# Patient Record
Sex: FEMALE | Race: WHITE | NOT HISPANIC OR LATINO | Employment: FULL TIME | URBAN - METROPOLITAN AREA
[De-identification: names, ages, dates, MRNs, and addresses within clinical notes are randomized per-mention and may not be internally consistent; named-entity substitution may affect disease eponyms.]

---

## 2017-01-10 ENCOUNTER — ALLSCRIPTS OFFICE VISIT (OUTPATIENT)
Dept: OTHER | Facility: OTHER | Age: 57
End: 2017-01-10

## 2017-05-02 ENCOUNTER — LAB CONVERSION - ENCOUNTER (OUTPATIENT)
Dept: OTHER | Facility: OTHER | Age: 57
End: 2017-05-02

## 2017-05-02 LAB
CHOLEST SERPL-MCNC: 163 MG/DL (ref 125–200)
CHOLEST/HDLC SERPL: 3.4 (CALC)
HBA1C MFR BLD HPLC: 5.7 % OF TOTAL HGB
HDLC SERPL-MCNC: 48 MG/DL
LDL CHOLESTEROL (HISTORICAL): 98 MG/DL (CALC)
NON-HDL-CHOL (CHOL-HDL) (HISTORICAL): 115 MG/DL (CALC)
TRIGL SERPL-MCNC: 87 MG/DL

## 2017-05-04 ENCOUNTER — ALLSCRIPTS OFFICE VISIT (OUTPATIENT)
Dept: OTHER | Facility: OTHER | Age: 57
End: 2017-05-04

## 2017-10-09 ENCOUNTER — ALLSCRIPTS OFFICE VISIT (OUTPATIENT)
Dept: OTHER | Facility: OTHER | Age: 57
End: 2017-10-09

## 2017-10-19 ENCOUNTER — ALLSCRIPTS OFFICE VISIT (OUTPATIENT)
Dept: OTHER | Facility: OTHER | Age: 57
End: 2017-10-19

## 2017-10-19 DIAGNOSIS — M25.579 PAIN IN ANKLE: ICD-10-CM

## 2017-10-20 NOTE — PROGRESS NOTES
Assessment  1  Pain, joint, ankle and foot, unspecified laterality (094 36) (Y39 [id number])    Plan  Pain, joint, ankle and foot, unspecified laterality    · * XR ANKLE 3+ VIEW RIGHT; Status:Active; Requested TCE:[id number];     Discussion/Summary    1  R ankle pain and swelling- possible OA with achilles tendinitis  Check xray R ankle and then refer to [facility]  Chief Complaint  1 wk follow up to right ankle pain  Pt  noted the swollen has gone down some, she continues to have pain in right ankle  kck      History of Present Illness  Patient here today for continued right ankle pain and Achilles tenderness  She states that the Mobic that was given to her about a week ago did not make any difference but she continues to take it on a daily basis  She does not remember any injury and states that she has been mostly wearing her flip-flops because she feels the arch is very good and has good support  She often hears a clicking coming from her ankle when she moves side to side  She states she did have issues with her right ankle in the past and had to wear a boot but upon looking in her past records it was actually her left ankle which she had x-rays and MRIs for  At that time she was seeing Dr Lavern rabago with Ortho  Patient states she did see Dr Tyree Lambert podiatrist for some problem in the past as well  Review of Systems    Constitutional: No fever, no chills, feels well, no tiredness, no recent weight gain or weight loss  Eyes: No complaints of eye pain, no red eyes, no eyesight problems, no discharge, no dry eyes, no itching of eyes  ENT: no complaints of earache, no loss of hearing, no nose bleeds, no nasal discharge, no sore throat, no hoarseness  Cardiovascular: No complaints of slow heart rate, no fast heart rate, no chest pain, no palpitations, no leg claudication, no lower extremity edema  Respiratory: No complaints of shortness of breath, no wheezing, no cough, no SOB on exertion, no orthopnea, no PND  Gastrointestinal: No complaints of abdominal pain, no constipation, no nausea or vomiting, no diarrhea, no bloody stools  Genitourinary: No complaints of dysuria, no incontinence, no pelvic pain, no dysmenorrhea, no vaginal discharge or bleeding  Musculoskeletal: as noted in HPI  Integumentary: No complaints of skin rash or lesions, no itching, no skin wounds, no breast pain or lump  Neurological: No complaints of headache, no confusion, no convulsions, no numbness, no dizziness or fainting, no tingling, no limb weakness, no difficulty walking  Psychiatric: Not suicidal, no sleep disturbance, no anxiety or depression, no change in personality, no emotional problems  Endocrine: No complaints of proptosis, no hot flashes, no muscle weakness, no deepening of the voice, no feelings of weakness  Hematologic/Lymphatic: No complaints of swollen glands, no swollen glands in the neck, does not bleed easily, does not bruise easily  ROS reviewed  Active Problems  1  Acute sinusitis (461 9) (J01 90)   2  Acute upper respiratory infection (465 9) (J06 9)   3  Allergic dermatitis (692 9) (L23 9)   4  Allergic rhinitis (477 9) (J30 9)   5  Anisocoria (379 41) (H57 02)   6  Asymptomatic menopausal state (V49 81) (Z78 0)   7  Attention deficit hyperactivity disorder (314 01) (F90 9)   8  Bronchitis, acute (466 0) (J20 9)   9  Chest pain (786 50) (R07 9)   10  Chronic sinusitis (473 9) (J32 9)   11  Cough (786 2) (R05)   12  Degenerative joint disease (715 90) (M19 90)   13  Depression with anxiety (300 4) (F41 8)   14  Dizziness (780 4) (R42)   15  Edema (782 3) (R60 9)   16  Edema (782 3) (R60 9)   17  Encounter for screening colonoscopy (V76 51) (Z12 11)   18  Encounter for screening mammogram for breast cancer (V76 12) (Z12 31)   19  Foot Pain (Soft Tissue) (729 5)   20  HTN (hypertension) (401 9) (I10)   21  Hyperglycemia (790 29) (R73 9)   22  Hyperlipidemia (272 4) (E78 5)   23   Insomnia (780 52) (G47 00)   24  Labile Hypertension (On Exam)   25  Memory disturbance (780 93) (R41 3)   26  Microscopic hematuria (599 72) (R31 29)   27  Multiple Environmental Allergies (V15 05)   28  Nausea (787 02) (R11 0)   29  Osteoporosis (733 00) (M81 0)   30  Other screening mammogram (V76 12) (Z12 31)   31  Pain, joint, ankle and foot, unspecified laterality (719 47) (M25 579)   32  Pericardial cyst (746 89) (Q24 8)   33  Pre-op testing (V72 84) (Z01 818)   34  Reactive airway disease (493 90) (J45 909)   35  Sciatica (724 3) (M54 30)   36  Sinus disease (473 9) (J34 9)   37  Urinary tract infection (599 0) (N39 0)   38  Visit for routine gyn exam (V72 31) (Z01 419)   39  Vitamin D deficiency (268 9) (E55 9)    Past Medical History  1  History of Anxiety (300 00) (F41 9)   2  History of Depression (311) (F32 9)   3  History of High cholesterol (272 0) (E78 00)   4  History of sinusitis (V12 69) (Z87 09)    The active problems and past medical history were reviewed and updated today  Surgical History  1  History of Breast Surgery Puncture Aspiration Of Cyst   2  History of Exploratory Laparoscopy   3  History of Sinus Surgery    The surgical history was reviewed and updated today  Family History  Father    1  Family history of CABG  Sister    2  Family history of Type 2 Diabetes Mellitus  Brother    3  Family history of Bipolar Disorder   4  Family history of Depression With Anxiety   5  Family history of malignant neoplasm of esophagus (V16 0) (Z80 0)  Maternal Grandfather    6  Family history of Diabetes Mellitus (V18 0)  Family History    7  Family history of Depression With Anxiety   8  Family history of Pure Hypercholesterolemia    The family history was reviewed and updated today         Social History   · Denied: History of Alcohol Use (History)   · Completed college, associates degree   · Employed   · Lives with spouse   ·    · Never a smoker   · No alcohol use   · No caffeine use   · No drug use · No secondhand smoke exposure (V49 89) (Z78 9)  The social history was reviewed and updated today  Current Meds   1  Anti-Oxidant TABS; Therapy: (Recorded:09Oct2017) to Recorded   2  Atenolol 25 MG Oral Tablet; take 1 tablet every day; Therapy: 23VOR7896 to ([facility])  Requested for: 06QVI4042; Last   GT:85EGC3224 Ordered   3  Atorvastatin Calcium 80 MG Oral Tablet; TAKE ONE tablet(s) DAILY at bedtime; Therapy: 71Mof2670 to (Evaluate:29Gbe7288)  Requested for: 10Aug2017; Last   Rx:10Aug2017 Ordered   4  Calcium 1500 MG TABS; Therapy: (Recorded:09Oct2017) to Recorded   5  DULoxetine HCl - 60 MG Oral Capsule Delayed Release Particles; TAKE 1 CAPSULE   Daily; Therapy: 54TJO1760 to (Last Rx:70Abm8915)  Requested for: 45Dif3951 Ordered   6  Fluticasone Propionate 50 MCG/ACT Nasal Suspension; USE 2 SPRAYS IN EACH   NOSTRIL ONCE DAILY; Therapy: 06RKN1122 to (Last Rx:09Bsm6758)  Requested for: 62Vil5153 Ordered   7  Meloxicam 7 5 MG Oral Tablet; Take 1 tablet daily; Therapy: 95GRS5184 to (Last Rx:09Oct2017)  Requested for: 35PMJ1516 Ordered   8  TraZODone HCl - 50 MG Oral Tablet; TAKE 1 TABLET AT BEDTIME; Therapy: 47KDF3989 to (Last Rx:09Oct2017)  Requested for: 81ZYY9138 Ordered   9  Ventolin  (90 Base) MCG/ACT Inhalation Aerosol Solution; INHALE 2 PUFFS   EVERY 6 HOURS AS NEEDED; Therapy: 80GLP3809 to [phone])  Requested for: 98QOF1429; Last   Rx:07Iqm5684 Ordered   10  Vitamin C 500 MG Oral Tablet; Therapy: (Recorded:22Axn8072) to Recorded   11  Vitamin D 2000 UNIT Oral Capsule; TAKE 1 CAPSULE Daily; Therapy: 08CWV0906 to (Last Rx:21Knz7069) Ordered    The medication list was reviewed and updated today  Allergies  1   No Known Drug Allergies    Vitals  Vital Signs    Recorded: 46QWK3825 03:10PM   Heart Rate 80   Systolic 021, LUE, Sitting   Diastolic 60, LUE, Sitting   Height 5 ft 2 in   Weight 150 lb 2 oz   BMI Calculated 27 46   BSA Calculated 1 69 Physical Exam    Constitutional   General appearance: No acute distress, well appearing and well nourished  Eyes   Conjunctiva and lids: No swelling, erythema or discharge  Ears, Nose, Mouth, and Throat   External inspection of ears and nose: Normal     Pulmonary   Respiratory effort: No increased work of breathing or signs of respiratory distress  Musculoskeletal   Inspection/palpation of joints, bones, and muscles: Abnormal  -- Right outer malleolus with mild edema and no warmth mild decreased range of motion  No tenderness to palpation  Tender to Achilles tendon insertion to the heel  Health Management  Other screening mammogram   Digital Bilateral Screening Mammogram With CAD; every 1 year; Next Due: 44WQW4795;  Overdue    Future Appointments    Date/Time Provider Specialty Site   11/15/2017 08:00 AM Nathanael Benz, [address]     Signatures   Electronically signed by : Adelina Monreal, [facility]; Oct 19 2017  3:46PM EST                       (Author)    Electronically signed by : Valentino Bence, DO; Oct 19 2017  4:22PM EST

## 2017-10-28 NOTE — PROGRESS NOTES
Assessment    1  Degenerative joint disease (215 90) (M19 90)    Plan  Allergic rhinitis    · Stop: Fluzone Quadrivalent 0 5 ML Intramuscular Suspension PrefilledSyringe  Degenerative joint disease    · Meloxicam 7 5 MG Oral Tablet; Take 1 tablet daily  Insomnia    · TraZODone HCl - 50 MG Oral Tablet; TAKE 1 TABLET AT BEDTIME  Reactive airway disease    · Ventolin  (90 Base) MCG/ACT Inhalation Aerosol Solution; INHALE 2PUFFS EVERY 6 HOURS AS NEEDED  Visit for routine gyn exam    · Stef Pandya Nurse Practitioner Co-Management  *  Status: Hold For -Scheduling  Requested for: 10ESV6015  Care Summary provided  : Yes    Discussion/Summary    #1  Degenerative joint disease of the right ankle-patient was placed on meloxicam 7 5 mg 1 daily with food #14 was offered an x-ray of the ankle but she wanted to wait to see how the meloxicam work  to get rid of her ankle pain  up 1 week for recheck  Her ankle pain which is intermittent may also be the result of Lipitor 80 mg daily  Possible side effects of new medications were reviewed with the patient/guardian today  The treatment plan was reviewed with the patient/guardian  The patient/guardian understands and agrees with the treatment plan     Self Referrals: No      Chief Complaint  Right ankle bothersome x 2 weeks  No specific recent injury  Pain varies with position  Some swelling  immunization declined - [facility]      History of Present Illness  HPI: This is a 57-year-old female who comes in with right ankle pain  She has not had any specific injury recently but several years ago she had an ankle injury  Occasionally the ankle swells  Right now the pain is located under the right medial malleolus  She has used Aleve which has helped  Review of Systems   Constitutional: No fever, no chills, feels well, no tiredness, no recent weight gain or loss    Cardiovascular: no complaints of slow or fast heart rate, no chest pain, no palpitations, no leg claudication or lower extremity edema  Respiratory: no complaints of shortness of breath, no wheezing, no dyspnea on exertion, no orthopnea or PND  Genitourinary: no complaints of dysuria, no incontinence, no pelvic pain, no dysmenorrhea, no vaginal discharge or abnormal vaginal bleeding  Musculoskeletal: joint stiffness-- and-- Right ankle pain, but-- as noted in HPI,-- no joint swelling,-- no limb pain,-- no myalgias-- and-- no limb swelling--   The patient presents with complaints of sudden onset of moderate right ankle arthralgias  ROS reviewed  Active Problems  1  Acute sinusitis (461 9) (J01 90)   2  Acute upper respiratory infection (465 9) (J06 9)   3  Allergic dermatitis (692 9) (L23 9)   4  Allergic rhinitis (477 9) (J30 9)   5  Anisocoria (379 41) (H57 02)   6  Ankle pain, unspecified laterality   7  Asymptomatic menopausal state (V49 81) (Z78 0)   8  Attention deficit hyperactivity disorder (314 01) (F90 9)   9  Bronchitis, acute (466 0) (J20 9)   10  Chest pain (786 50) (R07 9)   11  Chronic sinusitis (473 9) (J32 9)   12  Cough (786 2) (R05)   13  Depression with anxiety (300 4) (F41 8)   14  Dizziness (780 4) (R42)   15  Edema (782 3) (R60 9)   16  Edema (782 3) (R60 9)   17  Encounter for screening colonoscopy (V76 51) (Z12 11)   18  Encounter for screening mammogram for breast cancer (V76 12) (Z12 31)   19  Foot Pain (Soft Tissue) (729 5)   20  HTN (hypertension) (401 9) (I10)   21  Hyperglycemia (790 29) (R73 9)   22  Hyperlipidemia (272 4) (E78 5)   23  Insomnia (780 52) (G47 00)   24  Labile Hypertension (On Exam)   25  Memory disturbance (780 93) (R41 3)   26  Microscopic hematuria (599 72) (R31 29)   27  Multiple Environmental Allergies (V15 05)   28  Nausea (787 02) (R11 0)   29  Osteoporosis (733 00) (M81 0)   30  Other screening mammogram (V76 12) (Z12 31)   31  Pericardial cyst (746 89) (Q24 8)   32  Pre-op testing (V72 84) (Z01 818)   33  Reactive airway disease (102 90) (J45 909)   34  Sciatica (724 3) (M54 30)   35  Sinus disease (473 9) (J34 9)   36  Urinary tract infection (599 0) (N39 0)   37  Vitamin D deficiency (268 9) (E55 9)    Past Medical History  1  History of Anxiety (300 00) (F41 9)   2  History of Depression (311) (F32 9)   3  History of High cholesterol (272 0) (E78 00)   4  History of sinusitis (V12 69) (Z87 09)  Active Problems And Past Medical History Reviewed: The active problems and past medical history were reviewed and updated today  Family History  Father    1  Family history of CABG  Sister    2  Family history of Type 2 Diabetes Mellitus  Brother    3  Family history of Bipolar Disorder   4  Family history of Depression With Anxiety   5  Family history of malignant neoplasm of esophagus (V16 0) (Z80 0)  Maternal Grandfather    6  Family history of Diabetes Mellitus (V18 0)  Family History    7  Family history of Depression With Anxiety   8  Family history of Pure Hypercholesterolemia  Family History Reviewed: The family history was reviewed and updated today  Social History   · Denied: History of Alcohol Use (History)   · Completed college, associates degree   · Employed   · Lives with spouse   ·    · Never a smoker   · No alcohol use   · No caffeine use   · No drug use   · No secondhand smoke exposure (V49 89) (Z78 9)  The social history was reviewed and updated today  The social history was reviewed and is unchanged  Surgical History    1  History of Breast Surgery Puncture Aspiration Of Cyst   2  History of Exploratory Laparoscopy   3  History of Sinus Surgery  Surgical History Reviewed: The surgical history was reviewed and updated today  Current Meds   1  Anti-Oxidant TABS; Therapy: (Recorded:80Zmh9927) to Recorded   2  Atenolol 25 MG Oral Tablet; take 1 tablet every day; Therapy: 00IQE7453 to (Filipe Norman)  Requested for: 40VSN8077; Last RZ:81RRB5021 Ordered   3   Atorvastatin Calcium 80 MG Oral Tablet; TAKE ONE tablet(s) DAILY at bedtime; Therapy: 82Mdw4016 to (Evaluate:68Fdu9008)  Requested for: 41Yhf0230; Last Rx:04Uto7499 Ordered   4  Calcium 1500 MG TABS; Therapy: (Recorded:09Oct2017) to Recorded   5  DULoxetine HCl - 60 MG Oral Capsule Delayed Release Particles; TAKE 1 CAPSULE Daily; Therapy: 79EKZ6941 to (Last Rx:51Lte8229)  Requested for: 68Wnt3601 Ordered   6  Fluticasone Propionate 50 MCG/ACT Nasal Suspension; USE 2 SPRAYS IN EACH NOSTRIL ONCE DAILY; Therapy: 74VGS9553 to (Last Rx:88Nbp9538)  Requested for: 65Yaw5205 Ordered   7  TraZODone HCl - 50 MG Oral Tablet; TAKE 1 TABLET AT BEDTIME; Therapy: 98TZF2233 to (Last Rx:61Gcp8015)  Requested for: 67Dgy0162 Ordered   8  Ventolin  (90 Base) MCG/ACT Inhalation Aerosol Solution; INHALE 2 PUFFS EVERY 6 HOURS AS NEEDED; Therapy: 18PUO5041 to ([phone])  Requested for: 99YNY5833; Last Rx:31Mar2014 Ordered   9  Vitamin C 500 MG Oral Tablet; Therapy: (Recorded:09Oct2017) to Recorded   10  Vitamin D 2000 UNIT Oral Capsule; TAKE 1 CAPSULE Daily; Therapy: 72MJG1961 to (Last Rx:18Mar2015) Ordered    The medication list was reviewed and updated today  Allergies  1  No Known Drug Allergies    Vitals   Recorded: 20ZCO7073 06:00PM   Heart Rate 80, L Popliteal   Pulse Quality Regular, L Popliteal   Systolic 615, LLE, Sitting   Diastolic 90, LLE, Sitting   Height 5 ft 2 in   Weight 149 lb    BMI Calculated 27 25   BSA Calculated 1 69       Physical Exam   Constitutional  General appearance: No acute distress, well appearing and well nourished  Pulmonary  Auscultation of lungs: Clear to auscultation  Cardiovascular  Auscultation of heart: Normal rate and rhythm, normal S1 and S2, without murmurs  Examination of extremities for edema and/or varicosities: Normal    Musculoskeletal  Inspection/palpation of joints, bones, and muscles: Abnormal  -- Tender to palpation inferior to right medial malleolus with good range of motion of foot          Results/Data  PHQ-9 Adult Depression Screening 90QXL3385 06:27PM User, Margarita     Test Name Result Flag Reference   PHQ-9 Adult Depression Score 4       Over the last two weeks, how often have you been bothered by any of the following problems? Little interest or pleasure in doing things: Not at all - 0 Feeling down, depressed, or hopeless: Not at all - 0 Trouble falling or staying asleep, or sleeping too much: More than half the days - 2 Feeling tired or having little energy: Several days - 1 Poor appetite or over eating: Not at all - 0 Feeling bad about yourself - or that you are a failure or have let yourself or your family down: Not at all - 0 Trouble concentrating on things, such as reading the newspaper or watching television: Several days - 1 Moving or speaking so slowly that other people could have noticed  Or the opposite -  being so fidgety or restless that you have been moving around a lot more than usual: Not at all - 0 Thoughts that you would be better off dead, or of hurting yourself in some way: Not at all - 0   PHQ-9 Adult Depression Screening Negative     PHQ-9 Difficulty Level Not difficult at all     PHQ-9 Severity Minimal Depression         Future Appointments    Date/Time Provider Specialty Site   11/15/2017 08:00 AM Vani Garcias [facility]       Signatures   Electronically signed by : Lenoard Gaucher, [facility]; Oct  9 2017  6:31PM EST                       (Author)    Electronically signed by :  DARCY Mcdonald ; Oct 10 2017  9:12AM EST

## 2017-11-04 DIAGNOSIS — E55.9 VITAMIN D DEFICIENCY: ICD-10-CM

## 2017-11-04 DIAGNOSIS — R73.9 HYPERGLYCEMIA: ICD-10-CM

## 2017-11-04 DIAGNOSIS — E78.5 HYPERLIPIDEMIA: ICD-10-CM

## 2017-11-04 DIAGNOSIS — M81.0 AGE-RELATED OSTEOPOROSIS WITHOUT CURRENT PATHOLOGICAL FRACTURE: ICD-10-CM

## 2017-11-04 DIAGNOSIS — I10 ESSENTIAL (PRIMARY) HYPERTENSION: ICD-10-CM

## 2018-01-09 NOTE — MISCELLANEOUS
Provider Comments  Provider Comments:   Patient no-showed for her appt with me today at 3:00PM       Signatures   Electronically signed by : Karolina Henderson LCSW; Apr 25 2016  3:27PM EST                       (Author)

## 2018-01-11 NOTE — RESULT NOTES
Verified Results  ECHO COMPLETE WITH CONTRAST IF INDICATED 48QYR0391 08:58AM [facility]     Test Name Result Flag Reference   ECHO COMPLETE WITH CONTRAST IF INDICATED (Report)     [address]   [facility], [address] [facility]   [phone]     Transthoracic Echocardiogram   2D, M-mode, Doppler, and Color Doppler     Study date: 2016     Patient: Soy Young   MR number: [id number]   Account number: [de-identified]   : 1960   Age: 60 years   Gender: Female   Status: Outpatient   Location: Echo lab   Height: 62 in   Weight: 147 lb   BP: 122/ 64 mmHg     Indications: Congenital pericardial defect     Diagnoses: 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 9 - Congenital malformation of heart, unspecified     Sonographer: JAQUELINE Huang   Referring Physician: Raymon Ruff Aundria Osgood PAC   Group: [facility]   Interpreting Physician: Genoveva Palomo MD     SUMMARY     SUMMARY:   The small pericardial cyst noted on chest x-ray and chest CT was not visualized   well on echocardiogram  There was no extrinsic compression of any of the   cardiac chambers  LEFT VENTRICLE:   Systolic function was normal  Ejection fraction was estimated to be 65 %  There were no regional wall motion abnormalities  There was no evidence of concentric hypertrophy  MITRAL VALVE:   There was trace regurgitation  AORTIC VALVE:   There was trace regurgitation  TRICUSPID VALVE:   There was trace regurgitation  HISTORY: PRIOR HISTORY: Chest pain, dizziness, hypertension, edema,   hyperlipidemia     PROCEDURE: The procedure was performed in the echo lab  This was a routine   study  The transthoracic approach was used  The study included complete 2D   imaging, M-mode, complete spectral Doppler, and color Doppler  The heart rate   was 67 bpm, at the start of the study  Images were obtained from the   parasternal, apical, subcostal, and suprasternal notch acoustic windows   Image   quality was adequate  LEFT VENTRICLE: Size was normal  Systolic function was normal  Ejection   fraction was estimated to be 65 %  There were no regional wall motion   abnormalities  Wall thickness was normal  There was no evidence of concentric   hypertrophy  DOPPLER: Left ventricular diastolic function parameters were   normal      RIGHT VENTRICLE: The size was normal  Systolic function was normal  Wall   thickness was normal      LEFT ATRIUM: Size was normal      RIGHT ATRIUM: Size was normal      MITRAL VALVE: Valve structure was normal  There was normal leaflet separation  DOPPLER: The transmitral velocity was within the normal range  There was no   evidence for stenosis  There was trace regurgitation  AORTIC VALVE: The valve was trileaflet  Leaflets exhibited normal thickness and   normal cuspal separation  DOPPLER: Transaortic velocity was within the normal   range  There was no evidence for stenosis  There was trace regurgitation  TRICUSPID VALVE: The valve structure was normal  There was normal leaflet   separation  DOPPLER: The transtricuspid velocity was within the normal range  There was no evidence for stenosis  There was trace regurgitation  Pulmonary   artery systolic pressure was within the normal range  PULMONIC VALVE: Leaflets exhibited normal thickness, no calcification, and   normal cuspal separation  DOPPLER: The transpulmonic velocity was within the   normal range  There was no regurgitation  PERICARDIUM: There was no pericardial effusion  The pericardium was normal in   appearance  AORTA: The root exhibited normal size       SYSTEM MEASUREMENT TABLES     2D   %FS: 35 76 %   Ao Diam: 3 02 cm   EDV(Teich): 103 64 ml   EF(Cube): 73 49 %   EF(Teich): 65 25 %   ESV(Cube): 27 97 ml   ESV(Teich): 36 02 ml   IVSd: 0 85 cm   LA Area: 13 81 cm2   LA Diam: 2 67 cm   LVEDV MOD A4C: 67 97 ml   LVEF MOD A4C: 69 45 %   LVESV MOD A4C: 20 76 ml   LVIDd: 4 72 cm   LVIDs: 3 04 cm   LVLd A4C: 6 79 cm   LVLs A4C: 4 95 cm   LVPWd: 0 93 cm   RA Area: 12 12 cm2   SV MOD A4C: 47 21 ml   SV(Cube): 77 52 ml   SV(Teich): 67 62 ml   rv diam: 2 37 cm     CW   TR Vmax: 2 11 m/s   TR maxP 88 mmHg     MM   TAPSE: 1 98 cm     PW   E': 0 1 m/s   E/E': 11 26   MV A Yasmany: 0 93 m/s   MV Dec [place]: 5 1 m/s2   MV DecT: 222 88 ms   MV E Yasmany: 1 14 m/s   MV E/A Ratio: 1 22     [employer] Accredited Echocardiography Laboratory     Prepared and electronically signed by     Lynnette Farfan MD   Signed [id number] 17:13:37

## 2018-01-12 VITALS
HEIGHT: 62 IN | WEIGHT: 150.13 LBS | HEART RATE: 80 BPM | DIASTOLIC BLOOD PRESSURE: 60 MMHG | SYSTOLIC BLOOD PRESSURE: 118 MMHG | BODY MASS INDEX: 27.63 KG/M2

## 2018-01-12 NOTE — RESULT NOTES
Verified Results  (1) URINE CULTURE 21Oct2016 11:26AM Austin Joey Order Number: [id number]     Test Name Result Flag Reference   CLINICAL REPORT (Report)     Test:        Urine culture  Specimen Type:   Urine  Specimen Date:   10/21/2016 11:26 AM  Result Date:    10/24/2016 8:22 PM  Result Status:   Final result  Resulting Lab:   [place] [address]            Tel: [phone]      CULTURE                                       ------------------                                   60,000-69,000 cfu/ml Escherichia coli    10,000-19,000 cfu/ml Mixed Contaminants X2      SUSCEPTIBILITY                                   ------------------                                                       Escherichia coli  METHOD                 LIBRA  -------------------------------------  -------------------------  AMPICILLIN ($$)             <=8 00 ug/ml Susceptible  AZTREONAM ($$$)             <=8 ug/ml   Susceptible  CEFAZOLIN ($)              <=8 00 ug/ml Susceptible  CIPROFLOXACIN ($)            <=1 00 ug/ml Susceptible  GENTAMICIN ($$)             <=4 ug/ml   Susceptible  LEVOFLOXACIN ($)            <=2 00 ug/ml Susceptible  NITROFURANTOIN             <=32 ug/ml  Susceptible  PIPERACILLIN + TAZOBACTAM ($$$)     <=16 ug/ml  Susceptible  TETRACYCLINE              <=4 ug/ml   Susceptible  TOBRAMYCIN ($)             <=4 ug/ml   Susceptible  TRIMETHOPRIM + SULFAMETHOXAZOLE ($$$)  <=2/38 ug/ml Susceptible

## 2018-01-13 VITALS
TEMPERATURE: 97.4 F | WEIGHT: 151.13 LBS | HEART RATE: 76 BPM | SYSTOLIC BLOOD PRESSURE: 120 MMHG | HEIGHT: 62 IN | BODY MASS INDEX: 27.81 KG/M2 | DIASTOLIC BLOOD PRESSURE: 86 MMHG

## 2018-01-13 VITALS
SYSTOLIC BLOOD PRESSURE: 122 MMHG | WEIGHT: 148 LBS | HEIGHT: 62 IN | HEART RATE: 64 BPM | BODY MASS INDEX: 27.23 KG/M2 | DIASTOLIC BLOOD PRESSURE: 64 MMHG

## 2018-01-14 VITALS
DIASTOLIC BLOOD PRESSURE: 90 MMHG | SYSTOLIC BLOOD PRESSURE: 144 MMHG | HEIGHT: 62 IN | HEART RATE: 80 BPM | WEIGHT: 149 LBS | BODY MASS INDEX: 27.42 KG/M2

## 2018-01-14 NOTE — MISCELLANEOUS
Provider Comments  Provider Comments:   [facility] regarding pts missed appointment advised pt to call back to reschedule appointment        Signatures   Electronically signed by : Jules Harrison [facility]; Feb 19 2016  8:44AM EST                       (Author)    Electronically signed by : DARCY Hyatt ; Feb 19 2016  9:42AM EST                       (Author)

## 2018-01-16 NOTE — RESULT NOTES
Message  Patient aware of her CXR results   She will schedule her own Echo then follow up with Cardiologist       Plan  Pericardial cyst    · *1 - [facility] (CARDIOLOGY ) Physician Referral  Consult after her  Echocardiogram   Status: Active - Retrospective By Protocol Authorization  Requested  for: [id number]  Care Summary provided  : Yes    Signatures   Electronically signed by : Garett Hodge, ; Nov 17 2016  9:49AM EST                       (Author)

## 2018-01-17 NOTE — RESULT NOTES
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(a cyst on the "Saran wrap" that surrounds the heart)  She has had this since birth but it was never detected until now and was found incidentally  This will not prevent her from getting her surgery she is technically cleared forms were sent and faxed after completed  After her surgery she does need an echo and then to see a cardiologist for review  Verified Results  * XR CHEST PA & LATERAL 25Oct2016 05:01PM Bulmaro Briggs Order Number: [id number]     Test Name Result Flag Reference   XR CHEST PA & LATERAL (Report)     CHEST      INDICATION: Preoperative evaluation  COMPARISON: CT chest 6/5/2009     VIEWS: Frontal and lateral projections; 2 images     FINDINGS:        Cardiomediastinal silhouette demonstrates a normal heart size with obscuration of the right heart border which may represent an enlarging pericardial cyst      The lungs are clear  No pneumothorax or pleural effusion  Visualized osseous structures appear within normal limits for the patient's age  IMPRESSION:     Probable enlarging right-sided pericardial cyst        Workstation performed: [id number]     Signed by: Yuri Reyes MD   10/26/16       Plan  Pericardial cyst    · ECHO LIMITED WITH CONTRAST IF INDICATED; Status:Need Information - Financial  Authorization;  Requested U:[id number];     Signatures   Electronically signed by : Ashleigh Cordova, [facility]; Oct 27 2016 11:46AM EST                       (Author)

## 2018-02-17 ENCOUNTER — OFFICE VISIT (OUTPATIENT)
Dept: FAMILY MEDICINE CLINIC | Facility: CLINIC | Age: 58
End: 2018-02-17
Payer: COMMERCIAL

## 2018-02-17 VITALS
TEMPERATURE: 98.1 F | HEIGHT: 63 IN | DIASTOLIC BLOOD PRESSURE: 80 MMHG | RESPIRATION RATE: 20 BRPM | HEART RATE: 80 BPM | SYSTOLIC BLOOD PRESSURE: 132 MMHG | BODY MASS INDEX: 26.93 KG/M2 | WEIGHT: 152 LBS

## 2018-02-17 DIAGNOSIS — J20.9 ACUTE BRONCHITIS, UNSPECIFIED ORGANISM: Primary | ICD-10-CM

## 2018-02-17 PROBLEM — M19.90 DEGENERATIVE JOINT DISEASE: Status: ACTIVE | Noted: 2017-10-09

## 2018-02-17 PROCEDURE — 99214 OFFICE O/P EST MOD 30 MIN: CPT | Performed by: PHYSICIAN ASSISTANT

## 2018-02-17 PROCEDURE — 3008F BODY MASS INDEX DOCD: CPT | Performed by: PHYSICIAN ASSISTANT

## 2018-02-17 RX ORDER — PROMETHAZINE HYDROCHLORIDE AND CODEINE PHOSPHATE 6.25; 1 MG/5ML; MG/5ML
5 SYRUP ORAL EVERY 4 HOURS PRN
Qty: 118 ML | Refills: 0 | Status: SHIPPED | OUTPATIENT
Start: 2018-02-17 | End: 2018-02-27

## 2018-02-17 RX ORDER — AZITHROMYCIN 250 MG/1
TABLET, FILM COATED ORAL
Qty: 6 TABLET | Refills: 0 | Status: SHIPPED | OUTPATIENT
Start: 2018-02-17 | End: 2018-02-21

## 2018-02-17 NOTE — PATIENT INSTRUCTIONS
Problem List Items Addressed This Visit     Bronchitis, acute - Primary     Very highly likely started as influenza however patient has missed the window where Tamiflu initiation would be of benefit  Due to lung exam findings will cover for bronchitis with bacterial agent using Zithromax as directed  Increase fluids continue Delsym as needed during the day and Phenergan with codeine as needed at night for cough           Relevant Medications    azithromycin (ZITHROMAX) 250 mg tablet    promethazine-codeine (PHENERGAN WITH CODEINE) 6 25-10 mg/5 mL syrup

## 2018-02-17 NOTE — ASSESSMENT & PLAN NOTE
Very highly likely started as influenza however patient has missed the window where Tamiflu initiation would be of benefit  Due to lung exam findings will cover for bronchitis with bacterial agent using Zithromax as directed  Increase fluids continue Delsym as needed during the day and Phenergan with codeine as needed at night for cough

## 2018-02-17 NOTE — PROGRESS NOTES
Assessment/Plan:    Bronchitis, acute  Very highly likely started as influenza however patient has missed the window where Tamiflu initiation would be of benefit  Due to lung exam findings will cover for bronchitis with bacterial agent using Zithromax as directed  Increase fluids continue Delsym as needed during the day and Phenergan with codeine as needed at night for cough  Diagnoses and all orders for this visit:    Acute bronchitis, unspecified organism  -     azithromycin (ZITHROMAX) 250 mg tablet; Take two tablets on day one and then one tablet daily for the next four days  -     promethazine-codeine (PHENERGAN WITH CODEINE) 6 25-10 mg/5 mL syrup; Take 5 mL by mouth every 4 (four) hours as needed for cough for up to 10 days          Subjective:      Patient ID: Raven Taveras is a 59 y o  female  Cc: Cough, headache, fever last night of 100 00, fatigue x 4 days  Alverto Servant    Patient's  came home sick on Monday coughing and she developed symptoms on Tuesday  She states that as the week has gone but she has felt worse although her fever has broke with the 100 last night  She states that the most bothersome issue is her persistent cough Delsym with little relief  No nausea vomiting or diarrhea  URI    This is a new problem  The current episode started in the past 7 days  The problem has been gradually worsening  The maximum temperature recorded prior to her arrival was 100 4 - 100 9 F  The fever has been present for 1 to 2 days  Associated symptoms include congestion, coughing, ear pain, headaches, rhinorrhea, sinus pain and a sore throat  Pertinent negatives include no diarrhea, nausea or vomiting  She has tried antihistamine and decongestant for the symptoms  The treatment provided mild relief         The following portions of the patient's history were reviewed and updated as appropriate: allergies, current medications, past family history, past medical history, past social history, past surgical history and problem list     Review of Systems   Constitutional: Positive for chills, fatigue and fever  HENT: Positive for congestion, ear pain, rhinorrhea, sinus pain and sore throat  Eyes: Negative  Respiratory: Positive for cough  Cardiovascular: Negative  Gastrointestinal: Negative  Negative for diarrhea, nausea and vomiting  Endocrine: Negative  Genitourinary: Negative  Musculoskeletal: Positive for arthralgias and myalgias  Skin: Negative  Allergic/Immunologic: Negative  Neurological: Positive for headaches  Hematological: Negative  Psychiatric/Behavioral: Negative  Objective:      Vitals:    02/17/18 0900   BP: 132/80   BP Location: Left arm   Patient Position: Sitting   Cuff Size: Standard   Pulse: 80   Resp: 20   Temp: 98 1 °F (36 7 °C)   TempSrc: Tympanic   Weight: 68 9 kg (152 lb)   Height: 5' 3" (1 6 m)          Physical Exam   Constitutional: She is oriented to person, place, and time  She appears well-developed and well-nourished  No distress  HENT:   Head: Normocephalic and atraumatic  Eyes: Conjunctivae are normal  Right eye exhibits no discharge  Left eye exhibits no discharge  Neck: Neck supple  Cardiovascular: Normal rate, regular rhythm and normal heart sounds  Exam reveals no gallop and no friction rub  No murmur heard  Pulmonary/Chest: Effort normal  No respiratory distress  She has no wheezes  She has rhonchi in the right middle field and the left middle field  She has no rales  Neurological: She is alert and oriented to person, place, and time  Skin: Skin is warm and dry  She is not diaphoretic  Psychiatric: She has a normal mood and affect  Judgment normal    Nursing note and vitals reviewed

## 2018-05-14 DIAGNOSIS — I10 ESSENTIAL HYPERTENSION: Primary | ICD-10-CM

## 2018-05-14 RX ORDER — ATENOLOL 25 MG/1
TABLET ORAL
Qty: 90 TABLET | Refills: 1 | Status: SHIPPED | OUTPATIENT
Start: 2018-05-14 | End: 2018-10-29 | Stop reason: SDUPTHER

## 2018-07-11 ENCOUNTER — TRANSCRIBE ORDERS (OUTPATIENT)
Dept: ADMINISTRATIVE | Facility: HOSPITAL | Age: 58
End: 2018-07-11

## 2018-08-21 NOTE — ASSESSMENT & PLAN NOTE
Fasting lipid panel ordered  Overdue  Also ordered a set of blood work for 6 months from now to get patient back on track  Call/card with results of labs due soon and return to office after blood work completed in 6 months as well

## 2018-08-21 NOTE — ASSESSMENT & PLAN NOTE
Fasting CMP and hemoglobin A1c ordered  Overdue  Also ordered a set of blood work for 6 months from now to get patient back on track  Call/card with results of labs due soon and return to office after blood work completed in 6 months as well

## 2018-08-21 NOTE — ASSESSMENT & PLAN NOTE
Vitamin-D level ordered  Overdue  Also ordered a set of blood work for 6 months from now to get patient back on track  Call/card with results of labs due soon and return to office after blood work completed in 6 months as well

## 2018-08-22 ENCOUNTER — OFFICE VISIT (OUTPATIENT)
Dept: FAMILY MEDICINE CLINIC | Facility: CLINIC | Age: 58
End: 2018-08-22
Payer: COMMERCIAL

## 2018-08-22 VITALS
DIASTOLIC BLOOD PRESSURE: 60 MMHG | WEIGHT: 151.6 LBS | HEART RATE: 68 BPM | BODY MASS INDEX: 26.85 KG/M2 | SYSTOLIC BLOOD PRESSURE: 104 MMHG

## 2018-08-22 DIAGNOSIS — E78.2 MIXED HYPERLIPIDEMIA: ICD-10-CM

## 2018-08-22 DIAGNOSIS — E55.9 VITAMIN D DEFICIENCY: ICD-10-CM

## 2018-08-22 DIAGNOSIS — I10 ESSENTIAL HYPERTENSION: Primary | ICD-10-CM

## 2018-08-22 DIAGNOSIS — R07.81 RIB PAIN ON LEFT SIDE: ICD-10-CM

## 2018-08-22 DIAGNOSIS — F51.01 PRIMARY INSOMNIA: ICD-10-CM

## 2018-08-22 DIAGNOSIS — Z12.31 SCREENING MAMMOGRAM, ENCOUNTER FOR: ICD-10-CM

## 2018-08-22 DIAGNOSIS — R73.9 HYPERGLYCEMIA: ICD-10-CM

## 2018-08-22 DIAGNOSIS — F41.8 DEPRESSION WITH ANXIETY: ICD-10-CM

## 2018-08-22 PROCEDURE — 3078F DIAST BP <80 MM HG: CPT | Performed by: PHYSICIAN ASSISTANT

## 2018-08-22 PROCEDURE — 99214 OFFICE O/P EST MOD 30 MIN: CPT | Performed by: PHYSICIAN ASSISTANT

## 2018-08-22 PROCEDURE — 3074F SYST BP LT 130 MM HG: CPT | Performed by: PHYSICIAN ASSISTANT

## 2018-08-22 RX ORDER — DULOXETIN HYDROCHLORIDE 60 MG/1
60 CAPSULE, DELAYED RELEASE ORAL DAILY
Qty: 90 CAPSULE | Refills: 3 | Status: SHIPPED | OUTPATIENT
Start: 2018-08-22 | End: 2018-08-28 | Stop reason: SDUPTHER

## 2018-08-22 RX ORDER — FLUTICASONE PROPIONATE 50 MCG
2 SPRAY, SUSPENSION (ML) NASAL DAILY
COMMUNITY
Start: 2015-09-30 | End: 2018-09-10 | Stop reason: SDUPTHER

## 2018-08-22 RX ORDER — ATORVASTATIN CALCIUM 80 MG/1
80 TABLET, FILM COATED ORAL DAILY
Qty: 90 TABLET | Refills: 3 | Status: SHIPPED | OUTPATIENT
Start: 2018-08-22 | End: 2018-08-28 | Stop reason: SDUPTHER

## 2018-08-22 NOTE — ASSESSMENT & PLAN NOTE
Pt with possible rib fracture from very hard/tight hug  Check xray in future if wanted  Declines today  Continue aleve up to 2 twice daily

## 2018-08-22 NOTE — PROGRESS NOTES
Assessment/Plan:    Hyperglycemia  Fasting CMP and hemoglobin A1c ordered  Overdue  Also ordered a set of blood work for 6 months from now to get patient back on track  Call/card with results of labs due soon and return to office after blood work completed in 6 months as well  Vitamin D deficiency  Vitamin-D level ordered  Overdue  Also ordered a set of blood work for 6 months from now to get patient back on track  Call/card with results of labs due soon and return to office after blood work completed in 6 months as well  Mixed hyperlipidemia  Fasting lipid panel ordered  Overdue  Also ordered a set of blood work for 6 months from now to get patient back on track  Call/card with results of labs due soon and return to office after blood work completed in 6 months as well  Essential hypertension  Very well control continue current medication  Rib pain on left side  Pt with possible rib fracture from very hard/tight hug  Check xray in future if wanted  Declines today  Continue aleve up to 2 twice daily  Depression with anxiety  Sightly uncontrolled due to weather, work stress and family stress  Pt would like to see how the next 1-2 months go before changing/tweeking her meds  Recheck in 2 months  Diagnoses and all orders for this visit:    Essential hypertension    Hyperglycemia  -     Comprehensive metabolic panel; Future  -     Hemoglobin A1C; Future  -     Hemoglobin A1C; Future  -     Comprehensive metabolic panel; Future    Vitamin D deficiency  -     Vitamin D 25 hydroxy; Future  -     Vitamin D 25 hydroxy; Future    Mixed hyperlipidemia  -     Lipid Panel with Direct LDL reflex; Future  -     Lipid Panel with Direct LDL reflex; Future  -     atorvastatin (LIPITOR) 80 mg tablet; Take 1 tablet (80 mg total) by mouth daily    Primary insomnia    Depression with anxiety  -     DULoxetine (CYMBALTA) 60 mg delayed release capsule;  Take 1 capsule (60 mg total) by mouth daily    Screening mammogram, encounter for  -     Mammo screening bilateral w 3d & cad; Future    Rib pain on left side    Other orders  -     fluticasone (FLONASE) 50 mcg/act nasal spray; 2 sprays into each nostril daily          Subjective:   CC: Follow up for chronic conditions, requesting blood work script and medication refills  c/o pain on left side in the rib area, pt  Got a hug several days ago and has been having pain since  kck    PHQ-9 Depression Screening    PHQ-9:    Frequency of the following problems over the past two weeks:       Little interest or pleasure in doing things:  1 - several days  Feeling down, depressed, or hopeless:  2 - more than half the days  Trouble falling or staying asleep, or sleeping too much:  0 - not at all  Feeling tired or having little energy:  3 - nearly every day  Poor appetite or overeatin - several days  Feeling bad about yourself - or that you are a failure or have let yourself or your family down:  2 - more than half the days  Trouble concentrating on things, such as reading the newspaper or watching television:  1 - several days  Moving or speaking so slowly that other people could have noticed  Or the opposite - being so fidgety or restless that you have been moving around a lot more than usual:  0 - not at all  Thoughts that you would be better off dead, or of hurting yourself in some way:  0 - not at all  PHQ-2 Score:  3  PHQ-9 Score:  10          Patient ID: John Tello is a 59 y o  female  John Tello is here for chronic conditions f/u including the diagnosis of Essential hypertension  (primary encounter diagnosis)  Hyperglycemia  Vitamin d deficiency  Mixed hyperlipidemia  Primary insomnia  Depression with anxiety  Screening mammogram, encounter for   Pt  states they are taking all medications as directed without complaints or side effects       Patient has definitely been feeling overwhelmed with her job and 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 really made her drop stressful this year, she is taking care both her parents and the weather has been so dismal   She definitely feels that she has decreased motivation decrease in energy without problems with suicidal or homicidal ideations  She is unsure if she should tweak or change any of her medications at this time or weight which she prefers to wait to see if the weather improvement coming up will help her  She states even her  noticed that she is feeling more under the weather than usual         The following portions of the patient's history were reviewed and updated as appropriate: allergies, current medications, past family history, past medical history, past social history, past surgical history and problem list     Review of Systems   Constitutional: Negative  HENT: Negative  Eyes: Negative  Respiratory: Negative  Cardiovascular: Negative  Gastrointestinal: Negative  Endocrine: Negative  Genitourinary: Negative  Musculoskeletal: Negative  Skin: Negative  Allergic/Immunologic: Negative  Neurological: Negative  Hematological: Negative  Psychiatric/Behavioral: Positive for dysphoric mood  Objective:      Vitals:    08/22/18 0821   BP: 104/60   BP Location: Left arm   Patient Position: Sitting   Pulse: 68   Weight: 68 8 kg (151 lb 9 6 oz)            Physical Exam   Constitutional: She is oriented to person, place, and time  She appears well-developed and well-nourished  No distress  HENT:   Head: Normocephalic and atraumatic  Eyes: Conjunctivae are normal  Right eye exhibits no discharge  Left eye exhibits no discharge  Neck: Neck supple  Carotid bruit is not present  Cardiovascular: Normal rate, regular rhythm and normal heart sounds  Exam reveals no gallop and no friction rub  No murmur heard  Pulmonary/Chest: Effort normal and breath sounds normal  No respiratory distress  She has no wheezes  She has no rales     Neurological: She is alert and oriented to person, place, and time  Skin: Skin is warm and dry  She is not diaphoretic  Psychiatric: She has a normal mood and affect  Judgment normal    Nursing note and vitals reviewed

## 2018-08-22 NOTE — ASSESSMENT & PLAN NOTE
Sightly uncontrolled due to weather, work stress and family stress  Pt would like to see how the next 1-2 months go before changing/tweeking her meds  Recheck in 2 months

## 2018-08-22 NOTE — PATIENT INSTRUCTIONS
Problem List Items Addressed This Visit        Cardiovascular and Mediastinum    Essential hypertension - Primary     Very well control continue current medication  Other    Depression with anxiety    Relevant Medications    DULoxetine (CYMBALTA) 60 mg delayed release capsule    Hyperglycemia     Fasting CMP and hemoglobin A1c ordered  Overdue  Also ordered a set of blood work for 6 months from now to get patient back on track  Call/card with results of labs due soon and return to office after blood work completed in 6 months as well  Relevant Orders    Comprehensive metabolic panel    Hemoglobin A1C    Hemoglobin A1C    Comprehensive metabolic panel    Mixed hyperlipidemia     Fasting lipid panel ordered  Overdue  Also ordered a set of blood work for 6 months from now to get patient back on track  Call/card with results of labs due soon and return to office after blood work completed in 6 months as well  Relevant Medications    atorvastatin (LIPITOR) 80 mg tablet    Other Relevant Orders    Lipid Panel with Direct LDL reflex    Lipid Panel with Direct LDL reflex    Insomnia    Vitamin D deficiency     Vitamin-D level ordered  Overdue  Also ordered a set of blood work for 6 months from now to get patient back on track  Call/card with results of labs due soon and return to office after blood work completed in 6 months as well           Relevant Orders    Vitamin D 25 hydroxy    Vitamin D 25 hydroxy      Other Visit Diagnoses     Screening mammogram, encounter for        Relevant Orders    Mammo screening bilateral w 3d & cad

## 2018-08-28 DIAGNOSIS — F41.8 DEPRESSION WITH ANXIETY: ICD-10-CM

## 2018-08-28 DIAGNOSIS — E78.2 MIXED HYPERLIPIDEMIA: ICD-10-CM

## 2018-08-28 RX ORDER — DULOXETIN HYDROCHLORIDE 60 MG/1
60 CAPSULE, DELAYED RELEASE ORAL DAILY
Qty: 90 CAPSULE | Refills: 3 | Status: SHIPPED | OUTPATIENT
Start: 2018-08-28 | End: 2019-11-04 | Stop reason: SDUPTHER

## 2018-08-28 RX ORDER — ATORVASTATIN CALCIUM 80 MG/1
80 TABLET, FILM COATED ORAL DAILY
Qty: 90 TABLET | Refills: 3 | Status: SHIPPED | OUTPATIENT
Start: 2018-08-28 | End: 2019-08-06 | Stop reason: SDUPTHER

## 2018-09-10 ENCOUNTER — OFFICE VISIT (OUTPATIENT)
Dept: FAMILY MEDICINE CLINIC | Facility: CLINIC | Age: 58
End: 2018-09-10
Payer: COMMERCIAL

## 2018-09-10 VITALS
TEMPERATURE: 97.8 F | BODY MASS INDEX: 27 KG/M2 | HEART RATE: 68 BPM | WEIGHT: 152.4 LBS | SYSTOLIC BLOOD PRESSURE: 118 MMHG | DIASTOLIC BLOOD PRESSURE: 70 MMHG

## 2018-09-10 DIAGNOSIS — J30.1 SEASONAL ALLERGIC RHINITIS DUE TO POLLEN: Primary | ICD-10-CM

## 2018-09-10 PROCEDURE — 99213 OFFICE O/P EST LOW 20 MIN: CPT | Performed by: PHYSICIAN ASSISTANT

## 2018-09-10 RX ORDER — FLUTICASONE PROPIONATE 50 MCG
2 SPRAY, SUSPENSION (ML) NASAL DAILY
Qty: 16 G | Refills: 5 | Status: SHIPPED | OUTPATIENT
Start: 2018-09-10

## 2018-09-10 NOTE — PATIENT INSTRUCTIONS
Problem List Items Addressed This Visit        Respiratory    Seasonal allergic rhinitis due to pollen - Primary     Restart flonase as directed, Mucinex DM as directed  Pt has HTN so should not use decongestant however BP very well controlled so may use for 2-3 days if needed

## 2018-09-10 NOTE — ASSESSMENT & PLAN NOTE
Restart flonase as directed, Mucinex DM as directed  Pt has HTN so should not use decongestant however BP very well controlled so may use for 2-3 days if needed

## 2018-09-10 NOTE — PROGRESS NOTES
Assessment/Plan:    Seasonal allergic rhinitis due to pollen  Restart flonase as directed, Mucinex DM as directed  Pt has HTN so should not use decongestant however BP very well controlled so may use for 2-3 days if needed  Diagnoses and all orders for this visit:    Seasonal allergic rhinitis due to pollen    Other orders  -     fluticasone (FLONASE) 50 mcg/act nasal spray; 2 sprays into each nostril daily          Subjective:   CC: c/o cough and tickle in throat  Pt  States worse when she lays down  kck     Patient ID: John Tello is a 59 y o  female  Patient here today because she developed scratchy throat with a constant dry mouth nonproductive cough since her last appointment  She is being kept up by this  She states she otherwise does not feel sick but she is not sure if this is her allergies are she actually has an infection  No other coworkers or  is sick  She has not tried anything over-the-counter  She does have seasonal allergies for which she gets monthly immunotherapy injections  She is out of her Flonase and does not take daily nondrowsy antihistamines because the wire her up  No fever nausea vomiting or diarrhea  The following portions of the patient's history were reviewed and updated as appropriate: allergies, current medications, past family history, past medical history, past social history, past surgical history and problem list     Review of Systems   Constitutional: Negative  HENT: Positive for sore throat and voice change  Negative for congestion, ear pain, postnasal drip, rhinorrhea and sinus pressure  Eyes: Negative  Respiratory: Positive for cough  Negative for chest tightness and wheezing  Cardiovascular: Negative  Gastrointestinal: Negative  Endocrine: Negative  Genitourinary: Negative  Musculoskeletal: Negative  Skin: Negative  Allergic/Immunologic: Negative  Neurological: Negative  Hematological: Negative  Psychiatric/Behavioral: Negative  Objective:      Vitals:    09/10/18 1404   BP: 118/70   BP Location: Left arm   Patient Position: Sitting   Pulse: 68   Temp: 97 8 °F (36 6 °C)   TempSrc: Tympanic   Weight: 69 1 kg (152 lb 6 4 oz)            Physical Exam   Constitutional: She is oriented to person, place, and time  She appears well-developed and well-nourished  No distress  HENT:   Head: Normocephalic and atraumatic  Right Ear: Hearing, tympanic membrane, external ear and ear canal normal    Left Ear: Hearing, tympanic membrane, external ear and ear canal normal    Nose: Nose normal    Mouth/Throat: Uvula is midline  Posterior oropharyngeal edema and posterior oropharyngeal erythema present  Eyes: Conjunctivae are normal  Right eye exhibits no discharge  Left eye exhibits no discharge  Neck: Neck supple  Carotid bruit is not present  Cardiovascular: Normal rate, regular rhythm and normal heart sounds  Exam reveals no gallop and no friction rub  No murmur heard  Pulmonary/Chest: Effort normal and breath sounds normal  No respiratory distress  She has no wheezes  She has no rales  Lymphadenopathy:     She has cervical adenopathy  Neurological: She is alert and oriented to person, place, and time  Skin: Skin is warm and dry  She is not diaphoretic  Psychiatric: She has a normal mood and affect  Judgment normal    Nursing note and vitals reviewed

## 2018-10-06 LAB
25(OH)D3 SERPL-MCNC: 42 NG/ML (ref 30–100)
ALBUMIN SERPL-MCNC: 4.5 G/DL (ref 3.6–5.1)
ALBUMIN/GLOB SERPL: 1.8 (CALC) (ref 1–2.5)
ALP SERPL-CCNC: 110 U/L (ref 33–130)
ALT SERPL-CCNC: 14 U/L (ref 6–29)
AST SERPL-CCNC: 14 U/L (ref 10–35)
BILIRUB SERPL-MCNC: 1.2 MG/DL (ref 0.2–1.2)
BUN SERPL-MCNC: 21 MG/DL (ref 7–25)
BUN/CREAT SERPL: ABNORMAL (CALC) (ref 6–22)
CALCIUM SERPL-MCNC: 9.4 MG/DL (ref 8.6–10.4)
CHLORIDE SERPL-SCNC: 103 MMOL/L (ref 98–110)
CHOLEST SERPL-MCNC: 171 MG/DL
CHOLEST/HDLC SERPL: 4.2 (CALC)
CO2 SERPL-SCNC: 31 MMOL/L (ref 20–32)
CREAT SERPL-MCNC: 0.8 MG/DL (ref 0.5–0.99)
GLOBULIN SER CALC-MCNC: 2.5 G/DL (CALC) (ref 1.9–3.7)
GLUCOSE SERPL-MCNC: 106 MG/DL (ref 65–99)
HDLC SERPL-MCNC: 41 MG/DL
LDLC SERPL CALC-MCNC: 102 MG/DL (CALC)
NONHDLC SERPL-MCNC: 130 MG/DL (CALC)
POTASSIUM SERPL-SCNC: 4.7 MMOL/L (ref 3.5–5.3)
PROT SERPL-MCNC: 7 G/DL (ref 6.1–8.1)
SL AMB EGFR AFRICAN AMERICAN: 93 ML/MIN/1.73M2
SL AMB EGFR NON AFRICAN AMERICAN: 80 ML/MIN/1.73M2
SODIUM SERPL-SCNC: 140 MMOL/L (ref 135–146)
TRIGL SERPL-MCNC: 159 MG/DL

## 2018-10-28 PROBLEM — J20.9 BRONCHITIS, ACUTE: Status: RESOLVED | Noted: 2017-01-10 | Resolved: 2018-10-28

## 2018-10-29 ENCOUNTER — OFFICE VISIT (OUTPATIENT)
Dept: FAMILY MEDICINE CLINIC | Facility: CLINIC | Age: 58
End: 2018-10-29
Payer: COMMERCIAL

## 2018-10-29 VITALS
SYSTOLIC BLOOD PRESSURE: 128 MMHG | DIASTOLIC BLOOD PRESSURE: 70 MMHG | WEIGHT: 151.8 LBS | BODY MASS INDEX: 26.89 KG/M2 | HEART RATE: 64 BPM

## 2018-10-29 DIAGNOSIS — F51.01 PRIMARY INSOMNIA: ICD-10-CM

## 2018-10-29 DIAGNOSIS — E55.9 VITAMIN D DEFICIENCY: ICD-10-CM

## 2018-10-29 DIAGNOSIS — R73.9 HYPERGLYCEMIA: ICD-10-CM

## 2018-10-29 DIAGNOSIS — F41.8 DEPRESSION WITH ANXIETY: Primary | ICD-10-CM

## 2018-10-29 DIAGNOSIS — I10 ESSENTIAL HYPERTENSION: ICD-10-CM

## 2018-10-29 DIAGNOSIS — E78.2 MIXED HYPERLIPIDEMIA: ICD-10-CM

## 2018-10-29 PROBLEM — R07.81 RIB PAIN ON LEFT SIDE: Status: RESOLVED | Noted: 2018-08-22 | Resolved: 2018-10-29

## 2018-10-29 LAB — SL AMB POCT HEMOGLOBIN AIC: 5.8

## 2018-10-29 PROCEDURE — 3078F DIAST BP <80 MM HG: CPT | Performed by: PHYSICIAN ASSISTANT

## 2018-10-29 PROCEDURE — 3074F SYST BP LT 130 MM HG: CPT | Performed by: PHYSICIAN ASSISTANT

## 2018-10-29 PROCEDURE — 99214 OFFICE O/P EST MOD 30 MIN: CPT | Performed by: PHYSICIAN ASSISTANT

## 2018-10-29 PROCEDURE — 83036 HEMOGLOBIN GLYCOSYLATED A1C: CPT | Performed by: PHYSICIAN ASSISTANT

## 2018-10-29 RX ORDER — ATENOLOL 25 MG/1
25 TABLET ORAL DAILY
Qty: 90 TABLET | Refills: 3 | Status: SHIPPED | OUTPATIENT
Start: 2018-10-29 | End: 2019-11-11 | Stop reason: SDUPTHER

## 2018-10-29 RX ORDER — SERTRALINE HYDROCHLORIDE 25 MG/1
50 TABLET, FILM COATED ORAL DAILY
Qty: 30 TABLET | Refills: 2 | Status: SHIPPED | OUTPATIENT
Start: 2018-10-29 | End: 2018-12-10 | Stop reason: SDUPTHER

## 2018-10-29 NOTE — PATIENT INSTRUCTIONS
Problem List Items Addressed This Visit        Cardiovascular and Mediastinum    Essential hypertension     Stable  Continue current medications  Relevant Medications    atenolol (TENORMIN) 25 mg tablet       Other    Depression with anxiety - Primary     Uncontrolled since last winter  Will augment treatment with low dose zoloft 25 mg with pt taking 1/2 tab until next recheck  Relevant Medications    sertraline (ZOLOFT) 25 mg tablet    Hyperglycemia     Glucose fasting was 106 but lab did not run an A1C so fingerstick in office was done and resulted at 5 8  Decrease carb intake and recheck in 6 months  Relevant Orders    Hemoglobin A1C    Comprehensive metabolic panel    Mixed hyperlipidemia     LDL stable at 102 on lipitor 80 mg but trigs have increased to 159 from 87  Decrease carb intake and recheck in 6 months  Relevant Orders    Lipid Panel with Direct LDL reflex    Insomnia    Vitamin D deficiency     Vit D level stable at 42  Continue current supplementation and recheck in one year  Basic Carbohydrate Counting   AMBULATORY CARE:   Carbohydrate counting  is a way to plan your meals by counting the amount of carbohydrate in foods  Carbohydrates are the sugars, starches, and fiber found in fruit, grains, vegetables, and milk products  Carbohydrates increase your blood sugar levels  Carbohydrate counting can help you eat the right amount of carbohydrate to keep your blood sugar levels under control  What you need to know about planning meals using carbohydrate counting:  · A dietitian or healthcare provider will help you develop a healthy meal plan that works best for you  You will be taught how much carbohydrate to eat or drink for each meal and snack  Your meal plan will be based on your age, weight, usual food intake, and physical activity level  If you have diabetes, it will also include your blood sugar levels and diabetes medicine   Once you know how much carbohydrate you should eat, you can decide what type of food you want to eat  · You will need to know what foods contain carbohydrate and how much they contain  Keep track of the amount of carbohydrate in meals and snacks in order to follow your meal plan  Do not avoid carbohydrates or skip meals  Your blood sugar may fall too low if you do not eat enough carbohydrate or you skip meals  Foods that contain carbohydrate:   · Breads:  Each serving of food listed below contains about 15 g of carbohydrate   ¨ 1 slice of bread (1 ounce) or 1 flour or corn tortilla (6 inch)    ¨ ½ of a hamburger bun or ¼ of a large bagel (about 1 ounce)    ¨ 1 pancake (about 4 inches across and ¼ inch thick)    · Cereals and grains:  Serving sizes of ready-to-eat cereals vary  Look at the serving size and the total carbohydrate amount listed on the food label  Each serving of food listed below contains about 15 g of carbohydrate   ¨ ¾ cup of dry, unsweetened, ready-to-eat cereal or ¼ cup of low-fat granola     ¨ ½ cup of oatmeal or other cooked cereal     ¨ ? cup of cooked rice or pasta    · Starchy vegetables and beans:  Each serving of food listed below contains about 15 g of carbohydrate   ¨ ½ cup of corn, green peas, sweet potatoes, or mashed potatoes    ¨ ¼ of a large baked potato    ¨ ½ cup of beans, lentils, and peas (garbanzo, dill, kidney, white, split, black-eyed)    · Crackers and snacks:  Each serving of food listed below contains about 15 g of carbohydrate   ¨ 3 gaby cracker squares or 8 animal crackers     ¨ 6 saltine-type crackers    ¨ 3 cups of popcorn or ¾ ounce of pretzels, potato chips, or tortilla chips    · Fruit:  Each serving of food listed below contains about 15 g of carbohydrate       ¨ 1 small (4 ounce) piece of fresh fruit or ¾ to 1 cup of fresh fruit    ¨ ½ cup of canned or frozen fruit, packed in natural juice    ¨ ½ cup (4 ounces) of unsweetened fruit juice    ¨ 2 tablespoons of dried fruit    · Desserts or sugary foods:  Each serving of food listed below contains about 15 g of carbohydrate   ¨ 2-inch square unfrosted cake or brownie     ¨ 2 small cookies    ¨ ½ cup of ice cream, frozen yogurt, or nondairy frozen yogurt    ¨ ¼ cup of sherbet or sorbet    ¨ 1 tablespoon of regular syrup, jam, or jelly    ¨ 2 tablespoons of light syrup    · Milk and yogurt:  Foods from the milk group contain about 12 g of carbohydrate per serving  ¨ 1 cup of fat-free or low-fat milk    ¨ 1 cup of soy milk    ¨ ? cup of fat-free, yogurt sweetened with artificial sweetener    · Non-starchy vegetables:  Each serving contains about 5 g of carbohydrate   Three servings of non-starch vegetables count as 1 carbohydrate serving  ¨ ½ cup of cooked vegetables or 1 cup of raw vegetables  This includes beets, broccoli, cabbage, cauliflower, cucumber, mushrooms, tomatoes, and zucchini    ¨ ½ cup of vegetable juice  How to use carbohydrate counting to plan meals:   · Count carbohydrate amounts using serving sizes:      ¨ Pasta dinner example: You plan to have pasta, tossed salad, and an 8-ounce glass of milk  Your healthcare provider tells you that you may have 4 carbohydrate servings for dinner  One carbohydrate serving of pasta is ? cup  One cup of pasta will equal 3 carbohydrate servings  An 8-ounce glass of milk will count as 1 carbohydrate serving  These amounts of food would equal 4 carbohydrate servings  One cup of tossed salad does not count toward your carbohydrate servings  · Count carbohydrate amounts using food labels:  Find the total amount of carbohydrate in a packaged food by reading the food label  Food labels tell you the serving size of the food and the total carbohydrate amount in each serving  Find the serving size on the food label and then decide how many servings you will eat  Multiply the number of servings you plan to eat by the carbohydrate amount per serving       ¨ Granola bar snack example: Your meal plan allows you to have 2 carbohydrate servings (30 grams) of carbohydrate for a snack  You plan to eat 1 package of granola bars, which contains 2 bars  According to the food label, the serving size of food in this package is 1 bar  Each serving (1 bar) contains 25 grams of carbohydrate  The total amount of carbohydrate in this package of granola bars would be 50 g  Based on your meal plan, you should eat only 1 bar  Follow up with your healthcare provider as directed:  Write down your questions so you remember to ask them during your visits  © 2017 [employer]  Information is for End User's use only and may not be sold, redistributed or otherwise used for commercial purposes  All illustrations and images included in CareNotes® are the copyrighted property of [employer]  or [employer]  The above information is an  only  It is not intended as medical advice for individual conditions or treatments  Talk to your doctor, nurse or pharmacist before following any medical regimen to see if it is safe and effective for you

## 2018-10-29 NOTE — ASSESSMENT & PLAN NOTE
Uncontrolled since last winter  Will augment treatment with low dose zoloft 25 mg with pt taking 1/2 tab until next recheck

## 2018-10-29 NOTE — ASSESSMENT & PLAN NOTE
LDL stable at 102 on lipitor 80 mg but trigs have increased to 159 from 87  Decrease carb intake and recheck in 6 months

## 2018-10-29 NOTE — PROGRESS NOTES
Assessment/Plan:    Essential hypertension  Stable  Continue current medications  Hyperglycemia  Glucose fasting was 106 but lab did not run an A1C so fingerstick in office was done and resulted at 5 8  Decrease carb intake and recheck in 6 months  Vitamin D deficiency  Vit D level stable at 42  Continue current supplementation and recheck in one year  Mixed hyperlipidemia  LDL stable at 102 on lipitor 80 mg but trigs have increased to 159 from 87  Decrease carb intake and recheck in 6 months  Depression with anxiety  Uncontrolled since last winter  Will augment treatment with low dose zoloft 25 mg with pt taking 1/2 tab until next recheck  Diagnoses and all orders for this visit:    Depression with anxiety  -     sertraline (ZOLOFT) 25 mg tablet; Take 2 tablets (50 mg total) by mouth daily    Essential hypertension  -     atenolol (TENORMIN) 25 mg tablet; Take 1 tablet (25 mg total) by mouth daily    Hyperglycemia  -     Hemoglobin A1C; Future  -     Comprehensive metabolic panel; Future    Vitamin D deficiency    Mixed hyperlipidemia  -     Lipid Panel with Direct LDL reflex; Future    Primary insomnia          Subjective:   2 month follow up for anxiety and depression  nayan   Patient ID: Chaka Franks is a 59 y o  female  Patient here today for her 2 month check on follow-up to depression and anxiety  Patient still feels like she is more fluid than usual   She states that she never got out of her winter funk as the spring and summer were so Pepper  She also has decided not to dye her hair and let her hair go gray which she is also not 100% sure she legs and may be a part of the reason why she keeps feeling blue in a sense  No suicidal or homicidal ideations  Patient states she did go for blood work done which was overdue for over a year  Lab did not run an A1c fingerstick  No problems with sleeping she takes a quarter of her trazodone and this works well    She does need refills of her blood pressure medication at this time  Rita Rodríguez is here for chronic conditions f/u including the diagnosis of Depression with anxiety  (primary encounter diagnosis)  Essential hypertension  Hyperglycemia  Vitamin d deficiency  Mixed hyperlipidemia  Primary insomnia   Pt  states they are taking all medications as directed without complaints or side effects   Pt  had labs done prior to today's visit which included Recent Results (from the past 672 hour(s))  -Lipid Panel with Direct LDL reflex  Collection Time: 10/05/18  8:51 AM       Result                      Value             Ref Range           Total Cholesterol           171               <200 mg/dL          HDL                         41 (L)            >50 mg/dL           Triglycerides               159 (H)           <150 mg/dL          LDL Direct                  102 (H)           mg/dL (calc)        Chol HDLC Ratio             4 2               <5 0 (calc)         Non-HDL Cholesterol         130 (H)           <130 mg/dL (*  -Comprehensive metabolic panel  Collection Time: 10/05/18  8:51 AM       Result                      Value             Ref Range           Glucose                     106 (H)           65 - 99 mg/dL       BUN                         21                7 - 25 mg/dL        Creatinine                  0 80              0 50 - 0 99 *       eGFR Non  Ameri*     80                > OR = 60 mL*       SL AMB EGFR  AM*     93                > OR = 60 mL*       SL AMB BUN/CREATININE *                       6 - 22 (calc)   NOT APPLICABLE       Sodium                      140               135 - 146 mm*       Potassium                   4 7               3 5 - 5 3 mm*       Chloride                    103               98 - 110 mmo*       CO2                         31                20 - 32 mmol*       SL AMB CALCIUM              9 4               8 6 - 10 4 m*       SL AMB PROTEIN, TOTAL       7 0               6 1 - 8 1 g/*       Albumin                     4 5               3 6 - 5 1 g/*       Globulin                    2 5               1 9 - 3 7 g/*       SL AMB ALBUMIN/GLOBULI*     1 8               1 0 - 2 5 (c*       TOTAL BILIRUBIN             1 2               0 2 - 1 2 mg*       Alkaline Phosphatase        110               33 - 130 U/L        SL AMB AST                  14                10 - 35 U/L         SL AMB ALT                  14                6 - 29 U/L     -Vitamin D 25 hydroxy  Collection Time: 10/05/18  8:51 AM       Result                      Value             Ref Range           Vitamin D, 25-Hydroxy,*     42                30 - 100 ng/*          The following portions of the patient's history were reviewed and updated as appropriate: allergies, current medications, past family history, past medical history, past social history, past surgical history and problem list     Review of Systems   Constitutional: Negative  HENT: Negative  Eyes: Negative  Respiratory: Negative  Cardiovascular: Negative  Gastrointestinal: Negative  Endocrine: Negative  Genitourinary: Negative  Musculoskeletal: Negative  Skin: Negative  Allergic/Immunologic: Negative  Neurological: Negative  Hematological: Negative  Psychiatric/Behavioral: Positive for dysphoric mood  Objective:      Vitals:    10/29/18 0810   BP: 128/70   BP Location: Left arm   Patient Position: Sitting   Pulse: 64   Weight: 68 9 kg (151 lb 12 8 oz)          Physical Exam   Constitutional: She is oriented to person, place, and time  She appears well-developed and well-nourished  No distress  HENT:   Head: Normocephalic and atraumatic  Eyes: Conjunctivae are normal  Right eye exhibits no discharge  Left eye exhibits no discharge  Neck: Neck supple  Carotid bruit is not present  Cardiovascular: Normal rate, regular rhythm and normal heart sounds  Exam reveals no gallop and no friction rub      No murmur heard   Pulmonary/Chest: Effort normal and breath sounds normal  No respiratory distress  She has no wheezes  She has no rales  Neurological: She is alert and oriented to person, place, and time  Skin: Skin is warm and dry  She is not diaphoretic  Psychiatric: She has a normal mood and affect  Judgment normal    Nursing note and vitals reviewed

## 2018-10-29 NOTE — ASSESSMENT & PLAN NOTE
Glucose fasting was 106 but lab did not run an A1C so fingerstick in office was done and resulted at 5 8  Decrease carb intake and recheck in 6 months

## 2018-11-23 ENCOUNTER — OFFICE VISIT (OUTPATIENT)
Dept: FAMILY MEDICINE CLINIC | Facility: CLINIC | Age: 58
End: 2018-11-23
Payer: COMMERCIAL

## 2018-11-23 VITALS
SYSTOLIC BLOOD PRESSURE: 120 MMHG | DIASTOLIC BLOOD PRESSURE: 68 MMHG | TEMPERATURE: 98.1 F | WEIGHT: 149.8 LBS | HEIGHT: 63 IN | BODY MASS INDEX: 26.54 KG/M2 | HEART RATE: 68 BPM

## 2018-11-23 DIAGNOSIS — J18.9 COMMUNITY ACQUIRED PNEUMONIA OF LEFT LOWER LOBE OF LUNG: Primary | ICD-10-CM

## 2018-11-23 PROCEDURE — 99213 OFFICE O/P EST LOW 20 MIN: CPT | Performed by: FAMILY MEDICINE

## 2018-11-23 PROCEDURE — 1036F TOBACCO NON-USER: CPT | Performed by: FAMILY MEDICINE

## 2018-11-23 PROCEDURE — 3008F BODY MASS INDEX DOCD: CPT | Performed by: FAMILY MEDICINE

## 2018-11-23 RX ORDER — AZITHROMYCIN 250 MG/1
TABLET, FILM COATED ORAL
Qty: 6 TABLET | Refills: 0 | Status: SHIPPED | OUTPATIENT
Start: 2018-11-23 | End: 2018-11-28

## 2018-11-23 NOTE — PROGRESS NOTES
Assessment and Plan:    Problem List Items Addressed This Visit     None      Visit Diagnoses     Community acquired pneumonia of left lower lobe of lung ([facility] [address] )    -  Primary    Findings consistent with pneumonia of left  Recommend Zithromax, Mucinex over-the-counter  Follow in 2 weeks if needed  Diagnoses and all orders for this visit:    Community acquired pneumonia of left lower lobe of lung ([facility] [address] )  Comments:  Findings consistent with pneumonia of left  Recommend Zithromax, Mucinex over-the-counter  Follow in 2 weeks if needed  Subjective: pt is here for sinus pain and pressure for approx  1 week~cd     Patient ID: Marisa Lei is a 59 y o  female  CC:    No chief complaint on file  HPI:    Patient is having significant amount of nasal discharge  It is green at this point  Has been for approximately 1 week now  Denies any tooth or face pain  She did have some chest symptoms along with this, i e   productive cough  After it started in the chest, she now has symptoms in her head  She does have a significant amount of postnasal drip  Last night she had a significant amount of chills, but overall she states she does not feel chills  The following portions of the patient's history were reviewed and updated as appropriate: allergies, current medications and problem list       Review of Systems   Constitutional: Positive for chills  Negative for diaphoresis and fatigue  HENT: Positive for congestion, postnasal drip, rhinorrhea and sore throat  Negative for dental problem, sinus pain and sinus pressure  Respiratory: Positive for cough            Data to review:       Objective:    Vitals:    11/23/18 1442   BP: 120/68   BP Location: Left arm   Patient Position: Sitting   Cuff Size: Standard   Pulse: 68   Temp: 98 1 °F (36 7 °C)   TempSrc: Oral   Weight: 67 9 kg (149 lb 12 8 oz)   Height: 5' 3" (1 6 m)        Physical Exam   Constitutional: She appears well-developed and well-nourished  HENT:   Head: Normocephalic and atraumatic  Right Ear: Hearing, tympanic membrane, external ear and ear canal normal    Left Ear: Hearing, tympanic membrane, external ear and ear canal normal    Nose: Nose normal    Mouth/Throat: Uvula is midline, oropharynx is clear and moist and mucous membranes are normal    Cardiovascular: Normal rate, regular rhythm and normal heart sounds  Pulses:       Carotid pulses are 2+ on the right side, and 2+ on the left side  Pulmonary/Chest: Effort normal and breath sounds normal  She has no wheezes  She has no rales  She exhibits no tenderness  Lymphadenopathy:     She has no cervical adenopathy  Nursing note and vitals reviewed

## 2018-11-23 NOTE — PATIENT INSTRUCTIONS
Problem List Items Addressed This Visit     None      Visit Diagnoses     Community acquired pneumonia of left lower lobe of lung ([facility] [address] )    -  Primary    Findings consistent with pneumonia of left  Recommend Zithromax, Mucinex over-the-counter  Follow in 2 weeks if needed  Patient can stop her atorvastatin for the next week if needed

## 2018-12-10 ENCOUNTER — OFFICE VISIT (OUTPATIENT)
Dept: FAMILY MEDICINE CLINIC | Facility: CLINIC | Age: 58
End: 2018-12-10
Payer: COMMERCIAL

## 2018-12-10 VITALS
HEART RATE: 72 BPM | WEIGHT: 145.8 LBS | SYSTOLIC BLOOD PRESSURE: 118 MMHG | BODY MASS INDEX: 25.83 KG/M2 | HEIGHT: 63 IN | DIASTOLIC BLOOD PRESSURE: 60 MMHG

## 2018-12-10 DIAGNOSIS — H10.13 ALLERGIC CONJUNCTIVITIS OF BOTH EYES: ICD-10-CM

## 2018-12-10 DIAGNOSIS — F41.8 DEPRESSION WITH ANXIETY: Primary | ICD-10-CM

## 2018-12-10 PROCEDURE — 3008F BODY MASS INDEX DOCD: CPT | Performed by: PHYSICIAN ASSISTANT

## 2018-12-10 PROCEDURE — 99214 OFFICE O/P EST MOD 30 MIN: CPT | Performed by: PHYSICIAN ASSISTANT

## 2018-12-10 PROCEDURE — 1036F TOBACCO NON-USER: CPT | Performed by: PHYSICIAN ASSISTANT

## 2018-12-10 RX ORDER — SERTRALINE HYDROCHLORIDE 25 MG/1
25 TABLET, FILM COATED ORAL DAILY
Qty: 30 TABLET | Refills: 0
Start: 2018-12-10 | End: 2019-03-11 | Stop reason: SDUPTHER

## 2018-12-10 NOTE — ASSESSMENT & PLAN NOTE
Improved overall  Will have pt continue addition of zoloft at 1/2 of 25 mg once daily  Recheck in March as scheduled after labs

## 2018-12-10 NOTE — ASSESSMENT & PLAN NOTE
Over-the-counter antihistamine may be too drying for patient's eyes and they are constantly watering  Trial of prescription strength Alomide which is only a mast cell stabilizer    Consider consult with ophthalmologist

## 2018-12-10 NOTE — PATIENT INSTRUCTIONS
Problem List Items Addressed This Visit        Other    Depression with anxiety - Primary     Improved overall  Will have pt continue addition of zoloft at 1/2 of 25 mg once daily  Recheck in March as scheduled after labs  Relevant Medications    sertraline (ZOLOFT) 25 mg tablet    Allergic conjunctivitis of both eyes     Over-the-counter antihistamine may be too drying for patient's eyes and they are constantly watering  Trial of prescription strength Alomide which is only a mast cell stabilizer    Consider consult with ophthalmologist          Relevant Medications    lodoxamide (ALOMIDE) 0 1 % ophthalmic solution

## 2018-12-10 NOTE — PROGRESS NOTES
Assessment/Plan:    Depression with anxiety  Improved overall  Will have pt continue addition of zoloft at 1/2 of 25 mg once daily  Recheck in March as scheduled after labs  Allergic conjunctivitis of both eyes  Over-the-counter antihistamine may be too drying for patient's eyes and they are constantly watering  Trial of prescription strength Alomide which is only a mast cell stabilizer  Consider consult with ophthalmologist        Diagnoses and all orders for this visit:    Depression with anxiety  -     sertraline (ZOLOFT) 25 mg tablet; Take 1 tablet (25 mg total) by mouth daily    Allergic conjunctivitis of both eyes  -     lodoxamide (ALOMIDE) 0 1 % ophthalmic solution; Administer 2 drops to both eyes 4 (four) times a day          Subjective: Follow up for a medication check  Patient ID: Kelli Garner is a 59 y o  female  Patient here today to follow-up on depression and anxiety  The last visit we added Zoloft 25 mg half daily  Patient states that it is working very well and she feels like this is enough to keep her on even keel with mood and anxiety and depression throughout the rest of the year into the new year  She has no complaints of side effects  She does state that her eyes itch all the time so she uses an over-the-counter antihistamine drop as a prescription the ophthalmologist offered was too expensive  But then now she is having a lot of dripping of her eyes and watering and the allergist told her that she probably has dry eye for which the antihistamine is making worse  The following portions of the patient's history were reviewed and updated as appropriate: allergies, current medications, past family history, past medical history, past social history, past surgical history and problem list     Review of Systems   Constitutional: Negative  HENT: Negative  Eyes: Positive for discharge and itching  Respiratory: Negative  Cardiovascular: Negative  Gastrointestinal: Negative  Endocrine: Negative  Genitourinary: Negative  Musculoskeletal: Negative  Skin: Negative  Allergic/Immunologic: Negative  Neurological: Negative  Hematological: Negative  Psychiatric/Behavioral: Negative  Objective:    Vitals:    12/10/18 0811   BP: 118/60   BP Location: Left arm   Patient Position: Sitting   Pulse: 72   Weight: 66 1 kg (145 lb 12 8 oz)   Height: 5' 3" (1 6 m)          Physical Exam   Constitutional: She is oriented to person, place, and time  She appears well-developed and well-nourished  No distress  HENT:   Head: Normocephalic and atraumatic  Eyes: Pupils are equal, round, and reactive to light  Conjunctivae and EOM are normal  Right eye exhibits no discharge  Left eye exhibits no discharge  Neck: Neck supple  Carotid bruit is not present  Cardiovascular: Normal rate  Pulmonary/Chest: Effort normal  No respiratory distress  Neurological: She is alert and oriented to person, place, and time  Skin: Skin is warm and dry  She is not diaphoretic  Psychiatric: She has a normal mood and affect  Judgment normal    Nursing note and vitals reviewed

## 2019-02-07 DIAGNOSIS — F41.8 DEPRESSION WITH ANXIETY: ICD-10-CM

## 2019-02-07 RX ORDER — SERTRALINE HYDROCHLORIDE 25 MG/1
TABLET, FILM COATED ORAL
Qty: 30 TABLET | Refills: 2 | Status: SHIPPED | OUTPATIENT
Start: 2019-02-07 | End: 2019-02-12 | Stop reason: ALTCHOICE

## 2019-02-12 ENCOUNTER — OFFICE VISIT (OUTPATIENT)
Dept: FAMILY MEDICINE CLINIC | Facility: CLINIC | Age: 59
End: 2019-02-12
Payer: COMMERCIAL

## 2019-02-12 VITALS
BODY MASS INDEX: 25.34 KG/M2 | SYSTOLIC BLOOD PRESSURE: 94 MMHG | HEIGHT: 63 IN | DIASTOLIC BLOOD PRESSURE: 70 MMHG | WEIGHT: 143 LBS | TEMPERATURE: 97.9 F

## 2019-02-12 DIAGNOSIS — J30.1 SEASONAL ALLERGIC RHINITIS DUE TO POLLEN: ICD-10-CM

## 2019-02-12 DIAGNOSIS — I10 ESSENTIAL HYPERTENSION: ICD-10-CM

## 2019-02-12 DIAGNOSIS — J01.00 ACUTE NON-RECURRENT MAXILLARY SINUSITIS: Primary | ICD-10-CM

## 2019-02-12 PROCEDURE — 99214 OFFICE O/P EST MOD 30 MIN: CPT | Performed by: PHYSICIAN ASSISTANT

## 2019-02-12 RX ORDER — AZITHROMYCIN 250 MG/1
TABLET, FILM COATED ORAL
Qty: 6 TABLET | Refills: 0 | Status: SHIPPED | OUTPATIENT
Start: 2019-02-12 | End: 2019-02-17

## 2019-02-12 NOTE — PATIENT INSTRUCTIONS
1   Acute sinusitis-recommended Zithromax Z-Selvin as directed  The patient was also advised to use Flonase, 2 sprays in each nostril daily  They may use over-the-counter NSAIDs such as ibuprofen as necessary for pressure  Follow-up in the next 4-5 days if not improved  2  Allergic rhinitis-continue Flonase as above  3  Benign essential hypertension-presently stable, no medication changes

## 2019-02-12 NOTE — PROGRESS NOTES
Assessment/Plan:  Patient Instructions   1  Acute sinusitis-recommended Zithromax Z-Selvin as directed  The patient was also advised to use Flonase, 2 sprays in each nostril daily  They may use over-the-counter NSAIDs such as ibuprofen as necessary for pressure  Follow-up in the next 4-5 days if not improved  2  Allergic rhinitis-continue Flonase as above  3  Benign essential hypertension-presently stable, no medication changes  Diagnoses and all orders for this visit:    Acute non-recurrent maxillary sinusitis  -     azithromycin (ZITHROMAX) 250 mg tablet; Take 2 Tabs by mouth today, then Take 1 tablet daily for 4 more days  Seasonal allergic rhinitis due to pollen    Essential hypertension        Chief complaint:  Cough and congestion for 2 weeks  Subjective:      Patient ID: Dalia Morgan is a 59 y o  female  HPI:  This is a 59-year-old female who presents to the office with 2 weeks of ongoing chest congestion, fatigue, and sinus congestion  She has had quite a bit of sinus pressure, headache, and ear discomfort as well  She has been trying over-the-counter Delsym and Aleve with a little benefit  She has not had any fevers that she is aware of  She does have a history of hypertension which has been stable  She does also have a history of allergic rhinitis and continues Flonase nasal spray  The following portions of the patient's history were reviewed and updated as appropriate: allergies, current medications, past family history, past medical history, past social history, past surgical history and problem list     Review of Systems   Constitutional: Positive for activity change and fatigue  Negative for fever  HENT: Positive for congestion, postnasal drip, rhinorrhea, sinus pressure and sore throat  Negative for ear pain  Respiratory: Positive for cough  Negative for chest tightness, shortness of breath and wheezing  Cardiovascular: Negative for palpitations  Gastrointestinal: Negative for diarrhea and nausea  Musculoskeletal: Positive for myalgias  Negative for arthralgias  Skin: Negative for rash  Neurological: Negative for dizziness and numbness  All other systems reviewed and are negative  Objective:      BP 94/70   Temp 97 9 °F (36 6 °C)   Ht 5' 3" (1 6 m)   Wt 64 9 kg (143 lb)   BMI 25 33 kg/m²          Physical Exam   Constitutional: She is oriented to person, place, and time  She appears well-developed and well-nourished  No distress  HENT:   Head: Normocephalic and atraumatic  Mouth/Throat: No oropharyngeal exudate  Turbinates are erythematous and edematous bilaterally  Post nasal drip of the posterior pharynx noted  Eyes: Pupils are equal, round, and reactive to light  Right eye exhibits no discharge  Left eye exhibits no discharge  Neck: Neck supple  Cardiovascular: Normal rate, regular rhythm and normal heart sounds  Exam reveals no friction rub  No murmur heard  Pulmonary/Chest: Effort normal and breath sounds normal  No respiratory distress  She has no wheezes  She has no rales  Musculoskeletal: Normal range of motion  She exhibits no edema  Lymphadenopathy:     She has cervical adenopathy  Neurological: She is alert and oriented to person, place, and time  Skin: Skin is warm and dry  No erythema  Psychiatric: She has a normal mood and affect  Her behavior is normal    Nursing note and vitals reviewed

## 2019-03-01 LAB
25(OH)D3 SERPL-MCNC: 32 NG/ML (ref 30–100)
ALBUMIN SERPL-MCNC: 4.2 G/DL (ref 3.6–5.1)
ALBUMIN/GLOB SERPL: 1.7 (CALC) (ref 1–2.5)
ALP SERPL-CCNC: 125 U/L (ref 33–130)
ALT SERPL-CCNC: 18 U/L (ref 6–29)
AST SERPL-CCNC: 15 U/L (ref 10–35)
BILIRUB SERPL-MCNC: 1 MG/DL (ref 0.2–1.2)
BUN SERPL-MCNC: 22 MG/DL (ref 7–25)
BUN/CREAT SERPL: ABNORMAL (CALC) (ref 6–22)
CALCIUM SERPL-MCNC: 9.3 MG/DL (ref 8.6–10.4)
CHLORIDE SERPL-SCNC: 103 MMOL/L (ref 98–110)
CO2 SERPL-SCNC: 30 MMOL/L (ref 20–32)
CREAT SERPL-MCNC: 0.67 MG/DL (ref 0.5–0.99)
GLOBULIN SER CALC-MCNC: 2.5 G/DL (CALC) (ref 1.9–3.7)
GLUCOSE SERPL-MCNC: 102 MG/DL (ref 65–99)
POTASSIUM SERPL-SCNC: 4.1 MMOL/L (ref 3.5–5.3)
PROT SERPL-MCNC: 6.7 G/DL (ref 6.1–8.1)
SL AMB EGFR AFRICAN AMERICAN: 111 ML/MIN/1.73M2
SL AMB EGFR NON AFRICAN AMERICAN: 96 ML/MIN/1.73M2
SODIUM SERPL-SCNC: 141 MMOL/L (ref 135–146)

## 2019-03-06 DIAGNOSIS — F51.04 PSYCHOPHYSIOLOGICAL INSOMNIA: Primary | ICD-10-CM

## 2019-03-07 ENCOUNTER — TELEPHONE (OUTPATIENT)
Dept: FAMILY MEDICINE CLINIC | Facility: CLINIC | Age: 59
End: 2019-03-07

## 2019-03-07 RX ORDER — TRAZODONE HYDROCHLORIDE 50 MG/1
TABLET ORAL
Qty: 30 TABLET | Refills: 3 | Status: SHIPPED | OUTPATIENT
Start: 2019-03-07 | End: 2020-01-27

## 2019-03-07 NOTE — TELEPHONE ENCOUNTER
[facility] [address] [facility]  PT IS TOTALLY OUT OF HER TRAZADONE   CAN WE PLEASE REFILL IT  PLEASE ADVISE  THANK YOU

## 2019-03-11 ENCOUNTER — OFFICE VISIT (OUTPATIENT)
Dept: FAMILY MEDICINE CLINIC | Facility: CLINIC | Age: 59
End: 2019-03-11
Payer: COMMERCIAL

## 2019-03-11 VITALS
DIASTOLIC BLOOD PRESSURE: 64 MMHG | BODY MASS INDEX: 24.95 KG/M2 | WEIGHT: 140.8 LBS | HEIGHT: 63 IN | HEART RATE: 64 BPM | SYSTOLIC BLOOD PRESSURE: 118 MMHG

## 2019-03-11 DIAGNOSIS — J45.20 MILD INTERMITTENT ASTHMA WITHOUT COMPLICATION: ICD-10-CM

## 2019-03-11 DIAGNOSIS — E78.2 MIXED HYPERLIPIDEMIA: ICD-10-CM

## 2019-03-11 DIAGNOSIS — F51.01 PRIMARY INSOMNIA: ICD-10-CM

## 2019-03-11 DIAGNOSIS — F41.8 DEPRESSION WITH ANXIETY: ICD-10-CM

## 2019-03-11 DIAGNOSIS — E55.9 VITAMIN D DEFICIENCY: Primary | ICD-10-CM

## 2019-03-11 DIAGNOSIS — I10 ESSENTIAL HYPERTENSION: ICD-10-CM

## 2019-03-11 DIAGNOSIS — R73.9 HYPERGLYCEMIA: ICD-10-CM

## 2019-03-11 PROCEDURE — 1036F TOBACCO NON-USER: CPT | Performed by: PHYSICIAN ASSISTANT

## 2019-03-11 PROCEDURE — 3074F SYST BP LT 130 MM HG: CPT | Performed by: PHYSICIAN ASSISTANT

## 2019-03-11 PROCEDURE — 3008F BODY MASS INDEX DOCD: CPT | Performed by: PHYSICIAN ASSISTANT

## 2019-03-11 PROCEDURE — 3078F DIAST BP <80 MM HG: CPT | Performed by: PHYSICIAN ASSISTANT

## 2019-03-11 PROCEDURE — 99213 OFFICE O/P EST LOW 20 MIN: CPT | Performed by: PHYSICIAN ASSISTANT

## 2019-03-11 RX ORDER — SERTRALINE HYDROCHLORIDE 25 MG/1
TABLET, FILM COATED ORAL
Qty: 30 TABLET | Refills: 0 | Status: SHIPPED | OUTPATIENT
Start: 2019-03-11 | End: 2019-04-13 | Stop reason: SDUPTHER

## 2019-03-11 RX ORDER — ALBUTEROL SULFATE 90 UG/1
2 AEROSOL, METERED RESPIRATORY (INHALATION) EVERY 6 HOURS PRN
Qty: 1 INHALER | Refills: 2 | Status: SHIPPED | OUTPATIENT
Start: 2019-03-11 | End: 2020-03-12 | Stop reason: SDUPTHER

## 2019-03-11 NOTE — PATIENT INSTRUCTIONS
Problem List Items Addressed This Visit        Respiratory    Mild intermittent asthma without complication     Very infrequent use of inhaler more when cats, allergies         Relevant Medications    albuterol (PROVENTIL HFA,VENTOLIN HFA) 90 mcg/act inhaler       Cardiovascular and Mediastinum    Essential hypertension     Within normal limits continue current medication  Other    Depression with anxiety    Relevant Medications    sertraline (ZOLOFT) 25 mg tablet    Hyperglycemia     Fasting glucose was 102  Lab did not run a hemoglobin A1c  Mixed hyperlipidemia     Lab did not run a fasting lipid panel  Insomnia     Stable with 1/4 trazodone  Vitamin D deficiency - Primary     Vitamin-D level is stable at 32

## 2019-03-11 NOTE — PROGRESS NOTES
Assessment and Plan:  Pt  to get full fasting labs done end of April and the RTO at apt 5/4/19  Problem List Items Addressed This Visit        Respiratory    Mild intermittent asthma without complication     Very infrequent use of inhaler more when cats, allergies         Relevant Medications    albuterol (PROVENTIL HFA,VENTOLIN HFA) 90 mcg/act inhaler       Cardiovascular and Mediastinum    Essential hypertension     Within normal limits continue current medication  Other    Depression with anxiety    Relevant Medications    sertraline (ZOLOFT) 25 mg tablet    Hyperglycemia     Fasting glucose was 102  Lab did not run a hemoglobin A1c  Mixed hyperlipidemia     Lab did not run a fasting lipid panel  Insomnia     Stable with 1/4 trazodone  Vitamin D deficiency - Primary     Vitamin-D level is stable at 32  Diagnoses and all orders for this visit:    Vitamin D deficiency    Hyperglycemia    Essential hypertension    Depression with anxiety  -     sertraline (ZOLOFT) 25 mg tablet; Take 1/2 tab once daily  Mixed hyperlipidemia    Mild intermittent asthma without complication  -     albuterol (PROVENTIL HFA,VENTOLIN HFA) 90 mcg/act inhaler; Inhale 2 puffs every 6 (six) hours as needed for wheezing    Primary insomnia              Subjective:      Patient ID: Colin Roberts is a 59 y o  female  CC:    Chief Complaint   Patient presents with    Follow-up     Chronic conditions and to review blood work  HPI:      Colin Roberts is here for chronic conditions f/u including the diagnosis of Vitamin d deficiency  (primary encounter diagnosis)  Hyperglycemia  Essential hypertension  Depression with anxiety   Pt  states they are taking all medications as directed without complaints or side effects   Pt  had labs done prior to today's visit which included Recent Results (from the past 672 hour(s))  -Comprehensive metabolic panel  Collection Time: 02/28/19 8:01 AM       Result                      Value             Ref Range           Glucose, Random             102 (H)           65 - 99 mg/dL       BUN                         22                7 - 25 mg/dL        Creatinine                  0 67              0 50 - 0 99 *       eGFR Non  Ameri*     96                > OR = 60 mL*       eGFR        111               > OR = 60 mL*       SL AMB BUN/CREATININE *                       6 - 22 (calc)   NOT APPLICABLE       Sodium                      141               135 - 146 mm*       Potassium                   4 1               3 5 - 5 3 mm*       Chloride                    103               98 - 110 mmo*       CO2                         30                20 - 32 mmol*       SL AMB CALCIUM              9 3               8 6 - 10 4 m*       Protein, Total              6 7               6 1 - 8 1 g/*       Albumin                     4 2               3 6 - 5 1 g/*       Globulin                    2 5               1 9 - 3 7 g/*       Albumin/Globulin Ratio      1 7               1 0 - 2 5 (c*       TOTAL BILIRUBIN             1 0               0 2 - 1 2 mg*       Alkaline Phosphatase        125               33 - 130 U/L        AST                         15                10 - 35 U/L         ALT                         18                6 - 29 U/L     -Vitamin D 25 hydroxy  Collection Time: 02/28/19  8:01 AM       Result                      Value             Ref Range           Vitamin D, 25-Hydroxy,*     32                30 - 100 ng/*    Pt did go for labs but she was not supposed to  Somehow the lab did run a CMP and Vit D  The following portions of the patient's history were reviewed and updated as appropriate: allergies, current medications, past family history, past medical history, past social history, past surgical history and problem list       Review of Systems   Constitutional: Negative  HENT: Negative  Eyes: Negative  Respiratory: Negative  Cardiovascular: Negative  Gastrointestinal: Negative  Endocrine: Negative  Genitourinary: Negative  Musculoskeletal: Negative  Skin: Negative  Allergic/Immunologic: Negative  Neurological: Negative  Hematological: Negative  Psychiatric/Behavioral: Negative  Data to review:       Objective:    Vitals:    03/11/19 0823   BP: 118/64   BP Location: Left arm   Patient Position: Sitting   Pulse: 64   Weight: 63 9 kg (140 lb 12 8 oz)   Height: 5' 3" (1 6 m)        Physical Exam   Constitutional: She is oriented to person, place, and time  She appears well-developed and well-nourished  No distress  HENT:   Head: Normocephalic and atraumatic  Eyes: Conjunctivae are normal  Right eye exhibits no discharge  Left eye exhibits no discharge  Neck: Neck supple  Carotid bruit is not present  Cardiovascular: Normal rate  Pulmonary/Chest: Effort normal  No respiratory distress  Neurological: She is alert and oriented to person, place, and time  Skin: Skin is warm and dry  She is not diaphoretic  Psychiatric: She has a normal mood and affect  Judgment normal    Nursing note and vitals reviewed

## 2019-04-13 DIAGNOSIS — F41.8 DEPRESSION WITH ANXIETY: ICD-10-CM

## 2019-04-14 RX ORDER — SERTRALINE HYDROCHLORIDE 25 MG/1
TABLET, FILM COATED ORAL
Qty: 30 TABLET | Refills: 11 | Status: SHIPPED | OUTPATIENT
Start: 2019-04-14 | End: 2019-06-26 | Stop reason: SDUPTHER

## 2019-04-27 ENCOUNTER — OFFICE VISIT (OUTPATIENT)
Dept: FAMILY MEDICINE CLINIC | Facility: CLINIC | Age: 59
End: 2019-04-27
Payer: COMMERCIAL

## 2019-04-27 VITALS
TEMPERATURE: 97.9 F | HEART RATE: 64 BPM | HEIGHT: 63 IN | WEIGHT: 141 LBS | SYSTOLIC BLOOD PRESSURE: 112 MMHG | BODY MASS INDEX: 24.98 KG/M2 | DIASTOLIC BLOOD PRESSURE: 74 MMHG

## 2019-04-27 DIAGNOSIS — H10.33 ACUTE BACTERIAL CONJUNCTIVITIS OF BOTH EYES: Primary | ICD-10-CM

## 2019-04-27 PROCEDURE — 99213 OFFICE O/P EST LOW 20 MIN: CPT | Performed by: FAMILY MEDICINE

## 2019-04-27 RX ORDER — ERYTHROMYCIN 5 MG/G
0.5 OINTMENT OPHTHALMIC
Qty: 3.5 G | Refills: 0 | Status: SHIPPED | OUTPATIENT
Start: 2019-04-27 | End: 2019-06-08 | Stop reason: ALTCHOICE

## 2019-05-02 PROBLEM — H10.33 ACUTE BACTERIAL CONJUNCTIVITIS OF BOTH EYES: Status: RESOLVED | Noted: 2019-04-27 | Resolved: 2019-05-02

## 2019-05-02 PROBLEM — H10.13 ALLERGIC CONJUNCTIVITIS OF BOTH EYES: Status: RESOLVED | Noted: 2018-12-10 | Resolved: 2019-05-02

## 2019-05-02 LAB
ALBUMIN SERPL-MCNC: 4.4 G/DL (ref 3.6–5.1)
ALBUMIN/GLOB SERPL: 1.9 (CALC) (ref 1–2.5)
ALP SERPL-CCNC: 107 U/L (ref 33–130)
ALT SERPL-CCNC: 21 U/L (ref 6–29)
AST SERPL-CCNC: 19 U/L (ref 10–35)
BILIRUB SERPL-MCNC: 0.9 MG/DL (ref 0.2–1.2)
BUN SERPL-MCNC: 22 MG/DL (ref 7–25)
BUN/CREAT SERPL: ABNORMAL (CALC) (ref 6–22)
CALCIUM SERPL-MCNC: 9.4 MG/DL (ref 8.6–10.4)
CHLORIDE SERPL-SCNC: 106 MMOL/L (ref 98–110)
CHOLEST SERPL-MCNC: 168 MG/DL
CHOLEST/HDLC SERPL: 4.3 (CALC)
CO2 SERPL-SCNC: 32 MMOL/L (ref 20–32)
CREAT SERPL-MCNC: 0.69 MG/DL (ref 0.5–0.99)
GLOBULIN SER CALC-MCNC: 2.3 G/DL (CALC) (ref 1.9–3.7)
GLUCOSE SERPL-MCNC: 101 MG/DL (ref 65–99)
HBA1C MFR BLD: 5.7 % OF TOTAL HGB
HDLC SERPL-MCNC: 39 MG/DL
LDLC SERPL CALC-MCNC: 110 MG/DL (CALC)
NONHDLC SERPL-MCNC: 129 MG/DL (CALC)
POTASSIUM SERPL-SCNC: 4.5 MMOL/L (ref 3.5–5.3)
PROT SERPL-MCNC: 6.7 G/DL (ref 6.1–8.1)
SL AMB EGFR AFRICAN AMERICAN: 110 ML/MIN/1.73M2
SL AMB EGFR NON AFRICAN AMERICAN: 95 ML/MIN/1.73M2
SODIUM SERPL-SCNC: 143 MMOL/L (ref 135–146)
TRIGL SERPL-MCNC: 101 MG/DL

## 2019-05-06 ENCOUNTER — OFFICE VISIT (OUTPATIENT)
Dept: FAMILY MEDICINE CLINIC | Facility: CLINIC | Age: 59
End: 2019-05-06
Payer: COMMERCIAL

## 2019-05-06 VITALS
WEIGHT: 140.6 LBS | BODY MASS INDEX: 24.91 KG/M2 | DIASTOLIC BLOOD PRESSURE: 64 MMHG | HEIGHT: 63 IN | HEART RATE: 84 BPM | SYSTOLIC BLOOD PRESSURE: 124 MMHG

## 2019-05-06 DIAGNOSIS — R73.9 HYPERGLYCEMIA: ICD-10-CM

## 2019-05-06 DIAGNOSIS — I10 ESSENTIAL HYPERTENSION: Primary | ICD-10-CM

## 2019-05-06 DIAGNOSIS — E78.2 MIXED HYPERLIPIDEMIA: ICD-10-CM

## 2019-05-06 DIAGNOSIS — J30.1 SEASONAL ALLERGIC RHINITIS DUE TO POLLEN: ICD-10-CM

## 2019-05-06 DIAGNOSIS — F41.8 DEPRESSION WITH ANXIETY: ICD-10-CM

## 2019-05-06 DIAGNOSIS — E55.9 VITAMIN D DEFICIENCY: ICD-10-CM

## 2019-05-06 DIAGNOSIS — F51.01 PRIMARY INSOMNIA: ICD-10-CM

## 2019-05-06 PROCEDURE — 99214 OFFICE O/P EST MOD 30 MIN: CPT | Performed by: PHYSICIAN ASSISTANT

## 2019-05-06 RX ORDER — OLOPATADINE HYDROCHLORIDE 1 MG/ML
1 SOLUTION/ DROPS OPHTHALMIC 2 TIMES DAILY
Qty: 5 ML | Refills: 11 | Status: SHIPPED | OUTPATIENT
Start: 2019-05-06 | End: 2019-05-16

## 2019-05-16 ENCOUNTER — TELEPHONE (OUTPATIENT)
Dept: FAMILY MEDICINE CLINIC | Facility: CLINIC | Age: 59
End: 2019-05-16

## 2019-05-16 DIAGNOSIS — J30.1 SEASONAL ALLERGIC RHINITIS DUE TO POLLEN: Primary | ICD-10-CM

## 2019-05-16 RX ORDER — AZELASTINE HYDROCHLORIDE 0.5 MG/ML
1 SOLUTION/ DROPS OPHTHALMIC 2 TIMES DAILY
Qty: 18 ML | Refills: 3 | Status: SHIPPED | OUTPATIENT
Start: 2019-05-16 | End: 2020-05-27 | Stop reason: SDUPTHER

## 2019-05-28 DIAGNOSIS — Z12.31 SCREENING MAMMOGRAM, ENCOUNTER FOR: ICD-10-CM

## 2019-06-08 ENCOUNTER — OFFICE VISIT (OUTPATIENT)
Dept: FAMILY MEDICINE CLINIC | Facility: CLINIC | Age: 59
End: 2019-06-08
Payer: COMMERCIAL

## 2019-06-08 VITALS
OXYGEN SATURATION: 98 % | TEMPERATURE: 98.5 F | WEIGHT: 139 LBS | HEART RATE: 80 BPM | DIASTOLIC BLOOD PRESSURE: 84 MMHG | SYSTOLIC BLOOD PRESSURE: 116 MMHG | BODY MASS INDEX: 24.63 KG/M2 | HEIGHT: 63 IN

## 2019-06-08 DIAGNOSIS — R06.00 DYSPNEA ON EXERTION: ICD-10-CM

## 2019-06-08 DIAGNOSIS — J45.20 MILD INTERMITTENT ASTHMA WITHOUT COMPLICATION: ICD-10-CM

## 2019-06-08 DIAGNOSIS — R05.9 COUGH: ICD-10-CM

## 2019-06-08 DIAGNOSIS — I10 ESSENTIAL HYPERTENSION: ICD-10-CM

## 2019-06-08 DIAGNOSIS — J30.1 SEASONAL ALLERGIC RHINITIS DUE TO POLLEN: ICD-10-CM

## 2019-06-08 DIAGNOSIS — B96.0 MYCOPLASMAL TRACHEOBRONCHITIS: Primary | ICD-10-CM

## 2019-06-08 DIAGNOSIS — J40 MYCOPLASMAL TRACHEOBRONCHITIS: Primary | ICD-10-CM

## 2019-06-08 PROCEDURE — 3079F DIAST BP 80-89 MM HG: CPT | Performed by: FAMILY MEDICINE

## 2019-06-08 PROCEDURE — 3008F BODY MASS INDEX DOCD: CPT | Performed by: FAMILY MEDICINE

## 2019-06-08 PROCEDURE — 3074F SYST BP LT 130 MM HG: CPT | Performed by: FAMILY MEDICINE

## 2019-06-08 PROCEDURE — 1036F TOBACCO NON-USER: CPT | Performed by: FAMILY MEDICINE

## 2019-06-08 PROCEDURE — 99214 OFFICE O/P EST MOD 30 MIN: CPT | Performed by: FAMILY MEDICINE

## 2019-06-08 RX ORDER — PREDNISONE 20 MG/1
TABLET ORAL
Qty: 20 TABLET | Refills: 0 | Status: SHIPPED | OUTPATIENT
Start: 2019-06-08 | End: 2019-06-18

## 2019-06-08 RX ORDER — DOXYCYCLINE HYCLATE 100 MG/1
100 CAPSULE ORAL EVERY 12 HOURS SCHEDULED
Qty: 20 CAPSULE | Refills: 0 | Status: SHIPPED | OUTPATIENT
Start: 2019-06-08 | End: 2019-06-18

## 2019-06-08 RX ORDER — PROMETHAZINE HYDROCHLORIDE 6.25 MG/5ML
SYRUP ORAL
Qty: 120 ML | Refills: 1 | Status: SHIPPED | OUTPATIENT
Start: 2019-06-08 | End: 2019-11-11 | Stop reason: ALTCHOICE

## 2019-06-08 RX ORDER — MONTELUKAST SODIUM 10 MG/1
TABLET ORAL
Qty: 30 TABLET | Refills: 5 | Status: SHIPPED | OUTPATIENT
Start: 2019-06-08 | End: 2019-11-11 | Stop reason: ALTCHOICE

## 2019-06-26 DIAGNOSIS — F41.8 DEPRESSION WITH ANXIETY: ICD-10-CM

## 2019-06-26 RX ORDER — SERTRALINE HYDROCHLORIDE 25 MG/1
12.5 TABLET, FILM COATED ORAL DAILY
Qty: 45 TABLET | Refills: 3 | Status: SHIPPED | OUTPATIENT
Start: 2019-06-26 | End: 2019-11-11 | Stop reason: SDUPTHER

## 2019-08-06 DIAGNOSIS — E78.2 MIXED HYPERLIPIDEMIA: ICD-10-CM

## 2019-08-06 RX ORDER — ATORVASTATIN CALCIUM 80 MG/1
TABLET, FILM COATED ORAL
Qty: 90 TABLET | Refills: 3 | Status: SHIPPED | OUTPATIENT
Start: 2019-08-06 | End: 2020-02-17 | Stop reason: SDUPTHER

## 2019-09-07 ENCOUNTER — HOSPITAL ENCOUNTER (EMERGENCY)
Facility: HOSPITAL | Age: 59
Discharge: HOME | End: 2019-09-07
Attending: EMERGENCY MEDICINE | Admitting: EMERGENCY MEDICINE
Payer: OTHER MISCELLANEOUS

## 2019-09-07 ENCOUNTER — APPOINTMENT (EMERGENCY)
Dept: RADIOLOGY | Facility: HOSPITAL | Age: 59
End: 2019-09-07
Payer: OTHER MISCELLANEOUS

## 2019-09-07 VITALS
DIASTOLIC BLOOD PRESSURE: 98 MMHG | SYSTOLIC BLOOD PRESSURE: 148 MMHG | TEMPERATURE: 98.2 F | OXYGEN SATURATION: 99 % | WEIGHT: 145 LBS | RESPIRATION RATE: 18 BRPM | HEART RATE: 77 BPM | BODY MASS INDEX: 25.69 KG/M2

## 2019-09-07 DIAGNOSIS — S69.91XA RIGHT WRIST INJURY: Primary | ICD-10-CM

## 2019-09-07 DIAGNOSIS — M25.531 RIGHT WRIST PAIN: ICD-10-CM

## 2019-09-07 PROCEDURE — 73110 X-RAY EXAM OF WRIST: CPT

## 2019-09-07 PROCEDURE — 99284 EMERGENCY DEPT VISIT MOD MDM: CPT | Performed by: EMERGENCY MEDICINE

## 2019-09-07 PROCEDURE — 29130 APPL FINGER SPLINT STATIC: CPT | Performed by: EMERGENCY MEDICINE

## 2019-09-07 PROCEDURE — 99283 EMERGENCY DEPT VISIT LOW MDM: CPT

## 2019-09-07 NOTE — ED PROVIDER NOTES
History  Chief Complaint   Patient presents with    Wrist Injury     Patient reports that she slipped and fell and put out her R hand landing on R wrist  Reports pain and swelling at R wrist area  HPI    61yo female presents for evaluation of wrist pain after a fall last night  She notes breaking her fall with her right hand  She notes right wrist pain  She denies hitting her head or LOC  She feels like her  is weaker due to pain  She notes taking aleve without relief of pain  She localizes pain to dorsal side of wrist and hand  She denies prodromal symptoms prior to fall  Patient is left hand dominant  She denies elbow pain or other complaints  Prior to Admission Medications   Prescriptions Last Dose Informant Patient Reported? Taking?    Calcium 600 MG tablet  Self Yes Yes   Sig: Take 600 mg by mouth   Cholecalciferol (VITAMIN D-3 PO)  Self Yes Yes   Sig: Take 4,000 Int'l Units by mouth daily   DULoxetine (CYMBALTA) 60 mg delayed release capsule  Self No Yes   Sig: Take 1 capsule (60 mg total) by mouth daily   Multiple Vitamin (MULTIVITAMIN) tablet  Self Yes Yes   Sig: Take 1 tablet by mouth daily   Nutritional Supplements (ANTIOXIDANTS PO)  Self Yes Yes   Sig: Take 1 tablet by mouth daily   albuterol (PROVENTIL HFA,VENTOLIN HFA) 90 mcg/act inhaler  Self No Yes   Sig: Inhale 2 puffs every 6 (six) hours as needed for wheezing   atenolol (TENORMIN) 25 mg tablet  Self No Yes   Sig: Take 1 tablet (25 mg total) by mouth daily   atorvastatin (LIPITOR) 80 mg tablet   No Yes   Sig: TAKE 1 TABLET DAILY   azelastine (OPTIVAR) 0 05 % ophthalmic solution  Self No Yes   Sig: Administer 1 drop to both eyes 2 (two) times a day   conjugated estrogens (PREMARIN) vaginal cream  Self Yes No   Sig: Insert into the vagina once a week   fluticasone (FLONASE) 50 mcg/act nasal spray  Self No Yes   Si sprays into each nostril daily   montelukast (SINGULAIR) 10 mg tablet Not Taking at Unknown time  No No   Sig: Take 1 tablet daily for allergy   Patient not taking: Reported on 9/7/2019   promethazine (PHENERGAN) 12 5 mg/10 mL syrup Not Taking at Unknown time  No No   Sig: Take 1 tsp 4 times daily and 2 tsp at bedtime as needed for cough   Patient not taking: Reported on 9/7/2019   sertraline (ZOLOFT) 25 mg tablet   No Yes   Sig: Take 0 5 tablets (12 5 mg total) by mouth daily   traZODone (DESYREL) 50 mg tablet  Self No Yes   Sig: TAKE 1 TABLET AT BEDTIME      Facility-Administered Medications: None       Past Medical History:   Diagnosis Date    Anxiety     Depression     Elevated cholesterol     Hypertension     PONV (postoperative nausea and vomiting)        Past Surgical History:   Procedure Laterality Date    BREAST CYST ASPIRATION      DIAGNOSTIC LAPAROSCOPY      Exploratory, last assessed 10/25/16    NASAL SINUS SURGERY      RI STEREOTACTIC COMP ASSIST PROC,CRANIAL,EXTRADURAL N/A 11/8/2016    Procedure: FUNCTIONAL ENDOSCOPIC SINUS SURGERY (FESS) IMAGED GUIDED; Surgeon: Gregory Portillo DO;  Location: [facility];  Service: ENT    WISDOM TOOTH EXTRACTION         Family History   Problem Relation Age of Onset    Coronary artery disease Father         CABG    Diabetes type II Sister     Bipolar disorder Brother     Depression Brother         with anxiety     Esophageal cancer Brother     Diabetes Maternal Grandfather     Depression Family         with anxiety     Hyperlipidemia Family      I have reviewed and agree with the history as documented  Social History     Tobacco Use    Smoking status: Never Smoker    Smokeless tobacco: Never Used    Tobacco comment: No secondhand smoke exposure    Substance Use Topics    Alcohol use: No    Drug use: No        Review of Systems   Constitutional: Negative for chills, diaphoresis, fatigue and fever  HENT: Negative for congestion, rhinorrhea and sore throat  Eyes: Negative for photophobia and visual disturbance     Respiratory: Negative for cough, chest tightness and shortness of breath  Cardiovascular: Negative for chest pain and palpitations  Gastrointestinal: Negative for abdominal pain, blood in stool, constipation, diarrhea, nausea and vomiting  Genitourinary: Negative for dysuria, frequency and hematuria  Musculoskeletal: Positive for arthralgias  Negative for back pain, gait problem, myalgias, neck pain and neck stiffness  Skin: Negative for pallor and rash  Neurological: Negative for weakness, light-headedness, numbness and headaches  Hematological: Negative for adenopathy  Does not bruise/bleed easily  All other systems reviewed and are negative  Physical Exam  ED Triage Vitals [09/07/19 1711]   Temperature Pulse Respirations Blood Pressure SpO2   98 2 °F (36 8 °C) 77 18 148/98 99 %      Temp Source Heart Rate Source Patient Position - Orthostatic VS BP Location FiO2 (%)   Oral Monitor Sitting Right arm --      Pain Score       3             Orthostatic Vital Signs  Vitals:    09/07/19 1711   BP: 148/98   Pulse: 77   Patient Position - Orthostatic VS: Sitting       Physical Exam   Constitutional: She is oriented to person, place, and time  She appears well-developed and well-nourished  No distress  Patient is alert and oriented, appears well and nontoxic, in no acute distress   HENT:   Head: Normocephalic and atraumatic  Mouth/Throat: Oropharynx is clear and moist  No oropharyngeal exudate  Eyes: Pupils are equal, round, and reactive to light  Conjunctivae and EOM are normal    Neck: Normal range of motion  Neck supple  Cardiovascular: Normal rate, regular rhythm, normal heart sounds and intact distal pulses  Pulmonary/Chest: Effort normal and breath sounds normal  No respiratory distress  Abdominal: Soft  Bowel sounds are normal  She exhibits no distension  There is no tenderness  Musculoskeletal: She exhibits tenderness  She exhibits no deformity     Right hand: no obvious deformity appreciated, sensation and pulse intact, limited flexion and extension at wrist due to pain, tender to palpation along dorsal side of carpal and metacarpal bones, no snuffbox tenderness    Lymphadenopathy:     She has no cervical adenopathy  Neurological: She is alert and oriented to person, place, and time  Skin: Skin is warm and dry  Capillary refill takes less than 2 seconds  No rash noted  She is not diaphoretic  No erythema  No pallor  Psychiatric: She has a normal mood and affect  Her behavior is normal  Judgment and thought content normal    Nursing note and vitals reviewed  ED Medications  Medications - No data to display    Diagnostic Studies  Results Reviewed     None                 XR wrist 3+ views RIGHT    (Results Pending)         Procedures  Orthopedic injury treatment  Date/Time: 9/7/2019 7:06 PM  Performed by: Dominick Huerta MD  Authorized by: Dominick Huerta MD     Patient Location:  ED  Other Assisting Provider: Yes (comment)    Verbal consent obtained?: Yes    Risks and benefits: Risks, benefits and alternatives were discussed    Consent given by:  Patient  Patient identity confirmed:  Verbally with patient  Injury location:  Wrist  Location details:  Right wrist  Injury type: Soft tissue  Neurovascular status: Neurovascularly intact    Distal perfusion: normal    Neurological function: normal    Range of motion: reduced    Immobilization:  Splint  Splint type:  Thumb spica  Supplies used:  Ortho-Glass  Neurovascular status: Neurovascularly intact    Distal perfusion: normal    Neurological function: normal    Range of motion: unchanged    Patient tolerance:  Patient tolerated the procedure well with no immediate complications            ED Course                               MDM  Number of Diagnoses or Management Options  Right wrist injury:   Right wrist pain:   Diagnosis management comments: 59-year-old female presents for evaluation of right wrist pain after a fall    Patient appears clinically well and clinically stable  X-ray of right wrist concerning for possible lunate-capitate ligamentous injury  Will place patient in thumb spica and have patient follow up with hand surgery  Return precautions discussed  Disposition  Final diagnoses:   Right wrist injury   Right wrist pain     Time reflects when diagnosis was documented in both MDM as applicable and the Disposition within this note     Time User Action Codes Description Comment    9/7/2019  6:18 PM Renzo Sutton Right wrist injury     9/7/2019  6:19 PM Gumaro Bell Right wrist pain       ED Disposition     ED Disposition Condition Date/Time Comment    Discharge Stable Sat Sep 7, 2019  6:18 PM Jose A Vázquez discharge to home/self care              Follow-up Information     Follow up With Specialties Details Why Contact Info    Andrea Hurley MD Orthopedic Surgery Call in 2 days Please call asap to schedule appointment for ligamentous injury of carpal bones [address]  [phone]            Discharge Medication List as of 9/7/2019  6:20 PM      CONTINUE these medications which have NOT CHANGED    Details   albuterol (PROVENTIL HFA,VENTOLIN HFA) 90 mcg/act inhaler Inhale 2 puffs every 6 (six) hours as needed for wheezing, Starting Mon 3/11/2019, Normal      atenolol (TENORMIN) 25 mg tablet Take 1 tablet (25 mg total) by mouth daily, Starting Mon 10/29/2018, Normal      atorvastatin (LIPITOR) 80 mg tablet TAKE 1 TABLET DAILY, Normal      azelastine (OPTIVAR) 0 05 % ophthalmic solution Administer 1 drop to both eyes 2 (two) times a day, Starting Thu 5/16/2019, Normal      Calcium 600 MG tablet Take 600 mg by mouth, Historical Med      Cholecalciferol (VITAMIN D-3 PO) Take 4,000 Int'l Units by mouth daily, Historical Med      DULoxetine (CYMBALTA) 60 mg delayed release capsule Take 1 capsule (60 mg total) by mouth daily, Starting Tue 8/28/2018, Normal      fluticasone (FLONASE) 50 mcg/act nasal spray 2 sprays into each nostril daily, Starting Mon 9/10/2018, Normal      Multiple Vitamin (MULTIVITAMIN) tablet Take 1 tablet by mouth daily, Until Discontinued, Historical Med      Nutritional Supplements (ANTIOXIDANTS PO) Take 1 tablet by mouth daily, Historical Med      sertraline (ZOLOFT) 25 mg tablet Take 0 5 tablets (12 5 mg total) by mouth daily, Starting Wed 6/26/2019, Normal      traZODone (DESYREL) 50 mg tablet TAKE 1 TABLET AT BEDTIME, Normal      conjugated estrogens (PREMARIN) vaginal cream Insert into the vagina once a week, Until Discontinued, Historical Med      montelukast (SINGULAIR) 10 mg tablet Take 1 tablet daily for allergy, Normal      promethazine (PHENERGAN) 12 5 mg/10 mL syrup Take 1 tsp 4 times daily and 2 tsp at bedtime as needed for cough, Normal           No discharge procedures on file  ED Provider  Attending physically available and evaluated Lena Rosa I managed the patient along with the ED Attending      Electronically Signed by         Dominick Huerta MD  09/07/19 5351

## 2019-09-08 NOTE — ED ATTENDING ATTESTATION
Manuel Sandy MD, saw and evaluated the patient  I have discussed the patient with the resident/non-physician practitioner and agree with the resident's/non-physician practitioner's findings, Plan of Care, and MDM as documented in the resident's/non-physician practitioner's note, except where noted  All available labs and Radiology studies were reviewed  I was present for key portions of any procedure(s) performed by the resident/non-physician practitioner and I was immediately available to provide assistance  At this point I agree with the current assessment done in the Emergency Department  I have conducted an independent evaluation of this patient a history and physical is as follows:    68-year-old left-hand dominant female with fall on outstretched right hand  Patient did not strike her head and complains of isolated right wrist pain  Patient denies any other injuries  She says that her wrist is throbbing, aching, and tight  She has pain with moving it  Patient has no prior injuries to the wrist   Has no history of arthritis in the wrist   On exam the patient has swelling and tenderness over her lunate on the dorsal aspect of her wrist   There is no bony deformity  She has no snuffbox tenderness, nor does she have any pain with axial loading of her thumb  She is neurovascularly intact with good strength and sensation  She has no skin breaks and has normal capillary refill  Impression:  Wrist injury  X-ray done reviewed by me, patient with dorsal angulation of her capital lunate angle    Will plan to splint with thumb spica and have a follow-up with Hand  Critical Care Time  Procedures

## 2019-09-10 ENCOUNTER — OFFICE VISIT (OUTPATIENT)
Dept: OBGYN CLINIC | Facility: HOSPITAL | Age: 59
End: 2019-09-10
Payer: OTHER MISCELLANEOUS

## 2019-09-10 VITALS
DIASTOLIC BLOOD PRESSURE: 84 MMHG | WEIGHT: 140 LBS | HEIGHT: 63 IN | SYSTOLIC BLOOD PRESSURE: 138 MMHG | BODY MASS INDEX: 24.8 KG/M2 | HEART RATE: 62 BPM

## 2019-09-10 DIAGNOSIS — S60.211A CONTUSION OF RIGHT WRIST, INITIAL ENCOUNTER: Primary | ICD-10-CM

## 2019-09-10 PROCEDURE — 99203 OFFICE O/P NEW LOW 30 MIN: CPT | Performed by: PHYSICIAN ASSISTANT

## 2019-09-10 NOTE — PROGRESS NOTES
Assessment/Plan   Diagnoses and all orders for this visit:    Contusion of right wrist, initial encounter  -     Ok to discontinue splint at this time  -     Ice, NSAIDs as needed  -     She seemed very overwhelmed at the idea of doing some ROM exercises on her own  Will order 2-3 weeks of OT for rom  -     Follow up in a month with sports medicine if needed          Subjective   Patient ID: Ryan Roy is a 62 y o  female  Vitals:    09/10/19 1133   BP: 138/84   Pulse: 58     60yo female comes in for an evaluation of her right wrist   She was injured 9/6/19 when she fell on her outstretched hand  She went to the ER the next day and xrays showed no fracture  She was treated with a wrist splint and her pain is resolving  The pain is dull in character, mild in severity, pain does not radiate and is not associated with numbness  The following portions of the patient's history were reviewed and updated as appropriate: allergies, current medications, past family history, past medical history, past social history, past surgical history and problem list     Review of Systems  Ortho Exam  Past Medical History:   Diagnosis Date    Anxiety     Depression     Elevated cholesterol     Hypertension     PONV (postoperative nausea and vomiting)      Past Surgical History:   Procedure Laterality Date    BREAST CYST ASPIRATION      DIAGNOSTIC LAPAROSCOPY      Exploratory, last assessed 10/25/16    NASAL SINUS SURGERY      MN STEREOTACTIC COMP ASSIST PROC,CRANIAL,EXTRADURAL N/A 11/8/2016    Procedure: FUNCTIONAL ENDOSCOPIC SINUS SURGERY (FESS) IMAGED GUIDED;   Surgeon: Tutu Calderón DO;  Location: [facility] OR;  Service: ENT    WISDOM TOOTH EXTRACTION       Family History   Problem Relation Age of Onset    Coronary artery disease Father         CABG    Diabetes type II Sister     Bipolar disorder Brother     Depression Brother         with anxiety     Esophageal cancer Brother     Diabetes Maternal Grandfather     Depression Family         with anxiety     Hyperlipidemia Family      Social History     Occupational History    Not on file   Tobacco Use    Smoking status: Never Smoker    Smokeless tobacco: Never Used    Tobacco comment: No secondhand smoke exposure    Substance and Sexual Activity    Alcohol use: No    Drug use: No    Sexual activity: Not on file       Review of Systems   Constitutional: Negative  HENT: Negative  Eyes: Negative  Respiratory: Negative  Cardiovascular: Negative  Gastrointestinal: Negative  Endocrine: Negative  Genitourinary: Negative  Musculoskeletal: As below      Allergic/Immunologic: Negative  Neurological: Negative  Hematological: Negative  Psychiatric/Behavioral: Negative  Objective   Physical Exam    · Constitutional: Awake, Alert, Oriented  · Eyes: EOMI  · Psych: Mood and affect appropriate  · Heart: regular rate and rhythm  · Lungs: No audible wheezing  · Abdomen: soft  · Lymph: no lymphedema   right wrist:  - Appearance   Swelling: mild of the dorsal hand, no discoloration, no deformity, no ecchymosis and no erythema  - Palpation  o Minimal dorsal wrist tenderness  Otherwise, no tenderness about the forearm, wrist, or hand  No snuffbox tenderness     - ROM  o palmar flexion 30, dorsiflexion 20  - Motor  o  5/5, wrist extension 5/5, and wrist flexion 5/5, interossi 5/5  - Special Tests  o normal sensation of hand and arm  - NVI distally    I have personally reviewed pertinent films in PACS and my interpretation is no acute fracture  Some arthritis of the thumb  Trinidad Lorenzo

## 2019-09-10 NOTE — PATIENT INSTRUCTIONS
Ice Pack Application   WHAT YOU NEED TO KNOW:   Ice can be used to decrease swelling and pain after an injury or surgery  Common injuries that may benefit from ice therapy are sprains, strains, and bruises  The use of ice is most effective in the first 1 to 3 days after an injury  DISCHARGE INSTRUCTIONS:   How to apply ice:   · Fill a bag with crushed ice about half full  Remove the air from the bag before you close it  You can also use a bag of frozen vegetables  · Wrap the ice pack in a cloth to protect your skin from frostbite or other injury  · Put the ice over the injured area for 20 to 30 minutes or as long as directed  · Check your skin after about 30 seconds for color changes or blistering  Remove the ice if you notice skin changes or you feel burning or numbness in the area  · Throw the ice pack away after use  · Apply ice to your injured area 4 times each day or as directed  Ask your healthcare provider how many days you should apply ice  Contact your healthcare provider if:   · You see blisters, whitening of your skin, or a bluish color to your skin after using ice  · You feel burning or numbness when using ice  · You have questions about the use of ice packs  © 2017 [facility] Information is for End User's use only and may not be sold, redistributed or otherwise used for commercial purposes  All illustrations and images included in CareNotes® are the copyrighted property of [employer]  or [employer]  The above information is an  only  It is not intended as medical advice for individual conditions or treatments  Talk to your doctor, nurse or pharmacist before following any medical regimen to see if it is safe and effective for you  Safe Use of NSAIDs   WHAT YOU NEED TO KNOW:   NSAIDs are medicines that are used to decrease pain, swelling, and fever  NSAIDs are available with or without a doctor's order   NSAIDs that you can buy without a doctor's order include aspirin, ibuprofen, and naproxen  DISCHARGE INSTRUCTIONS:   Return to the emergency department if:   · You have swelling around your mouth or trouble breathing  · You are breathing fast or you have a fast heartbeat  · You have nausea, vomiting, or abdominal pain  · You have blood in your vomit or bowel movements  · You have a seizure  Contact your healthcare provider if:   · You have a headache or become confused  · You develop hearing loss or ringing in your ears  · You develop itching, a rash, or hives  · You have swelling around your lower legs, feet, ankles, and hands  · You do not know how much NSAIDs to give to your child  · You have questions or concerns about your condition or care  How to give NSAIDs to your child safely:   · Read the directions on the label  Find out if the medicine is right for your child's age and how much to give to your child  The dose for your child's weight or age should be listed  Do not  give your child more than the recommended amount  · Use the measuring tool that came with the medicine  Do not  use another measuring tool, such as a kitchen spoon  Other measuring tools do not provide the right amount of medicine  How to take NSAIDs safely:   · Read the directions on the label to learn how much medicine you should take and often to take it  Do not take more than the recommended amount  · Talk to your healthcare provider if you need take NSAIDs for more than 30 days  The longer you take NSAIDs, the higher your risk of side effects will be  You may need to take other medicines to decrease your risk of side effects such as stomach bleeding  · Do not take an over-the-counter NSAIDs with prescription NSAIDs  The combined amount of NSAIDs may be too high  · Tell your healthcare provider about other medicines you take  Some medicines can increase the risk of side effects from NSAIDs   Your healthcare provider will tell you if it is okay to take NSAIDs and how to take them  Who should not take NSAIDs:  Certain people should avoid or limit NSAIDs  Do not  give NSAIDs to children under 10months of age without direction from your child's doctor  Do not give aspirin to children under 25years of age  Your child could develop Reye syndrome if he takes aspirin  Reye syndrome can cause life-threatening brain and liver damage  Check your child's medicine labels for aspirin, salicylates, or oil of wintergreen  Talk to your healthcare provider before you take NSAIDs if any of the following apply to you:  · You have reflux disease, a peptic ulcer, H pylori infection, or bleeding in your stomach or intestines  · You have a bleeding disorder, or you take blood-thinning medicine  · You are allergic to aspirin or other NSAIDs  · You have liver or kidney or disease  · You have high blood pressure or heart disease  · You have 3 or more alcoholic drinks each day  · You are pregnant  What you need to know about an NSAID overdose:  Certain health problems can occur if you take too much NSAID medicine at one time or over time  Problems include nausea, vomiting, and abdominal pain  You may develop gastritis, peptic ulcers, and stomach bleeding  You may also develop fluid retention, heart problems, and kidney problems  NSAIDs can worsen high blood pressure  You may become confused, or you may have a headache, hearing loss, or hallucinations  An overdose of aspirin may also cause rapid breathing, a rapid heartbeat, or seizures  What to do if you think you or your child took too much NSAID medicine:  Call the [facility] at 1-520.854.4490 immediately  © 2017 [address]  Information is for End User's use only and may not be sold, redistributed or otherwise used for commercial purposes   All illustrations and images included in CareNotes® are the copyrighted property of [employer]  or [employer]  The above information is an  only  It is not intended as medical advice for individual conditions or treatments  Talk to your doctor, nurse or pharmacist before following any medical regimen to see if it is safe and effective for you

## 2019-09-10 NOTE — LETTER
September 10, 2019     Patient: Jv Wilson   YOB: 1960   Date of Visit: 9/10/2019       To Whom it May Concern:    Jv Wilson is under my professional care  She was seen in my office on 9/10/2019  She may return to work  If you have any questions or concerns, please don't hesitate to call           Sincerely,          Fred Freire PA-C        CC: Jv Wlison

## 2019-09-11 ENCOUNTER — EVALUATION (OUTPATIENT)
Dept: OCCUPATIONAL THERAPY | Age: 59
End: 2019-09-11
Payer: OTHER MISCELLANEOUS

## 2019-09-11 DIAGNOSIS — S60.211D CONTUSION OF RIGHT WRIST, SUBSEQUENT ENCOUNTER: Primary | ICD-10-CM

## 2019-09-11 PROCEDURE — 97165 OT EVAL LOW COMPLEX 30 MIN: CPT

## 2019-09-11 PROCEDURE — 97110 THERAPEUTIC EXERCISES: CPT

## 2019-09-11 NOTE — PROGRESS NOTES
OT Evaluation     Today's date: 2019  Patient name: Norman Cornell  : 1960  MRN: [id number]  Referring provider: Gilma Apple MD  Dx: No diagnosis found  Assessment  Assessment details: Patient is referred to therapy with right wrist contusion  After bracing herself from falling  on 2019  Digital ROM is intact  She lacks an arc of 20 degrees of wrist motion compared to her non affected side  Pain is diffuse in the dorsum of the hand and wrist   Strength is decreased due to pain  Impairments: abnormal or restricted ROM, impaired physical strength, lacks appropriate home exercise program and pain with function  Understanding of Dx/Px/POC: good   Prognosis: good    Goals  STGs 2-4visits 1) decrease VPR to 2) improve wrist motion by 10 degrees LTGs 1) painfree wrist motion and function 2) improve wrist arc of motion by 20 degrees  3) improve  strength by 5-10psi 4) improve pinch strengths by 2psi    Plan  Patient would benefit from: skilled occupational therapy  Planned modality interventions: fluidotherapy  Planned therapy interventions: manual therapy, strengthening, stretching, therapeutic exercise and home exercise program  Frequency: 2x week  Duration in weeks: 4        Subjective Evaluation    History of Present Illness  Date of onset: 2019  Mechanism of injury: Patient used her hand to catch herself so she won't fall  She reports pain with she makes a fist along the dorsal hand and lateral aspects      Pain  Current pain rating: 3  At best pain rating: 3  At worst pain ratin  Quality: pulling  Relieving factors: ice  Progression: improved    Hand dominance: left          Objective     Active Range of Motion     Left Wrist   Wrist flexion: 50 degrees   Wrist extension: 60 degrees   Radial deviation: 30 degrees   Ulnar deviation: 44 degrees     Right Wrist   Wrist flexion: 40 degrees   Wrist extension: 50 degrees   Radial deviation: 4 degrees   Ulnar deviation: 44 degrees     Right Thumb   Opposition: Opposition intact    Additional Active Range of Motion Details  Pain with radial deviation    Strength/Myotome Testing     Left Wrist/Hand   Wrist extension: 4  Wrist flexion: 4     (2nd hand position)     Trial 1: 45    Trial 2: 40    Trial 3: 35    Thumb Strength  Key/Lateral Pinch     Trail 1: 12  Tip/Two-Point Pinch     Trial 1: 10  Palmar/Three-Point Pinch     Trial 1: 12    Right Wrist/Hand   Wrist extension: 3+  Wrist flexion: 4-     (2nd hand position)     Trial 1: 15    Trial 2: 15    Trial 3: 20    Thumb Strength   Key/Lateral Pinch     Trial 1: 10  Tip/Two-Point Pinch     Trial 1: 6  Palmar/Three-Point Pinch     Trial 1: 6             Precautions: none      Manual              Full (P) wrist motion all planes                                                                     Exercise Diary  9/11            Wrist ROM fluido,  Jogs, wrist bar,  HEP instruction for ROM and stretching             strengthening                                                                                                                                                                                                                                                           Modalities

## 2019-09-16 ENCOUNTER — APPOINTMENT (OUTPATIENT)
Dept: OCCUPATIONAL THERAPY | Age: 59
End: 2019-09-16
Payer: OTHER MISCELLANEOUS

## 2019-09-17 ENCOUNTER — OFFICE VISIT (OUTPATIENT)
Dept: OCCUPATIONAL THERAPY | Age: 59
End: 2019-09-17
Payer: OTHER MISCELLANEOUS

## 2019-09-17 DIAGNOSIS — S60.211D CONTUSION OF RIGHT WRIST, SUBSEQUENT ENCOUNTER: Primary | ICD-10-CM

## 2019-09-17 PROCEDURE — 97140 MANUAL THERAPY 1/> REGIONS: CPT

## 2019-09-17 PROCEDURE — 97110 THERAPEUTIC EXERCISES: CPT

## 2019-09-17 NOTE — PROGRESS NOTES
Daily Note     Today's date: 2019  Patient name: Mireya Bryant  : 1960  MRN: [id number]  Referring provider: Fernanda Hoang MD  Dx:   Encounter Diagnosis     ICD-10-CM    1  Contusion of right wrist, subsequent encounter S60 211D                   Subjective: I don't have the strength in it when I lift something  Objective: See treatment diary below      Assessment: Tolerated treatment well  Patient demonstrated fatigue post treatment and would benefit from continued OT  Applied CP post for pain relief post ex  Plan: Continue per plan of care        Precautions: none      Manual              Full (P) wrist motion all planes  8'                                                                   Exercise Diary             Wrist ROM fluido,  Jogs, wrist bar,  HEP instruction for ROM and stretching fluido 15'            strengthening  RPW 30x           Static   Soft putty and dowel           Wrist PRE's f/e/RD  1# 3x10           s/p  sm hammer 3x10                                                                                                                                                                                                                               Modalities              CP post-tx prn  5'

## 2019-09-19 ENCOUNTER — OFFICE VISIT (OUTPATIENT)
Dept: OCCUPATIONAL THERAPY | Age: 59
End: 2019-09-19
Payer: OTHER MISCELLANEOUS

## 2019-09-19 DIAGNOSIS — S60.211D CONTUSION OF RIGHT WRIST, SUBSEQUENT ENCOUNTER: Primary | ICD-10-CM

## 2019-09-19 PROCEDURE — 97110 THERAPEUTIC EXERCISES: CPT

## 2019-09-19 NOTE — PROGRESS NOTES
Daily Note     Today's date: 2019  Patient name: Jayla Wiggins  : 1960  MRN: [id number]  Referring provider: Taylor May MD  Dx:   Encounter Diagnosis     ICD-10-CM    1  Contusion of right wrist, subsequent encounter S60 211D                   Subjective:  Reports feelings of decreased strength  Objective: See treatment diary below      Assessment: Tolerated treatment well  Plan: Continue per plan of care        Precautions: none      Manual              Full (P) wrist motion all planes  8'                                                                   Exercise Diary            Wrist ROM fluido,  Jogs, wrist bar,  HEP instruction for ROM and stretching fluido 15' fluido x 15 min           strengthening  RPW 30x RPW 30x          Static   Soft putty and dowel           Wrist PRE's f/e/RD  1# 3x10 2#3x10          s/p  sm hammer 3x10 lg hammer          General hand use   RB                                                                                                                                                                                                                 Modalities              CP post-tx prn  5'

## 2019-09-23 ENCOUNTER — APPOINTMENT (OUTPATIENT)
Dept: OCCUPATIONAL THERAPY | Age: 59
End: 2019-09-23
Payer: OTHER MISCELLANEOUS

## 2019-10-01 ENCOUNTER — OFFICE VISIT (OUTPATIENT)
Dept: OCCUPATIONAL THERAPY | Age: 59
End: 2019-10-01
Payer: OTHER MISCELLANEOUS

## 2019-10-01 DIAGNOSIS — S60.211D CONTUSION OF RIGHT WRIST, SUBSEQUENT ENCOUNTER: Primary | ICD-10-CM

## 2019-10-01 PROCEDURE — 97110 THERAPEUTIC EXERCISES: CPT

## 2019-10-01 NOTE — PROGRESS NOTES
Daily Note     Today's date: 10/1/2019  Patient name: Hugh Gentile  : 1960  MRN: [id number]  Referring provider: Poncho Dang MD  Dx:   No diagnosis found  Subjective:  Reports shooting pain ulnar side of hand and wrist      Objective: See treatment diary below  Mild ECU tenderness      Assessment: Tolerated treatment well  Plan: Continue per plan of care        Precautions: none      Manual              Full (P) wrist motion all planes  8'                                                                   Exercise Diary  9/11 9/17 9/19 10/01         Wrist ROM fluido,  Jogs, wrist bar,  HEP instruction for ROM and stretching fluido 15' fluido x 15 min fluido x 15min          strengthening  RPW 30x RPW 30x RPW 30x         Static   Soft putty and dowel           Wrist PRE's f/e/RD  1# 3x10 2#3x10 2#3x10         s/p  sm hammer 3x10 lg hammer lg hammer   3 x 10         General hand use   RB RB                                                                                                                                                                                                                Modalities              CP post-tx prn  5'

## 2019-10-07 ENCOUNTER — OFFICE VISIT (OUTPATIENT)
Dept: OCCUPATIONAL THERAPY | Age: 59
End: 2019-10-07
Payer: OTHER MISCELLANEOUS

## 2019-10-07 DIAGNOSIS — S60.211D CONTUSION OF RIGHT WRIST, SUBSEQUENT ENCOUNTER: Primary | ICD-10-CM

## 2019-10-07 PROCEDURE — 97110 THERAPEUTIC EXERCISES: CPT

## 2019-10-07 NOTE — PROGRESS NOTES
Daily Note     Today's date: 10/7/2019  Patient name: Queta Barrow  : 1960  MRN: [id number]  Referring provider: Shaila Bauman MD  Dx:   Encounter Diagnosis     ICD-10-CM    1  Contusion of right wrist, subsequent encounter S60 211D                   Subjective:  0/10 pain rating today  Objective: See treatment diary below  (-) ECU tenderness  Co treated for 15 min with another patient  Assessment: Tolerated treatment well  Plan: Continue per plan of care        Precautions: none      Manual              Full (P) wrist motion all planes  8'                                                                   Exercise Diary  9/11 9/17 9/19 10/01 10/7        Wrist ROM fluido,  Jogs, wrist bar,  HEP instruction for ROM and stretching fluido 15' fluido x 15 min fluido x 15min fluido x 15min         strengthening  RPW 30x RPW 30x RPW 30x RPW 3x10        Static   Soft putty and dowel           Wrist PRE's f/e/RD  1# 3x10 2#3x10 2#3x10 2#3x10        s/p  sm hammer 3x10 lg hammer lg hammer   3 x 10 lg hammer 3x10        General hand use   RB RB RB                                                                                                                                                                                                               Modalities              CP post-tx prn  5'

## 2019-10-15 ENCOUNTER — TELEPHONE (OUTPATIENT)
Dept: OBGYN CLINIC | Facility: HOSPITAL | Age: 59
End: 2019-10-15

## 2019-10-15 NOTE — TELEPHONE ENCOUNTER
Patient is calling   [phone]    Patient was told at last physical therapy session that she needs to be "released" from P T  She does not know what that means, so is asking if she needs an appointment  She has not scheduled any further P T  appointments  She states her wrist is feeling fine  Please advise patient

## 2019-10-19 ENCOUNTER — OFFICE VISIT (OUTPATIENT)
Dept: FAMILY MEDICINE CLINIC | Facility: CLINIC | Age: 59
End: 2019-10-19
Payer: COMMERCIAL

## 2019-10-19 VITALS
WEIGHT: 142 LBS | SYSTOLIC BLOOD PRESSURE: 110 MMHG | BODY MASS INDEX: 25.16 KG/M2 | HEART RATE: 76 BPM | TEMPERATURE: 98.1 F | DIASTOLIC BLOOD PRESSURE: 84 MMHG | HEIGHT: 63 IN

## 2019-10-19 DIAGNOSIS — N39.0 URINARY TRACT INFECTION WITHOUT HEMATURIA, SITE UNSPECIFIED: Primary | ICD-10-CM

## 2019-10-19 DIAGNOSIS — I10 ESSENTIAL HYPERTENSION: ICD-10-CM

## 2019-10-19 DIAGNOSIS — R73.9 HYPERGLYCEMIA: ICD-10-CM

## 2019-10-19 LAB
SL AMB  POCT GLUCOSE, UA: NORMAL
SL AMB LEUKOCYTE ESTERASE,UA: NORMAL
SL AMB POCT BILIRUBIN,UA: NORMAL
SL AMB POCT BLOOD,UA: NORMAL
SL AMB POCT CLARITY,UA: CLEAR
SL AMB POCT COLOR,UA: YELLOW
SL AMB POCT KETONES,UA: NORMAL
SL AMB POCT NITRITE,UA: POSITIVE
SL AMB POCT PH,UA: 6
SL AMB POCT SPECIFIC GRAVITY,UA: 1.02
SL AMB POCT URINE PROTEIN: NORMAL
SL AMB POCT UROBILINOGEN: 0.2

## 2019-10-19 PROCEDURE — 87077 CULTURE AEROBIC IDENTIFY: CPT | Performed by: PHYSICIAN ASSISTANT

## 2019-10-19 PROCEDURE — 87086 URINE CULTURE/COLONY COUNT: CPT | Performed by: PHYSICIAN ASSISTANT

## 2019-10-19 PROCEDURE — 3008F BODY MASS INDEX DOCD: CPT | Performed by: PHYSICIAN ASSISTANT

## 2019-10-19 PROCEDURE — 3074F SYST BP LT 130 MM HG: CPT | Performed by: PHYSICIAN ASSISTANT

## 2019-10-19 PROCEDURE — 81002 URINALYSIS NONAUTO W/O SCOPE: CPT | Performed by: PHYSICIAN ASSISTANT

## 2019-10-19 PROCEDURE — 99214 OFFICE O/P EST MOD 30 MIN: CPT | Performed by: PHYSICIAN ASSISTANT

## 2019-10-19 PROCEDURE — 3079F DIAST BP 80-89 MM HG: CPT | Performed by: PHYSICIAN ASSISTANT

## 2019-10-19 PROCEDURE — 87186 SC STD MICRODIL/AGAR DIL: CPT | Performed by: PHYSICIAN ASSISTANT

## 2019-10-19 RX ORDER — AMOXICILLIN 500 MG/1
500 CAPSULE ORAL EVERY 8 HOURS SCHEDULED
Qty: 21 CAPSULE | Refills: 0 | Status: SHIPPED | OUTPATIENT
Start: 2019-10-19 | End: 2019-10-26

## 2019-10-19 NOTE — PROGRESS NOTES
Assessment and Plan:  Patient Instructions     1  Urinary tract infection -recommend starting amoxicillin 500 mg 3 times daily for 7 days  Urinalysis will be sent for culture and sensitivity  Encourage follow up or going to the emergency room if she would develop fever, or flank pain  2   Hypertension -presently stable, no medication changes  3   Hyperglycemia -stable  Urinalysis without glucose present  Continue to monitor with regular labs  Avoid excessive carbohydrates  Diagnoses and all orders for this visit:    Urinary tract infection without hematuria, site unspecified  -     POCT urine dip  -     amoxicillin (AMOXIL) 500 mg capsule; Take 1 capsule (500 mg total) by mouth every 8 (eight) hours for 7 days    Essential hypertension    Hyperglycemia              Subjective:      Patient ID: Hever Willams is a 62 y o  female  CC:    Chief Complaint   Patient presents with    Possible UTI     c/o odor in urine  She had some burning last week  mjs       HPI:     HPI:  This is a 68-year-old female who presents to the office with concerns over possible urinary tract infection  Last week she was having some burning and discomfort with urination but that seems to have improved  She still notices a stronger odor to the urine  She has not had any flank pain, back pain, or fevers present  Patient has not had a history of frequent urinary tract infections in the past       The following portions of the patient's history were reviewed and updated as appropriate: allergies, current medications, past family history, past medical history, past social history, past surgical history and problem list       Review of Systems   Constitutional: Negative for chills, fatigue and fever  HENT: Negative for congestion, ear pain and sinus pressure  Eyes: Negative for visual disturbance  Respiratory: Negative for cough, chest tightness and shortness of breath      Cardiovascular: Negative for chest pain and palpitations  Gastrointestinal: Negative for diarrhea, nausea and vomiting  Endocrine: Negative for polyuria  Genitourinary: Positive for dysuria  Negative for frequency  Stronger odor to the urine  Musculoskeletal: Negative for arthralgias and myalgias  Skin: Negative for pallor and rash  Neurological: Negative for dizziness, weakness, light-headedness, numbness and headaches  Psychiatric/Behavioral: Negative for agitation, behavioral problems and sleep disturbance  All other systems reviewed and are negative  Data to review:       Objective:    Vitals:    10/19/19 1033   BP: 110/84   Pulse: 76   Temp: 98 1 °F (36 7 °C)   Weight: 64 4 kg (142 lb)   Height: 5' 3" (1 6 m)        Physical Exam   Constitutional: She appears well-developed and well-nourished  No distress  HENT:   Head: Normocephalic and atraumatic  Eyes: Pupils are equal, round, and reactive to light  Conjunctivae are normal    Cardiovascular: Normal rate, normal heart sounds and intact distal pulses  No murmur heard  Pulmonary/Chest: Effort normal  No respiratory distress  She has no wheezes  Abdominal: Soft  Bowel sounds are normal  She exhibits no distension  There is no tenderness  There is no rebound and no guarding  No CVA tenderness of either side  Nursing note and vitals reviewed

## 2019-10-19 NOTE — PATIENT INSTRUCTIONS
1  Urinary tract infection -recommend starting amoxicillin 500 mg 3 times daily for 7 days  Urinalysis will be sent for culture and sensitivity  Encourage follow up or going to the emergency room if she would develop fever, or flank pain  2   Hypertension -presently stable, no medication changes  3   Hyperglycemia -stable  Urinalysis without glucose present  Continue to monitor with regular labs  Avoid excessive carbohydrates

## 2019-10-21 LAB — BACTERIA UR CULT: ABNORMAL

## 2019-11-04 ENCOUNTER — TELEPHONE (OUTPATIENT)
Dept: FAMILY MEDICINE CLINIC | Facility: CLINIC | Age: 59
End: 2019-11-04

## 2019-11-04 DIAGNOSIS — F41.8 DEPRESSION WITH ANXIETY: ICD-10-CM

## 2019-11-04 RX ORDER — DULOXETIN HYDROCHLORIDE 60 MG/1
60 CAPSULE, DELAYED RELEASE ORAL DAILY
Qty: 90 CAPSULE | Refills: 0 | Status: SHIPPED | OUTPATIENT
Start: 2019-11-04 | End: 2019-11-11 | Stop reason: SDUPTHER

## 2019-11-04 NOTE — TELEPHONE ENCOUNTER
PATIENT CALLED IN RESCHEDULED HER F/U APPOINTMENT WOULD LIKE MORE TIME TO COMPLETE BLOOD WORK, PT ALSO STATED SHE WILL NEED A REFILL ON "CYMBALTA" PATIENT STATED SHE WILL RUN OUT OF MEDICATION SOMETIME THIS WEEK  PATIENT IS REALLY WORRIED, STATES SHE MIGHT BE ABLE TO MAKE IT UNTIL Monday SINCE SHE RESCHEDULED FOR Monday 11/11   IF SCRIPT IS ISSUED PLEASE SEND TO [facility]   PLEASE CALL PATIENT BACK [phone]

## 2019-11-06 LAB
25(OH)D3 SERPL-MCNC: 36 NG/ML (ref 30–100)
ALBUMIN SERPL-MCNC: 4.1 G/DL (ref 3.6–5.1)
ALBUMIN/GLOB SERPL: 1.9 (CALC) (ref 1–2.5)
ALP SERPL-CCNC: 94 U/L (ref 33–130)
ALT SERPL-CCNC: 18 U/L (ref 6–29)
AST SERPL-CCNC: 15 U/L (ref 10–35)
BILIRUB SERPL-MCNC: 0.9 MG/DL (ref 0.2–1.2)
BUN SERPL-MCNC: 23 MG/DL (ref 7–25)
BUN/CREAT SERPL: ABNORMAL (CALC) (ref 6–22)
CALCIUM SERPL-MCNC: 9.1 MG/DL (ref 8.6–10.4)
CHLORIDE SERPL-SCNC: 105 MMOL/L (ref 98–110)
CHOLEST SERPL-MCNC: 169 MG/DL
CHOLEST/HDLC SERPL: 3.8 (CALC)
CO2 SERPL-SCNC: 28 MMOL/L (ref 20–32)
CREAT SERPL-MCNC: 0.65 MG/DL (ref 0.5–0.99)
GLOBULIN SER CALC-MCNC: 2.2 G/DL (CALC) (ref 1.9–3.7)
GLUCOSE SERPL-MCNC: 103 MG/DL (ref 65–99)
HBA1C MFR BLD: 5.9 % OF TOTAL HGB
HDLC SERPL-MCNC: 45 MG/DL
LDLC SERPL CALC-MCNC: 103 MG/DL (CALC)
NONHDLC SERPL-MCNC: 124 MG/DL (CALC)
POTASSIUM SERPL-SCNC: 4.3 MMOL/L (ref 3.5–5.3)
PROT SERPL-MCNC: 6.3 G/DL (ref 6.1–8.1)
SL AMB EGFR AFRICAN AMERICAN: 111 ML/MIN/1.73M2
SL AMB EGFR NON AFRICAN AMERICAN: 96 ML/MIN/1.73M2
SODIUM SERPL-SCNC: 142 MMOL/L (ref 135–146)
TRIGL SERPL-MCNC: 114 MG/DL

## 2019-11-07 NOTE — ASSESSMENT & PLAN NOTE
Hemoglobin A1c is normal at 5 9 with a fasting sugar slightly elevated at 103  Continue watching carbohydrate intake and check in 6 months

## 2019-11-07 NOTE — ASSESSMENT & PLAN NOTE
Patient on Lipitor 80 mg which is keeping her LDL in check at 103  Continue current medication recheck 6 months

## 2019-11-11 ENCOUNTER — OFFICE VISIT (OUTPATIENT)
Dept: FAMILY MEDICINE CLINIC | Facility: CLINIC | Age: 59
End: 2019-11-11
Payer: COMMERCIAL

## 2019-11-11 VITALS
BODY MASS INDEX: 25.83 KG/M2 | WEIGHT: 145.8 LBS | HEIGHT: 63 IN | SYSTOLIC BLOOD PRESSURE: 114 MMHG | HEART RATE: 60 BPM | DIASTOLIC BLOOD PRESSURE: 76 MMHG

## 2019-11-11 DIAGNOSIS — F41.8 DEPRESSION WITH ANXIETY: ICD-10-CM

## 2019-11-11 DIAGNOSIS — E78.2 MIXED HYPERLIPIDEMIA: ICD-10-CM

## 2019-11-11 DIAGNOSIS — R73.9 HYPERGLYCEMIA: ICD-10-CM

## 2019-11-11 DIAGNOSIS — E55.9 VITAMIN D DEFICIENCY: ICD-10-CM

## 2019-11-11 DIAGNOSIS — J45.20 MILD INTERMITTENT ASTHMA WITHOUT COMPLICATION: ICD-10-CM

## 2019-11-11 DIAGNOSIS — F51.01 PRIMARY INSOMNIA: ICD-10-CM

## 2019-11-11 DIAGNOSIS — I10 ESSENTIAL HYPERTENSION: Primary | ICD-10-CM

## 2019-11-11 DIAGNOSIS — M81.0 AGE-RELATED OSTEOPOROSIS WITHOUT CURRENT PATHOLOGICAL FRACTURE: ICD-10-CM

## 2019-11-11 DIAGNOSIS — J30.1 SEASONAL ALLERGIC RHINITIS DUE TO POLLEN: ICD-10-CM

## 2019-11-11 PROCEDURE — 99214 OFFICE O/P EST MOD 30 MIN: CPT | Performed by: PHYSICIAN ASSISTANT

## 2019-11-11 PROCEDURE — 1036F TOBACCO NON-USER: CPT | Performed by: PHYSICIAN ASSISTANT

## 2019-11-11 RX ORDER — DULOXETIN HYDROCHLORIDE 60 MG/1
60 CAPSULE, DELAYED RELEASE ORAL DAILY
Qty: 90 CAPSULE | Refills: 1 | Status: SHIPPED | OUTPATIENT
Start: 2019-11-11 | End: 2020-02-17 | Stop reason: SDUPTHER

## 2019-11-11 RX ORDER — ATENOLOL 25 MG/1
25 TABLET ORAL DAILY
Qty: 30 TABLET | Refills: 11 | Status: SHIPPED | OUTPATIENT
Start: 2019-11-11 | End: 2020-11-13

## 2019-11-11 RX ORDER — SERTRALINE HYDROCHLORIDE 25 MG/1
25 TABLET, FILM COATED ORAL DAILY
Qty: 30 TABLET | Refills: 11 | Status: SHIPPED | OUTPATIENT
Start: 2019-11-11 | End: 2020-03-12 | Stop reason: SDUPTHER

## 2019-11-11 NOTE — PROGRESS NOTES
Assessment and Plan:    Problem List Items Addressed This Visit        Respiratory    Seasonal allergic rhinitis due to pollen    Mild intermittent asthma without complication     Stable with only as needed use of inhaler infrequently  Cardiovascular and Mediastinum    Essential hypertension - Primary     Stable continue current medication  Relevant Medications    atenolol (TENORMIN) 25 mg tablet       Musculoskeletal and Integument    Age-related osteoporosis without current pathological fracture       Other    Depression with anxiety     Since pts father passed away has been feeling better on a full 25 mg zoloft with her cymbalta so will order it in this manor  Relevant Medications    DULoxetine (CYMBALTA) 60 mg delayed release capsule    sertraline (ZOLOFT) 25 mg tablet    Hyperglycemia       Hemoglobin A1c is normal at 5 9 with a fasting sugar slightly elevated at 103  Continue watching carbohydrate intake and check in 6 months  Relevant Orders    Comprehensive metabolic panel    Hemoglobin A1C    Mixed hyperlipidemia      Patient on Lipitor 80 mg which is keeping her LDL in check at 103  Continue current medication recheck 6 months  Relevant Orders    Lipid Panel with Direct LDL reflex    Insomnia    Vitamin D deficiency       Vitamin-D level good at 36  Continue current supplementation check 1 year  Diagnoses and all orders for this visit:    Essential hypertension  -     atenolol (TENORMIN) 25 mg tablet; Take 1 tablet (25 mg total) by mouth daily    Age-related osteoporosis without current pathological fracture    Mild intermittent asthma without complication    Seasonal allergic rhinitis due to pollen    Hyperglycemia  -     Comprehensive metabolic panel; Future  -     Hemoglobin A1C; Future    Mixed hyperlipidemia  -     Lipid Panel with Direct LDL reflex;  Future    Primary insomnia    Vitamin D deficiency    Depression with anxiety  - DULoxetine (CYMBALTA) 60 mg delayed release capsule; Take 1 capsule (60 mg total) by mouth daily  -     sertraline (ZOLOFT) 25 mg tablet; Take 1 tablet (25 mg total) by mouth daily              Subjective:      Patient ID: Gerry Stone is a 62 y o  female  CC:    Chief Complaint   Patient presents with    Follow-up     Pt here to follow up and review labs  kw    Hypertension    Hyperlipidemia       HPI:      Gerry Stone is here for chronic conditions f/u including the diagnosis of Essential hypertension  (primary encounter diagnosis)  Age-related osteoporosis without current pathological fracture  Mild intermittent asthma without complication  Seasonal allergic rhinitis due to pollen  Hyperglycemia  Mixed hyperlipidemia  Primary insomnia  Vitamin d deficiency  Depression with anxiety   Pt  states they are taking all medications as directed without complaints or side effects   Pt  had labs done prior to today's visit which included Recent Results (from the past 672 hour(s))  -POCT urine dip  Collection Time: 10/19/19 10:49 AM       Result                      Value             Ref Range           LEUKOCYTE ESTERASE,UA       small                                 NITRITE,UA                  positive                              SL AMB POCT UROBILINOG*     0 2                                   POCT URINE PROTEIN          neg                                    PH,UA                      6 0                                   BLOOD,UA                    small                                 SPECIFIC GRAVITY,UA         1 025                                 KETONES,UA                  neg                                   BILIRUBIN,UA                neg                                   GLUCOSE, UA                 neg                                    COLOR,UA                   yellow                                CLARITY,UA                  clear                            -Urine culture  Collection Time: 10/19/19 10:51 AM       Result                      Value             Ref Range           Urine Culture                                                 >100,000 cfu/ml Escherichia coli (A)  *Culture results found, see result in Chart Review  -Lipid Panel with Direct LDL reflex  Collection Time: 11/05/19  7:54 AM       Result                      Value             Ref Range           Total Cholesterol           169               <200 mg/dL          HDL                         45 (L)            >50 mg/dL           Triglycerides               114               <150 mg/dL          LDL Direct                  103 (H)           mg/dL (calc)        Chol HDLC Ratio             3 8               <5 0 (calc)         Non-HDL Cholesterol         124               <130 mg/dL (*  -Comprehensive metabolic panel  Collection Time: 11/05/19  7:54 AM       Result                      Value             Ref Range           Glucose, Random             103 (H)           65 - 99 mg/dL       BUN                         23                7 - 25 mg/dL        Creatinine                  0 65              0 50 - 0 99 *       eGFR Non  Ameri*     96                > OR = 60 mL*       eGFR        111               > OR = 60 mL*       SL AMB BUN/CREATININE *                       6 - 22 (calc)   NOT APPLICABLE       Sodium                      142               135 - 146 mm*       Potassium                   4 3               3 5 - 5 3 mm*       Chloride                    105               98 - 110 mmo*       CO2                         28                20 - 32 mmol*       SL AMB CALCIUM              9 1               8 6 - 10 4 m*       Protein, Total              6 3               6 1 - 8 1 g/*       Albumin                     4 1               3 6 - 5 1 g/*       Globulin                    2 2               1 9 - 3 7 g/*       Albumin/Globulin Ratio      1 9               1 0 - 2 5 (c*       TOTAL BILIRUBIN 0 9               0 2 - 1 2 mg*       Alkaline Phosphatase        94                33 - 130 U/L        AST                         15                10 - 35 U/L         ALT                         18                6 - 29 U/L     -Vitamin D 25 hydroxy  Collection Time: 11/05/19  7:54 AM       Result                      Value             Ref Range           Vitamin D, 25-Hydroxy,*     36                30 - 100 ng/*  -Hemoglobin A1c (w/out EAG)  Collection Time: 11/05/19  7:54 AM       Result                      Value             Ref Range           Hemoglobin A1C              5 9 (H)           <5 7 % of to*        The following portions of the patient's history were reviewed and updated as appropriate: allergies, current medications, past family history, past medical history, past social history, past surgical history and problem list       Review of Systems   Constitutional: Negative  HENT: Negative  Eyes: Negative  Respiratory: Negative  Cardiovascular: Negative  Gastrointestinal: Negative  Endocrine: Negative  Genitourinary: Negative  Musculoskeletal: Negative  Skin: Negative  Allergic/Immunologic: Negative  Neurological: Negative  Hematological: Negative  Psychiatric/Behavioral: Negative  Data to review:       Objective:    Vitals:    11/11/19 0847   BP: 114/76   BP Location: Left arm   Patient Position: Sitting   Pulse: 60   Weight: 66 1 kg (145 lb 12 8 oz)   Height: 5' 3" (1 6 m)        Physical Exam   Constitutional: She is oriented to person, place, and time  She appears well-developed and well-nourished  No distress  HENT:   Head: Normocephalic and atraumatic  Eyes: Conjunctivae are normal  Right eye exhibits no discharge  Left eye exhibits no discharge  Neck: Neck supple  Carotid bruit is not present  Cardiovascular: Normal rate, regular rhythm and normal heart sounds  Exam reveals no gallop and no friction rub     No murmur heard   Pulmonary/Chest: Effort normal and breath sounds normal  No respiratory distress  She has no wheezes  She has no rales  Neurological: She is alert and oriented to person, place, and time  Skin: Skin is warm and dry  She is not diaphoretic  Psychiatric: She has a normal mood and affect  Judgment normal    Nursing note and vitals reviewed  BMI Counseling: Body mass index is 25 83 kg/m²  The BMI is above normal  Nutrition recommendations include decreasing portion sizes, encouraging healthy choices of fruits and vegetables, decreasing fast food intake, consuming healthier snacks, limiting drinks that contain sugar, moderation in carbohydrate intake, increasing intake of lean protein, reducing intake of saturated and trans fat and reducing intake of cholesterol  Exercise recommendations include exercising 3-5 times per week  No pharmacotherapy was ordered

## 2019-11-11 NOTE — PATIENT INSTRUCTIONS
Problem List Items Addressed This Visit        Respiratory    Seasonal allergic rhinitis due to pollen    Mild intermittent asthma without complication       Cardiovascular and Mediastinum    Essential hypertension - Primary     Stable continue current medication  Relevant Medications    atenolol (TENORMIN) 25 mg tablet       Musculoskeletal and Integument    Age-related osteoporosis without current pathological fracture       Other    Depression with anxiety     Since pts father passed away has been feeling better on a full 25 mg zoloft with her cymbalta so will order it in this manor  Relevant Medications    DULoxetine (CYMBALTA) 60 mg delayed release capsule    sertraline (ZOLOFT) 25 mg tablet    Hyperglycemia       Hemoglobin A1c is normal at 5 9 with a fasting sugar slightly elevated at 103  Continue watching carbohydrate intake and check in 6 months  Relevant Orders    Comprehensive metabolic panel    Hemoglobin A1C    Mixed hyperlipidemia      Patient on Lipitor 80 mg which is keeping her LDL in check at 103  Continue current medication recheck 6 months  Relevant Orders    Lipid Panel with Direct LDL reflex    Insomnia    Vitamin D deficiency       Vitamin-D level good at 36  Continue current supplementation check 1 year

## 2019-11-11 NOTE — ASSESSMENT & PLAN NOTE
Since pts father passed away has been feeling better on a full 25 mg zoloft with her cymbalta so will order it in this manor

## 2020-01-26 DIAGNOSIS — F51.04 PSYCHOPHYSIOLOGICAL INSOMNIA: ICD-10-CM

## 2020-01-27 RX ORDER — TRAZODONE HYDROCHLORIDE 50 MG/1
TABLET ORAL
Qty: 30 TABLET | Refills: 0 | Status: SHIPPED | OUTPATIENT
Start: 2020-01-27 | End: 2020-02-10

## 2020-02-10 DIAGNOSIS — F51.04 PSYCHOPHYSIOLOGICAL INSOMNIA: ICD-10-CM

## 2020-02-10 RX ORDER — TRAZODONE HYDROCHLORIDE 50 MG/1
TABLET ORAL
Qty: 90 TABLET | Refills: 1 | Status: SHIPPED | OUTPATIENT
Start: 2020-02-10 | End: 2020-06-18

## 2020-02-17 ENCOUNTER — OFFICE VISIT (OUTPATIENT)
Dept: FAMILY MEDICINE CLINIC | Facility: CLINIC | Age: 60
End: 2020-02-17
Payer: COMMERCIAL

## 2020-02-17 VITALS
HEART RATE: 68 BPM | TEMPERATURE: 98.2 F | BODY MASS INDEX: 26.05 KG/M2 | WEIGHT: 147 LBS | HEIGHT: 63 IN | DIASTOLIC BLOOD PRESSURE: 72 MMHG | SYSTOLIC BLOOD PRESSURE: 110 MMHG

## 2020-02-17 DIAGNOSIS — E78.2 MIXED HYPERLIPIDEMIA: ICD-10-CM

## 2020-02-17 DIAGNOSIS — F41.8 DEPRESSION WITH ANXIETY: ICD-10-CM

## 2020-02-17 DIAGNOSIS — J01.90 ACUTE NON-RECURRENT SINUSITIS, UNSPECIFIED LOCATION: Primary | ICD-10-CM

## 2020-02-17 PROCEDURE — 99213 OFFICE O/P EST LOW 20 MIN: CPT | Performed by: FAMILY MEDICINE

## 2020-02-17 PROCEDURE — 3074F SYST BP LT 130 MM HG: CPT | Performed by: FAMILY MEDICINE

## 2020-02-17 PROCEDURE — 3078F DIAST BP <80 MM HG: CPT | Performed by: FAMILY MEDICINE

## 2020-02-17 PROCEDURE — 1036F TOBACCO NON-USER: CPT | Performed by: FAMILY MEDICINE

## 2020-02-17 PROCEDURE — 3008F BODY MASS INDEX DOCD: CPT | Performed by: FAMILY MEDICINE

## 2020-02-17 RX ORDER — DULOXETIN HYDROCHLORIDE 60 MG/1
60 CAPSULE, DELAYED RELEASE ORAL DAILY
Qty: 90 CAPSULE | Refills: 3 | Status: SHIPPED | OUTPATIENT
Start: 2020-02-17 | End: 2020-05-27 | Stop reason: SDUPTHER

## 2020-02-17 RX ORDER — ATORVASTATIN CALCIUM 80 MG/1
80 TABLET, FILM COATED ORAL DAILY
Qty: 90 TABLET | Refills: 3 | Status: SHIPPED | OUTPATIENT
Start: 2020-02-17 | End: 2021-01-29

## 2020-02-17 RX ORDER — AMOXICILLIN 500 MG/1
500 CAPSULE ORAL EVERY 8 HOURS SCHEDULED
Qty: 30 CAPSULE | Refills: 0 | Status: SHIPPED | OUTPATIENT
Start: 2020-02-17 | End: 2020-02-27

## 2020-02-17 NOTE — PROGRESS NOTES
Assessment and Plan:  Follow up if not better  Problem List Items Addressed This Visit        Respiratory    Acute non-recurrent sinusitis - Primary     The patient was placed on amoxicillin 500 mg 1 capsule 3 times a day for 10 days  She will also take Coricidin HBP/cold and flu 1 tablet twice a day and 2 at bedtime  Relevant Medications    amoxicillin (AMOXIL) 500 mg capsule       Other    Depression with anxiety     Patient needed her Cymbalta renewed         Relevant Medications    DULoxetine (CYMBALTA) 60 mg delayed release capsule    Mixed hyperlipidemia     She needed her Lipitor 80 mg 1 daily renewed  Relevant Medications    atorvastatin (LIPITOR) 80 mg tablet                 Diagnoses and all orders for this visit:    Acute non-recurrent sinusitis, unspecified location  -     amoxicillin (AMOXIL) 500 mg capsule; Take 1 capsule (500 mg total) by mouth every 8 (eight) hours for 10 days    Mixed hyperlipidemia  -     atorvastatin (LIPITOR) 80 mg tablet; Take 1 tablet (80 mg total) by mouth daily    Depression with anxiety  -     DULoxetine (CYMBALTA) 60 mg delayed release capsule; Take 1 capsule (60 mg total) by mouth daily              Subjective:      Patient ID: Aj Hannah is a 62 y o  female  CC:    Chief Complaint   Patient presents with    Earache     Right ear pain that started Saturday  Right side of face "doesn't feel right"  Sore throat off / on   -  [facility]       HPI:    This is a 64-year-old female who comes in with right ear pain and pressure around the right side of her face  She feels head congestion and sinus pressure  She has a sore throat off and on and postnasal drip  She does not have a cough and denies any nausea, vomiting or diarrhea  She has used Aleve    Her blood pressure is 110/72 and her weight is 147 lb with a BMI of 26 0      The following portions of the patient's history were reviewed and updated as appropriate: allergies, current medications, past family history, past medical history, past social history, past surgical history and problem list       Review of Systems   Constitutional: Negative for chills, fatigue and fever  HENT: Positive for congestion, ear pain, postnasal drip and sore throat  Negative for rhinorrhea  Respiratory: Negative for cough, chest tightness, shortness of breath and wheezing  Cardiovascular: Negative for chest pain  Gastrointestinal: Negative for diarrhea, nausea and vomiting  Data to review:       Objective:    Vitals:    02/17/20 1840   BP: 110/72   BP Location: Left arm   Patient Position: Sitting   Cuff Size: Large   Pulse: 68   Temp: 98 2 °F (36 8 °C)   TempSrc: Oral   Weight: 66 7 kg (147 lb)   Height: 5' 3" (1 6 m)        Physical Exam   Constitutional: She is oriented to person, place, and time  She appears well-developed and well-nourished  HENT:   Head: Normocephalic  Left Ear: External ear normal    Mouth/Throat: No oropharyngeal exudate  Right canal is swollen and unable to see the tympanic membrane  Positive postnasal drip +1 erythema of posterior pharynx without exudates  Eyes: Pupils are equal, round, and reactive to light  Conjunctivae and EOM are normal    Neck: Normal range of motion  Neck supple  Cardiovascular: Normal rate, regular rhythm and normal heart sounds  Pulmonary/Chest: Effort normal and breath sounds normal  She has no wheezes  She has no rales  Abdominal: Soft  Bowel sounds are normal    Musculoskeletal: Normal range of motion  Lymphadenopathy:     She has no cervical adenopathy  Neurological: She is alert and oriented to person, place, and time  Skin: Skin is warm  Psychiatric: She has a normal mood and affect  Her behavior is normal  Judgment and thought content normal    Nursing note and vitals reviewed  BMI Counseling: Body mass index is 26 04 kg/m²   The BMI is above normal  Nutrition recommendations include decreasing portion sizes, encouraging healthy choices of fruits and vegetables, decreasing fast food intake, consuming healthier snacks, limiting drinks that contain sugar, moderation in carbohydrate intake, increasing intake of lean protein, reducing intake of saturated and trans fat and reducing intake of cholesterol  Exercise recommendations include exercising 3-5 times per week  No pharmacotherapy was ordered

## 2020-02-18 NOTE — ASSESSMENT & PLAN NOTE
The patient was placed on amoxicillin 500 mg 1 capsule 3 times a day for 10 days  She will also take Coricidin HBP/cold and flu 1 tablet twice a day and 2 at bedtime

## 2020-03-12 ENCOUNTER — TELEPHONE (OUTPATIENT)
Dept: FAMILY MEDICINE CLINIC | Facility: CLINIC | Age: 60
End: 2020-03-12

## 2020-03-12 DIAGNOSIS — F41.8 DEPRESSION WITH ANXIETY: ICD-10-CM

## 2020-03-12 DIAGNOSIS — J45.20 MILD INTERMITTENT ASTHMA WITHOUT COMPLICATION: ICD-10-CM

## 2020-03-12 RX ORDER — SERTRALINE HYDROCHLORIDE 25 MG/1
25 TABLET, FILM COATED ORAL DAILY
Qty: 30 TABLET | Refills: 11 | Status: SHIPPED | OUTPATIENT
Start: 2020-03-12 | End: 2021-03-14

## 2020-03-12 RX ORDER — ALBUTEROL SULFATE 90 UG/1
2 AEROSOL, METERED RESPIRATORY (INHALATION) EVERY 6 HOURS PRN
Qty: 3 INHALER | Refills: 1 | Status: SHIPPED | OUTPATIENT
Start: 2020-03-12 | End: 2020-03-12 | Stop reason: SDUPTHER

## 2020-03-12 RX ORDER — ALBUTEROL SULFATE 90 UG/1
2 AEROSOL, METERED RESPIRATORY (INHALATION) EVERY 6 HOURS PRN
Qty: 3 INHALER | Refills: 1 | Status: SHIPPED | OUTPATIENT
Start: 2020-03-12 | End: 2020-05-27 | Stop reason: SDUPTHER

## 2020-03-12 NOTE — TELEPHONE ENCOUNTER
Pt called requesting ventolin inhaler refill be sent to mail order pharmacy ([facility] )   Also refill Sertraline 25 mg 1 daily--send to [facility]

## 2020-03-12 NOTE — TELEPHONE ENCOUNTER
MESSAGE FOR PHARMACY LINE:  PT WOULD LIKE TO ORDER HER VENTOLIN INHALER, MAIL ORDER, [facility]  SERTRALINE 25 MG SENT TO [facility] IN [place]  HER DOSAGE WAS CHANGED TO ONE A DAY INSTEAD OF A HALF  THANK YOU

## 2020-03-28 ENCOUNTER — OFFICE VISIT (OUTPATIENT)
Dept: FAMILY MEDICINE CLINIC | Facility: CLINIC | Age: 60
End: 2020-03-28
Payer: COMMERCIAL

## 2020-03-28 VITALS
SYSTOLIC BLOOD PRESSURE: 110 MMHG | TEMPERATURE: 98.4 F | DIASTOLIC BLOOD PRESSURE: 78 MMHG | HEART RATE: 72 BPM | BODY MASS INDEX: 25.87 KG/M2 | HEIGHT: 63 IN | WEIGHT: 146 LBS

## 2020-03-28 DIAGNOSIS — J45.20 MILD INTERMITTENT ASTHMA WITHOUT COMPLICATION: ICD-10-CM

## 2020-03-28 DIAGNOSIS — I10 ESSENTIAL HYPERTENSION: ICD-10-CM

## 2020-03-28 DIAGNOSIS — N30.00 ACUTE CYSTITIS WITHOUT HEMATURIA: Primary | ICD-10-CM

## 2020-03-28 PROBLEM — J45.909 ASTHMA: Status: ACTIVE | Noted: 2019-03-11

## 2020-03-28 LAB
SL AMB  POCT GLUCOSE, UA: NEGATIVE
SL AMB LEUKOCYTE ESTERASE,UA: ABNORMAL
SL AMB POCT BILIRUBIN,UA: NEGATIVE
SL AMB POCT BLOOD,UA: ABNORMAL
SL AMB POCT CLARITY,UA: ABNORMAL
SL AMB POCT COLOR,UA: ABNORMAL
SL AMB POCT KETONES,UA: NEGATIVE
SL AMB POCT NITRITE,UA: POSITIVE
SL AMB POCT PH,UA: 6.5
SL AMB POCT SPECIFIC GRAVITY,UA: 1.02
SL AMB POCT URINE PROTEIN: NEGATIVE
SL AMB POCT UROBILINOGEN: 1

## 2020-03-28 PROCEDURE — 1036F TOBACCO NON-USER: CPT | Performed by: FAMILY MEDICINE

## 2020-03-28 PROCEDURE — 87086 URINE CULTURE/COLONY COUNT: CPT | Performed by: FAMILY MEDICINE

## 2020-03-28 PROCEDURE — 3074F SYST BP LT 130 MM HG: CPT | Performed by: FAMILY MEDICINE

## 2020-03-28 PROCEDURE — 99213 OFFICE O/P EST LOW 20 MIN: CPT | Performed by: FAMILY MEDICINE

## 2020-03-28 PROCEDURE — 3078F DIAST BP <80 MM HG: CPT | Performed by: FAMILY MEDICINE

## 2020-03-28 PROCEDURE — 3008F BODY MASS INDEX DOCD: CPT | Performed by: FAMILY MEDICINE

## 2020-03-28 PROCEDURE — 81002 URINALYSIS NONAUTO W/O SCOPE: CPT | Performed by: FAMILY MEDICINE

## 2020-03-28 PROCEDURE — 87186 SC STD MICRODIL/AGAR DIL: CPT | Performed by: FAMILY MEDICINE

## 2020-03-28 PROCEDURE — 87077 CULTURE AEROBIC IDENTIFY: CPT | Performed by: FAMILY MEDICINE

## 2020-03-28 RX ORDER — SULFAMETHOXAZOLE AND TRIMETHOPRIM 800; 160 MG/1; MG/1
1 TABLET ORAL EVERY 12 HOURS SCHEDULED
Qty: 14 TABLET | Refills: 0 | Status: SHIPPED | OUTPATIENT
Start: 2020-03-28 | End: 2020-04-04

## 2020-03-28 NOTE — PROGRESS NOTES
Assessment and Plan:    Problem List Items Addressed This Visit     Asthma     Try to avoid exposure to allergens  Continue with as needed albuterol  If she frequently has problems where she needs to use the albuterol, consider adding other controller medications  Essential hypertension     Blood pressure today was excellent  Continue with current treatment  No changes  Other Visit Diagnoses     Acute cystitis without hematuria    -  Primary    bactrim, hygene, follow as needed  Relevant Medications    sulfamethoxazole-trimethoprim (Bactrim DS) 800-160 mg per tablet    Other Relevant Orders    POCT urine dip (Completed)    Urine culture                 Diagnoses and all orders for this visit:    Acute cystitis without hematuria  Comments:  bactrim, hygene, follow as needed  Orders:  -     POCT urine dip  -     Urine culture  -     sulfamethoxazole-trimethoprim (Bactrim DS) 800-160 mg per tablet; Take 1 tablet by mouth every 12 (twelve) hours for 7 days    Essential hypertension    Mild intermittent asthma without complication              Subjective:      Patient ID: Mick Barnhart is a 62 y o  female  CC:    Chief Complaint   Patient presents with    Urinary Tract Infection     Burning with urination  Foul smelling urine  Symptoms started a few days ago  Denies suprapubic pain / pressure  -  lsh       HPI:    Patient has what she thinks is UTI  She has a significant change in sent of her urine, stating that it small is more like rotten eggs  She also has some burning with urination  Denies any urgency, frequency  Reviewed hygiene  Denies any back pain with this  Hypertension:  Currently on atenolol, and without problems  History of asthma:  Patient does have albuterol at home  She reports she uses it when she feels she needs it  She is using it more lately  She had cough recently, with some HA and aches          The following portions of the patient's history were reviewed and updated as appropriate: allergies, current medications and problem list       Review of Systems   Constitutional: Negative  HENT: Negative  Respiratory: Negative  Genitourinary: Positive for dysuria  Negative for decreased urine volume, difficulty urinating, flank pain, frequency, genital sores, hematuria, pelvic pain and urgency  Data to review:       Objective:    Vitals:    03/28/20 1114   BP: 110/78   BP Location: Left arm   Patient Position: Sitting   Cuff Size: Standard   Pulse: 72   Temp: 98 4 °F (36 9 °C)   Weight: 66 2 kg (146 lb)   Height: 5' 3" (1 6 m)        Physical Exam   Constitutional: She appears well-developed and well-nourished  HENT:   Head: Normocephalic and atraumatic  Cardiovascular: Normal rate, regular rhythm and normal heart sounds  Pulses:       Carotid pulses are 2+ on the right side, and 2+ on the left side  Pulmonary/Chest: Effort normal and breath sounds normal  She has no wheezes  She has no rales  She exhibits no tenderness  Abdominal: Soft  Bowel sounds are normal    No CVAT   Nursing note and vitals reviewed

## 2020-03-28 NOTE — PATIENT INSTRUCTIONS
Problem List Items Addressed This Visit     Asthma     Try to avoid exposure to allergens  Continue with as needed albuterol  If she frequently has problems where she needs to use the albuterol, consider adding other controller medications  Essential hypertension     Blood pressure today was excellent  Continue with current treatment  No changes  Other Visit Diagnoses     Acute cystitis without hematuria    -  Primary    bactrim, hygene, follow as needed  Relevant Medications    sulfamethoxazole-trimethoprim (Bactrim DS) 800-160 mg per tablet    Other Relevant Orders    POCT urine dip (Completed)    Urine culture          COVID 19 Instructions    Aleisha Bahena was advised to limit contact with others to essential tasks such as getting food, medications, and medical care  Proper handwashing reviewed, and Hand sanitzer when washing is not available  If the patient develops symptoms of COVID 19, the patient should call the office as soon as possible

## 2020-03-28 NOTE — ASSESSMENT & PLAN NOTE
Try to avoid exposure to allergens  Continue with as needed albuterol  If she frequently has problems where she needs to use the albuterol, consider adding other controller medications

## 2020-03-30 LAB — BACTERIA UR CULT: ABNORMAL

## 2020-05-23 LAB
ALBUMIN SERPL-MCNC: 4.5 G/DL (ref 3.6–5.1)
ALBUMIN/GLOB SERPL: 2 (CALC) (ref 1–2.5)
ALP SERPL-CCNC: 103 U/L (ref 37–153)
ALT SERPL-CCNC: 15 U/L (ref 6–29)
AST SERPL-CCNC: 15 U/L (ref 10–35)
BILIRUB SERPL-MCNC: 1 MG/DL (ref 0.2–1.2)
BUN SERPL-MCNC: 20 MG/DL (ref 7–25)
BUN/CREAT SERPL: ABNORMAL (CALC) (ref 6–22)
CALCIUM SERPL-MCNC: 9.5 MG/DL (ref 8.6–10.4)
CHLORIDE SERPL-SCNC: 104 MMOL/L (ref 98–110)
CHOLEST SERPL-MCNC: 184 MG/DL
CHOLEST/HDLC SERPL: 4.5 (CALC)
CO2 SERPL-SCNC: 30 MMOL/L (ref 20–32)
CREAT SERPL-MCNC: 0.63 MG/DL (ref 0.5–0.99)
GLOBULIN SER CALC-MCNC: 2.3 G/DL (CALC) (ref 1.9–3.7)
GLUCOSE SERPL-MCNC: 104 MG/DL (ref 65–99)
HBA1C MFR BLD: 6 % OF TOTAL HGB
HDLC SERPL-MCNC: 41 MG/DL
LDLC SERPL CALC-MCNC: 117 MG/DL (CALC)
NONHDLC SERPL-MCNC: 143 MG/DL (CALC)
POTASSIUM SERPL-SCNC: 4.4 MMOL/L (ref 3.5–5.3)
PROT SERPL-MCNC: 6.8 G/DL (ref 6.1–8.1)
SL AMB EGFR AFRICAN AMERICAN: 112 ML/MIN/1.73M2
SL AMB EGFR NON AFRICAN AMERICAN: 97 ML/MIN/1.73M2
SODIUM SERPL-SCNC: 141 MMOL/L (ref 135–146)
TRIGL SERPL-MCNC: 144 MG/DL

## 2020-05-26 PROBLEM — J01.90 ACUTE NON-RECURRENT SINUSITIS: Status: RESOLVED | Noted: 2020-02-17 | Resolved: 2020-05-26

## 2020-05-27 ENCOUNTER — OFFICE VISIT (OUTPATIENT)
Dept: FAMILY MEDICINE CLINIC | Facility: CLINIC | Age: 60
End: 2020-05-27
Payer: COMMERCIAL

## 2020-05-27 VITALS
TEMPERATURE: 97.7 F | SYSTOLIC BLOOD PRESSURE: 116 MMHG | BODY MASS INDEX: 25.87 KG/M2 | WEIGHT: 146 LBS | HEART RATE: 62 BPM | DIASTOLIC BLOOD PRESSURE: 80 MMHG | HEIGHT: 63 IN

## 2020-05-27 DIAGNOSIS — F51.01 PRIMARY INSOMNIA: ICD-10-CM

## 2020-05-27 DIAGNOSIS — Z12.39 BREAST CANCER SCREENING: ICD-10-CM

## 2020-05-27 DIAGNOSIS — M81.0 AGE-RELATED OSTEOPOROSIS WITHOUT CURRENT PATHOLOGICAL FRACTURE: ICD-10-CM

## 2020-05-27 DIAGNOSIS — R73.9 HYPERGLYCEMIA: ICD-10-CM

## 2020-05-27 DIAGNOSIS — J30.1 SEASONAL ALLERGIC RHINITIS DUE TO POLLEN: ICD-10-CM

## 2020-05-27 DIAGNOSIS — E78.2 MIXED HYPERLIPIDEMIA: ICD-10-CM

## 2020-05-27 DIAGNOSIS — J45.20 MILD INTERMITTENT ASTHMA WITHOUT COMPLICATION: ICD-10-CM

## 2020-05-27 DIAGNOSIS — E55.9 VITAMIN D DEFICIENCY: ICD-10-CM

## 2020-05-27 DIAGNOSIS — F41.8 DEPRESSION WITH ANXIETY: ICD-10-CM

## 2020-05-27 DIAGNOSIS — I10 ESSENTIAL HYPERTENSION: Primary | ICD-10-CM

## 2020-05-27 DIAGNOSIS — Z12.11 COLON CANCER SCREENING: ICD-10-CM

## 2020-05-27 PROCEDURE — 1036F TOBACCO NON-USER: CPT | Performed by: PHYSICIAN ASSISTANT

## 2020-05-27 PROCEDURE — 99214 OFFICE O/P EST MOD 30 MIN: CPT | Performed by: PHYSICIAN ASSISTANT

## 2020-05-27 PROCEDURE — 3074F SYST BP LT 130 MM HG: CPT | Performed by: PHYSICIAN ASSISTANT

## 2020-05-27 PROCEDURE — 3079F DIAST BP 80-89 MM HG: CPT | Performed by: PHYSICIAN ASSISTANT

## 2020-05-27 PROCEDURE — 3008F BODY MASS INDEX DOCD: CPT | Performed by: PHYSICIAN ASSISTANT

## 2020-05-27 RX ORDER — DULOXETIN HYDROCHLORIDE 60 MG/1
60 CAPSULE, DELAYED RELEASE ORAL DAILY
Qty: 90 CAPSULE | Refills: 3 | Status: SHIPPED | OUTPATIENT
Start: 2020-05-27 | End: 2021-03-25

## 2020-05-27 RX ORDER — ALBUTEROL SULFATE 90 UG/1
2 AEROSOL, METERED RESPIRATORY (INHALATION) EVERY 6 HOURS PRN
Qty: 3 INHALER | Refills: 1 | Status: SHIPPED | OUTPATIENT
Start: 2020-05-27

## 2020-05-27 RX ORDER — AZELASTINE HYDROCHLORIDE 0.5 MG/ML
1 SOLUTION/ DROPS OPHTHALMIC 2 TIMES DAILY
Qty: 18 ML | Refills: 3 | Status: SHIPPED | OUTPATIENT
Start: 2020-05-27 | End: 2021-11-04

## 2020-06-18 DIAGNOSIS — F51.04 PSYCHOPHYSIOLOGICAL INSOMNIA: ICD-10-CM

## 2020-06-18 RX ORDER — TRAZODONE HYDROCHLORIDE 50 MG/1
TABLET ORAL
Qty: 30 TABLET | Refills: 0 | Status: SHIPPED | OUTPATIENT
Start: 2020-06-18 | End: 2020-12-17 | Stop reason: SDUPTHER

## 2020-11-13 DIAGNOSIS — I10 ESSENTIAL HYPERTENSION: ICD-10-CM

## 2020-11-13 RX ORDER — ATENOLOL 25 MG/1
TABLET ORAL
Qty: 30 TABLET | Refills: 11 | Status: SHIPPED | OUTPATIENT
Start: 2020-11-13 | End: 2021-09-14

## 2020-12-07 ENCOUNTER — TELEMEDICINE (OUTPATIENT)
Dept: FAMILY MEDICINE CLINIC | Facility: CLINIC | Age: 60
End: 2020-12-07
Payer: COMMERCIAL

## 2020-12-07 VITALS — BODY MASS INDEX: 24.27 KG/M2 | WEIGHT: 137 LBS | TEMPERATURE: 97 F

## 2020-12-07 DIAGNOSIS — B34.9 VIRAL INFECTION, UNSPECIFIED: Primary | ICD-10-CM

## 2020-12-07 DIAGNOSIS — B34.9 VIRAL INFECTION, UNSPECIFIED: ICD-10-CM

## 2020-12-07 PROCEDURE — 99213 OFFICE O/P EST LOW 20 MIN: CPT | Performed by: PHYSICIAN ASSISTANT

## 2020-12-07 PROCEDURE — 1036F TOBACCO NON-USER: CPT | Performed by: PHYSICIAN ASSISTANT

## 2020-12-07 PROCEDURE — U0003 INFECTIOUS AGENT DETECTION BY NUCLEIC ACID (DNA OR RNA); SEVERE ACUTE RESPIRATORY SYNDROME CORONAVIRUS 2 (SARS-COV-2) (CORONAVIRUS DISEASE [COVID-19]), AMPLIFIED PROBE TECHNIQUE, MAKING USE OF HIGH THROUGHPUT TECHNOLOGIES AS DESCRIBED BY CMS-2020-01-R: HCPCS | Performed by: PHYSICIAN ASSISTANT

## 2020-12-08 LAB — SARS-COV-2 RNA SPEC QL NAA+PROBE: NOT DETECTED

## 2020-12-08 NOTE — RESULT ENCOUNTER NOTE
Please let pt know that her covid test was negative  If she is still having symptoms and would like me to order a CXR I can or even call her in an antibiotic  Thank you

## 2020-12-09 ENCOUNTER — APPOINTMENT (OUTPATIENT)
Dept: RADIOLOGY | Facility: MEDICAL CENTER | Age: 60
End: 2020-12-09
Payer: COMMERCIAL

## 2020-12-09 DIAGNOSIS — R05.9 COUGH: Primary | ICD-10-CM

## 2020-12-09 DIAGNOSIS — R05.9 COUGH: ICD-10-CM

## 2020-12-09 PROCEDURE — 71046 X-RAY EXAM CHEST 2 VIEWS: CPT

## 2020-12-15 LAB
25(OH)D3 SERPL-MCNC: 40 NG/ML (ref 30–100)
ALBUMIN SERPL-MCNC: 4.2 G/DL (ref 3.6–5.1)
ALBUMIN/GLOB SERPL: 1.7 (CALC) (ref 1–2.5)
ALP SERPL-CCNC: 102 U/L (ref 37–153)
ALT SERPL-CCNC: 18 U/L (ref 6–29)
AST SERPL-CCNC: 18 U/L (ref 10–35)
BILIRUB SERPL-MCNC: 0.8 MG/DL (ref 0.2–1.2)
BUN SERPL-MCNC: 23 MG/DL (ref 7–25)
BUN/CREAT SERPL: ABNORMAL (CALC) (ref 6–22)
CALCIUM SERPL-MCNC: 9.3 MG/DL (ref 8.6–10.4)
CHLORIDE SERPL-SCNC: 106 MMOL/L (ref 98–110)
CHOLEST SERPL-MCNC: 149 MG/DL
CHOLEST/HDLC SERPL: 3.7 (CALC)
CO2 SERPL-SCNC: 27 MMOL/L (ref 20–32)
CREAT SERPL-MCNC: 0.66 MG/DL (ref 0.5–0.99)
GLOBULIN SER CALC-MCNC: 2.5 G/DL (CALC) (ref 1.9–3.7)
GLUCOSE SERPL-MCNC: 100 MG/DL (ref 65–99)
HBA1C MFR BLD: 5.4 % OF TOTAL HGB
HDLC SERPL-MCNC: 40 MG/DL
LDLC SERPL CALC-MCNC: 90 MG/DL (CALC)
NONHDLC SERPL-MCNC: 109 MG/DL (CALC)
POTASSIUM SERPL-SCNC: 4.5 MMOL/L (ref 3.5–5.3)
PROT SERPL-MCNC: 6.7 G/DL (ref 6.1–8.1)
SL AMB EGFR AFRICAN AMERICAN: 110 ML/MIN/1.73M2
SL AMB EGFR NON AFRICAN AMERICAN: 95 ML/MIN/1.73M2
SODIUM SERPL-SCNC: 143 MMOL/L (ref 135–146)
TRIGL SERPL-MCNC: 94 MG/DL

## 2020-12-17 ENCOUNTER — TELEMEDICINE (OUTPATIENT)
Dept: FAMILY MEDICINE CLINIC | Facility: CLINIC | Age: 60
End: 2020-12-17
Payer: COMMERCIAL

## 2020-12-17 DIAGNOSIS — F51.01 PRIMARY INSOMNIA: ICD-10-CM

## 2020-12-17 DIAGNOSIS — I10 ESSENTIAL HYPERTENSION: ICD-10-CM

## 2020-12-17 DIAGNOSIS — Z11.4 SCREENING FOR HIV (HUMAN IMMUNODEFICIENCY VIRUS): Primary | ICD-10-CM

## 2020-12-17 DIAGNOSIS — R73.9 HYPERGLYCEMIA: ICD-10-CM

## 2020-12-17 DIAGNOSIS — F41.8 DEPRESSION WITH ANXIETY: ICD-10-CM

## 2020-12-17 DIAGNOSIS — F51.04 PSYCHOPHYSIOLOGICAL INSOMNIA: ICD-10-CM

## 2020-12-17 DIAGNOSIS — E55.9 VITAMIN D DEFICIENCY: ICD-10-CM

## 2020-12-17 DIAGNOSIS — M81.0 AGE-RELATED OSTEOPOROSIS WITHOUT CURRENT PATHOLOGICAL FRACTURE: ICD-10-CM

## 2020-12-17 DIAGNOSIS — E78.2 MIXED HYPERLIPIDEMIA: ICD-10-CM

## 2020-12-17 PROCEDURE — 99214 OFFICE O/P EST MOD 30 MIN: CPT | Performed by: PHYSICIAN ASSISTANT

## 2020-12-17 RX ORDER — TRAZODONE HYDROCHLORIDE 50 MG/1
50 TABLET ORAL
Qty: 30 TABLET | Refills: 5 | Status: SHIPPED | OUTPATIENT
Start: 2020-12-17 | End: 2022-01-12 | Stop reason: SDUPTHER

## 2021-01-25 ENCOUNTER — TELEPHONE (OUTPATIENT)
Dept: ADMINISTRATIVE | Facility: OTHER | Age: 61
End: 2021-01-25

## 2021-01-25 NOTE — TELEPHONE ENCOUNTER
----- Message from Henna Bryant sent at 1/25/2021  9:15 AM EST -----  Regarding: [facility]/ Mammo  01/25/21 9:15 AM    Hello, our patient Lencho Gaviria has had Mammogram completed/performed  Please assist in updating the patient chart by pulling the Care Everywhere (CE) document  The date of service is 01/22/2021       Thank you,  [facility]

## 2021-01-27 NOTE — TELEPHONE ENCOUNTER
Upon review of the In Basket request we were able to locate, review, and update the patient chart as requested for Mammogram     Any additional questions or concerns should be emailed to the Practice Liaisons via [email or web address] email, please do not reply via In Basket      Thank you  Jace Fontaine

## 2021-01-29 DIAGNOSIS — E78.2 MIXED HYPERLIPIDEMIA: ICD-10-CM

## 2021-01-29 RX ORDER — ATORVASTATIN CALCIUM 80 MG/1
TABLET, FILM COATED ORAL
Qty: 90 TABLET | Refills: 3 | Status: SHIPPED | OUTPATIENT
Start: 2021-01-29 | End: 2022-01-17 | Stop reason: SDUPTHER

## 2021-03-14 DIAGNOSIS — F41.8 DEPRESSION WITH ANXIETY: ICD-10-CM

## 2021-03-14 RX ORDER — SERTRALINE HYDROCHLORIDE 25 MG/1
TABLET, FILM COATED ORAL
Qty: 90 TABLET | Refills: 3 | Status: SHIPPED | OUTPATIENT
Start: 2021-03-14 | End: 2021-06-30 | Stop reason: SDUPTHER

## 2021-03-25 DIAGNOSIS — F41.8 DEPRESSION WITH ANXIETY: ICD-10-CM

## 2021-03-25 RX ORDER — DULOXETIN HYDROCHLORIDE 60 MG/1
CAPSULE, DELAYED RELEASE ORAL
Qty: 90 CAPSULE | Refills: 3 | Status: SHIPPED | OUTPATIENT
Start: 2021-03-25 | End: 2022-01-17 | Stop reason: SDUPTHER

## 2021-06-17 LAB
ALBUMIN SERPL-MCNC: 4.2 G/DL (ref 3.6–5.1)
ALBUMIN/GLOB SERPL: 1.8 (CALC) (ref 1–2.5)
ALP SERPL-CCNC: 81 U/L (ref 37–153)
ALT SERPL-CCNC: 20 U/L (ref 6–29)
AST SERPL-CCNC: 16 U/L (ref 10–35)
BILIRUB SERPL-MCNC: 0.8 MG/DL (ref 0.2–1.2)
BUN SERPL-MCNC: 28 MG/DL (ref 7–25)
BUN/CREAT SERPL: 42 (CALC) (ref 6–22)
CALCIUM SERPL-MCNC: 9.5 MG/DL (ref 8.6–10.4)
CHLORIDE SERPL-SCNC: 103 MMOL/L (ref 98–110)
CHOLEST SERPL-MCNC: 176 MG/DL
CHOLEST/HDLC SERPL: 3.5 (CALC)
CO2 SERPL-SCNC: 29 MMOL/L (ref 20–32)
CREAT SERPL-MCNC: 0.67 MG/DL (ref 0.5–0.99)
GLOBULIN SER CALC-MCNC: 2.3 G/DL (CALC) (ref 1.9–3.7)
GLUCOSE SERPL-MCNC: 86 MG/DL (ref 65–99)
HBA1C MFR BLD: 5.4 % OF TOTAL HGB
HDLC SERPL-MCNC: 50 MG/DL
HIV 1+2 AB+HIV1 P24 AG SERPL QL IA: NORMAL
LDLC SERPL CALC-MCNC: 110 MG/DL (CALC)
NONHDLC SERPL-MCNC: 126 MG/DL (CALC)
POTASSIUM SERPL-SCNC: 4.1 MMOL/L (ref 3.5–5.3)
PROT SERPL-MCNC: 6.5 G/DL (ref 6.1–8.1)
SL AMB EGFR AFRICAN AMERICAN: 109 ML/MIN/1.73M2
SL AMB EGFR NON AFRICAN AMERICAN: 94 ML/MIN/1.73M2
SODIUM SERPL-SCNC: 140 MMOL/L (ref 135–146)
TRIGL SERPL-MCNC: 74 MG/DL

## 2021-06-29 PROBLEM — B34.9 VIRAL INFECTION, UNSPECIFIED: Status: RESOLVED | Noted: 2020-12-07 | Resolved: 2021-06-29

## 2021-06-30 ENCOUNTER — OFFICE VISIT (OUTPATIENT)
Dept: FAMILY MEDICINE CLINIC | Facility: CLINIC | Age: 61
End: 2021-06-30
Payer: COMMERCIAL

## 2021-06-30 VITALS
BODY MASS INDEX: 22.32 KG/M2 | TEMPERATURE: 98.2 F | WEIGHT: 126 LBS | DIASTOLIC BLOOD PRESSURE: 76 MMHG | SYSTOLIC BLOOD PRESSURE: 112 MMHG | HEART RATE: 62 BPM

## 2021-06-30 DIAGNOSIS — Z11.59 NEED FOR HEPATITIS C SCREENING TEST: ICD-10-CM

## 2021-06-30 DIAGNOSIS — R73.9 HYPERGLYCEMIA: ICD-10-CM

## 2021-06-30 DIAGNOSIS — E55.9 VITAMIN D DEFICIENCY: ICD-10-CM

## 2021-06-30 DIAGNOSIS — F41.8 DEPRESSION WITH ANXIETY: ICD-10-CM

## 2021-06-30 DIAGNOSIS — I10 ESSENTIAL HYPERTENSION: Primary | ICD-10-CM

## 2021-06-30 DIAGNOSIS — E78.2 MIXED HYPERLIPIDEMIA: ICD-10-CM

## 2021-06-30 DIAGNOSIS — J30.1 SEASONAL ALLERGIC RHINITIS DUE TO POLLEN: ICD-10-CM

## 2021-06-30 DIAGNOSIS — F51.01 PRIMARY INSOMNIA: ICD-10-CM

## 2021-06-30 DIAGNOSIS — J45.20 MILD INTERMITTENT ASTHMA WITHOUT COMPLICATION: ICD-10-CM

## 2021-06-30 DIAGNOSIS — M81.0 AGE-RELATED OSTEOPOROSIS WITHOUT CURRENT PATHOLOGICAL FRACTURE: ICD-10-CM

## 2021-06-30 PROCEDURE — 3074F SYST BP LT 130 MM HG: CPT | Performed by: PHYSICIAN ASSISTANT

## 2021-06-30 PROCEDURE — 99214 OFFICE O/P EST MOD 30 MIN: CPT | Performed by: PHYSICIAN ASSISTANT

## 2021-06-30 PROCEDURE — 1036F TOBACCO NON-USER: CPT | Performed by: PHYSICIAN ASSISTANT

## 2021-06-30 PROCEDURE — 3078F DIAST BP <80 MM HG: CPT | Performed by: PHYSICIAN ASSISTANT

## 2021-06-30 RX ORDER — PERPHENAZINE/AMITRIPTYLINE HCL 4 MG-25 MG
TABLET ORAL
COMMUNITY

## 2021-06-30 RX ORDER — SERTRALINE HYDROCHLORIDE 25 MG/1
25 TABLET, FILM COATED ORAL DAILY
Qty: 30 TABLET | Refills: 11 | Status: SHIPPED | OUTPATIENT
Start: 2021-06-30 | End: 2022-01-12 | Stop reason: SDUPTHER

## 2021-06-30 RX ORDER — OLOPATADINE HYDROCHLORIDE 1 MG/ML
1 SOLUTION/ DROPS OPHTHALMIC 2 TIMES DAILY
Qty: 5 ML | Refills: 11 | Status: SHIPPED | OUTPATIENT
Start: 2021-06-30 | End: 2021-11-04

## 2021-06-30 NOTE — PROGRESS NOTES
Assessment and Plan:    Problem List Items Addressed This Visit        Respiratory    Seasonal allergic rhinitis due to pollen     Having trouble with eye symptoms  Trial change from optivar to pataday  Relevant Medications    olopatadine (PATANOL) 0 1 % ophthalmic solution    Asthma       Stable with as needed Albuterol inhaler  Cardiovascular and Mediastinum    Essential hypertension - Primary      Very stable continue current medication  Musculoskeletal and Integument    Age-related osteoporosis without current pathological fracture       DEXA is due in 2022  Other    Depression with anxiety       Patient is stable on Cymbalta and Zoloft without SI or HI  Relevant Medications    sertraline (ZOLOFT) 25 mg tablet    Hyperglycemia       Fasting sugar doing great below 100 at 86 with a hemoglobin A1c of 5 4  PT recently lost 20-25 pounds by eliminating sugar from her diet  Continue to watch simple carbohydrates and recheck in 6 months  Relevant Orders    Comprehensive metabolic panel    Hemoglobin A1C    Mixed hyperlipidemia       Patient is taking Lipitor 80 mg once daily keeping her LDL at 110  Continue recheck 6 months  Decrease fat and cholesterol  Relevant Orders    Lipid Panel with Direct LDL reflex    Insomnia       Stable on trazodone 50 mg at bedtime  Vitamin D deficiency      Vitamin-D level is good at 39 in January  Continue supplementation check with next labs  Other Visit Diagnoses     Need for hepatitis C screening test        Relevant Orders    Hepatitis C Antibody ([facility])                 Diagnoses and all orders for this visit:    Essential hypertension    Mild intermittent asthma without complication    Age-related osteoporosis without current pathological fracture    Vitamin D deficiency    Mixed hyperlipidemia  -     Lipid Panel with Direct LDL reflex;  Future    Hyperglycemia  -     Comprehensive metabolic panel; Future  -     Hemoglobin A1C; Future    Depression with anxiety  -     sertraline (ZOLOFT) 25 mg tablet; Take 1 tablet (25 mg total) by mouth daily    Primary insomnia    Need for hepatitis C screening test  -     Hepatitis C Antibody ([facility]); Future    Seasonal allergic rhinitis due to pollen  -     olopatadine (PATANOL) 0 1 % ophthalmic solution; Administer 1 drop to both eyes 2 (two) times a day    Other orders  -     Mag Oxide-Vit D3-Turmeric (Magnesium-Vitamin D3-Turmeric) [phone] MG-UNIT-MG TABS; Take by mouth  -     TURMERIC PO; Take by mouth  -     hydrocortisone 2 5 % ointment; APPLY THIN FILM TWICE DAILY TO EYELIDS UNTIL CLEAR              Subjective:      Patient ID: Chato Mancilla is a 56 y o  female  CC:    Chief Complaint   Patient presents with    Hypertension    Depression    Hyperglycemia     Review BW results  Pt states there are no other concerns at this time  - [facility]    Hyperlipidemia       HPI:      Chato Mancilla is here for chronic conditions f/u including the diagnosis of Essential hypertension  (primary encounter diagnosis)  Mild intermittent asthma without complication  Age-related osteoporosis without current pathological fracture  Vitamin d deficiency  Mixed hyperlipidemia  Hyperglycemia  Depression with anxiety  Primary insomnia  Need for hepatitis c screening test   Pt  states they are taking all medications as directed without complaints or side effects   Pt  had labs done prior to today's visit which included Recent Results (from the past 672 hour(s))  -Lipid Panel with Direct LDL reflex  Collection Time: 06/17/21  7:13 AM       Result                      Value             Ref Range           Total Cholesterol           176               <200 mg/dL          HDL                         50                > OR = 50 mg*       Triglycerides               74                <150 mg/dL          LDL Calculated              110 (H)           mg/dL (calc) Chol HDLC Ratio             3 5               <5 0 (calc)         Non-HDL Cholesterol         126               <130 mg/dL (*  -Human Immunodeficiency Virus 1/2 Antigen / Antibody ( Fourth Generation) with Reflex Testing  Collection Time: 06/17/21  7:13 AM       Result                      Value             Ref Range           HIV AG/AB, 4th Gen          NON-REACTIVE      NON-REACTIVE   -Comprehensive metabolic panel  Collection Time: 06/17/21  7:13 AM       Result                      Value             Ref Range           Glucose, Random             86                65 - 99 mg/dL       BUN                         28 (H)            7 - 25 mg/dL        Creatinine                  0 67              0 50 - 0 99 *       eGFR Non  Ameri*     94                > OR = 60 mL*       eGFR        109               > OR = 60 mL*       SL AMB BUN/CREATININE *     42 (H)            6 - 22 (calc)       Sodium                      140               135 - 146 mm*       Potassium                   4 1               3 5 - 5 3 mm*       Chloride                    103               98 - 110 mmo*       CO2                         29                20 - 32 mmol*       Calcium                     9 5               8 6 - 10 4 m*       Protein, Total              6 5               6 1 - 8 1 g/*       Albumin                     4 2               3 6 - 5 1 g/*       Globulin                    2 3               1 9 - 3 7 g/*       Albumin/Globulin Ratio      1 8               1 0 - 2 5 (c*       TOTAL BILIRUBIN             0 8               0 2 - 1 2 mg*       Alkaline Phosphatase        81                37 - 153 U/L        AST                         16                10 - 35 U/L         ALT                         20                6 - 29 U/L     -Hemoglobin A1c (w/out EAG)  Collection Time: 06/17/21  7:13 AM       Result                      Value             Ref Range           Hemoglobin A1C 5 4               <5 7 % of to*        The following portions of the patient's history were reviewed and updated as appropriate: allergies, current medications, past family history, past medical history, past social history, past surgical history and problem list       Review of Systems   Constitutional: Negative  HENT: Negative  Eyes: Negative  Respiratory: Negative  Cardiovascular: Negative  Gastrointestinal: Negative  Endocrine: Negative  Genitourinary: Negative  Musculoskeletal: Negative  Skin: Negative  Allergic/Immunologic: Negative  Neurological: Negative  Hematological: Negative  Psychiatric/Behavioral: Negative  Data to review:       Objective:    Vitals:    06/30/21 0845   BP: 112/76   BP Location: Left arm   Patient Position: Sitting   Cuff Size: Standard   Pulse: 62   Temp: 98 2 °F (36 8 °C)   TempSrc: Oral   Weight: 57 2 kg (126 lb)        Physical Exam  Vitals and nursing note reviewed  Constitutional:       Appearance: Normal appearance  She is well-developed  HENT:      Head: Normocephalic and atraumatic  Eyes:      General: Lids are normal       Conjunctiva/sclera: Conjunctivae normal       Pupils: Pupils are equal, round, and reactive to light  Cardiovascular:      Rate and Rhythm: Normal rate and regular rhythm  Heart sounds: No murmur heard  Pulmonary:      Effort: Pulmonary effort is normal       Breath sounds: Normal breath sounds  Skin:     General: Skin is warm and dry  Neurological:      General: No focal deficit present  Mental Status: She is alert  Coordination: Coordination is intact  Psychiatric:         Mood and Affect: Mood normal          Behavior: Behavior normal  Behavior is cooperative  Thought Content:  Thought content normal          Judgment: Judgment normal

## 2021-06-30 NOTE — ASSESSMENT & PLAN NOTE
Patient is taking Lipitor 80 mg once daily keeping her LDL at 110  Continue recheck 6 months  Decrease fat and cholesterol

## 2021-06-30 NOTE — ASSESSMENT & PLAN NOTE
Fasting sugar doing great below 100 at 86 with a hemoglobin A1c of 5 4  PT recently lost 20-25 pounds by eliminating sugar from her diet  Continue to watch simple carbohydrates and recheck in 6 months

## 2021-06-30 NOTE — PATIENT INSTRUCTIONS
Problem List Items Addressed This Visit        Respiratory    Seasonal allergic rhinitis due to pollen     Having trouble with eye symptoms  Asthma       Stable with as needed Albuterol inhaler  Cardiovascular and Mediastinum    Essential hypertension - Primary      Very stable continue current medication  Musculoskeletal and Integument    Age-related osteoporosis without current pathological fracture       DEXA is due in 2022  Other    Depression with anxiety       Patient is stable on Cymbalta and Zoloft without SI or HI  Hyperglycemia       Fasting sugar doing great below 100 at 84 with a hemoglobin A1c of 5 4  PT recently lost 20-25 pounds by eliminating sugar from her diet  Continue to watch simple carbohydrates and recheck in 6 months  Mixed hyperlipidemia       Patient is taking Lipitor 80 mg once daily keeping her LDL at 110  Continue recheck 6 months  Decrease fat and cholesterol  Insomnia       Stable on trazodone 50 mg at bedtime  Vitamin D deficiency      Vitamin-D level is good at 39 in January  Continue supplementation check with next labs             Other Visit Diagnoses     Need for hepatitis C screening test

## 2021-08-13 ENCOUNTER — OFFICE VISIT (OUTPATIENT)
Dept: FAMILY MEDICINE CLINIC | Facility: CLINIC | Age: 61
End: 2021-08-13
Payer: COMMERCIAL

## 2021-08-13 VITALS
HEIGHT: 63 IN | BODY MASS INDEX: 22.68 KG/M2 | DIASTOLIC BLOOD PRESSURE: 68 MMHG | RESPIRATION RATE: 18 BRPM | SYSTOLIC BLOOD PRESSURE: 110 MMHG | WEIGHT: 128 LBS | HEART RATE: 60 BPM

## 2021-08-13 DIAGNOSIS — K12.0 APHTHOUS ULCER OF MOUTH: Primary | ICD-10-CM

## 2021-08-13 PROCEDURE — 1036F TOBACCO NON-USER: CPT | Performed by: NURSE PRACTITIONER

## 2021-08-13 PROCEDURE — 99213 OFFICE O/P EST LOW 20 MIN: CPT | Performed by: NURSE PRACTITIONER

## 2021-08-13 PROCEDURE — 3725F SCREEN DEPRESSION PERFORMED: CPT | Performed by: NURSE PRACTITIONER

## 2021-08-13 PROCEDURE — 3008F BODY MASS INDEX DOCD: CPT | Performed by: NURSE PRACTITIONER

## 2021-08-13 NOTE — PATIENT INSTRUCTIONS
Canker Sores   WHAT YOU NEED TO KNOW:   Canker sores are small ulcers that develop inside your mouth  Ulcers are open sores that may be shallow or deep  You may have one or more sores at a time, and they may grow in clusters  DISCHARGE INSTRUCTIONS:   Return to the emergency department if:   · You cannot eat or drink because of your mouth pain  Contact your healthcare provider if:   · Your canker sores are not gone after 3 to 4 weeks  · Your pain does not go away after you take medicines  · Your sores are getting worse or you are getting more sores, even after treatment  · You have questions or concerns about your condition or care  Medicines: You may need any of the following:  · Pain medicine  may be given as a cream, gel, or mouthwash  · Steroid medicine  may be given to decrease redness, swelling, and pain  · Take your medicine as directed  Contact your healthcare provider if you think your medicine is not helping or if you have side effects  Tell him or her if you are allergic to any medicine  Keep a list of the medicines, vitamins, and herbs you take  Include the amounts, and when and why you take them  Bring the list or the pill bottles to follow-up visits  Carry your medicine list with you in case of an emergency  Follow up with your healthcare provider as directed:  Write down your questions so you remember to ask them during your visits  Eat soft, plain foods until your canker sores heal:  Examples include yogurt, eggs, and creamy soups  You may need to change some foods you usually eat  Do not have crunchy, dry, or salty foods, such as dry toast, popcorn, or chips  These can cause pain  Do not have foods or drinks that contain citric acid, such as grapefruit, orange juice, collins, and limes  These may make your pain worse or cause more sores to form  Mouth care:  Gently brush your teeth and tongue every day  Use a soft toothbrush   If you have dentures, clean them every day  If your braces or dentures do not feel comfortable, have a dentist check them to see that they fit well  © Copyright "[employer] 2021 Information is for End User's use only and may not be sold, redistributed or otherwise used for commercial purposes  All illustrations and images included in CareNotes® are the copyrighted property of [employer]  or Carley Bliss  The above information is an  only  It is not intended as medical advice for individual conditions or treatments  Talk to your doctor, nurse or pharmacist before following any medical regimen to see if it is safe and effective for you

## 2021-08-13 NOTE — PROGRESS NOTES
Assessment and Plan:    Problem List Items Addressed This Visit        Digestive    Aphthous ulcer of mouth - Primary     Magic mouthwash ordered to be used as needed every 4 hours to help with resolution of aphthous ulcer  Patient informed this will resolve on its own over the next few days but in the meantime she should avoid spicy foods for comfort  Relevant Medications    al mag oxide-diphenhydramine-lidocaine viscous (MAGIC MOUTHWASH) 1:1:1 suspension                 Diagnoses and all orders for this visit:    Aphthous ulcer of mouth  -     al mag oxide-diphenhydramine-lidocaine viscous (MAGIC MOUTHWASH) 1:1:1 suspension; Swish and spit 10 mL every 4 (four) hours as needed for mouth pain or discomfort for up to 7 days              Subjective:      Patient ID: Nelli Tesfaye is a 59 y o  female  CC:    Chief Complaint   Patient presents with    Mouth Lesions     patient has a sore in her mouth that she would like looked at, it has been ther for a few days       HPI:    Mouth sores: Patient reports recently she took one of her pills and she felt that it got stuck in her mouth and after this happened she began to feel a sore on the right side of her mouth  The patient denies any pain with swallowing or dysphagia  The patient denies sore throat  The patient denies any fevers or chills  She denies ever smoking or using chewing tobacco        The following portions of the patient's history were reviewed and updated as appropriate: allergies, current medications, past family history, past medical history, past social history, past surgical history and problem list       Review of Systems   Constitutional: Negative for chills and fever  HENT: Positive for mouth sores (right side of mouth )  Negative for ear pain, sore throat, trouble swallowing and voice change  Eyes: Negative for pain and visual disturbance  Respiratory: Negative for cough, chest tightness, shortness of breath and wheezing  Cardiovascular: Negative for chest pain, palpitations and leg swelling  Gastrointestinal: Negative for abdominal pain, constipation, diarrhea, nausea and vomiting  Endocrine: Negative for cold intolerance and heat intolerance  Genitourinary: Negative for decreased urine volume, dysuria and hematuria  Musculoskeletal: Negative for arthralgias, back pain and myalgias  Skin: Negative for color change and rash  Allergic/Immunologic: Negative for environmental allergies  Neurological: Negative for dizziness, seizures, syncope, weakness, light-headedness, numbness and headaches  Hematological: Negative for adenopathy  Psychiatric/Behavioral: Negative for confusion  The patient is not nervous/anxious  All other systems reviewed and are negative  Data to review:       Objective:    Vitals:    08/13/21 1004   BP: 110/68   BP Location: Left arm   Patient Position: Sitting   Cuff Size: Adult   Pulse: 60   Resp: 18   Weight: 58 1 kg (128 lb)   Height: 5' 3" (1 6 m)        Physical Exam  Vitals and nursing note reviewed  Constitutional:       General: She is not in acute distress  Appearance: Normal appearance  She is well-developed  She is not ill-appearing  HENT:      Head: Normocephalic and atraumatic  Mouth/Throat:      Tongue: No lesions  Tongue does not deviate from midline  Palate: No mass and lesions  Pharynx: No pharyngeal swelling, oropharyngeal exudate, posterior oropharyngeal erythema or uvula swelling  Tonsils: No tonsillar exudate or tonsillar abscesses  Comments: Small area of redness noted in patient's right posterior mouth close to the base of the tongue  The area was not raised, discolored, and there was no drainage noted  No other lesions or redness noted anywhere else in the mouth or on the tongue  Eyes:      Conjunctiva/sclera: Conjunctivae normal    Cardiovascular:      Rate and Rhythm: Normal rate and regular rhythm        Pulses: Normal pulses  Carotid pulses are 2+ on the right side and 2+ on the left side  Posterior tibial pulses are 2+ on the right side and 2+ on the left side  Heart sounds: Normal heart sounds  No murmur heard  Pulmonary:      Effort: Pulmonary effort is normal  No respiratory distress  Breath sounds: Normal breath sounds  No wheezing or rhonchi  Abdominal:      General: Abdomen is flat  Bowel sounds are normal  There is no distension  Palpations: Abdomen is soft  Tenderness: There is no abdominal tenderness  There is no guarding  Musculoskeletal:         General: Normal range of motion  Cervical back: Normal range of motion and neck supple  Right lower leg: No edema  Left lower leg: No edema  Skin:     General: Skin is warm and dry  Capillary Refill: Capillary refill takes less than 2 seconds  Neurological:      General: No focal deficit present  Mental Status: She is alert and oriented to person, place, and time  Psychiatric:         Mood and Affect: Mood normal          Behavior: Behavior normal          Thought Content:  Thought content normal          Judgment: Judgment normal

## 2021-08-13 NOTE — ASSESSMENT & PLAN NOTE
Magic mouthwash ordered to be used as needed every 4 hours to help with resolution of aphthous ulcer  Patient informed this will resolve on its own over the next few days but in the meantime she should avoid spicy foods for comfort

## 2021-09-09 ENCOUNTER — TELEMEDICINE (OUTPATIENT)
Dept: FAMILY MEDICINE CLINIC | Facility: CLINIC | Age: 61
End: 2021-09-09
Payer: COMMERCIAL

## 2021-09-09 VITALS — WEIGHT: 128 LBS | BODY MASS INDEX: 22.67 KG/M2

## 2021-09-09 DIAGNOSIS — B34.9 VIRAL INFECTION, UNSPECIFIED: Primary | ICD-10-CM

## 2021-09-09 PROCEDURE — 1036F TOBACCO NON-USER: CPT | Performed by: PHYSICIAN ASSISTANT

## 2021-09-09 PROCEDURE — U0005 INFEC AGEN DETEC AMPLI PROBE: HCPCS | Performed by: PHYSICIAN ASSISTANT

## 2021-09-09 PROCEDURE — U0003 INFECTIOUS AGENT DETECTION BY NUCLEIC ACID (DNA OR RNA); SEVERE ACUTE RESPIRATORY SYNDROME CORONAVIRUS 2 (SARS-COV-2) (CORONAVIRUS DISEASE [COVID-19]), AMPLIFIED PROBE TECHNIQUE, MAKING USE OF HIGH THROUGHPUT TECHNOLOGIES AS DESCRIBED BY CMS-2020-01-R: HCPCS | Performed by: PHYSICIAN ASSISTANT

## 2021-09-09 PROCEDURE — 99212 OFFICE O/P EST SF 10 MIN: CPT | Performed by: PHYSICIAN ASSISTANT

## 2021-09-09 RX ORDER — PIMECROLIMUS 10 MG/G
CREAM TOPICAL 3 TIMES DAILY
COMMUNITY
End: 2021-11-04

## 2021-09-09 RX ORDER — PREDNISOLONE ACETATE 10 MG/ML
1 SUSPENSION/ DROPS OPHTHALMIC 3 TIMES DAILY
COMMUNITY
End: 2021-11-04

## 2021-09-09 NOTE — ASSESSMENT & PLAN NOTE
Patient had high exposure to [place] on Saturday the 4th started having headaches yesterday the 8th  She is unvaccinated  She will go for a COVID swab at the [facility] site today between 5 and 9  We will call with results  Consider monoclonal antibody infusion if positive

## 2021-09-09 NOTE — PATIENT INSTRUCTIONS
Problem List Items Addressed This Visit        Other    Viral infection, unspecified - Primary     Patient had high exposure to COVID on Saturday the 4th started having headaches yesterday the 8th  She is unvaccinated  She will go for a COVID swab at the [facility] site today between 5 and 9  We will call with results  Consider monoclonal antibody infusion if positive             Relevant Orders    Novel Coronavirus (Covid-19),PCR [facility] - Collected at   [facility] or [facility]

## 2021-09-09 NOTE — PROGRESS NOTES
COVID-19 Outpatient Progress Note    Assessment/Plan:    Problem List Items Addressed This Visit        Other    Viral infection, unspecified - Primary     Patient had high exposure to COVID on Saturday the 4th started having headaches yesterday the 8th  She is unvaccinated  She will go for a COVID swab at the [address] site today between 5 and 9  We will call with results  Consider monoclonal antibody infusion if positive  Relevant Orders    Novel Coronavirus (Covid-19),PCR [facility] - Collected at Mobile V[facility] or [facility]         Disposition:     I referred patient to one of our centralized sites for a COVID-19 swab  Patient to go to [facility] between 5 and 9 today for COVID swab  I have spent 15 minutes directly with the patient  Greater than 50% of this time was spent in counseling/coordination of care regarding: instructions for management and impressions  Verification of patient location:    Patient is located in the following state in which I hold an active license [place]    Encounter provider sAhleigh Cordova PA-C    Provider located at [address]  [address] [address] [address] [phone]    Recent Visits  No visits were found meeting these conditions  Showing recent visits within past 7 days and meeting all other requirements  Today's Visits  Date Type Provider Dept   09/09/21 1135 Blayne Bliss PA-C [facility]   Showing today's visits and meeting all other requirements  Future Appointments  No visits were found meeting these conditions  Showing future appointments within next 150 days and meeting all other requirements     This virtual check-in was done via telephone and she agrees to proceed  Patient agrees to participate in a virtual check in via telephone or video visit instead of presenting to the office to address urgent/immediate medical needs   Patient is aware this is a billable service  After connecting through Telephone, the patient was identified by name and date of birth  Norman Cornell was informed that this was a telemedicine visit and that the exam was being conducted confidentially over secure lines  My office door was closed  No one else was in the room  Norman Cornell acknowledged consent and understanding of privacy and security of the telemedicine visit  I informed the patient that I have reviewed her record in Epic and presented the opportunity for her to ask any questions regarding the visit today  The patient agreed to participate  It was my intent to perform this visit via video technology but the patient was not able to do a video connection so the visit was completed via audio telephone only  Subjective:   Norman Cornell is a 59 y o  female who is concerned about COVID-19  Patient is currently asymptomatic  Patient's symptoms include headache  Patient denies fever, chills, fatigue, malaise, congestion, rhinorrhea, sore throat, anosmia, loss of taste, cough, shortness of breath, chest tightness, abdominal pain, nausea, vomiting, diarrhea and myalgias       Date of symptom onset: 9/8/2021  Date of exposure: 9/4/2021  COVID-19 vaccination status: Not vaccinated    Exposure:   Contact with a person who is under investigation (PUI) for or who is positive for COVID-19 within the last 14 days?: Yes    Hospitalized recently for fever and/or lower respiratory symptoms?: No      Currently a healthcare worker that is involved in direct patient care?: No      Works in a special setting where the risk of COVID-19 transmission may be high? (this may include long-term care, correctional and FDC facilities; homeless shelters; assisted-living facilities and group homes ): No      Resident in a special setting where the risk of COVID-19 transmission may be high? (this may include long-term care, correctional and FDC facilities; homeless shelters; assisted-living facilities and group homes ): No      Patient was with her granddaughter on Saturday sewing and by Tuesday patient had symptoms of COVID and was positive as well as her sister in the same household who is diagnosis on Saturday with symptoms and positive COVID  Patient states that yesterday she developed a dull headache and she has no other symptoms and otherwise feels fine but stated that they were told to tell her to get tested because she had a high exposure  She is unvaccinated  Lab Results   Component Value Date    SARSCOV2 Not Detected 12/07/2020     Past Medical History:   Diagnosis Date    Anxiety     Depression     Elevated cholesterol     Hypertension     PONV (postoperative nausea and vomiting)      Past Surgical History:   Procedure Laterality Date    BREAST CYST ASPIRATION      DIAGNOSTIC LAPAROSCOPY      Exploratory, last assessed 10/25/16    NASAL SINUS SURGERY      age 26 - FESS and septoplasty - unknown surgeon    CO STEREOTACTIC COMP ASSIST PROC,CRANIAL,EXTRADURAL N/A 11/8/2016    Procedure: FUNCTIONAL ENDOSCOPIC SINUS SURGERY (FESS) IMAGED GUIDED;   Surgeon: Edson Smallwood DO;  Location: [facility];  Service: ENT    WISDOM TOOTH EXTRACTION       Current Outpatient Medications   Medication Sig Dispense Refill    albuterol (PROVENTIL HFA,VENTOLIN HFA) 90 mcg/act inhaler Inhale 2 puffs every 6 (six) hours as needed for wheezing 3 Inhaler 1    atenolol (TENORMIN) 25 mg tablet TAKE 1 TABLET BY MOUTH EVERY DAY 30 tablet 11    atorvastatin (LIPITOR) 80 mg tablet TAKE 1 TABLET BY MOUTH DAILY 90 tablet 3    Calcium 600 MG tablet Take 600 mg by mouth      Cholecalciferol (VITAMIN D-3 PO) Take 4,000 Int'l Units by mouth daily      DULoxetine (CYMBALTA) 60 mg delayed release capsule TAKE ONE CAPSULE BY MOUTH DAILY 90 capsule 3    fluticasone (FLONASE) 50 mcg/act nasal spray 2 sprays into each nostril daily 16 g 5    Mag Oxide-Vit D3-Turmeric (Magnesium-Vitamin D3-Turmeric) [phone] MG-UNIT-MG TABS Take by mouth      Nutritional Supplements (ANTIOXIDANTS PO) Take 1 tablet by mouth daily      pimecrolimus (ELIDEL) 1 % cream Apply topically 3 (three) times a day      prednisoLONE acetate (PRED FORTE) 1 % ophthalmic suspension Administer 1 drop to both eyes 3 (three) times a day      sertraline (ZOLOFT) 25 mg tablet Take 1 tablet (25 mg total) by mouth daily 30 tablet 11    traZODone (DESYREL) 50 mg tablet Take 1 tablet (50 mg total) by mouth daily at bedtime 30 tablet 5    TURMERIC PO Take by mouth      azelastine (OPTIVAR) 0 05 % ophthalmic solution Administer 1 drop to both eyes 2 (two) times a day (Patient not taking: Reported on 9/9/2021) 18 mL 3    conjugated estrogens (PREMARIN) vaginal cream Insert into the vagina once a week (Patient not taking: Reported on 8/13/2021)      Multiple Vitamin (MULTIVITAMIN) tablet Take 1 tablet by mouth daily (Patient not taking: Reported on 9/9/2021)      olopatadine (PATANOL) 0 1 % ophthalmic solution Administer 1 drop to both eyes 2 (two) times a day (Patient not taking: Reported on 9/9/2021) 5 mL 11     No current facility-administered medications for this visit  Allergies   Allergen Reactions    Antihistamine & Nasal Deconges [Fexofenadine-Pseudoephed Er] Other (See Comments)     "didn't feel good"      Pollen Extract     Singulair [Montelukast]      Did not feel well, no particular assistance  Review of Systems   Constitutional: Negative  Negative for chills, fatigue and fever  HENT: Negative  Negative for congestion, rhinorrhea and sore throat  Eyes: Negative  Respiratory: Negative  Negative for cough, chest tightness and shortness of breath  Cardiovascular: Negative  Gastrointestinal: Negative  Negative for abdominal pain, diarrhea, nausea and vomiting  Endocrine: Negative  Genitourinary: Negative  Musculoskeletal: Negative  Negative for myalgias  Skin: Negative  Allergic/Immunologic: Negative  Neurological: Positive for headaches  Hematological: Negative  Psychiatric/Behavioral: Negative  Objective:    Vitals:    09/09/21 1145   Weight: 58 1 kg (128 lb)       Physical Exam  Vitals and nursing note reviewed  Constitutional:       General: She is not in acute distress  Appearance: Normal appearance  HENT:      Head: Normocephalic and atraumatic  Eyes:      General:         Right eye: No discharge  Left eye: No discharge  Conjunctiva/sclera: Conjunctivae normal    Pulmonary:      Effort: Pulmonary effort is normal    Skin:     General: Skin is warm and dry  Neurological:      General: No focal deficit present  Mental Status: She is alert  Psychiatric:         Mood and Affect: Mood normal          Behavior: Behavior normal          Thought Content: Thought content normal          Judgment: Judgment normal          VIRTUAL VISIT DISCLAIMER    Harsha Velasquez verbally agrees to participate in [employer]  Pt is aware that [employer] could be limited without vital signs or the ability to perform a full hands-on physical Sharon Carbajal understands she or the provider may request at any time to terminate the video visit and request the patient to seek care or treatment in person

## 2021-09-14 DIAGNOSIS — I10 ESSENTIAL HYPERTENSION: ICD-10-CM

## 2021-09-14 RX ORDER — ATENOLOL 25 MG/1
TABLET ORAL
Qty: 90 TABLET | Refills: 3 | Status: SHIPPED | OUTPATIENT
Start: 2021-09-14 | End: 2022-01-12 | Stop reason: SDUPTHER

## 2021-11-04 ENCOUNTER — TELEMEDICINE (OUTPATIENT)
Dept: FAMILY MEDICINE CLINIC | Facility: CLINIC | Age: 61
End: 2021-11-04
Payer: COMMERCIAL

## 2021-11-04 DIAGNOSIS — J06.9 URI, ACUTE: Primary | ICD-10-CM

## 2021-11-04 PROBLEM — B34.9 VIRAL INFECTION, UNSPECIFIED: Status: RESOLVED | Noted: 2020-12-07 | Resolved: 2021-11-04

## 2021-11-04 PROCEDURE — 99214 OFFICE O/P EST MOD 30 MIN: CPT | Performed by: PHYSICIAN ASSISTANT

## 2021-11-04 PROCEDURE — 1036F TOBACCO NON-USER: CPT | Performed by: PHYSICIAN ASSISTANT

## 2021-11-04 RX ORDER — CEFUROXIME AXETIL 250 MG/1
250 TABLET ORAL EVERY 12 HOURS SCHEDULED
Qty: 20 TABLET | Refills: 0 | Status: SHIPPED | OUTPATIENT
Start: 2021-11-04 | End: 2021-11-14

## 2021-11-08 ENCOUNTER — TELEPHONE (OUTPATIENT)
Dept: FAMILY MEDICINE CLINIC | Facility: CLINIC | Age: 61
End: 2021-11-08

## 2021-11-09 DIAGNOSIS — B34.9 VIRAL INFECTION, UNSPECIFIED: Primary | ICD-10-CM

## 2021-11-09 PROCEDURE — U0005 INFEC AGEN DETEC AMPLI PROBE: HCPCS | Performed by: PHYSICIAN ASSISTANT

## 2021-11-09 PROCEDURE — U0003 INFECTIOUS AGENT DETECTION BY NUCLEIC ACID (DNA OR RNA); SEVERE ACUTE RESPIRATORY SYNDROME CORONAVIRUS 2 (SARS-COV-2) (CORONAVIRUS DISEASE [COVID-19]), AMPLIFIED PROBE TECHNIQUE, MAKING USE OF HIGH THROUGHPUT TECHNOLOGIES AS DESCRIBED BY CMS-2020-01-R: HCPCS | Performed by: PHYSICIAN ASSISTANT

## 2021-11-15 ENCOUNTER — TELEPHONE (OUTPATIENT)
Dept: FAMILY MEDICINE CLINIC | Facility: CLINIC | Age: 61
End: 2021-11-15

## 2021-11-15 DIAGNOSIS — J45.20 MILD INTERMITTENT ASTHMA WITHOUT COMPLICATION: Primary | ICD-10-CM

## 2021-11-15 RX ORDER — PREDNISONE 10 MG/1
TABLET ORAL
Qty: 25 TABLET | Refills: 0 | Status: SHIPPED | OUTPATIENT
Start: 2021-11-15 | End: 2022-01-11

## 2022-01-08 LAB
ALBUMIN SERPL-MCNC: 4.2 G/DL (ref 3.6–5.1)
ALBUMIN/GLOB SERPL: 1.8 (CALC) (ref 1–2.5)
ALP SERPL-CCNC: 74 U/L (ref 37–153)
ALT SERPL-CCNC: 22 U/L (ref 6–29)
AST SERPL-CCNC: 18 U/L (ref 10–35)
BILIRUB SERPL-MCNC: 0.8 MG/DL (ref 0.2–1.2)
BUN SERPL-MCNC: 26 MG/DL (ref 7–25)
BUN/CREAT SERPL: 42 (CALC) (ref 6–22)
CALCIUM SERPL-MCNC: 9.5 MG/DL (ref 8.6–10.4)
CHLORIDE SERPL-SCNC: 103 MMOL/L (ref 98–110)
CHOLEST SERPL-MCNC: 156 MG/DL
CHOLEST/HDLC SERPL: 3.5 (CALC)
CO2 SERPL-SCNC: 30 MMOL/L (ref 20–32)
CREAT SERPL-MCNC: 0.62 MG/DL (ref 0.5–0.99)
GLOBULIN SER CALC-MCNC: 2.4 G/DL (CALC) (ref 1.9–3.7)
GLUCOSE SERPL-MCNC: 88 MG/DL (ref 65–99)
HBA1C MFR BLD: 5.5 % OF TOTAL HGB
HCV AB S/CO SERPL IA: 0.02
HCV AB SERPL QL IA: NORMAL
HDLC SERPL-MCNC: 45 MG/DL
LDLC SERPL CALC-MCNC: 97 MG/DL (CALC)
NONHDLC SERPL-MCNC: 111 MG/DL (CALC)
POTASSIUM SERPL-SCNC: 4.3 MMOL/L (ref 3.5–5.3)
PROT SERPL-MCNC: 6.6 G/DL (ref 6.1–8.1)
SL AMB EGFR AFRICAN AMERICAN: 111 ML/MIN/1.73M2
SL AMB EGFR NON AFRICAN AMERICAN: 96 ML/MIN/1.73M2
SODIUM SERPL-SCNC: 139 MMOL/L (ref 135–146)
TRIGL SERPL-MCNC: 62 MG/DL

## 2022-01-11 PROBLEM — J06.9 URI, ACUTE: Status: RESOLVED | Noted: 2021-11-04 | Resolved: 2022-01-11

## 2022-01-12 ENCOUNTER — OFFICE VISIT (OUTPATIENT)
Dept: FAMILY MEDICINE CLINIC | Facility: CLINIC | Age: 62
End: 2022-01-12
Payer: COMMERCIAL

## 2022-01-12 VITALS
TEMPERATURE: 97.1 F | BODY MASS INDEX: 22.68 KG/M2 | SYSTOLIC BLOOD PRESSURE: 122 MMHG | WEIGHT: 128 LBS | HEIGHT: 63 IN | HEART RATE: 66 BPM | DIASTOLIC BLOOD PRESSURE: 66 MMHG

## 2022-01-12 DIAGNOSIS — F41.8 DEPRESSION WITH ANXIETY: ICD-10-CM

## 2022-01-12 DIAGNOSIS — I10 ESSENTIAL HYPERTENSION: ICD-10-CM

## 2022-01-12 DIAGNOSIS — R73.9 HYPERGLYCEMIA: ICD-10-CM

## 2022-01-12 DIAGNOSIS — E78.2 MIXED HYPERLIPIDEMIA: ICD-10-CM

## 2022-01-12 DIAGNOSIS — J30.1 SEASONAL ALLERGIC RHINITIS DUE TO POLLEN: ICD-10-CM

## 2022-01-12 DIAGNOSIS — F51.04 PSYCHOPHYSIOLOGICAL INSOMNIA: ICD-10-CM

## 2022-01-12 DIAGNOSIS — J45.20 MILD INTERMITTENT ASTHMA WITHOUT COMPLICATION: ICD-10-CM

## 2022-01-12 DIAGNOSIS — F90.9 ATTENTION DEFICIT HYPERACTIVITY DISORDER (ADHD), UNSPECIFIED ADHD TYPE: ICD-10-CM

## 2022-01-12 DIAGNOSIS — M81.0 AGE-RELATED OSTEOPOROSIS WITHOUT CURRENT PATHOLOGICAL FRACTURE: Primary | ICD-10-CM

## 2022-01-12 DIAGNOSIS — F51.01 PRIMARY INSOMNIA: ICD-10-CM

## 2022-01-12 DIAGNOSIS — E55.9 VITAMIN D DEFICIENCY: ICD-10-CM

## 2022-01-12 PROCEDURE — 99214 OFFICE O/P EST MOD 30 MIN: CPT | Performed by: PHYSICIAN ASSISTANT

## 2022-01-12 PROCEDURE — 1036F TOBACCO NON-USER: CPT | Performed by: PHYSICIAN ASSISTANT

## 2022-01-12 PROCEDURE — 3008F BODY MASS INDEX DOCD: CPT | Performed by: PHYSICIAN ASSISTANT

## 2022-01-12 RX ORDER — TRAZODONE HYDROCHLORIDE 50 MG/1
50 TABLET ORAL
Qty: 30 TABLET | Refills: 11 | Status: SHIPPED | OUTPATIENT
Start: 2022-01-12

## 2022-01-12 RX ORDER — ATENOLOL 25 MG/1
25 TABLET ORAL DAILY
Qty: 90 TABLET | Refills: 0 | Status: SHIPPED | OUTPATIENT
Start: 2022-01-12 | End: 2022-03-15 | Stop reason: SDUPTHER

## 2022-01-12 RX ORDER — SERTRALINE HYDROCHLORIDE 25 MG/1
25 TABLET, FILM COATED ORAL DAILY
Qty: 30 TABLET | Refills: 11 | Status: SHIPPED | OUTPATIENT
Start: 2022-01-12

## 2022-01-12 NOTE — PROGRESS NOTES
Assessment and Plan:    Problem List Items Addressed This Visit        Respiratory    Seasonal allergic rhinitis due to pollen     Stable with flonase as needed  Asthma     Stable with as needed inhaler  Cardiovascular and Mediastinum    Essential hypertension     Very stable continue current meds  Relevant Medications    atenolol (TENORMIN) 25 mg tablet       Musculoskeletal and Integument    Age-related osteoporosis without current pathological fracture - Primary    Relevant Orders    DXA bone density spine hip and pelvis       Other    Attention deficit hyperactivity disorder     Stable off meds  Relevant Medications    sertraline (ZOLOFT) 25 mg tablet    traZODone (DESYREL) 50 mg tablet    Depression with anxiety     Combo cymbalta and zoloft working good  Refill given  NO SI or HI  Relevant Medications    sertraline (ZOLOFT) 25 mg tablet    traZODone (DESYREL) 50 mg tablet    Hyperglycemia     Fasting glucose good at 88 with an A1C was normal at 5 5  Relevant Orders    Hemoglobin A1C    Comprehensive metabolic panel    Mixed hyperlipidemia     Pt is on lipitor 80 mg and her LDL good at 97  Continue and check in 6 months  Relevant Orders    Lipid Panel with Direct LDL reflex    Insomnia     Stable on trazodone nightly  Relevant Medications    traZODone (DESYREL) 50 mg tablet    Vitamin D deficiency     Check D with next labs  Relevant Orders    Vitamin D 25 hydroxy                 Diagnoses and all orders for this visit:    Age-related osteoporosis without current pathological fracture  -     DXA bone density spine hip and pelvis; Future    Essential hypertension  -     atenolol (TENORMIN) 25 mg tablet; Take 1 tablet (25 mg total) by mouth daily    Mixed hyperlipidemia  -     Lipid Panel with Direct LDL reflex; Future    Hyperglycemia  -     Hemoglobin A1C; Future  -     Comprehensive metabolic panel;  Future    Vitamin D deficiency  -     Vitamin D 25 hydroxy; Future    Depression with anxiety  -     sertraline (ZOLOFT) 25 mg tablet; Take 1 tablet (25 mg total) by mouth daily    Mild intermittent asthma without complication    Seasonal allergic rhinitis due to pollen    Primary insomnia    Psychophysiological insomnia  -     traZODone (DESYREL) 50 mg tablet; Take 1 tablet (50 mg total) by mouth daily at bedtime    Attention deficit hyperactivity disorder (ADHD), unspecified ADHD type              Subjective:      Patient ID: Rita Rodríguez is a 59 y o  female  CC:    Chief Complaint   Patient presents with    Follow-up     6 month f/u - review labs from 1/7/22 denies covid vaccination refused flu   Medication Refill     on pending medication pt wanted pharmacy changed from [facility] due to cost  Pt states she wants med sent with no refills to see cost at new pharmacy  HPI:      Rita Rodríguez is here for chronic conditions f/u including the diagnosis of Age-related osteoporosis without current pathological fracture  (primary encounter diagnosis)  Essential hypertension  Mixed hyperlipidemia  Hyperglycemia  Vitamin d deficiency  Depression with anxiety  Mild intermittent asthma without complication  Seasonal allergic rhinitis due to pollen  Primary insomnia  Psychophysiological insomnia   Pt  states they are taking all medications as directed without complaints or side effects   Pt  had labs done prior to today's visit which included Recent Results (from the past 672 hour(s))  -Lipid Panel with Direct LDL reflex:   Collection Time: 01/07/22 12:42 PM       Result                      Value             Ref Range           Total Cholesterol           156               <200 mg/dL          HDL                         45 (L)            > OR = 50 mg*       Triglycerides               62                <150 mg/dL          LDL Calculated              97                mg/dL (calc)        Chol HDLC Ratio             3 5 <5 0 (calc)         Non-HDL Cholesterol         111               <130 mg/dL (*  -Comprehensive metabolic panel:   Collection Time: 01/07/22 12:42 PM       Result                      Value             Ref Range           Glucose, Random             88                65 - 99 mg/dL       BUN                         26 (H)            7 - 25 mg/dL        Creatinine                  0 62              0 50 - 0 99 *       eGFR Non  [employer]*     96                > OR = 60 mL*       eGFR        111               > OR = 60 mL*       SL AMB BUN/CREATININE *     42 (H)            6 - 22 (calc)       Sodium                      139               135 - 146 mm*       Potassium                   4 3               3 5 - 5 3 mm*       Chloride                    103               98 - 110 mmo*       CO2                         30                20 - 32 mmol*       Calcium                     9 5               8 6 - 10 4 m*       Protein, Total              6 6               6 1 - 8 1 g/*       Albumin                     4 2               3 6 - 5 1 g/*       Globulin                    2 4               1 9 - 3 7 g/*       Albumin/Globulin Ratio      1 8               1 0 - 2 5 (c*       TOTAL BILIRUBIN             0 8               0 2 - 1 2 mg*       Alkaline Phosphatase        74                37 - 153 U/L        AST                         18                10 - 35 U/L         ALT                         22                6 - 29 U/L     -Hepatitis C Ab W/Refl To HCV RNA, Qn, PCR:   Collection Time: 01/07/22 12:42 PM       Result                      Value             Ref Range           HEP C AB                    NON-REACTIVE      NON-REACTIVE        Signal to Cut-Off           0 02              <1 00          -Hemoglobin A1c (w/out EAG):   Collection Time: 01/07/22 12:42 PM       Result                      Value             Ref Range           Hemoglobin A1C              5 5               <5 7 % of to*        The following portions of the patient's history were reviewed and updated as appropriate: allergies, current medications, past family history, past medical history, past social history, past surgical history and problem list       Review of Systems   Constitutional: Negative  HENT: Negative  Eyes: Negative  Respiratory: Negative  Cardiovascular: Negative  Gastrointestinal: Negative  Endocrine: Negative  Genitourinary: Negative  Musculoskeletal: Negative  Skin: Negative  Allergic/Immunologic: Negative  Neurological: Negative  Hematological: Negative  Psychiatric/Behavioral: Negative  Data to review:       Objective:    Vitals:    01/12/22 1009   BP: 122/66   BP Location: Left arm   Patient Position: Sitting   Cuff Size: Adult   Pulse: 66   Temp: (!) 97 1 °F (36 2 °C)   TempSrc: Probe   Weight: 58 1 kg (128 lb)   Height: 5' 3" (1 6 m)        Physical Exam  Vitals and nursing note reviewed  Constitutional:       Appearance: Normal appearance  She is well-developed  HENT:      Head: Normocephalic and atraumatic  Eyes:      General: Lids are normal       Conjunctiva/sclera: Conjunctivae normal       Pupils: Pupils are equal, round, and reactive to light  Cardiovascular:      Rate and Rhythm: Normal rate and regular rhythm  Heart sounds: No murmur heard  Pulmonary:      Effort: Pulmonary effort is normal       Breath sounds: Normal breath sounds  Skin:     General: Skin is warm and dry  Neurological:      General: No focal deficit present  Mental Status: She is alert  Coordination: Coordination is intact  Psychiatric:         Mood and Affect: Mood normal          Behavior: Behavior normal  Behavior is cooperative  Thought Content:  Thought content normal          Judgment: Judgment normal

## 2022-01-12 NOTE — PATIENT INSTRUCTIONS
Problem List Items Addressed This Visit        Respiratory    Seasonal allergic rhinitis due to pollen     Stable with flonase as needed  Asthma     Stable with as needed inhaler  Cardiovascular and Mediastinum    Essential hypertension     Very stable continue current meds  Relevant Medications    atenolol (TENORMIN) 25 mg tablet       Musculoskeletal and Integument    Age-related osteoporosis without current pathological fracture - Primary    Relevant Orders    DXA bone density spine hip and pelvis       Other    Attention deficit hyperactivity disorder     Stable off meds  Relevant Medications    sertraline (ZOLOFT) 25 mg tablet    traZODone (DESYREL) 50 mg tablet    Depression with anxiety     Combo cymbalta and zoloft working good  Refill given  NO SI or HI  Relevant Medications    sertraline (ZOLOFT) 25 mg tablet    traZODone (DESYREL) 50 mg tablet    Hyperglycemia     Fasting glucose good at 88 with an A1C was normal at 5 5  Relevant Orders    Hemoglobin A1C    Comprehensive metabolic panel    Mixed hyperlipidemia     Pt is on lipitor 80 mg and her LDL good at 97  Continue and check in 6 months  Relevant Orders    Lipid Panel with Direct LDL reflex    Insomnia     Stable on trazodone nightly  Relevant Medications    traZODone (DESYREL) 50 mg tablet    Vitamin D deficiency     Check D with next labs            Relevant Orders    Vitamin D 25 hydroxy

## 2022-01-17 DIAGNOSIS — E78.2 MIXED HYPERLIPIDEMIA: ICD-10-CM

## 2022-01-17 DIAGNOSIS — F41.8 DEPRESSION WITH ANXIETY: ICD-10-CM

## 2022-01-17 RX ORDER — ATORVASTATIN CALCIUM 80 MG/1
80 TABLET, FILM COATED ORAL DAILY
Qty: 90 TABLET | Refills: 3 | Status: SHIPPED | OUTPATIENT
Start: 2022-01-17 | End: 2022-05-16 | Stop reason: SDUPTHER

## 2022-01-17 RX ORDER — DULOXETIN HYDROCHLORIDE 60 MG/1
60 CAPSULE, DELAYED RELEASE ORAL DAILY
Qty: 90 CAPSULE | Refills: 3 | Status: SHIPPED | OUTPATIENT
Start: 2022-01-17

## 2022-03-15 DIAGNOSIS — I10 ESSENTIAL HYPERTENSION: ICD-10-CM

## 2022-03-15 RX ORDER — ATENOLOL 25 MG/1
25 TABLET ORAL DAILY
Qty: 90 TABLET | Refills: 1 | Status: SHIPPED | OUTPATIENT
Start: 2022-03-15

## 2022-04-13 ENCOUNTER — TELEPHONE (OUTPATIENT)
Dept: ADMINISTRATIVE | Facility: OTHER | Age: 62
End: 2022-04-13

## 2022-04-13 NOTE — TELEPHONE ENCOUNTER
Upon review of the In Basket request we were able to locate, review, and update the patient chart as requested for Mammogram     Any additional questions or concerns should be emailed to the Practice Liaisons via [email or web address] email, please do not reply via In Basket      Thank you  Sayda Lyle MA

## 2022-04-13 NOTE — TELEPHONE ENCOUNTER
----- Message from [address] sent at 4/13/2022 10:25 AM EDT -----  Regarding: care gap request  04/13/22 10:25 AM    Hello, our patient attached above has had Mammogram completed/performed  Please assist in updating the patient chart by pulling the Care Everywhere (CE) document  The date of service is 4/12/2022       Thank you,  Le Ames  [facility]

## 2022-05-16 DIAGNOSIS — E78.2 MIXED HYPERLIPIDEMIA: ICD-10-CM

## 2022-05-16 RX ORDER — ATORVASTATIN CALCIUM 80 MG/1
80 TABLET, FILM COATED ORAL DAILY
Qty: 90 TABLET | Refills: 1 | Status: SHIPPED | OUTPATIENT
Start: 2022-05-16

## 2022-06-16 ENCOUNTER — TELEMEDICINE (OUTPATIENT)
Dept: FAMILY MEDICINE CLINIC | Facility: CLINIC | Age: 62
End: 2022-06-16
Payer: COMMERCIAL

## 2022-06-16 VITALS — BODY MASS INDEX: 22.68 KG/M2 | WEIGHT: 128 LBS | HEIGHT: 63 IN

## 2022-06-16 DIAGNOSIS — I10 ESSENTIAL HYPERTENSION: Primary | ICD-10-CM

## 2022-06-16 DIAGNOSIS — J45.20 MILD INTERMITTENT ASTHMA WITHOUT COMPLICATION: ICD-10-CM

## 2022-06-16 DIAGNOSIS — U07.1 COVID-19: ICD-10-CM

## 2022-06-16 PROCEDURE — 3008F BODY MASS INDEX DOCD: CPT | Performed by: NURSE PRACTITIONER

## 2022-06-16 PROCEDURE — 99214 OFFICE O/P EST MOD 30 MIN: CPT | Performed by: PHYSICIAN ASSISTANT

## 2022-06-16 NOTE — PROGRESS NOTES
COVID-19 Outpatient Progress Note    Assessment/Plan:    Problem List Items Addressed This Visit        Respiratory    Asthma       Cardiovascular and Mediastinum    Essential hypertension - Primary      Other Visit Diagnoses     COVID-19        Relevant Medications    nirmatrelvir & ritonavir (Paxlovid) tablet therapy pack         Disposition:       Assessment/plan:  1  COVID-19 infection-patient is unvaccinated and higher risk given hypertension and history of asthma  Would recommend starting on Paxlovid therapy  She is advised to hold her statin therapy  This may also increase the trazodone affect however she is only using a quarter tablet at nighttime to begin with  Quarantine guidelines were discussed  Patient should isolate for 5 days and then wear mask around others for 5 more days  2  Hypertension-stable, no medication changes  3  Asthma-stable, no medication changes  Patient is not experiencing any shortness of breath or respiratory difficulties and she is encouraged to call us back if she would start to have problems  4  Hyperlipidemia-stable on statin therapy however this will be held during Paxlovid treatment and for 2 days after  I have spent 15 minutes directly with the patient  Encounter provider Frank Mcclure PA-C    Provider located at [address] [address] [phone]    Recent Visits  No visits were found meeting these conditions  Showing recent visits within past 7 days and meeting all other requirements  Today's Visits  Date Type Provider Dept   06/16/22 Telemedicine Frank Mcclure PA-C [facility]   Showing today's visits and meeting all other requirements  Future Appointments  No visits were found meeting these conditions    Showing future appointments within next 150 days and meeting all other requirements     This virtual check-in was done via Aframe and patient was informed that this is a secure, HIPAA-compliant platform  She agrees to proceed  Patient agrees to participate in a virtual check in via telephone or video visit instead of presenting to the office to address urgent/immediate medical needs  Patient is aware this is a billable service  After connecting through [facility], the patient was identified by name and date of birth  Hermenia Alpers was informed that this was a telemedicine visit and that the exam was being conducted confidentially over secure lines  My office door was closed  No one else was in the room  Hermenia Alpers acknowledged consent and understanding of privacy and security of the telemedicine visit  I informed the patient that I have reviewed her record in Epic and presented the opportunity for her to ask any questions regarding the visit today  The patient agreed to participate  Verification of patient location:  Patient is located in the following state in which I hold an active license: [place]    Subjective:   Hermenia Alpers is a 59 y o  female who is concerned about COVID-19  Patient's symptoms include chills, myalgias and headache  Patient denies fever, fatigue, congestion, rhinorrhea, sore throat, cough, shortness of breath, chest tightness, abdominal pain, nausea, vomiting and diarrhea  - Date of symptom onset: 6/15/2022      COVID-19 vaccination status: Not vaccinated      HPI:  This is a 57-year-old female who presents via virtual video visit using [employer] platform for   COVID positive home test   She started yesterday abruptly with feeling of chills, body aches, and some headache  She has not had any stomach problems such as diarrhea, nausea, or vomiting  She denies any cough or chest congestion but she does have a history of asthma and hypertension  She would like to discuss treatment options      Lab Results   Component Value Date    SARSCOV2 Negative 11/09/2021    SARSCOV2 Not Detected 12/07/2020     Past Medical History:   Diagnosis Date    Anxiety     Depression     Elevated cholesterol     Hypertension     PONV (postoperative nausea and vomiting)      Past Surgical History:   Procedure Laterality Date    BREAST CYST ASPIRATION      DIAGNOSTIC LAPAROSCOPY      Exploratory, last assessed 10/25/16    NASAL SINUS SURGERY      age 26 - FESS and septoplasty - unknown surgeon    [place] STEREOTACTIC COMP ASSIST PROC,CRANIAL,EXTRADURAL N/A 11/8/2016    Procedure: FUNCTIONAL ENDOSCOPIC SINUS SURGERY (FESS) IMAGED GUIDED; Surgeon: Lawrence Pittman DO;  Location: [facility];  Service: ENT    WISDOM TOOTH EXTRACTION       Current Outpatient Medications   Medication Sig Dispense Refill    albuterol (PROVENTIL HFA,VENTOLIN HFA) 90 mcg/act inhaler Inhale 2 puffs every 6 (six) hours as needed for wheezing 3 Inhaler 1    atenolol (TENORMIN) 25 mg tablet Take 1 tablet (25 mg total) by mouth daily 90 tablet 1    atorvastatin (LIPITOR) 80 mg tablet Take 1 tablet (80 mg total) by mouth in the morning   90 tablet 1    Calcium 600 MG tablet Take 600 mg by mouth      Cholecalciferol (VITAMIN D-3 PO) Take 4,000 Int'l Units by mouth daily      DULoxetine (CYMBALTA) 60 mg delayed release capsule Take 1 capsule (60 mg total) by mouth daily 90 capsule 3    fluticasone (FLONASE) 50 mcg/act nasal spray 2 sprays into each nostril daily 16 g 5    Mag Oxide-Vit D3-Turmeric (Magnesium-Vitamin D3-Turmeric) [phone] MG-UNIT-MG TABS Take by mouth      Multiple Vitamin (MULTIVITAMIN) tablet Take 1 tablet by mouth daily       nirmatrelvir & ritonavir (Paxlovid) tablet therapy pack Take 3 tablets by mouth 2 (two) times a day for 5 days Take 2 nirmatrelvir tablets + 1 ritonavir tablet together per dose 30 tablet 0    Nutritional Supplements (ANTIOXIDANTS PO) Take 1 tablet by mouth daily      sertraline (ZOLOFT) 25 mg tablet Take 1 tablet (25 mg total) by mouth daily 30 tablet 11    traZODone (DESYREL) 50 mg tablet Take 1 tablet (50 mg total) by mouth daily at bedtime 30 tablet 11     No current facility-administered medications for this visit  Allergies   Allergen Reactions    Antihistamine & Nasal Deconges [Fexofenadine-Pseudoephed Er] Other (See Comments)     "didn't feel good"      Pollen Extract     Singulair [Montelukast]      Did not feel well, no particular assistance  Review of Systems   Constitutional: Positive for chills  Negative for appetite change, diaphoresis, fatigue and fever  HENT: Negative for congestion, ear pain, facial swelling, postnasal drip, rhinorrhea, sinus pressure, sinus pain, sore throat, trouble swallowing and voice change  Respiratory: Negative for cough, chest tightness, shortness of breath and wheezing  Cardiovascular: Negative for chest pain  Gastrointestinal: Negative for abdominal pain, diarrhea, nausea and vomiting  Musculoskeletal: Positive for myalgias  Neurological: Positive for headaches  Objective:    Vitals:    06/16/22 0911   Weight: 58 1 kg (128 lb)   Height: 5' 3" (1 6 m)       Physical Exam  Constitutional:       General: She is not in acute distress  Appearance: She is well-developed  HENT:      Head: Normocephalic and atraumatic  Eyes:      General: No scleral icterus  Conjunctiva/sclera: Conjunctivae normal    Pulmonary:      Effort: Pulmonary effort is normal    Skin:     Findings: No rash  Neurological:      Mental Status: She is alert and oriented to person, place, and time  VIRTUAL VISIT DISCLAIMER    Lencho Gaviria verbally agrees to participate in [employer]  Pt is aware that [employer] could be limited without vital signs or the ability to perform a full hands-on physical Evelin Muñoz understands she or the provider may request at any time to terminate the video visit and request the patient to seek care or treatment in person

## 2022-06-29 ENCOUNTER — TELEMEDICINE (OUTPATIENT)
Dept: FAMILY MEDICINE CLINIC | Facility: CLINIC | Age: 62
End: 2022-06-29
Payer: COMMERCIAL

## 2022-06-29 DIAGNOSIS — U07.1 COVID-19 VIRUS INFECTION: ICD-10-CM

## 2022-06-29 DIAGNOSIS — J01.40 ACUTE NON-RECURRENT PANSINUSITIS: Primary | ICD-10-CM

## 2022-06-29 DIAGNOSIS — J45.20 MILD INTERMITTENT ASTHMA WITHOUT COMPLICATION: ICD-10-CM

## 2022-06-29 PROCEDURE — 99214 OFFICE O/P EST MOD 30 MIN: CPT | Performed by: NURSE PRACTITIONER

## 2022-06-29 PROCEDURE — 1036F TOBACCO NON-USER: CPT | Performed by: NURSE PRACTITIONER

## 2022-06-29 RX ORDER — AZITHROMYCIN 250 MG/1
TABLET, FILM COATED ORAL
Qty: 6 TABLET | Refills: 0 | Status: SHIPPED | OUTPATIENT
Start: 2022-06-29 | End: 2022-07-04

## 2022-06-29 RX ORDER — PREDNISONE 10 MG/1
TABLET ORAL
Qty: 30 TABLET | Refills: 0 | Status: SHIPPED | OUTPATIENT
Start: 2022-06-29

## 2022-06-29 NOTE — PROGRESS NOTES
Virtual Regular Visit    Verification of patient location:    Patient is located in the following state in which I hold an active license [place]      Assessment/Plan:    Problem List Items Addressed This Visit        Respiratory    Asthma     No current issues regarding this  Patient does have albuterol inhaler on hand if she would need it  Acute non-recurrent pansinusitis - Primary     Azithromycin and prednisone taper were ordered to treat sinusitis  Patient was advised that if no improvement is seen in her symptoms after finishing azithromycin she should be seen in the office for a more thorough PE  Relevant Medications    azithromycin (Zithromax) 250 mg tablet    predniSONE 10 mg tablet       Other    COVID-19 virus infection     Patient does appear to have some continuing symptoms of COVID however, I feel her current symptoms of increased sinus and nasal congestion represent an opportunistic sinusitis therefore, she will be treated with antibiotics and prednisone accordingly  Reason for visit is   Chief Complaint   Patient presents with    Virtual Regular Visit        Encounter provider Cleopatra Mohs, CRNP    Provider located at [address] [address] [phone]      Recent Visits  No visits were found meeting these conditions  Showing recent visits within past 7 days and meeting all other requirements  Today's Visits  Date Type Provider Dept   06/29/22 Telemedicine [facility]   Showing today's visits and meeting all other requirements  Future Appointments  No visits were found meeting these conditions  Showing future appointments within next 150 days and meeting all other requirements       The patient was identified by name and date of birth  Dale Minus was informed that this is a telemedicine visit and that the visit is being conducted through Telephone    My office door was closed  No one else was in the room  She acknowledged consent and understanding of privacy and security of the video platform  The patient has agreed to participate and understands they can discontinue the visit at any time  Patient is aware this is a billable service  Subjective  Lesley Khan is a 59 y o  female    COVID infection:  Patient tested positive for COVID via home test on 06/15/2022  Patient was seen for a virtual visit the following day and was started on Paxlovid at that time  The patient is currently reporting recurrent headaches, frontal and maxillary sinus pressure, increased fatigue, rhinorrhea, nasal congestion, sore throat, and PND  She denies SOB, cough, dental pain, fevers, or chills  The patient reports that her fatigue has been present since she tested positive for COVID however, her sinus and nasal congestion has significantly worsened over the past 10 days  Asthma:  Patient currently denies any shortness of breath and denies needing to use her albuterol inhaler since her symptoms began  Past Medical History:   Diagnosis Date    Anxiety     Depression     Elevated cholesterol     Hypertension     PONV (postoperative nausea and vomiting)        Past Surgical History:   Procedure Laterality Date    BREAST CYST ASPIRATION      DIAGNOSTIC LAPAROSCOPY      Exploratory, last assessed 10/25/16    NASAL SINUS SURGERY      age 26 - FESS and septoplasty - unknown surgeon    [place] STEREOTACTIC COMP ASSIST PROC,CRANIAL,EXTRADURAL N/A 11/8/2016    Procedure: FUNCTIONAL ENDOSCOPIC SINUS SURGERY (FESS) IMAGED GUIDED; Surgeon: Ashwin Alicea DO;  Location: [facility];  Service: ENT    WISDOM TOOTH EXTRACTION         Current Outpatient Medications   Medication Sig Dispense Refill    azithromycin (Zithromax) 250 mg tablet Take 2 tablets (500 mg total) by mouth daily for 1 day, THEN 1 tablet (250 mg total) daily for 4 days   6 tablet 0    predniSONE 10 mg tablet Use 5 tablets for 2 days  Use 4 tablets for 2 days  Use 3 tablets for 2 days  Use 2 tablets for 2 days  Use 1 tablet for 2 days  30 tablet 0    albuterol (PROVENTIL HFA,VENTOLIN HFA) 90 mcg/act inhaler Inhale 2 puffs every 6 (six) hours as needed for wheezing 3 Inhaler 1    atenolol (TENORMIN) 25 mg tablet Take 1 tablet (25 mg total) by mouth daily 90 tablet 1    atorvastatin (LIPITOR) 80 mg tablet Take 1 tablet (80 mg total) by mouth in the morning  90 tablet 1    Calcium 600 MG tablet Take 600 mg by mouth      Cholecalciferol (VITAMIN D-3 PO) Take 4,000 Int'l Units by mouth daily      DULoxetine (CYMBALTA) 60 mg delayed release capsule Take 1 capsule (60 mg total) by mouth daily 90 capsule 3    fluticasone (FLONASE) 50 mcg/act nasal spray 2 sprays into each nostril daily 16 g 5    Mag Oxide-Vit D3-Turmeric (Magnesium-Vitamin D3-Turmeric) [phone] MG-UNIT-MG TABS Take by mouth      Multiple Vitamin (MULTIVITAMIN) tablet Take 1 tablet by mouth daily       Nutritional Supplements (ANTIOXIDANTS PO) Take 1 tablet by mouth daily      sertraline (ZOLOFT) 25 mg tablet Take 1 tablet (25 mg total) by mouth daily 30 tablet 11    traZODone (DESYREL) 50 mg tablet Take 1 tablet (50 mg total) by mouth daily at bedtime 30 tablet 11     No current facility-administered medications for this visit  Allergies   Allergen Reactions    Antihistamine & Nasal Deconges [Fexofenadine-Pseudoephed Er] Other (See Comments)     "didn't feel good"      Pollen Extract     Singulair [Montelukast]      Did not feel well, no particular assistance  Review of Systems   Constitutional: Positive for fatigue  Negative for chills and fever  HENT: Positive for congestion, postnasal drip, rhinorrhea, sinus pressure, sinus pain and sore throat  Negative for ear pain  Eyes: Negative for pain and visual disturbance  Respiratory: Negative for cough, chest tightness, shortness of breath and wheezing  Cardiovascular: Negative for chest pain and palpitations  Gastrointestinal: Positive for abdominal pain (intermittent)  Negative for constipation, diarrhea, nausea and vomiting  Endocrine: Negative for cold intolerance and heat intolerance  Genitourinary: Negative for decreased urine volume, dysuria and hematuria  Musculoskeletal: Negative for arthralgias, back pain and myalgias  Skin: Negative for color change and rash  Allergic/Immunologic: Negative for environmental allergies  Neurological: Positive for headaches  Negative for dizziness, seizures, syncope, weakness, light-headedness and numbness  Hematological: Negative for adenopathy  Psychiatric/Behavioral: Negative for confusion  The patient is not nervous/anxious  All other systems reviewed and are negative  Video Exam    There were no vitals filed for this visit  Physical Exam  Vitals reviewed: limited due to phone only exam    Neurological:      Mental Status: She is alert  I spent 15 minutes directly with the patient during this visit    VIRTUAL VISIT DISCLAIMER      Tegan Bell verbally agrees to participate in [employer]  Pt is aware that [employer] could be limited without vital signs or the ability to perform a full hands-on physical Praveen Duncan understands she or the provider may request at any time to terminate the video visit and request the patient to seek care or treatment in person

## 2022-06-29 NOTE — PATIENT INSTRUCTIONS
Problem List Items Addressed This Visit          Respiratory    Asthma     No current issues regarding this  Patient does have albuterol inhaler on hand if she would need it  Acute non-recurrent pansinusitis - Primary     Azithromycin and prednisone taper were ordered to treat sinusitis  Patient was advised that if no improvement is seen in her symptoms after finishing azithromycin she should be seen in the office for a more thorough PE  Relevant Medications    azithromycin (Zithromax) 250 mg tablet    predniSONE 10 mg tablet       Other    COVID-19 virus infection     Patient does appear to have some continuing symptoms of COVID however, I feel her current symptoms of increased sinus and nasal congestion represent an opportunistic sinusitis therefore, she will be treated with antibiotics and prednisone accordingly

## 2022-06-29 NOTE — ASSESSMENT & PLAN NOTE
Azithromycin and prednisone taper were ordered to treat sinusitis    Patient was advised that if no improvement is seen in her symptoms after finishing azithromycin she should be seen in the office for a more thorough PE

## 2022-06-29 NOTE — ASSESSMENT & PLAN NOTE
Patient does appear to have some continuing symptoms of COVID however, I feel her current symptoms of increased sinus and nasal congestion represent an opportunistic sinusitis therefore, she will be treated with antibiotics and prednisone accordingly

## 2022-07-08 LAB
25(OH)D3 SERPL-MCNC: 59 NG/ML (ref 30–100)
ALBUMIN SERPL-MCNC: 4 G/DL (ref 3.6–5.1)
ALBUMIN/GLOB SERPL: 1.6 (CALC) (ref 1–2.5)
ALP SERPL-CCNC: 81 U/L (ref 37–153)
ALT SERPL-CCNC: 15 U/L (ref 6–29)
AST SERPL-CCNC: 16 U/L (ref 10–35)
BILIRUB SERPL-MCNC: 0.8 MG/DL (ref 0.2–1.2)
BUN SERPL-MCNC: 27 MG/DL (ref 7–25)
BUN/CREAT SERPL: 44 (CALC) (ref 6–22)
CALCIUM SERPL-MCNC: 9.1 MG/DL (ref 8.6–10.4)
CHLORIDE SERPL-SCNC: 103 MMOL/L (ref 98–110)
CHOLEST SERPL-MCNC: 160 MG/DL
CHOLEST/HDLC SERPL: 3.6 (CALC)
CO2 SERPL-SCNC: 29 MMOL/L (ref 20–32)
CREAT SERPL-MCNC: 0.61 MG/DL (ref 0.5–0.99)
GLOBULIN SER CALC-MCNC: 2.5 G/DL (CALC) (ref 1.9–3.7)
GLUCOSE SERPL-MCNC: 89 MG/DL (ref 65–99)
HBA1C MFR BLD: 5.7 % OF TOTAL HGB
HDLC SERPL-MCNC: 44 MG/DL
LDLC SERPL CALC-MCNC: 98 MG/DL (CALC)
NONHDLC SERPL-MCNC: 116 MG/DL (CALC)
POTASSIUM SERPL-SCNC: 4.1 MMOL/L (ref 3.5–5.3)
PROT SERPL-MCNC: 6.5 G/DL (ref 6.1–8.1)
SL AMB EGFR AFRICAN AMERICAN: 112 ML/MIN/1.73M2
SL AMB EGFR NON AFRICAN AMERICAN: 96 ML/MIN/1.73M2
SODIUM SERPL-SCNC: 139 MMOL/L (ref 135–146)
TRIGL SERPL-MCNC: 86 MG/DL

## 2022-07-14 ENCOUNTER — RA CDI HCC (OUTPATIENT)
Dept: OTHER | Facility: HOSPITAL | Age: 62
End: 2022-07-14

## 2022-07-25 ENCOUNTER — HOSPITAL ENCOUNTER (OUTPATIENT)
Dept: CT IMAGING | Facility: HOSPITAL | Age: 62
Discharge: HOME | End: 2022-07-25
Payer: COMMERCIAL

## 2022-07-25 ENCOUNTER — OFFICE VISIT (OUTPATIENT)
Dept: FAMILY MEDICINE CLINIC | Facility: CLINIC | Age: 62
End: 2022-07-25
Payer: COMMERCIAL

## 2022-07-25 VITALS
WEIGHT: 133 LBS | DIASTOLIC BLOOD PRESSURE: 70 MMHG | BODY MASS INDEX: 23.57 KG/M2 | HEIGHT: 63 IN | TEMPERATURE: 97.8 F | HEART RATE: 72 BPM | SYSTOLIC BLOOD PRESSURE: 118 MMHG

## 2022-07-25 DIAGNOSIS — R93.41 ABNORMAL CT SCAN, BLADDER: ICD-10-CM

## 2022-07-25 DIAGNOSIS — N20.0 NEPHROLITHIASIS: ICD-10-CM

## 2022-07-25 DIAGNOSIS — N30.01 ACUTE CYSTITIS WITH HEMATURIA: ICD-10-CM

## 2022-07-25 DIAGNOSIS — I10 ESSENTIAL HYPERTENSION: ICD-10-CM

## 2022-07-25 DIAGNOSIS — R30.0 DYSURIA: ICD-10-CM

## 2022-07-25 DIAGNOSIS — N30.01 ACUTE CYSTITIS WITH HEMATURIA: Primary | ICD-10-CM

## 2022-07-25 DIAGNOSIS — K52.9 ENTERITIS: ICD-10-CM

## 2022-07-25 DIAGNOSIS — R31.9 HEMATURIA, UNSPECIFIED TYPE: ICD-10-CM

## 2022-07-25 DIAGNOSIS — R93.5 ABNORMAL CT OF THE ABDOMEN: Primary | ICD-10-CM

## 2022-07-25 DIAGNOSIS — I88.0 MESENTERIC ADENITIS: ICD-10-CM

## 2022-07-25 LAB
SL AMB  POCT GLUCOSE, UA: NEGATIVE
SL AMB LEUKOCYTE ESTERASE,UA: ABNORMAL
SL AMB POCT BILIRUBIN,UA: NEGATIVE
SL AMB POCT BLOOD,UA: ABNORMAL
SL AMB POCT CLARITY,UA: ABNORMAL
SL AMB POCT COLOR,UA: YELLOW
SL AMB POCT KETONES,UA: ABNORMAL
SL AMB POCT NITRITE,UA: POSITIVE
SL AMB POCT PH,UA: 7
SL AMB POCT SPECIFIC GRAVITY,UA: 1.02
SL AMB POCT URINE PROTEIN: 300
SL AMB POCT UROBILINOGEN: 1

## 2022-07-25 PROCEDURE — 99214 OFFICE O/P EST MOD 30 MIN: CPT | Performed by: NURSE PRACTITIONER

## 2022-07-25 PROCEDURE — 81002 URINALYSIS NONAUTO W/O SCOPE: CPT | Performed by: NURSE PRACTITIONER

## 2022-07-25 PROCEDURE — 87186 SC STD MICRODIL/AGAR DIL: CPT | Performed by: NURSE PRACTITIONER

## 2022-07-25 PROCEDURE — G1004 CDSM NDSC: HCPCS

## 2022-07-25 PROCEDURE — 87181 SC STD AGAR DILUTION PER AGT: CPT | Performed by: NURSE PRACTITIONER

## 2022-07-25 PROCEDURE — 74176 CT ABD & PELVIS W/O CONTRAST: CPT

## 2022-07-25 PROCEDURE — 87086 URINE CULTURE/COLONY COUNT: CPT | Performed by: NURSE PRACTITIONER

## 2022-07-25 PROCEDURE — 87077 CULTURE AEROBIC IDENTIFY: CPT | Performed by: NURSE PRACTITIONER

## 2022-07-25 RX ORDER — NITROFURANTOIN 25; 75 MG/1; MG/1
100 CAPSULE ORAL 2 TIMES DAILY
Qty: 10 CAPSULE | Refills: 0 | Status: SHIPPED | OUTPATIENT
Start: 2022-07-25 | End: 2022-07-30

## 2022-07-25 NOTE — ASSESSMENT & PLAN NOTE
Patient does have acute UTI so she will be treated with 5 day course of Macrobid  Patient's symptoms are consistent with nephrolithiasis therefore, I feel patient's acute UTI is most likely being caused by obstructing stone  Stat CT renal stone study was ordered to confirm this  Depending on the size of the stone the patient may be referred to urology for further follow-up

## 2022-07-25 NOTE — PROGRESS NOTES
Assessment and Plan:    Problem List Items Addressed This Visit        Cardiovascular and Mediastinum    Essential hypertension     Well controlled on current regimen  Genitourinary    Acute cystitis with hematuria - Primary     Patient does have acute UTI so she will be treated with 5 day course of Macrobid  Patient's symptoms are consistent with nephrolithiasis therefore, I feel patient's acute UTI is most likely being caused by obstructing stone  Stat CT renal stone study was ordered to confirm this  Depending on the size of the stone the patient may be referred to urology for further follow-up  Relevant Medications    nitrofurantoin (MACROBID) 100 mg capsule    Other Relevant Orders    CT renal stone study abdomen pelvis wo contrast      Other Visit Diagnoses     Dysuria        Relevant Orders    POCT urine dip (Completed)    Urine culture    Hematuria, unspecified type        Relevant Orders    POCT urine dip (Completed)    Urine culture    CT renal stone study abdomen pelvis wo contrast                 Diagnoses and all orders for this visit:    Acute cystitis with hematuria  -     nitrofurantoin (MACROBID) 100 mg capsule; Take 1 capsule (100 mg total) by mouth 2 (two) times a day for 5 days  -     CT renal stone study abdomen pelvis wo contrast; Future    Dysuria  -     POCT urine dip  -     Urine culture; Future  -     Urine culture    Hematuria, unspecified type  -     POCT urine dip  -     Urine culture; Future  -     Urine culture  -     CT renal stone study abdomen pelvis wo contrast; Future    Essential hypertension            Subjective:      Patient ID: Raudel Rodrigez is a 57 y o  female  CC:    Chief Complaint   Patient presents with    Abdominal Pain     Patient c/o LLQ pain, pressure, vaginal pressure, lower back pain x 2 weeks, Pain has increased in the last 2 days, using Aleve  Sitting makes it worse  Patient c/o an odor and burning with urination   ak       HPI:    UTI: Patient's urine dip in the office today was positive for a large amount of blood, nitrites, and a large amount of leukocyte esterase  She reports vaginal and lower pelvic pressure, bilateral lower back pain, increased frequency, dysuria, foul smelling dark urine, urgency  She denies fevers or chills  Hypertension:  Well controlled on current dosage of atenolol  Patient denies lightheadedness, dizziness, or orthostasis  The following portions of the patient's history were reviewed and updated as appropriate: allergies, current medications, past family history, past medical history, past social history, past surgical history and problem list       Review of Systems   Constitutional: Negative for chills and fever  HENT: Negative for ear pain and sore throat  Eyes: Negative for pain and visual disturbance  Respiratory: Negative for cough, chest tightness, shortness of breath and wheezing  Cardiovascular: Negative for chest pain, palpitations and leg swelling  Gastrointestinal: Negative for abdominal pain, constipation, diarrhea, nausea and vomiting  Endocrine: Negative for cold intolerance and heat intolerance  Genitourinary: Positive for dysuria, frequency, urgency and vaginal pain  Negative for decreased urine volume, difficulty urinating, flank pain, hematuria, vaginal bleeding and vaginal discharge  Musculoskeletal: Negative for arthralgias, back pain and myalgias  Skin: Negative for color change and rash  Allergic/Immunologic: Negative for environmental allergies  Neurological: Negative for dizziness, seizures, syncope, weakness, light-headedness, numbness and headaches  Hematological: Negative for adenopathy  Psychiatric/Behavioral: Negative for confusion  The patient is not nervous/anxious  All other systems reviewed and are negative          Data to review:       Objective:    Vitals:    07/25/22 1340   BP: 118/70   Pulse: 72   Temp: 97 8 °F (36 6 °C)   Weight: 60 3 kg (133 lb)   Height: 5' 3" (1 6 m)        Physical Exam  Vitals and nursing note reviewed  Constitutional:       General: She is not in acute distress  Appearance: Normal appearance  She is well-developed  She is not ill-appearing  HENT:      Head: Normocephalic and atraumatic  Eyes:      Conjunctiva/sclera: Conjunctivae normal    Cardiovascular:      Rate and Rhythm: Normal rate and regular rhythm  Pulses: Normal pulses  Carotid pulses are 2+ on the right side and 2+ on the left side  Radial pulses are 2+ on the right side and 2+ on the left side  Posterior tibial pulses are 2+ on the right side and 2+ on the left side  Heart sounds: Normal heart sounds  No murmur heard  Pulmonary:      Effort: Pulmonary effort is normal  No respiratory distress  Breath sounds: Normal breath sounds  No wheezing  Abdominal:      Palpations: Abdomen is soft  Tenderness: There is abdominal tenderness in the left lower quadrant  There is right CVA tenderness  There is no left CVA tenderness  Musculoskeletal:         General: Normal range of motion  Cervical back: Normal range of motion and neck supple  Right lower leg: No edema  Left lower leg: No edema  Skin:     General: Skin is warm and dry  Capillary Refill: Capillary refill takes less than 2 seconds  Neurological:      General: No focal deficit present  Mental Status: She is alert and oriented to person, place, and time  Psychiatric:         Mood and Affect: Mood normal          Behavior: Behavior normal          Thought Content:  Thought content normal          Judgment: Judgment normal

## 2022-07-27 ENCOUNTER — OFFICE VISIT (OUTPATIENT)
Dept: FAMILY MEDICINE CLINIC | Facility: CLINIC | Age: 62
End: 2022-07-27
Payer: COMMERCIAL

## 2022-07-27 VITALS
BODY MASS INDEX: 23.39 KG/M2 | HEART RATE: 60 BPM | DIASTOLIC BLOOD PRESSURE: 82 MMHG | WEIGHT: 132 LBS | TEMPERATURE: 97.9 F | HEIGHT: 63 IN | SYSTOLIC BLOOD PRESSURE: 120 MMHG

## 2022-07-27 DIAGNOSIS — R73.9 HYPERGLYCEMIA: ICD-10-CM

## 2022-07-27 DIAGNOSIS — E78.2 MIXED HYPERLIPIDEMIA: ICD-10-CM

## 2022-07-27 DIAGNOSIS — R93.3 ABNORMAL CT SCAN, GASTROINTESTINAL TRACT: ICD-10-CM

## 2022-07-27 DIAGNOSIS — M81.0 AGE-RELATED OSTEOPOROSIS WITHOUT CURRENT PATHOLOGICAL FRACTURE: Primary | ICD-10-CM

## 2022-07-27 DIAGNOSIS — E55.9 VITAMIN D DEFICIENCY: ICD-10-CM

## 2022-07-27 DIAGNOSIS — N30.01 ACUTE CYSTITIS WITH HEMATURIA: ICD-10-CM

## 2022-07-27 DIAGNOSIS — F51.01 PRIMARY INSOMNIA: ICD-10-CM

## 2022-07-27 PROBLEM — Z86.16 HISTORY OF COVID-19: Status: ACTIVE | Noted: 2022-06-29

## 2022-07-27 PROBLEM — K12.0 APHTHOUS ULCER OF MOUTH: Status: RESOLVED | Noted: 2021-08-13 | Resolved: 2022-07-27

## 2022-07-27 PROBLEM — J45.909 ASTHMA: Status: RESOLVED | Noted: 2019-03-11 | Resolved: 2022-07-27

## 2022-07-27 PROBLEM — J01.40 ACUTE NON-RECURRENT PANSINUSITIS: Status: RESOLVED | Noted: 2020-02-17 | Resolved: 2022-07-27

## 2022-07-27 PROCEDURE — 99214 OFFICE O/P EST MOD 30 MIN: CPT | Performed by: PHYSICIAN ASSISTANT

## 2022-07-27 NOTE — ASSESSMENT & PLAN NOTE
Patient is on Lipitor 80 mg once daily keeping her LDL at goal at 98 triglycerides good at 86 LDL slightly low at 44 increase activity continue medication recheck 6 months

## 2022-07-27 NOTE — PATIENT INSTRUCTIONS
Problem List Items Addressed This Visit          Musculoskeletal and Integument    Age-related osteoporosis without current pathological fracture - Primary     DEXA scheduled  Genitourinary    Acute cystitis with hematuria     Patient on Macrobid, urine culture preliminary greater than 100,000 gram-negative chele  Pt  had signs of infection for at least 2-3 weeks now  Other    Hyperglycemia     Glucose fasting again was normal at 89 with an A1c of 5 7  This has been stable for some time now  Relevant Orders    Comprehensive metabolic panel    Mixed hyperlipidemia     Patient is on Lipitor 80 mg once daily keeping her LDL at goal at 98 triglycerides good at 86 LDL slightly low at 44 increase activity continue medication recheck 6 months  Relevant Orders    Lipid Panel with Direct LDL reflex    Comprehensive metabolic panel    Insomnia     Stable on trazodone nightly  Vitamin D deficiency     Vitamin-D stable at 59 continue supplementation  Abnormal CT scan, gastrointestinal tract     Noncontrast CT shows multiple lymph nodes and small loops of bowel with possible enteritis findings however patient's symptoms do not correlate with this  CT with contrast was ordered and I do recommend going through with this for further evaluation  Urinary Tract Infection in Women   AMBULATORY CARE:   A urinary tract infection (UTI)  is caused by bacteria that get inside your urinary tract  Most bacteria that enter your urinary tract come out when you urinate  If the bacteria stay in your urinary tract, you may get an infection  Your urinary tract includes your kidneys, ureters, bladder, and urethra  Urine is made in your kidneys, and it flows from the ureters to the bladder  Urine leaves the bladder through the urethra  A UTI is more common in your lower urinary tract, which includes your bladder and urethra          Common symptoms include the following:   Urinating more often or waking from sleep to urinate    Pain or burning when you urinate    Pain or pressure in your lower abdomen     Urine that smells bad    Blood in your urine    Leaking urine    Seek care immediately if:   You are urinating very little or not at all  You have a high fever with shaking chills  You have side or back pain that gets worse  Call your doctor if:   You have a fever  You do not feel better after 2 days of taking antibiotics  You are vomiting  You have questions or concerns about your condition or care  Treatment for a UTI  may include antibiotics to treat a bacterial infection  You may also need medicines to decrease pain and burning, or decrease the urge to urinate often  If you have UTIs often (called recurrent UTIs), you may be given antibiotics to take regularly  You will be given directions for when and how to use antibiotics  The goal is to prevent UTIs but not cause antibiotic resistance by using antibiotics too often  Prevent a UTI:   Empty your bladder often  Urinate and empty your bladder as soon as you feel the need  Do not hold your urine for long periods of time  Wipe from front to back after you urinate or have a bowel movement  This will help prevent germs from getting into your urinary tract through your urethra  Drink liquids as directed  Ask how much liquid to drink each day and which liquids are best for you  You may need to drink more liquids than usual to help flush out the bacteria  Do not drink alcohol, caffeine, or citrus juices  These can irritate your bladder and increase your symptoms  Your healthcare provider may recommend cranberry juice to help prevent a UTI  Urinate after you have sex  This can help flush out bacteria passed during sex  Do not douche or use feminine deodorants  These can change the chemical balance in your vagina  Change sanitary pads or tampons often    This will help prevent germs from getting into your urinary tract  Talk to your healthcare provider about your birth control method  You may need to change your method if it is increasing your risk for UTIs  Wear cotton underwear and clothes that are loose  Tight pants and nylon underwear can trap moisture and cause bacteria to grow  Vaginal estrogen may be recommended  This medicine helps prevent UTIs in women who have gone through menopause or are in jose alejandro-menopause  Do pelvic muscle exercises often  Pelvic muscle exercises may help you start and stop urinating  Strong pelvic muscles may help you empty your bladder easier  Squeeze these muscles tightly for 5 seconds like you are trying to hold back urine  Then relax for 5 seconds  Gradually work up to squeezing for 10 seconds  Do 3 sets of 15 repetitions a day, or as directed  Follow up with your doctor as directed:  Write down your questions so you remember to ask them during your visits  © Copyright [employer] 2022 Information is for End User's use only and may not be sold, redistributed or otherwise used for commercial purposes  All illustrations and images included in CareNotes® are the copyrighted property of A D [employer] M [employer] Inc  or [place]   The above information is an  only  It is not intended as medical advice for individual conditions or treatments  Talk to your doctor, nurse or pharmacist before following any medical regimen to see if it is safe and effective for you

## 2022-07-27 NOTE — ASSESSMENT & PLAN NOTE
Patient on Macrobid, urine culture preliminary greater than 100,000 gram-negative chele  Pt  had signs of infection for at least 2-3 weeks now

## 2022-07-27 NOTE — ASSESSMENT & PLAN NOTE
Noncontrast CT shows multiple lymph nodes and small loops of bowel with possible enteritis findings however patient's symptoms do not correlate with this  CT with contrast was ordered and I do recommend going through with this for further evaluation

## 2022-07-27 NOTE — PROGRESS NOTES
Assessment and Plan:    Problem List Items Addressed This Visit        Musculoskeletal and Integument    Age-related osteoporosis without current pathological fracture - Primary     DEXA scheduled  Genitourinary    Acute cystitis with hematuria     Patient on Macrobid, urine culture preliminary greater than 100,000 gram-negative chele  Pt  had signs of infection for at least 2-3 weeks now  Other    Hyperglycemia     Glucose fasting again was normal at 89 with an A1c of 5 7  This has been stable for some time now  Relevant Orders    Comprehensive metabolic panel    Mixed hyperlipidemia     Patient is on Lipitor 80 mg once daily keeping her LDL at goal at 98 triglycerides good at 86 LDL slightly low at 44 increase activity continue medication recheck 6 months  Relevant Orders    Lipid Panel with Direct LDL reflex    Comprehensive metabolic panel    Insomnia     Stable on trazodone nightly  Vitamin D deficiency     Vitamin-D stable at 59 continue supplementation  Abnormal CT scan, gastrointestinal tract     Noncontrast CT shows multiple lymph nodes and small loops of bowel with possible enteritis findings however patient's symptoms do not correlate with this  CT with contrast was ordered and I do recommend going through with this for further evaluation  Diagnoses and all orders for this visit:    Age-related osteoporosis without current pathological fracture    Vitamin D deficiency    Mixed hyperlipidemia  -     Lipid Panel with Direct LDL reflex; Future  -     Comprehensive metabolic panel; Future    Hyperglycemia  -     Comprehensive metabolic panel; Future    Primary insomnia    Acute cystitis with hematuria    Abnormal CT scan, gastrointestinal tract            Subjective:      Patient ID: Colin Roberts is a 57 y o  female      CC:    Chief Complaint   Patient presents with    Follow-up     Patient is here for a 6 month f/u re: chronic medical conditions  patient needs to review blood work results  Patient also needs to f/u on her left sided abdominal pain, but no pressure  She also c/o ongoing constant low back pain  No urinary burning, pressure, frequency  Patient has not had a BM 2 days (was on vacation for 2 weeks and didn't eat her normal diet)  Victorino recommended a CT scan with contrast  ak       HPI:      Dalia Morgan is here for chronic conditions f/u including the diagnosis of No diagnosis found    Pt  states they are taking all medications as directed without complaints or side effects   Pt  had labs done prior to today's visit which included Recent Results (from the past 672 hour(s))  -Lipid Panel with Direct LDL reflex:   Collection Time: 07/08/22  9:18 AM       Result                      Value             Ref Range           Total Cholesterol           160               <200 mg/dL          HDL                         44 (L)            > OR = 50 mg*       Triglycerides               86                <150 mg/dL          LDL Calculated              98                mg/dL (calc)        Chol HDLC Ratio             3 6               <5 0 (calc)         Non-HDL Cholesterol         116               <130 mg/dL (*  -Comprehensive metabolic panel:   Collection Time: 07/08/22  9:18 AM       Result                      Value             Ref Range           Glucose, Random             89                65 - 99 mg/dL       BUN                         27 (H)            7 - 25 mg/dL        Creatinine                  0 61              0 50 - 0 99 *       eGFR Non  Ameri*     96                > OR = 60 mL*       eGFR        112               > OR = 60 mL*       SL AMB BUN/CREATININE *     44 (H)            6 - 22 (calc)       Sodium                      139               135 - 146 mm*       Potassium                   4 1               3 5 - 5 3 mm*       Chloride                    103               98 - 110 mmo*       CO2 29                20 - 32 mmol*       Calcium                     9 1               8 6 - 10 4 m*       Protein, Total              6 5               6 1 - 8 1 g/*       Albumin                     4 0               3 6 - 5 1 g/*       Globulin                    2 5               1 9 - 3 7 g/*       Albumin/Globulin Ratio      1 6               1 0 - 2 5 (c*       TOTAL BILIRUBIN             0 8               0 2 - 1 2 mg*       Alkaline Phosphatase        81                37 - 153 U/L        AST                         16                10 - 35 U/L         ALT                         15                6 - 29 U/L     -Vitamin D 25 hydroxy:   Collection Time: 07/08/22  9:18 AM       Result                      Value             Ref Range           Vitamin D, 25-Hydroxy,*     59                30 - 100 ng/*  -Hemoglobin A1c (w/out EAG):   Collection Time: 07/08/22  9:18 AM       Result                      Value             Ref Range           Hemoglobin A1C              5 7 (H)           <5 7 % of to*  -POCT urine dip:   Collection Time: 07/25/22  1:56 PM       Result                      Value             Ref Range           LEUKOCYTE ESTERASE,UA       large                                 NITRITE,UA                  positive                              SL AMB POCT UROBILINOG*     1 0                                   POCT URINE PROTEIN          300                                    PH,UA                      7 0                                   BLOOD,UA                    large                                 SPECIFIC GRAVITY,UA         1 025                                 KETONES,UA                  trace                                 BILIRUBIN,UA                negative                              GLUCOSE, UA                 negative                               COLOR,UA                   yellow                                CLARITY,UA                  cloudy -Urine culture:   Collection Time: 07/25/22  1:58 PM  Specimen: Urine, Clean Catch       Result                      Value             Ref Range           Urine Culture                                                 >100,000 cfu/ml Gram Negative León (A)        The following portions of the patient's history were reviewed and updated as appropriate: allergies, current medications, past family history, past medical history, past social history, past surgical history and problem list       Review of Systems   Constitutional: Negative  HENT: Negative  Eyes: Negative  Respiratory: Negative  Cardiovascular: Negative  Gastrointestinal: Negative  Endocrine: Negative  Genitourinary: Negative  Musculoskeletal: Negative  Skin: Negative  Allergic/Immunologic: Negative  Neurological: Negative  Hematological: Negative  Psychiatric/Behavioral: Negative  Data to review:       Objective:    Vitals:    07/27/22 0909   BP: 120/82   Pulse: 60   Temp: 97 9 °F (36 6 °C)   Weight: 59 9 kg (132 lb)   Height: 5' 3" (1 6 m)        Physical Exam  Vitals and nursing note reviewed  Constitutional:       Appearance: Normal appearance  She is well-developed  HENT:      Head: Normocephalic and atraumatic  Eyes:      General: Lids are normal       Conjunctiva/sclera: Conjunctivae normal       Pupils: Pupils are equal, round, and reactive to light  Cardiovascular:      Rate and Rhythm: Normal rate and regular rhythm  Heart sounds: No murmur heard  Pulmonary:      Effort: Pulmonary effort is normal       Breath sounds: Normal breath sounds  Skin:     General: Skin is warm and dry  Neurological:      General: No focal deficit present  Mental Status: She is alert  Coordination: Coordination is intact  Psychiatric:         Mood and Affect: Mood normal          Behavior: Behavior normal  Behavior is cooperative  Thought Content:  Thought content normal  Judgment: Judgment normal

## 2022-07-28 LAB — BACTERIA UR CULT: ABNORMAL

## 2022-08-09 ENCOUNTER — HOSPITAL ENCOUNTER (OUTPATIENT)
Dept: CT IMAGING | Facility: HOSPITAL | Age: 62
Discharge: HOME | End: 2022-08-09
Payer: COMMERCIAL

## 2022-08-09 DIAGNOSIS — R93.5 ABNORMAL CT OF THE ABDOMEN: ICD-10-CM

## 2022-08-09 DIAGNOSIS — K52.9 ENTERITIS: ICD-10-CM

## 2022-08-09 DIAGNOSIS — I88.0 MESENTERIC ADENITIS: ICD-10-CM

## 2022-08-09 PROCEDURE — 74177 CT ABD & PELVIS W/CONTRAST: CPT

## 2022-08-09 PROCEDURE — G1004 CDSM NDSC: HCPCS

## 2022-08-09 RX ADMIN — IOHEXOL 65 ML: 350 INJECTION, SOLUTION INTRAVENOUS at 09:30

## 2022-08-15 DIAGNOSIS — R59.0 MESENTERIC LYMPHADENOPATHY: Primary | ICD-10-CM

## 2022-09-02 DIAGNOSIS — E78.2 MIXED HYPERLIPIDEMIA: ICD-10-CM

## 2022-09-03 RX ORDER — ATORVASTATIN CALCIUM 80 MG/1
80 TABLET, FILM COATED ORAL DAILY
Qty: 30 TABLET | Refills: 0 | Status: SHIPPED | OUTPATIENT
Start: 2022-09-03

## 2022-10-05 ENCOUNTER — NURSE TRIAGE (OUTPATIENT)
Dept: OTHER | Facility: OTHER | Age: 62
End: 2022-10-05

## 2022-10-05 NOTE — TELEPHONE ENCOUNTER
Reason for Disposition   MODERATE back pain (e g , interferes with normal activities) and present > 3 days    Answer Assessment - Initial Assessment Questions  1  ONSET: "When did the pain begin?"       Started over a month ago on one side and now both   2  LOCATION: "Where does it hurt?" (upper, mid or lower back)      Very low back pain   3  SEVERITY: "How bad is the pain?"  (e g , Scale 1-10; mild, moderate, or severe)    - MILD (1-3): doesn't interfere with normal activities     - MODERATE (4-7): interferes with normal activities or awakens from sleep     - SEVERE (8-10): excruciating pain, unable to do any normal activities       Varies per pt  Worse at times  Moderate pain   4  PATTERN: "Is the pain constant?" (e g , yes, no; constant, intermittent)       Pt is constant   5  RADIATION: "Does the pain shoot into your legs or elsewhere?"      Right hip area  6  CAUSE:  "What do you think is causing the back pain?"       Unsure   7  BACK OVERUSE:  "Any recent lifting of heavy objects, strenuous work or exercise?"      Lifting heavy objects  Moving items around   8  MEDICATIONS: "What have you taken so far for the pain?" (e g , nothing, acetaminophen, NSAIDS)      NSAID   9  NEUROLOGIC SYMPTOMS: "Do you have any weakness, numbness, or problems with bowel/bladder control?"      Denies     10  OTHER SYMPTOMS: "Do you have any other symptoms?" (e g , fever, abdominal pain, burning with urination, blood in urine)  Smell to urine    Protocols used: BACK PAIN-ADULT-OH

## 2022-10-05 NOTE — TELEPHONE ENCOUNTER
Regarding: low back pain  ----- Message from Master Vela sent at 10/5/2022 10:02 AM EDT -----  "I have low back pain "

## 2022-10-07 ENCOUNTER — OFFICE VISIT (OUTPATIENT)
Dept: FAMILY MEDICINE CLINIC | Facility: CLINIC | Age: 62
End: 2022-10-07
Payer: COMMERCIAL

## 2022-10-07 VITALS
BODY MASS INDEX: 23.21 KG/M2 | HEART RATE: 66 BPM | WEIGHT: 131 LBS | DIASTOLIC BLOOD PRESSURE: 68 MMHG | OXYGEN SATURATION: 95 % | SYSTOLIC BLOOD PRESSURE: 118 MMHG | HEIGHT: 63 IN

## 2022-10-07 DIAGNOSIS — S39.012A STRAIN OF LUMBAR REGION, INITIAL ENCOUNTER: ICD-10-CM

## 2022-10-07 DIAGNOSIS — N30.00 ACUTE CYSTITIS WITHOUT HEMATURIA: Primary | ICD-10-CM

## 2022-10-07 DIAGNOSIS — M54.50 LOW BACK PAIN, UNSPECIFIED BACK PAIN LATERALITY, UNSPECIFIED CHRONICITY, UNSPECIFIED WHETHER SCIATICA PRESENT: ICD-10-CM

## 2022-10-07 PROBLEM — N20.0 NEPHROLITHIASIS: Status: ACTIVE | Noted: 2022-10-07

## 2022-10-07 LAB
SL AMB  POCT GLUCOSE, UA: NEGATIVE
SL AMB LEUKOCYTE ESTERASE,UA: ABNORMAL
SL AMB POCT BILIRUBIN,UA: NEGATIVE
SL AMB POCT BLOOD,UA: ABNORMAL
SL AMB POCT CLARITY,UA: ABNORMAL
SL AMB POCT COLOR,UA: YELLOW
SL AMB POCT KETONES,UA: NEGATIVE
SL AMB POCT NITRITE,UA: POSITIVE
SL AMB POCT PH,UA: 6.5
SL AMB POCT SPECIFIC GRAVITY,UA: 1.01
SL AMB POCT URINE PROTEIN: ABNORMAL
SL AMB POCT UROBILINOGEN: 0.2

## 2022-10-07 PROCEDURE — 81002 URINALYSIS NONAUTO W/O SCOPE: CPT | Performed by: PHYSICIAN ASSISTANT

## 2022-10-07 PROCEDURE — 87186 SC STD MICRODIL/AGAR DIL: CPT | Performed by: PHYSICIAN ASSISTANT

## 2022-10-07 PROCEDURE — 87181 SC STD AGAR DILUTION PER AGT: CPT | Performed by: PHYSICIAN ASSISTANT

## 2022-10-07 PROCEDURE — 87077 CULTURE AEROBIC IDENTIFY: CPT | Performed by: PHYSICIAN ASSISTANT

## 2022-10-07 PROCEDURE — 99214 OFFICE O/P EST MOD 30 MIN: CPT | Performed by: PHYSICIAN ASSISTANT

## 2022-10-07 PROCEDURE — 87086 URINE CULTURE/COLONY COUNT: CPT | Performed by: PHYSICIAN ASSISTANT

## 2022-10-07 RX ORDER — MELOXICAM 15 MG/1
15 TABLET ORAL DAILY
Qty: 30 TABLET | Refills: 0 | Status: SHIPPED | OUTPATIENT
Start: 2022-10-07

## 2022-10-07 RX ORDER — NITROFURANTOIN 25; 75 MG/1; MG/1
100 CAPSULE ORAL 2 TIMES DAILY
Qty: 14 CAPSULE | Refills: 0 | Status: SHIPPED | OUTPATIENT
Start: 2022-10-07 | End: 2022-10-14

## 2022-10-07 NOTE — ASSESSMENT & PLAN NOTE
Patient with signs and symptoms of UTI with a positive urine dip for leukocytes and nitrates  Will treat her with Macrobid 100 mg twice daily and await urine culture  We will call her with the culture only if need to change antibiotic  UTI prevention she given at checkout

## 2022-10-07 NOTE — PROGRESS NOTES
Name: Naheed Castro      : 1960      MRN: [id number]  Encounter Provider: Crystal Crabtree PA-C  Encounter Date: 10/7/2022   Encounter department: [facility] PRIMARY [facility]    Assessment & Plan     1  Low back pain, unspecified back pain laterality, unspecified chronicity, unspecified whether sciatica present  -     POCT urine dip  -     Urine culture; Future  -     Urine culture    2  Foul smelling urine  -     POCT urine dip  -     Urine culture; Future  -     Urine culture    3  Acute cystitis without hematuria  Assessment & Plan:  Patient with signs and symptoms of UTI with a positive urine dip for leukocytes and nitrates  Will treat her with Macrobid 100 mg twice daily and await urine culture  We will call her with the culture only if need to change antibiotic  UTI prevention she given at checkout  Orders:  -     nitrofurantoin (MACROBID) 100 mg capsule; Take 1 capsule (100 mg total) by mouth 2 (two) times a day for 7 days  -     Urine culture; Future; Expected date: 10/21/2022    4  Strain of lumbar region, initial encounter  Assessment & Plan:  Failed OTC aleve  Trail mobic 15 mg once daily  Warm compressed and stretches  Piriformis syndrome stretching given to the patient  X-ray not needed at this time  Orders:  -     meloxicam (Mobic) 15 mg tablet; Take 1 tablet (15 mg total) by mouth daily           Subjective      Patient here for low back pain mostly on the right for past 1 month 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 that she is 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 to doing as much of  She did also twist her back specifically in a small injury  She did see chiropractic care which did not help her  She has been using Aleve without help ice heat direct contact without help  She has been doing stretching without help  No numbness tingling weakness in her leg or bladder incontinence      Patient also states that ever since her UTI 2 months ago she has continued to have a strong smelling urine although no other UTI symptoms  She did finish the antibiotic which was Macrobid which culture proved was susceptible to the particular infection that she had  Review of Systems   Constitutional: Negative  HENT: Negative  Eyes: Negative  Respiratory: Negative  Cardiovascular: Negative  Gastrointestinal: Negative  Endocrine: Negative  Genitourinary: Positive for urgency  Musculoskeletal: Positive for back pain  Skin: Negative  Allergic/Immunologic: Negative  Neurological: Negative  Hematological: Negative  Psychiatric/Behavioral: Negative          Current Outpatient Medications on File Prior to Visit   Medication Sig    albuterol (PROVENTIL HFA,VENTOLIN HFA) 90 mcg/act inhaler Inhale 2 puffs every 6 (six) hours as needed for wheezing    atenolol (TENORMIN) 25 mg tablet Take 1 tablet (25 mg total) by mouth daily    atorvastatin (LIPITOR) 80 mg tablet Take 1 tablet (80 mg total) by mouth daily    azelastine (OPTIVAR) 0 05 % ophthalmic solution Administer 1 drop to both eyes 2 (two) times a day As needed    Calcium 600 MG tablet Take 600 mg by mouth    Cholecalciferol (VITAMIN D-3 PO) Take 4,000 Int'l Units by mouth daily    DULoxetine (CYMBALTA) 60 mg delayed release capsule Take 1 capsule (60 mg total) by mouth daily    fluticasone (FLONASE) 50 mcg/act nasal spray 2 sprays into each nostril daily    Mag Oxide-Vit D3-Turmeric (Magnesium-Vitamin D3-Turmeric) [phone] MG-UNIT-MG TABS Take by mouth    Multiple Vitamin (MULTIVITAMIN) tablet Take 1 tablet by mouth daily     mupirocin (BACTROBAN) 2 % ointment Apply topically 3 (three) times a day    Nutritional Supplements (ANTIOXIDANTS PO) Take 1 tablet by mouth daily    sertraline (ZOLOFT) 25 mg tablet Take 1 tablet (25 mg total) by mouth daily    traZODone (DESYREL) 50 mg tablet Take 1 tablet (50 mg total) by mouth daily at bedtime    [DISCONTINUED] predniSONE 10 mg tablet Use 5 tablets for 2 days  Use 4 tablets for 2 days  Use 3 tablets for 2 days  Use 2 tablets for 2 days  Use 1 tablet for 2 days  (Patient not taking: No sig reported)       Objective     /68 (BP Location: Right arm, Patient Position: Sitting, Cuff Size: Large)   Pulse 66   Ht 5' 3" (1 6 m)   Wt 59 4 kg (131 lb)   SpO2 95%   BMI 23 21 kg/m²     Physical Exam  Vitals and nursing note reviewed  Constitutional:       General: She is not in acute distress  Appearance: She is well-developed  She is not diaphoretic  HENT:      Head: Normocephalic and atraumatic  Eyes:      General:         Right eye: No discharge  Left eye: No discharge  Conjunctiva/sclera: Conjunctivae normal    Neck:      Vascular: No carotid bruit  Cardiovascular:      Rate and Rhythm: Normal rate and regular rhythm  Heart sounds: Normal heart sounds  No murmur heard  No friction rub  No gallop  Pulmonary:      Effort: Pulmonary effort is normal  No respiratory distress  Breath sounds: Normal breath sounds  No wheezing or rales  Abdominal:      General: Abdomen is flat  Bowel sounds are normal       Palpations: Abdomen is soft  Tenderness: There is no abdominal tenderness  There is no right CVA tenderness or left CVA tenderness  Musculoskeletal:      Cervical back: Neck supple  Comments: Mild paravertebral lumbar right muscle tenderness negative sciatic notch good range of motion negative straight leg reflex and DTR   Skin:     General: Skin is warm and dry  Neurological:      Mental Status: She is alert and oriented to person, place, and time     Psychiatric:         Judgment: Judgment normal        Cristina Reilly PA-C

## 2022-10-07 NOTE — ASSESSMENT & PLAN NOTE
Failed OTC aleve  Trail mobic 15 mg once daily  Warm compressed and stretches  Piriformis syndrome stretching given to the patient  X-ray not needed at this time

## 2022-10-07 NOTE — PATIENT INSTRUCTIONS
Problem List Items Addressed This Visit          Genitourinary    Acute cystitis without hematuria     Patient with signs and symptoms of UTI with a positive urine dip for leukocytes and nitrates  Will treat her with Macrobid 100 mg twice daily and await urine culture  We will call her with the culture only if need to change antibiotic  UTI prevention she given at checkout  Relevant Medications    nitrofurantoin (MACROBID) 100 mg capsule          Other Visit Diagnoses       Low back pain, unspecified back pain laterality, unspecified chronicity, unspecified whether sciatica present    -  Primary    Relevant Orders    POCT urine dip (Completed)    Urine culture    Foul smelling urine        Relevant Orders    POCT urine dip (Completed)    Urine culture          Urinary Tract Infection in Women   AMBULATORY CARE:   A urinary tract infection (UTI)  is caused by bacteria that get inside your urinary tract  Most bacteria that enter your urinary tract come out when you urinate  If the bacteria stay in your urinary tract, you may get an infection  Your urinary tract includes your kidneys, ureters, bladder, and urethra  Urine is made in your kidneys, and it flows from the ureters to the bladder  Urine leaves the bladder through the urethra  A UTI is more common in your lower urinary tract, which includes your bladder and urethra  Common symptoms include the following:   Urinating more often or waking from sleep to urinate    Pain or burning when you urinate    Pain or pressure in your lower abdomen     Urine that smells bad    Blood in your urine    Leaking urine    Seek care immediately if:   You are urinating very little or not at all  You have a high fever with shaking chills  You have side or back pain that gets worse  Call your doctor if:   You have a fever  You do not feel better after 2 days of taking antibiotics  You are vomiting       You have questions or concerns about your condition or care  Treatment for a UTI  may include antibiotics to treat a bacterial infection  You may also need medicines to decrease pain and burning, or decrease the urge to urinate often  If you have UTIs often (called recurrent UTIs), you may be given antibiotics to take regularly  You will be given directions for when and how to use antibiotics  The goal is to prevent UTIs but not cause antibiotic resistance by using antibiotics too often  Prevent a UTI:   Empty your bladder often  Urinate and empty your bladder as soon as you feel the need  Do not hold your urine for long periods of time  Wipe from front to back after you urinate or have a bowel movement  This will help prevent germs from getting into your urinary tract through your urethra  Drink liquids as directed  Ask how much liquid to drink each day and which liquids are best for you  You may need to drink more liquids than usual to help flush out the bacteria  Do not drink alcohol, caffeine, or citrus juices  These can irritate your bladder and increase your symptoms  Your healthcare provider may recommend cranberry juice to help prevent a UTI  Urinate after you have sex  This can help flush out bacteria passed during sex  Do not douche or use feminine deodorants  These can change the chemical balance in your vagina  Change sanitary pads or tampons often  This will help prevent germs from getting into your urinary tract  Talk to your healthcare provider about your birth control method  You may need to change your method if it is increasing your risk for UTIs  Wear cotton underwear and clothes that are loose  Tight pants and nylon underwear can trap moisture and cause bacteria to grow  Vaginal estrogen may be recommended  This medicine helps prevent UTIs in women who have gone through menopause or are in jose alejandro-menopause  Do pelvic muscle exercises often    Pelvic muscle exercises may help you start and stop urinating  Strong pelvic muscles may help you empty your bladder easier  Squeeze these muscles tightly for 5 seconds like you are trying to hold back urine  Then relax for 5 seconds  Gradually work up to squeezing for 10 seconds  Do 3 sets of 15 repetitions a day, or as directed  Follow up with your doctor as directed:  Write down your questions so you remember to ask them during your visits  © Copyright [employer] 2022 Information is for End User's use only and may not be sold, redistributed or otherwise used for commercial purposes  All illustrations and images included in CareNotes® are the copyrighted property of A D A M , Inc  or [facility] [employer]   The above information is an  only  It is not intended as medical advice for individual conditions or treatments  Talk to your doctor, nurse or pharmacist before following any medical regimen to see if it is safe and effective for you  Piriformis Syndrome   WHAT YOU NEED TO KNOW:   What is piriformis syndrome? Piriformis syndrome is sciatic nerve pain caused by an injured or overused piriformis muscle  This is a muscle inside your buttocks that helps you move your leg  The pain is caused when this muscle pinches your sciatic nerve  You may feel the pain in your hip or down your leg  What are the signs and symptoms of piriformis syndrome? Hip or buttock pain after sitting, squatting, standing, or climbing stairs    Sudden or gradual pain that starts in the buttock and spreads to your lower leg    Trouble walking    Pain when you cross your legs     Pain when you pass a bowel movement    How is piriformis syndrome diagnosed? Your healthcare provider will examine you, and move your leg in different directions to check for pain  You may also need the following:  Imaging tests  such as an ultrasound, a CT scan, or an MRI may be used to help healthcare providers see your muscles and nerves in more detail   You may be given contrast dye before these tests  Tell the healthcare provider if you have ever had an allergic reaction to contrast dye  Do not enter the MRI room with anything metal  Metal can cause serious injury  Tell the healthcare provider if you have any metal in or on your body  An electromyography , or EMG, may be used to test the function of your muscles and the nerves that control them  How is piriformis syndrome treated? Prescription medicines  may be used to relax your muscles and decrease pain and swelling  NSAIDs  help decrease swelling and pain or fever  This medicine is available with or without a doctor's order  NSAIDs can cause stomach bleeding or kidney problems in certain people  If you take blood thinner medicine, always ask your healthcare provider if NSAIDs are safe for you  Always read the medicine label and follow directions  Acetaminophen  decreases pain  It is available without a doctor's order  Ask how much to take and how often to take it  Follow directions  Acetaminophen can cause liver damage if not taken correctly  Surgery  may be needed if other treatments do not relieve your symptoms  How can I manage my symptoms? Rest as directed  Avoid activities that make your pain worse  Apply ice to the buttock on your injured side  Use an ice pack, or put crushed ice in a plastic bag  Cover it with a towel  Leave the ice on for 15 to 20 minutes every hour, or as directed  Ice helps prevent tissue damage and decreases swelling and pain  Apply heat to the buttock on your injured side  Use heating pads for 20 to 30 minutes every 2 hours for as many days as directed  Heat helps decrease pain and muscle spasms  Stretch as directed  Lie on your back with your knees bent  Place the ankle of your injured leg on the knee of your other leg  Gently pull your bent leg toward your chest, until you feel a stretch in the buttock of your injured leg   A physical therapist may show you other exercises to stretch and strengthen your hip muscles  When should I contact my healthcare provider? Your pain worsens or returns, even with treatment  You have questions or concerns about your condition or care  When should I seek immediate care or call 911? You cannot move your leg or foot  CARE AGREEMENT:   You have the right to help plan your care  Learn about your health condition and how it may be treated  Discuss treatment options with your healthcare providers to decide what care you want to receive  You always have the right to refuse treatment  The above information is an  only  It is not intended as medical advice for individual conditions or treatments  Talk to your doctor, nurse or pharmacist before following any medical regimen to see if it is safe and effective for you  © Copyright [employer] 2022 Information is for End User's use only and may not be sold, redistributed or otherwise used for commercial purposes   All illustrations and images included in CareNotes® are the copyrighted property of A D A M , Inc  or [address] [place] [address] [facility]

## 2022-10-09 LAB — BACTERIA UR CULT: ABNORMAL

## 2022-10-09 NOTE — RESULT ENCOUNTER NOTE
Urine culture shows bacteria causing infection will respond to the macrobid given at apt  PT has instructions to finish Rx and repeat culture to prove UTI resolves as never went fully away from past UTI one month ago

## 2022-10-10 ENCOUNTER — HOSPITAL ENCOUNTER (OUTPATIENT)
Dept: CT IMAGING | Facility: HOSPITAL | Age: 62
Discharge: HOME | End: 2022-10-10
Payer: COMMERCIAL

## 2022-10-10 DIAGNOSIS — J33.9 NASAL POLYPOSIS: ICD-10-CM

## 2022-10-10 DIAGNOSIS — J32.9 CHRONIC SINUSITIS, UNSPECIFIED LOCATION: ICD-10-CM

## 2022-10-10 LAB — BACTERIA UR CULT: ABNORMAL

## 2022-10-10 PROCEDURE — 70486 CT MAXILLOFACIAL W/O DYE: CPT

## 2022-10-12 ENCOUNTER — HOSPITAL ENCOUNTER (OUTPATIENT)
Dept: BONE DENSITY | Facility: MEDICAL CENTER | Age: 62
Discharge: HOME | End: 2022-10-12
Payer: COMMERCIAL

## 2022-10-12 DIAGNOSIS — M81.0 AGE-RELATED OSTEOPOROSIS WITHOUT CURRENT PATHOLOGICAL FRACTURE: ICD-10-CM

## 2022-10-12 PROCEDURE — 77080 DXA BONE DENSITY AXIAL: CPT

## 2022-10-14 PROBLEM — M54.50 LOW BACK PAIN: Status: ACTIVE | Noted: 2022-10-14

## 2022-10-18 DIAGNOSIS — M81.0 AGE-RELATED OSTEOPOROSIS WITHOUT CURRENT PATHOLOGICAL FRACTURE: Primary | ICD-10-CM

## 2022-10-18 NOTE — RESULT ENCOUNTER NOTE
Radiologist is calling the results of DEXA osteoporosis although her T score was 1/10 away from this diagnosis which puts her in the osteopenia classification instead  PT can talk with her GYN but I am recommending continuation of avoidance of alcohol tobacco increase in weight-bearing exercises continuing her calcium twice daily and vitamin-D supplementation and check DEXA in 2 years  Patient is already status post Reclast treatment  Reclast treatment

## 2022-10-24 LAB
SL AMB ISOLATE 1: ABNORMAL
SPECIMEN SOURCE CVX/VAG CYTO: ABNORMAL
TRANSFUSION STATUS PATIENT QL: ABNORMAL

## 2022-10-25 ENCOUNTER — TELEPHONE (OUTPATIENT)
Dept: FAMILY MEDICINE CLINIC | Facility: CLINIC | Age: 62
End: 2022-10-25

## 2022-10-25 DIAGNOSIS — N39.0 RECURRENT UTI: ICD-10-CM

## 2022-10-25 DIAGNOSIS — N32.89 BLADDER WALL THICKENING: ICD-10-CM

## 2022-10-25 DIAGNOSIS — N20.0 NEPHROLITHIASIS: Primary | ICD-10-CM

## 2022-10-25 RX ORDER — AMOXICILLIN AND CLAVULANATE POTASSIUM 875; 125 MG/1; MG/1
1 TABLET, FILM COATED ORAL EVERY 12 HOURS SCHEDULED
Qty: 14 TABLET | Refills: 0 | Status: SHIPPED | OUTPATIENT
Start: 2022-10-25 | End: 2022-11-01

## 2022-10-25 NOTE — TELEPHONE ENCOUNTER
----- Message from Rey Hall PA-C sent at 10/25/2022  9:35 AM EDT -----  Please let pt know her urine culture is again positive  I need to put her on augmentin and I have ordered a urology consult  CT this past summer does show she has small non obstructing bilateral kidney stones but also showed evidence in her bladder that she has chronic infection as her bladder wall was thickened and further eval is warranted

## 2022-10-25 NOTE — RESULT ENCOUNTER NOTE
Please let pt know her urine culture is again positive  I need to put her on augmentin and I have ordered a urology consult  CT this past summer does show she has small non obstructing bilateral kidney stones but also showed evidence in her bladder that she has chronic infection as her bladder wall was thickened and further eval is warranted

## 2022-10-25 NOTE — TELEPHONE ENCOUNTER
Patient was advised with urine culture results and recommendations from Bhargav Garner  Patient will advise if urology referral needs to be printed

## 2022-10-26 ENCOUNTER — VBI (OUTPATIENT)
Dept: ADMINISTRATIVE | Facility: OTHER | Age: 62
End: 2022-10-26

## 2022-10-27 ENCOUNTER — TELEPHONE (OUTPATIENT)
Dept: PHYSICAL THERAPY | Facility: OTHER | Age: 62
End: 2022-10-27

## 2022-10-27 NOTE — TELEPHONE ENCOUNTER
Patient filled out an online request form  I returned her call  I explained CSP to her  She is not opposed to having an eval with the advanced spine therapist, however she would like to check her insurance first      Patient stated she has already tried "stretching" and that is not helping her for her Right side low back pain into her hip  No formal PT       Patient was also given the phone number to Orthopedics

## 2022-11-16 ENCOUNTER — OFFICE VISIT (OUTPATIENT)
Dept: FAMILY MEDICINE CLINIC | Facility: CLINIC | Age: 62
End: 2022-11-16

## 2022-11-16 ENCOUNTER — TELEPHONE (OUTPATIENT)
Dept: UROLOGY | Facility: AMBULATORY SURGERY CENTER | Age: 62
End: 2022-11-16

## 2022-11-16 VITALS
SYSTOLIC BLOOD PRESSURE: 102 MMHG | HEART RATE: 68 BPM | BODY MASS INDEX: 23.04 KG/M2 | HEIGHT: 63 IN | DIASTOLIC BLOOD PRESSURE: 62 MMHG | TEMPERATURE: 97.9 F | WEIGHT: 130 LBS

## 2022-11-16 DIAGNOSIS — I10 ESSENTIAL HYPERTENSION: ICD-10-CM

## 2022-11-16 DIAGNOSIS — M54.50 LOW BACK PAIN RADIATING TO RIGHT LOWER EXTREMITY: ICD-10-CM

## 2022-11-16 DIAGNOSIS — M25.551 RIGHT HIP PAIN: Primary | ICD-10-CM

## 2022-11-16 DIAGNOSIS — M79.604 LOW BACK PAIN RADIATING TO RIGHT LOWER EXTREMITY: ICD-10-CM

## 2022-11-16 LAB
SL AMB  POCT GLUCOSE, UA: NORMAL
SL AMB LEUKOCYTE ESTERASE,UA: NORMAL
SL AMB POCT BILIRUBIN,UA: NORMAL
SL AMB POCT BLOOD,UA: NORMAL
SL AMB POCT CLARITY,UA: CLEAR
SL AMB POCT COLOR,UA: YELLOW
SL AMB POCT KETONES,UA: NORMAL
SL AMB POCT NITRITE,UA: NORMAL
SL AMB POCT PH,UA: 6.5
SL AMB POCT SPECIFIC GRAVITY,UA: 1.02
SL AMB POCT URINE PROTEIN: NORMAL
SL AMB POCT UROBILINOGEN: 0.2

## 2022-11-16 RX ORDER — ATENOLOL 25 MG/1
25 TABLET ORAL DAILY
Qty: 90 TABLET | Refills: 1 | Status: SHIPPED | OUTPATIENT
Start: 2022-11-16

## 2022-11-16 NOTE — ASSESSMENT & PLAN NOTE
Could not tolerate mobic 15 mg once daily so she may try 1/2 tab instead  Given piriformis exercises to do  Check xray hip/pelvis

## 2022-11-16 NOTE — PATIENT INSTRUCTIONS
1  Essential hypertension      Piriformis Syndrome   WHAT YOU NEED TO KNOW:   Piriformis syndrome is sciatic nerve pain caused by an injured or overused piriformis muscle  This is a muscle inside your buttocks that helps you move your leg  The pain is caused when this muscle pinches your sciatic nerve  You may feel the pain in your hip or down your leg  DISCHARGE INSTRUCTIONS:   Medicines: You may need any of the following:  Prescription medicines  may be used to relax your muscles and decrease pain and swelling  NSAIDs  help decrease swelling and pain  This medicine is available with or without a doctor's order  NSAIDs can cause stomach bleeding or kidney problems in certain people  If you take blood thinner medicine, always ask your healthcare provider if NSAIDs are safe for you  Always read the medicine label and follow directions  Acetaminophen  decreases pain  It is available without a doctor's order  Ask how much to take and how often to take it  Follow directions  Acetaminophen can cause liver damage if not taken correctly  Take your medicine as directed  Contact your healthcare provider if you think your medicine is not helping or if you have side effects  Tell him or her if you are allergic to any medicine  Keep a list of the medicines, vitamins, and herbs you take  Include the amounts, and when and why you take them  Bring the list or the pill bottles to follow-up visits  Carry your medicine list with you in case of an emergency  Follow up with your healthcare provider as directed: You may need to return for more tests  You may also be referred to a physical therapist  Write down your questions so you remember to ask them during your visits  Manage your symptoms of piriformis syndrome:   Rest as directed  Avoid activities that make your pain worse  Apply ice to the buttock on your injured side  Use an ice pack, or put crushed ice in a plastic bag  Cover it with a towel   Leave the ice on for 15 to 20 minutes every hour, or as directed  Ice helps prevent tissue damage and decreases swelling and pain  Apply heat to the buttock on your injured side  Use heating pads for 20 to 30 minutes every 2 hours for as many days as directed  Heat helps decrease pain and muscle spasms  Stretch as directed  Lie on your back with your knees bent  Place the ankle of your injured leg on the knee of your other leg  Gently pull your bent leg toward your chest, until you feel a stretch in the buttock of your injured leg  A physical therapist may show you other exercises to stretch and strengthen your hip muscles  Contact your healthcare provider if:   Your pain worsens or returns, even with treatment  You have questions or concerns about your condition or care  Return to the emergency department if:   You cannot move your leg or foot  © Copyright [employer] 2022 Information is for End User's use only and may not be sold, redistributed or otherwise used for commercial purposes  All illustrations and images included in CareNotes® are the copyrighted property of A D A M , Inc  or [facility] [place]   The above information is an  only  It is not intended as medical advice for individual conditions or treatments  Talk to your doctor, nurse or pharmacist before following any medical regimen to see if it is safe and effective for you

## 2022-11-16 NOTE — PROGRESS NOTES
Name: Marisa Lei      : 1960      MRN: [id number]  Encounter Provider: Ana Ogden PA-C  Encounter Date: 2022   Encounter department: [facility] PRIMARY CARE    Assessment & Plan     1  Right hip pain  Assessment & Plan:  Could not tolerate mobic 15 mg once daily so she may try 1/2 tab instead  Given piriformis exercises to do  Check xray hip/pelvis  Orders:  -     XR hip/pelv 2-3 vws right if performed; Future; Expected date: 2022    2  Essential hypertension  -     atenolol (TENORMIN) 25 mg tablet; Take 1 tablet (25 mg total) by mouth daily    3  Low back pain radiating to right lower extremity  -     POCT urine dip           Subjective      Patient complains of right hip pain for the past 2 months she states her UTI symptoms seemed to have resolved she is not been called by Urology yet  She has been doing certa stretches and has seen her chiro  Has hx of osteoporosis  She is very active and concerned she will not be ready for skiing season  NO numbness tingling or weakness in her leg or foot  Review of Systems   Constitutional: Negative  HENT: Negative  Eyes: Negative  Respiratory: Negative  Cardiovascular: Negative  Gastrointestinal: Negative  Endocrine: Negative  Genitourinary: Negative  Musculoskeletal: Negative  Skin: Negative  Allergic/Immunologic: Negative  Neurological: Negative  Hematological: Negative  Psychiatric/Behavioral: Negative          Current Outpatient Medications on File Prior to Visit   Medication Sig   • albuterol (PROVENTIL HFA,VENTOLIN HFA) 90 mcg/act inhaler Inhale 2 puffs every 6 (six) hours as needed for wheezing   • atorvastatin (LIPITOR) 80 mg tablet Take 1 tablet (80 mg total) by mouth daily   • azelastine (OPTIVAR) 0 05 % ophthalmic solution Administer 1 drop to both eyes 2 (two) times a day As needed   • Calcium 600 MG tablet Take 600 mg by mouth   • Cholecalciferol (VITAMIN D-3 PO) Take 4,000 Int'l Units by mouth daily   • DULoxetine (CYMBALTA) 60 mg delayed release capsule Take 1 capsule (60 mg total) by mouth daily   • fluticasone (FLONASE) 50 mcg/act nasal spray 2 sprays into each nostril daily   • Mag Oxide-Vit D3-Turmeric (Magnesium-Vitamin D3-Turmeric) [phone] MG-UNIT-MG TABS Take by mouth   • Multiple Vitamin (MULTIVITAMIN) tablet Take 1 tablet by mouth daily    • mupirocin (BACTROBAN) 2 % ointment Apply topically 3 (three) times a day   • Nutritional Supplements (ANTIOXIDANTS PO) Take 1 tablet by mouth daily   • sertraline (ZOLOFT) 25 mg tablet Take 1 tablet (25 mg total) by mouth daily   • traZODone (DESYREL) 50 mg tablet Take 1 tablet (50 mg total) by mouth daily at bedtime   • [DISCONTINUED] atenolol (TENORMIN) 25 mg tablet Take 1 tablet (25 mg total) by mouth daily   • [DISCONTINUED] meloxicam (Mobic) 15 mg tablet Take 1 tablet (15 mg total) by mouth daily       Objective     /62 (BP Location: Left arm, Patient Position: Sitting, Cuff Size: Adult)   Pulse 68   Temp 97 9 °F (36 6 °C) (Temporal)   Ht 5' 3" (1 6 m) Comment: on file  Wt 59 kg (130 lb)   BMI 23 03 kg/m²     Physical Exam  Vitals and nursing note reviewed  Constitutional:       General: She is not in acute distress  Appearance: She is well-developed and well-nourished  She is not diaphoretic  HENT:      Head: Normocephalic and atraumatic  Eyes:      General:         Right eye: No discharge  Left eye: No discharge  Conjunctiva/sclera: Conjunctivae normal    Neck:      Vascular: No carotid bruit  Cardiovascular:      Rate and Rhythm: Normal rate and regular rhythm  Heart sounds: Normal heart sounds  No murmur heard  No friction rub  No gallop  Pulmonary:      Effort: Pulmonary effort is normal  No respiratory distress  Breath sounds: Normal breath sounds  No wheezing or rales  Musculoskeletal:      Cervical back: Neck supple        Comments: Negative straight leg raise bilaterally however patient does have discomfort in her piriformis right buttock area  Good deep tendon reflexes and strength  Skin:     General: Skin is warm and dry  Neurological:      Mental Status: She is alert and oriented to person, place, and time     Psychiatric:         Mood and Affect: Mood and affect normal          Judgment: Judgment normal        Celeste Epley, PA-C

## 2022-11-16 NOTE — TELEPHONE ENCOUNTER
Please Triage  New Patient    What is the reason for the patient’s appointment? Referral for   N20 0 (ICD-10-CM) - Nephrolithiasis   N39 0 (ICD-10-CM) - Recurrent UTI   N32 89 (ICD-10-CM) - Bladder wall thickening     What office location does the patient prefer? [place]     Imaging/Lab Results:    Do we accept the patient's insurance or is the patient Self-Pay? Insurance Provider: [employer]   Plan Type/Number:  Member ID#: Has the patient had any previous Urologist(s)? No    Have patient records been requested? If not are records showing in Epic:     Has the patient had any outside testing done? Does the patient have a personal history of cancer?  No    Appt was made for 01/06/22

## 2022-12-02 DIAGNOSIS — E78.2 MIXED HYPERLIPIDEMIA: ICD-10-CM

## 2022-12-04 RX ORDER — ATORVASTATIN CALCIUM 80 MG/1
80 TABLET, FILM COATED ORAL DAILY
Qty: 90 TABLET | Refills: 3 | Status: SHIPPED | OUTPATIENT
Start: 2022-12-04

## 2022-12-06 ENCOUNTER — HOSPITAL ENCOUNTER (OUTPATIENT)
Dept: RADIOLOGY | Facility: HOSPITAL | Age: 62
Discharge: HOME | End: 2022-12-06

## 2022-12-06 DIAGNOSIS — M25.551 RIGHT HIP PAIN: ICD-10-CM

## 2022-12-06 PROBLEM — N30.00 ACUTE CYSTITIS WITHOUT HEMATURIA: Status: RESOLVED | Noted: 2022-07-25 | Resolved: 2022-12-06

## 2022-12-11 NOTE — RESULT ENCOUNTER NOTE
Please let pt know her hip/pelvis xray was normal  If she is still with the pains we cna refer her to ortho

## 2022-12-28 ENCOUNTER — OFFICE VISIT (OUTPATIENT)
Dept: FAMILY MEDICINE CLINIC | Facility: CLINIC | Age: 62
End: 2022-12-28

## 2022-12-28 VITALS
TEMPERATURE: 96.9 F | BODY MASS INDEX: 23.25 KG/M2 | SYSTOLIC BLOOD PRESSURE: 120 MMHG | DIASTOLIC BLOOD PRESSURE: 84 MMHG | HEIGHT: 63 IN | HEART RATE: 72 BPM | WEIGHT: 131.25 LBS | OXYGEN SATURATION: 97 %

## 2022-12-28 DIAGNOSIS — H69.83 DYSFUNCTION OF BOTH EUSTACHIAN TUBES: Primary | ICD-10-CM

## 2022-12-28 DIAGNOSIS — I10 ESSENTIAL HYPERTENSION: ICD-10-CM

## 2022-12-28 DIAGNOSIS — R42 DIZZINESS: ICD-10-CM

## 2022-12-28 DIAGNOSIS — U07.1 COVID-19 VIRUS INFECTION: ICD-10-CM

## 2022-12-28 PROBLEM — H69.93 DYSFUNCTION OF BOTH EUSTACHIAN TUBES: Status: ACTIVE | Noted: 2022-12-28

## 2022-12-28 RX ORDER — MECLIZINE HYDROCHLORIDE 25 MG/1
25 TABLET ORAL 3 TIMES DAILY PRN
Qty: 30 TABLET | Refills: 0 | Status: SHIPPED | OUTPATIENT
Start: 2022-12-28

## 2022-12-28 RX ORDER — METHYLPREDNISOLONE 4 MG/1
TABLET ORAL
Qty: 21 EACH | Refills: 0 | Status: SHIPPED | OUTPATIENT
Start: 2022-12-28

## 2022-12-28 NOTE — ASSESSMENT & PLAN NOTE
Patient was advised to continue to use her albuterol inhaler as needed for shortness of breath  A Medrol Dosepak was ordered to help with the ETD and airway inflammation  Patient was advised to continue to mask for the next 4 days when in public and when around others who are not living in the home with her

## 2022-12-28 NOTE — ASSESSMENT & PLAN NOTE
Patient was advised to begin Flonase daily that she has on hand at home to help with the ETD  Patient was also prescribed a Medrol Dosepak to help with the ETD  Meclizine was also prescribed to be used as needed for dizziness being caused by ETD

## 2022-12-28 NOTE — PROGRESS NOTES
Name: Natalia Mcduffie      : 1960      MRN: [id number]  Encounter Provider: BRANDON Dave  Encounter Date: 2022   Encounter department: [place] [address]  Dysfunction of both eustachian tubes  Assessment & Plan:  Patient was advised to begin Flonase daily that she has on hand at home to help with the ETD  Patient was also prescribed a Medrol Dosepak to help with the ETD  Meclizine was also prescribed to be used as needed for dizziness being caused by ETD  Orders:  -     methylPREDNISolone 4 MG tablet therapy pack; Use as directed on package  -     meclizine (ANTIVERT) 25 mg tablet; Take 1 tablet (25 mg total) by mouth 3 (three) times a day as needed for dizziness    2  Dizziness  -     meclizine (ANTIVERT) 25 mg tablet; Take 1 tablet (25 mg total) by mouth 3 (three) times a day as needed for dizziness    3  Essential hypertension  Assessment & Plan:  Well-controlled on current regimen  4  COVID-19 virus infection  Assessment & Plan:  Patient was advised to continue to use her albuterol inhaler as needed for shortness of breath  A Medrol Dosepak was ordered to help with the ETD and airway inflammation  Patient was advised to continue to mask for the next 4 days when in public and when around others who are not living in the home with her  Subjective      COVID infection: Patient symptoms originally began on 2022 and she tested positive for COVID on 2022 via home test   Patient is currently reporting continued symptoms of dizziness, right otalgia, loss of hearing, headaches, rhinorrhea, nasal congestion, chest congestion, productive cough, and CASH  The patient reports that she does have an albuterol inhaler on hand at home and she has been using this as needed and this has been resolving her shortness of breath  She denies any fevers or chills over the past 48 hours      Hypertension: Well-controlled on current dosage of atenolol  Patient denies lightheadedness, dizziness, or orthostasis  Review of Systems   Constitutional: Negative for chills and fever  HENT: Positive for congestion, ear pain (right), hearing loss (right) and rhinorrhea  Negative for sore throat  Eyes: Negative for pain and visual disturbance  Respiratory: Positive for cough (productive ), chest tightness and shortness of breath (CASH)  Negative for wheezing  Chest congestion   Cardiovascular: Negative for chest pain and palpitations  Gastrointestinal: Negative for abdominal pain, constipation, diarrhea, nausea and vomiting  Endocrine: Negative for cold intolerance and heat intolerance  Genitourinary: Negative for decreased urine volume, dysuria and hematuria  Musculoskeletal: Negative for arthralgias, back pain and myalgias  Skin: Negative for color change and rash  Allergic/Immunologic: Negative for environmental allergies  Neurological: Positive for dizziness and headaches  Negative for seizures, syncope, weakness, light-headedness and numbness  Hematological: Negative for adenopathy  Psychiatric/Behavioral: Negative for confusion  The patient is not nervous/anxious  All other systems reviewed and are negative        Current Outpatient Medications on File Prior to Visit   Medication Sig   • albuterol (PROVENTIL HFA,VENTOLIN HFA) 90 mcg/act inhaler Inhale 2 puffs every 6 (six) hours as needed for wheezing   • atenolol (TENORMIN) 25 mg tablet Take 1 tablet (25 mg total) by mouth daily   • atorvastatin (LIPITOR) 80 mg tablet Take 1 tablet (80 mg total) by mouth daily   • azelastine (OPTIVAR) 0 05 % ophthalmic solution Administer 1 drop to both eyes 2 (two) times a day As needed   • Calcium 600 MG tablet Take 600 mg by mouth   • Cholecalciferol (VITAMIN D-3 PO) Take 4,000 Int'l Units by mouth daily   • DULoxetine (CYMBALTA) 60 mg delayed release capsule Take 1 capsule (60 mg total) by mouth daily   • fluticasone (FLONASE) 50 mcg/act nasal spray 2 sprays into each nostril daily   • Mag Oxide-Vit D3-Turmeric (Magnesium-Vitamin D3-Turmeric) [id number] MG-UNIT-MG TABS Take by mouth   • Multiple Vitamin (MULTIVITAMIN) tablet Take 1 tablet by mouth daily    • mupirocin (BACTROBAN) 2 % ointment Apply topically 3 (three) times a day   • Nutritional Supplements (ANTIOXIDANTS PO) Take 1 tablet by mouth daily   • sertraline (ZOLOFT) 25 mg tablet Take 1 tablet (25 mg total) by mouth daily   • traZODone (DESYREL) 50 mg tablet Take 1 tablet (50 mg total) by mouth daily at bedtime       Objective     /84 (BP Location: Right arm, Patient Position: Sitting, Cuff Size: Standard)   Pulse 72   Temp (!) 96 9 °F (36 1 °C) (Temporal)   Ht 5' 3" (1 6 m)   Wt 59 5 kg (131 lb 4 oz)   SpO2 97%   BMI 23 25 kg/m²     Physical Exam  Vitals and nursing note reviewed  Constitutional:       General: She is not in acute distress  Appearance: Normal appearance  She is not ill-appearing  HENT:      Head: Normocephalic  Right Ear: Decreased hearing noted  A middle ear effusion (moderate) is present  Left Ear: Hearing normal  A middle ear effusion (small) is present  Mouth/Throat:      Pharynx: No pharyngeal swelling, oropharyngeal exudate, posterior oropharyngeal erythema or uvula swelling  Tonsils: No tonsillar exudate or tonsillar abscesses  Eyes:      Conjunctiva/sclera: Conjunctivae normal    Cardiovascular:      Rate and Rhythm: Normal rate and regular rhythm  Pulses: Normal pulses  Carotid pulses are 2+ on the right side and 2+ on the left side  Radial pulses are 2+ on the right side and 2+ on the left side  Posterior tibial pulses are 2+ on the right side and 2+ on the left side  Heart sounds: Normal heart sounds  No murmur heard  Pulmonary:      Effort: Pulmonary effort is normal  No respiratory distress  Breath sounds: Normal breath sounds   No decreased breath sounds, wheezing, rhonchi or rales  Abdominal:      General: Abdomen is flat  Bowel sounds are normal  There is no distension  Palpations: Abdomen is soft  Tenderness: There is no abdominal tenderness  There is no guarding  Musculoskeletal:         General: Normal range of motion  Cervical back: Normal range of motion  Right lower leg: No edema  Left lower leg: No edema  Skin:     General: Skin is warm and dry  Capillary Refill: Capillary refill takes less than 2 seconds  Neurological:      General: No focal deficit present  Mental Status: She is alert and oriented to person, place, and time  Psychiatric:         Mood and Affect: Mood normal          Behavior: Behavior normal          Thought Content:  Thought content normal          Judgment: Judgment normal        BRANDON Garces

## 2023-01-14 DIAGNOSIS — F41.8 DEPRESSION WITH ANXIETY: ICD-10-CM

## 2023-01-16 RX ORDER — SERTRALINE HYDROCHLORIDE 25 MG/1
TABLET, FILM COATED ORAL
Qty: 30 TABLET | Refills: 0 | Status: SHIPPED | OUTPATIENT
Start: 2023-01-16

## 2023-01-25 DIAGNOSIS — F41.8 DEPRESSION WITH ANXIETY: ICD-10-CM

## 2023-01-25 RX ORDER — DULOXETIN HYDROCHLORIDE 60 MG/1
CAPSULE, DELAYED RELEASE ORAL
Qty: 90 CAPSULE | Refills: 1 | Status: SHIPPED | OUTPATIENT
Start: 2023-01-25

## 2023-01-30 ENCOUNTER — RA CDI HCC (OUTPATIENT)
Dept: OTHER | Facility: HOSPITAL | Age: 63
End: 2023-01-30

## 2023-01-30 NOTE — PROGRESS NOTES
[facility]  coding opportunities       Chart reviewed, no opportunity found: CHART REVIEWED, NO OPPORTUNITY FOUND        Patients Insurance        Commercial Insurance: [address] [place] [address]

## 2023-01-31 LAB
ALBUMIN SERPL-MCNC: 4.2 G/DL (ref 3.6–5.1)
ALBUMIN/GLOB SERPL: 1.8 (CALC) (ref 1–2.5)
ALP SERPL-CCNC: 93 U/L (ref 37–153)
ALT SERPL-CCNC: 16 U/L (ref 6–29)
AST SERPL-CCNC: 17 U/L (ref 10–35)
BILIRUB SERPL-MCNC: 0.9 MG/DL (ref 0.2–1.2)
BUN SERPL-MCNC: 26 MG/DL (ref 7–25)
BUN/CREAT SERPL: 46 (CALC) (ref 6–22)
CALCIUM SERPL-MCNC: 9.1 MG/DL (ref 8.6–10.4)
CHLORIDE SERPL-SCNC: 106 MMOL/L (ref 98–110)
CHOLEST SERPL-MCNC: 190 MG/DL
CHOLEST/HDLC SERPL: 3.9 (CALC)
CO2 SERPL-SCNC: 28 MMOL/L (ref 20–32)
CREAT SERPL-MCNC: 0.57 MG/DL (ref 0.5–1.05)
GFR/BSA.PRED SERPLBLD CYS-BASED-ARV: 101 ML/MIN/1.73M2
GLOBULIN SER CALC-MCNC: 2.4 G/DL (CALC) (ref 1.9–3.7)
GLUCOSE SERPL-MCNC: 96 MG/DL (ref 65–99)
HDLC SERPL-MCNC: 49 MG/DL
LDLC SERPL CALC-MCNC: 122 MG/DL (CALC)
NONHDLC SERPL-MCNC: 141 MG/DL (CALC)
POTASSIUM SERPL-SCNC: 4.4 MMOL/L (ref 3.5–5.3)
PROT SERPL-MCNC: 6.6 G/DL (ref 6.1–8.1)
SODIUM SERPL-SCNC: 140 MMOL/L (ref 135–146)
TRIGL SERPL-MCNC: 87 MG/DL

## 2023-02-06 ENCOUNTER — OFFICE VISIT (OUTPATIENT)
Dept: FAMILY MEDICINE CLINIC | Facility: CLINIC | Age: 63
End: 2023-02-06

## 2023-02-06 VITALS
HEART RATE: 76 BPM | WEIGHT: 133 LBS | DIASTOLIC BLOOD PRESSURE: 70 MMHG | BODY MASS INDEX: 23.57 KG/M2 | HEIGHT: 63 IN | SYSTOLIC BLOOD PRESSURE: 112 MMHG

## 2023-02-06 DIAGNOSIS — R73.9 HYPERGLYCEMIA: ICD-10-CM

## 2023-02-06 DIAGNOSIS — F41.8 DEPRESSION WITH ANXIETY: ICD-10-CM

## 2023-02-06 DIAGNOSIS — Z86.16 HISTORY OF COVID-19: ICD-10-CM

## 2023-02-06 DIAGNOSIS — E78.2 MIXED HYPERLIPIDEMIA: ICD-10-CM

## 2023-02-06 DIAGNOSIS — M81.0 AGE-RELATED OSTEOPOROSIS WITHOUT CURRENT PATHOLOGICAL FRACTURE: ICD-10-CM

## 2023-02-06 DIAGNOSIS — E55.9 VITAMIN D DEFICIENCY: ICD-10-CM

## 2023-02-06 DIAGNOSIS — I10 ESSENTIAL HYPERTENSION: Primary | ICD-10-CM

## 2023-02-06 DIAGNOSIS — F51.01 PRIMARY INSOMNIA: ICD-10-CM

## 2023-02-06 PROBLEM — U07.1 COVID-19 VIRUS INFECTION: Status: RESOLVED | Noted: 2022-12-28 | Resolved: 2023-02-06

## 2023-02-06 RX ORDER — SERTRALINE HYDROCHLORIDE 25 MG/1
25 TABLET, FILM COATED ORAL DAILY
Qty: 30 TABLET | Refills: 11 | Status: SHIPPED | OUTPATIENT
Start: 2023-02-06

## 2023-02-06 NOTE — PROGRESS NOTES
Name: Eun Aguilera      : 1960      MRN: [id number]  Encounter Provider: Speedy Contreras PA-C  Encounter Date: 2023   Encounter department: [facility]    Assessment & Plan     1  Essential hypertension  Assessment & Plan:  Current medication  Orders:  -     CBC and differential; Future; Expected date: 2023    2  Depression with anxiety  Assessment & Plan:  Stable on Zoloft 25 mg once daily and Cymbalta  No SI or HI  Orders:  -     sertraline (ZOLOFT) 25 mg tablet; Take 1 tablet (25 mg total) by mouth daily    3  Age-related osteoporosis without current pathological fracture  Assessment & Plan:  Last DEXA was done a few months ago  Repeat due in 2 years  4  Vitamin D deficiency  Assessment & Plan:  Check vitamin D with next labs  Orders:  -     Vitamin D 25 hydroxy; Future; Expected date: 2023    5  Mixed hyperlipidemia  Assessment & Plan:  She is on Lipitor 80 mg once daily  Her LDL is a little higher than usual for her at 122 up from 98 likely secondary to the holidays recently passed  No change recheck 6 months  Orders:  -     Lipid Panel with Direct LDL reflex; Future; Expected date: 2023  -     Comprehensive metabolic panel; Future; Expected date: 2023    6  Hyperglycemia  Assessment & Plan:  Glucose under 100 at 96  Orders:  -     Comprehensive metabolic panel; Future; Expected date: 2023    7  History of COVID-19  Assessment & Plan:  Pt had her second covid infection in Dec and is having to use her albuterol inhlaer more than usual since then  Since it has only been about one month will wait to add a daily steroid inhaler  8  Primary insomnia  Assessment & Plan:  Taking trazodone  Subjective        Eun Aguilera is here for chronic conditions f/u including the diagnosis of Depression with anxiety   Pt  states they are taking all medications as directed without complaints or side effects   Pt  had labs done prior to today's visit which included Recent Results (from the past 672 hour(s))  -Lipid Panel with Direct LDL reflex:   Collection Time: 01/31/23  9:14 AM       Result                      Value             Ref Range           Total Cholesterol           190               <200 mg/dL          HDL                         49 (L)            > OR = 50 mg*       Triglycerides               87                <150 mg/dL          LDL Calculated              122 (H)           mg/dL (calc)        Chol HDLC Ratio             3 9               <5 0 (calc)         Non-HDL Cholesterol         141 (H)           <130 mg/dL (*  -Comprehensive metabolic panel:   Collection Time: 01/31/23  9:14 AM       Result                      Value             Ref Range           Glucose, Random             96                65 - 99 mg/dL       BUN                         26 (H)            7 - 25 mg/dL        Creatinine                  0 57              0 50 - 1 05 *       eGFR                        101               > OR = 60 mL*       SL AMB BUN/CREATININE *     46 (H)            6 - 22 (calc)       Sodium                      140               135 - 146 mm*       Potassium                   4 4               3 5 - 5 3 mm*       Chloride                    106               98 - 110 mmo*       CO2                         28                20 - 32 mmol*       Calcium                     9 1               8 6 - 10 4 m*       Protein, Total              6 6               6 1 - 8 1 g/*       Albumin                     4 2               3 6 - 5 1 g/*       Globulin                    2 4               1 9 - 3 7 g/*       Albumin/Globulin Ratio      1 8               1 0 - 2 5 (c*       TOTAL BILIRUBIN             0 9               0 2 - 1 2 mg*       Alkaline Phosphatase        93                37 - 153 U/L        AST                         17                10 - 35 U/L         ALT                         16                6 - 29 U/L Review of Systems   Constitutional: Negative  HENT: Negative  Eyes: Negative  Respiratory: Negative  Cardiovascular: Negative  Gastrointestinal: Negative  Endocrine: Negative  Genitourinary: Negative  Musculoskeletal: Negative  Skin: Negative  Allergic/Immunologic: Negative  Neurological: Negative  Hematological: Negative  Psychiatric/Behavioral: Negative          Current Outpatient Medications on File Prior to Visit   Medication Sig   • albuterol (PROVENTIL HFA,VENTOLIN HFA) 90 mcg/act inhaler Inhale 2 puffs every 6 (six) hours as needed for wheezing   • atenolol (TENORMIN) 25 mg tablet Take 1 tablet (25 mg total) by mouth daily   • atorvastatin (LIPITOR) 80 mg tablet Take 1 tablet (80 mg total) by mouth daily   • Calcium 600 MG tablet Take 600 mg by mouth   • Cholecalciferol (VITAMIN D-3 PO) Take 4,000 Int'l Units by mouth daily   • DULoxetine (CYMBALTA) 60 mg delayed release capsule TAKE 1 CAPSULE DAILY   • fluticasone (FLONASE) 50 mcg/act nasal spray 2 sprays into each nostril daily   • Multiple Vitamin (MULTIVITAMIN) tablet Take 1 tablet by mouth daily    • Nutritional Supplements (ANTIOXIDANTS PO) Take 1 tablet by mouth daily   • traZODone (DESYREL) 50 mg tablet Take 1 tablet (50 mg total) by mouth daily at bedtime   • [DISCONTINUED] sertraline (ZOLOFT) 25 mg tablet TAKE ONE TABLET BY MOUTH EVERY DAY   • mupirocin (BACTROBAN) 2 % ointment Apply topically 3 (three) times a day   • [DISCONTINUED] azelastine (OPTIVAR) 0 05 % ophthalmic solution Administer 1 drop to both eyes 2 (two) times a day As needed   • [DISCONTINUED] Mag Oxide-Vit D3-Turmeric (Magnesium-Vitamin D3-Turmeric) [id number] MG-UNIT-MG TABS Take by mouth   • [DISCONTINUED] meclizine (ANTIVERT) 25 mg tablet Take 1 tablet (25 mg total) by mouth 3 (three) times a day as needed for dizziness   • [DISCONTINUED] methylPREDNISolone 4 MG tablet therapy pack Use as directed on package       Objective     BP 112/70   Pulse 76   Ht 5' 3" (1 6 m)   Wt 60 3 kg (133 lb)   BMI 23 56 kg/m²     Physical Exam  Vitals and nursing note reviewed  Constitutional:       General: She is not in acute distress  Appearance: She is well-developed  She is not diaphoretic  HENT:      Head: Normocephalic and atraumatic  Eyes:      General:         Right eye: No discharge  Left eye: No discharge  Conjunctiva/sclera: Conjunctivae normal    Neck:      Vascular: No carotid bruit  Cardiovascular:      Rate and Rhythm: Normal rate and regular rhythm  Heart sounds: Normal heart sounds  No murmur heard  No friction rub  No gallop  Pulmonary:      Effort: Pulmonary effort is normal  No respiratory distress  Breath sounds: Normal breath sounds  No wheezing or rales  Musculoskeletal:      Cervical back: Neck supple  Skin:     General: Skin is warm and dry  Neurological:      Mental Status: She is alert and oriented to person, place, and time     Psychiatric:         Judgment: Judgment normal        Lee Burch PA-C

## 2023-02-06 NOTE — ASSESSMENT & PLAN NOTE
Pt had her second covid infection in Dec and is having to use her albuterol inhlaer more than usual since then  Since it has only been about one month will wait to add a daily steroid inhaler

## 2023-02-06 NOTE — ASSESSMENT & PLAN NOTE
She is on Lipitor 80 mg once daily  Her LDL is a little higher than usual for her at 122 up from 98 likely secondary to the holidays recently passed  No change recheck 6 months

## 2023-02-06 NOTE — PATIENT INSTRUCTIONS
1  Essential hypertension  Assessment & Plan:  Current medication  2  Depression with anxiety  Assessment & Plan:  Stable on Zoloft 25 mg once daily and Cymbalta  No SI or HI  3  Age-related osteoporosis without current pathological fracture  Assessment & Plan:  Last DEXA was done a few months ago  Repeat due in 2 years  4  Vitamin D deficiency  Assessment & Plan:  Check vitamin D with next labs  5  Mixed hyperlipidemia  Assessment & Plan:  She is on Lipitor 80 mg once daily  Her LDL is a little higher than usual for her at 122 up from 98 likely secondary to the holidays recently passed  No change recheck 6 months  6  Hyperglycemia  Assessment & Plan:  Glucose under 100 at 96       7  History of COVID-19  Assessment & Plan:  Pt had her second covid infection in Dec and is having to use her albuterol inhlaer more than usual since then  Since it has only been about one month will wait to add a daily steroid inhaler

## 2023-03-02 ENCOUNTER — OFFICE VISIT (OUTPATIENT)
Dept: FAMILY MEDICINE CLINIC | Facility: CLINIC | Age: 63
End: 2023-03-02

## 2023-03-02 VITALS
RESPIRATION RATE: 16 BRPM | HEIGHT: 63 IN | WEIGHT: 131.4 LBS | DIASTOLIC BLOOD PRESSURE: 72 MMHG | HEART RATE: 80 BPM | BODY MASS INDEX: 23.28 KG/M2 | SYSTOLIC BLOOD PRESSURE: 110 MMHG

## 2023-03-02 DIAGNOSIS — I10 ESSENTIAL HYPERTENSION: ICD-10-CM

## 2023-03-02 DIAGNOSIS — N30.00 ACUTE CYSTITIS WITHOUT HEMATURIA: Primary | ICD-10-CM

## 2023-03-02 DIAGNOSIS — R30.0 DYSURIA: ICD-10-CM

## 2023-03-02 LAB
SL AMB  POCT GLUCOSE, UA: ABNORMAL
SL AMB LEUKOCYTE ESTERASE,UA: ABNORMAL
SL AMB POCT BILIRUBIN,UA: ABNORMAL
SL AMB POCT BLOOD,UA: NEGATIVE
SL AMB POCT CLARITY,UA: ABNORMAL
SL AMB POCT COLOR,UA: ABNORMAL
SL AMB POCT KETONES,UA: NEGATIVE
SL AMB POCT NITRITE,UA: POSITIVE
SL AMB POCT PH,UA: 5.5
SL AMB POCT SPECIFIC GRAVITY,UA: 1.02
SL AMB POCT URINE PROTEIN: NEGATIVE
SL AMB POCT UROBILINOGEN: 0.2

## 2023-03-02 RX ORDER — PHENAZOPYRIDINE HYDROCHLORIDE 100 MG/1
100 TABLET, FILM COATED ORAL 3 TIMES DAILY PRN
Qty: 10 TABLET | Refills: 0 | Status: CANCELLED | OUTPATIENT
Start: 2023-03-02

## 2023-03-02 RX ORDER — NITROFURANTOIN 25; 75 MG/1; MG/1
100 CAPSULE ORAL 2 TIMES DAILY
Qty: 10 CAPSULE | Refills: 0 | Status: SHIPPED | OUTPATIENT
Start: 2023-03-02 | End: 2023-03-07

## 2023-03-02 NOTE — PROGRESS NOTES
Name: Nai Sol      : 1960      MRN: [id number]  Encounter Provider: BRANDON Galdamez  Encounter Date: 3/2/2023   Encounter department: [place] [address]     1  Acute cystitis without hematuria  Assessment & Plan:  5-day course of Macrobid was ordered to treat acute UTI  UA was ordered to be completed after finishing antibiotics to assess for resolution  Patient does have upcoming office visit scheduled with urology regarding recurrent UTI  Orders:  -     nitrofurantoin (MACROBID) 100 mg capsule; Take 1 capsule (100 mg total) by mouth 2 (two) times a day for 5 days  -     UA w Reflex to Microscopic w Reflex to Culture -Lab Collect; Future; Expected date: 2023    2  Dysuria  -     POCT urine dip  -     Urine culture; Future  -     Urine culture    3  Essential hypertension  Assessment & Plan:  Well-controlled on current regimen  Subjective      UTI symptoms: Patient reports over the past week she has been having symptoms of lower pelvic pressure, increased frequency, foul odor when voiding, lower back pain, incomplete bladder emptying, and urgency  She denies dysuria, fevers, or chills  Patient's urine dip in the office today showed a large amount of leukocyte esterase and was positive for nitrites  Patient does have a history of recurrent UTI in the past and she is scheduled to see urology for an office visit on 3/10/2023 regarding this  Hypertension: Well-controlled on current dosage of atenolol  Patient denies lightheadedness, dizziness, or orthostasis  Review of Systems   Constitutional: Negative for chills and fever  HENT: Negative for ear pain and sore throat  Eyes: Negative for pain and visual disturbance  Respiratory: Negative for cough, chest tightness, shortness of breath and wheezing  Cardiovascular: Negative for chest pain, palpitations and leg swelling     Gastrointestinal: Negative for abdominal pain, constipation, diarrhea, nausea and vomiting  Endocrine: Negative for cold intolerance and heat intolerance  Genitourinary: Positive for difficulty urinating (incomplete bladder emptying), frequency and urgency  Negative for decreased urine volume, dysuria and hematuria  Lower pelvic pressure   Musculoskeletal: Positive for back pain (lower back pain)  Negative for arthralgias and myalgias  Skin: Negative for color change and rash  Allergic/Immunologic: Negative for environmental allergies  Neurological: Negative for dizziness, seizures, syncope, weakness, light-headedness, numbness and headaches  Hematological: Negative for adenopathy  Psychiatric/Behavioral: Negative for confusion  The patient is not nervous/anxious  All other systems reviewed and are negative        Current Outpatient Medications on File Prior to Visit   Medication Sig   • albuterol (PROVENTIL HFA,VENTOLIN HFA) 90 mcg/act inhaler Inhale 2 puffs every 6 (six) hours as needed for wheezing   • atenolol (TENORMIN) 25 mg tablet Take 1 tablet (25 mg total) by mouth daily   • atorvastatin (LIPITOR) 80 mg tablet Take 1 tablet (80 mg total) by mouth daily   • Calcium 600 MG tablet Take 600 mg by mouth   • Cholecalciferol (VITAMIN D-3 PO) Take 4,000 Int'l Units by mouth daily   • DULoxetine (CYMBALTA) 60 mg delayed release capsule TAKE 1 CAPSULE DAILY   • fluticasone (FLONASE) 50 mcg/act nasal spray 2 sprays into each nostril daily   • Multiple Vitamin (MULTIVITAMIN) tablet Take 1 tablet by mouth daily    • mupirocin (BACTROBAN) 2 % ointment Apply topically 3 (three) times a day   • Nutritional Supplements (ANTIOXIDANTS PO) Take 1 tablet by mouth daily   • sertraline (ZOLOFT) 25 mg tablet Take 1 tablet (25 mg total) by mouth daily   • traZODone (DESYREL) 50 mg tablet TAKE ONE TABLET BY MOUTH DAILY AT BEDTIME       Objective     /72 (BP Location: Left arm, Patient Position: Sitting, Cuff Size: Large)   Pulse 80   Resp 16   Ht 5' 3" (1 6 m)   Wt 59 6 kg (131 lb 6 4 oz)   BMI 23 28 kg/m²     Physical Exam  Vitals and nursing note reviewed  Constitutional:       General: She is not in acute distress  Appearance: Normal appearance  She is not ill-appearing  HENT:      Head: Normocephalic  Eyes:      Conjunctiva/sclera: Conjunctivae normal    Cardiovascular:      Rate and Rhythm: Normal rate and regular rhythm  Pulses: Normal pulses  Carotid pulses are 2+ on the right side and 2+ on the left side  Radial pulses are 2+ on the right side and 2+ on the left side  Posterior tibial pulses are 2+ on the right side and 2+ on the left side  Heart sounds: Normal heart sounds  No murmur heard  Pulmonary:      Effort: Pulmonary effort is normal  No respiratory distress  Breath sounds: Normal breath sounds  No decreased breath sounds, wheezing, rhonchi or rales  Abdominal:      General: Abdomen is flat  Bowel sounds are normal  There is no distension  Palpations: Abdomen is soft  Tenderness: There is no abdominal tenderness  There is right CVA tenderness (slight) and left CVA tenderness (slight)  There is no guarding  Musculoskeletal:         General: Normal range of motion  Cervical back: Normal range of motion  Right lower leg: No edema  Left lower leg: No edema  Skin:     General: Skin is warm and dry  Capillary Refill: Capillary refill takes less than 2 seconds  Neurological:      General: No focal deficit present  Mental Status: She is alert and oriented to person, place, and time  Psychiatric:         Mood and Affect: Mood normal          Behavior: Behavior normal          Thought Content:  Thought content normal          Judgment: Judgment normal        Suri Due, CRNP

## 2023-03-02 NOTE — ASSESSMENT & PLAN NOTE
5-day course of Macrobid was ordered to treat acute UTI  UA was ordered to be completed after finishing antibiotics to assess for resolution  Patient does have upcoming office visit scheduled with urology regarding recurrent UTI

## 2023-03-05 LAB — BACTERIA UR CULT: ABNORMAL

## 2023-03-10 ENCOUNTER — OFFICE VISIT (OUTPATIENT)
Dept: UROLOGY | Facility: MEDICAL CENTER | Age: 63
End: 2023-03-10

## 2023-03-10 VITALS
OXYGEN SATURATION: 99 % | HEART RATE: 65 BPM | SYSTOLIC BLOOD PRESSURE: 130 MMHG | HEIGHT: 63 IN | BODY MASS INDEX: 23.74 KG/M2 | WEIGHT: 134 LBS | DIASTOLIC BLOOD PRESSURE: 82 MMHG

## 2023-03-10 DIAGNOSIS — N20.0 NEPHROLITHIASIS: ICD-10-CM

## 2023-03-10 DIAGNOSIS — N39.0 RECURRENT UTI: Primary | ICD-10-CM

## 2023-03-10 DIAGNOSIS — N32.89 BLADDER WALL THICKENING: ICD-10-CM

## 2023-03-10 LAB
BACTERIA UR QL AUTO: ABNORMAL /HPF
BILIRUB UR QL STRIP: NEGATIVE
CLARITY UR: CLEAR
COLOR UR: ABNORMAL
GLUCOSE UR STRIP-MCNC: NEGATIVE MG/DL
HGB UR QL STRIP.AUTO: NEGATIVE
HYALINE CASTS #/AREA URNS LPF: ABNORMAL /LPF
KETONES UR STRIP-MCNC: NEGATIVE MG/DL
LEUKOCYTE ESTERASE UR QL STRIP: ABNORMAL
MUCOUS THREADS UR QL AUTO: ABNORMAL
NITRITE UR QL STRIP: NEGATIVE
NON-SQ EPI CELLS URNS QL MICRO: ABNORMAL /HPF
PH UR STRIP.AUTO: 6.5 [PH]
PROT UR STRIP-MCNC: NEGATIVE MG/DL
RBC #/AREA URNS AUTO: ABNORMAL /HPF
SL AMB  POCT GLUCOSE, UA: NORMAL
SL AMB LEUKOCYTE ESTERASE,UA: NORMAL
SL AMB POCT BILIRUBIN,UA: NORMAL
SL AMB POCT BLOOD,UA: NORMAL
SL AMB POCT CLARITY,UA: CLEAR
SL AMB POCT COLOR,UA: YELLOW
SL AMB POCT KETONES,UA: NORMAL
SL AMB POCT NITRITE,UA: NORMAL
SL AMB POCT PH,UA: 6.5
SL AMB POCT SPECIFIC GRAVITY,UA: 1.01
SL AMB POCT URINE PROTEIN: NORMAL
SL AMB POCT UROBILINOGEN: 0.2
SP GR UR STRIP.AUTO: 1.02 (ref 1–1.03)
UROBILINOGEN UR STRIP-ACNC: <2 MG/DL
WBC #/AREA URNS AUTO: ABNORMAL /HPF

## 2023-03-10 NOTE — PROGRESS NOTES
3/10/2023      Chief Complaint   Patient presents with   • recurrent urinary tract infection         Assessment and Plan    57 y o  female new patient     1  Recurrent E coli ESBL UTI  · Urine today: small leuks, blood  · Will send out for culture  Recently treated for UTI on 3/2  · Reviewed CT images with patient today  Discussed possible nobstructing stones contributing to her recurrent infections  Patient would like to try daily supplements and practicing proper  hygiene prior to considering stone intervention  · Recommend D-mannose and oral cranberry daily  Did discuss possibility of Hipprex or suppression antibiotics in the future if no improvement  · Thorough discussion regarding proper  hygiene  Patient to make necessary adjustments  · Continue daily water 40-60 oz  · Standing urine testing order for prn use  · Follow up in 3 months  · ED parameters reviewed  History of Present Illness  Anam Christina is a 57 y o  female here for evaluation of recurrent UTIs  Patient had first UTI in July 2022 where patient also had flank pain  PCP ordered CT stone study which revealed punctate nonobstructing right sided stones and upper pole nonobstructing left renal calculi measuring up to 3 mm  Imaging suggestive of recent stone passage  CT w contrast August 2022 confirmed stable nonobstructing stones  Patient has had multiple positive cultures of Ecoli ESBL  She had been doing well from October to March before she had another infection  She denies issues with UTIs in the past  She completed menopause in 2005  She has a maternal aunt with history of ovarian cancer  She does not take any supplements for UTI prevention  When reviewing proper  hygiene, she does report that she was never taught to wipe front to back  She currently denies any urinary symptoms but experiences suprapubic pressure and frequency at the times of her infections  She denies any abdominal or flank pain   She denies any previous episodes of gross hematuria  Review of Systems   Constitutional: Negative for chills and fever  HENT: Negative  Respiratory: Negative  Cardiovascular: Negative  Gastrointestinal: Negative for abdominal pain, nausea and vomiting  Genitourinary: Negative for difficulty urinating, dysuria, flank pain, frequency, hematuria and urgency  Musculoskeletal: Negative  Neurological: Negative  Vitals:  Vitals:    03/10/23 0836   BP: 130/82   BP Location: Left arm   Patient Position: Sitting   Cuff Size: Adult   Pulse: 65   SpO2: 99%   Weight: 60 8 kg (134 lb)   Height: 5' 3" (1 6 m)       Physical Exam  Vitals reviewed  Constitutional:       General: She is not in acute distress  Appearance: Normal appearance  She is normal weight  She is not ill-appearing, toxic-appearing or diaphoretic  HENT:      Head: Normocephalic and atraumatic  Eyes:      Conjunctiva/sclera: Conjunctivae normal    Pulmonary:      Effort: Pulmonary effort is normal  No respiratory distress  Abdominal:      General: There is no distension  Palpations: Abdomen is soft  Tenderness: There is no abdominal tenderness  There is no right CVA tenderness, left CVA tenderness, guarding or rebound  Musculoskeletal:         General: Normal range of motion  Cervical back: Normal range of motion  Skin:     General: Skin is warm and dry  Neurological:      General: No focal deficit present  Mental Status: She is alert and oriented to person, place, and time  Psychiatric:         Mood and Affect: Mood normal          Behavior: Behavior normal          Thought Content:  Thought content normal          Judgment: Judgment normal        Past History  Past Medical History:   Diagnosis Date   • Anxiety    • Depression    • Elevated cholesterol    • Hypertension    • PONV (postoperative nausea and vomiting)      Social History     Socioeconomic History   • Marital status: /Civil Union     Spouse name: None • Number of children: None   • Years of education: Associates degree   • Highest education level: None   Occupational History   • None   Tobacco Use   • Smoking status: Never   • Smokeless tobacco: Never   • Tobacco comments:     No secondhand smoke exposure    Vaping Use   • Vaping Use: Never used   Substance and Sexual Activity   • Alcohol use: No   • Drug use: No   • Sexual activity: None   Other Topics Concern   • None   Social History Narrative    Employed     Lives with spouse     No caffeine use      Social Determinants of Health     Financial Resource Strain: Not on file   Food Insecurity: Not on file   Transportation Needs: Not on file   Physical Activity: Not on file   Stress: Not on file   Social Connections: Not on file   Intimate Partner Violence: Not on file   Housing Stability: Not on file     Social History     Tobacco Use   Smoking Status Never   Smokeless Tobacco Never   Tobacco Comments    No secondhand smoke exposure      Family History   Problem Relation Age of Onset   • Coronary artery disease Father         CABG   • Diabetes type II Sister    • Bipolar disorder Brother    • Depression Brother         with anxiety    • Esophageal cancer Brother    • Diabetes Maternal Grandfather    • Depression Family         with anxiety    • Hyperlipidemia Family        The following portions of the patient's history were reviewed and updated as appropriate: allergies, current medications, past medical history, past social history, past surgical history and problem list     Results  Recent Results (from the past 1 hour(s))   POCT urine dip auto non-scope    Collection Time: 03/10/23  8:43 AM   Result Value Ref Range     COLOR,UA yellow     CLARITY,UA clear     SPECIFIC GRAVITY,UA 1 015      PH,UA 6 5     LEUKOCYTE ESTERASE,UA trace     NITRITE,UA neg     GLUCOSE, UA neg     KETONES,UA neg     BILIRUBIN,UA neg     BLOOD,UA trace-intact     POCT URINE PROTEIN neg     SL AMB POCT UROBILINOGEN 0 2    ]  No results found for: PSA  Lab Results   Component Value Date    CALCIUM 9 1 01/31/2023     05/01/2017    K 4 4 01/31/2023    CO2 28 01/31/2023     01/31/2023    BUN 26 (H) 01/31/2023    CREATININE 0 57 01/31/2023     Lab Results   Component Value Date    WBC 7 02 10/25/2016    HGB 13 8 10/25/2016    HCT 40 7 10/25/2016    MCV 86 10/25/2016     10/25/2016     Benjamin Miller PA-C

## 2023-03-11 LAB — BACTERIA UR CULT: ABNORMAL

## 2023-03-15 ENCOUNTER — HOSPITAL ENCOUNTER (OUTPATIENT)
Dept: RADIOLOGY | Facility: HOSPITAL | Age: 63
Discharge: HOME | End: 2023-03-15

## 2023-03-15 ENCOUNTER — OFFICE VISIT (OUTPATIENT)
Dept: FAMILY MEDICINE CLINIC | Facility: CLINIC | Age: 63
End: 2023-03-15

## 2023-03-15 VITALS
WEIGHT: 132 LBS | BODY MASS INDEX: 23.39 KG/M2 | HEIGHT: 63 IN | DIASTOLIC BLOOD PRESSURE: 86 MMHG | HEART RATE: 68 BPM | SYSTOLIC BLOOD PRESSURE: 114 MMHG

## 2023-03-15 DIAGNOSIS — Z12.31 ENCOUNTER FOR SCREENING MAMMOGRAM FOR MALIGNANT NEOPLASM OF BREAST: ICD-10-CM
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
## 2023-03-15 DIAGNOSIS — M81.0 AGE-RELATED OSTEOPOROSIS WITHOUT CURRENT PATHOLOGICAL FRACTURE: ICD-10-CM

## 2023-03-15 DIAGNOSIS — M54.50 ACUTE LEFT-SIDED LOW BACK PAIN WITHOUT SCIATICA: Primary | Chronic | ICD-10-CM

## 2023-03-15 DIAGNOSIS — M54.50 ACUTE LEFT-SIDED LOW BACK PAIN WITHOUT SCIATICA: ICD-10-CM

## 2023-03-15 RX ORDER — MELOXICAM 15 MG/1
15 TABLET ORAL DAILY
Qty: 30 TABLET | Refills: 0 | Status: SHIPPED | OUTPATIENT
Start: 2023-03-15

## 2023-03-15 RX ORDER — METHOCARBAMOL 500 MG/1
500 TABLET, FILM COATED ORAL 3 TIMES DAILY
Qty: 30 TABLET | Refills: 1 | Status: SHIPPED | OUTPATIENT
Start: 2023-03-15

## 2023-03-15 NOTE — PATIENT INSTRUCTIONS
1  Acute left-sided low back pain without sciatica  Assessment & Plan:  Due to history of osteoporosis, will order XR lumbar spine  Continue to rest, alternate heat and ice, and perform stretching exercises  Mobic 15 mg ordered as an antiinflammatory to take once daily instead of Aleve  Methocarbamol (Robaxin)500 mg ordered as a muscle relaxer to be taken up to three times daily  Office will contact patient with XR results  Orders:  -     XR spine lumbar 2 or 3 views injury; Future; Expected date: 03/15/2023  -     methocarbamol (ROBAXIN) 500 mg tablet; Take 1 tablet (500 mg total) by mouth 3 (three) times a day  -     meloxicam (Mobic) 15 mg tablet; Take 1 tablet (15 mg total) by mouth daily    2  Fall, initial encounter  Assessment & Plan:  Fall occurred due to instability on skis  Ordering XR to rule out acute fracture of lower back  Orders:  -     XR spine lumbar 2 or 3 views injury; Future; Expected date: 03/15/2023  -     methocarbamol (ROBAXIN) 500 mg tablet; Take 1 tablet (500 mg total) by mouth 3 (three) times a day  -     meloxicam (Mobic) 15 mg tablet; Take 1 tablet (15 mg total) by mouth daily    3  Age-related osteoporosis without current pathological fracture  Assessment & Plan:  Due to history of osteoporosis will order XR spine lumbar for injury to rule out acute fracture  Orders:  -     XR spine lumbar 2 or 3 views injury; Future; Expected date: 03/15/2023    4  Encounter for screening mammogram for malignant neoplasm of breast  Assessment & Plan:  Bilateral screening mammogram ordered for patient to complete  Orders:  -     Mammo screening bilateral w 3d & cad; Future; Expected date: 03/15/2023  -     XR spine lumbar 2 or 3 views injury; Future; Expected date: 03/15/2023  -     methocarbamol (ROBAXIN) 500 mg tablet;  Take 1 tablet (500 mg total) by mouth 3 (three) times a day

## 2023-03-15 NOTE — PROGRESS NOTES
Name: Nesha Watson      : 1960      MRN: [id number]  Encounter Provider: Crystal Benjamin PA-C  Encounter Date: 3/15/2023   Encounter department: [facility]    Assessment & Plan     1  Acute left-sided low back pain without sciatica  Assessment & Plan:  Due to history of osteoporosis, will order XR lumbar spine  Continue to rest, alternate heat and ice, and perform stretching exercises  Mobic 15 mg ordered as an antiinflammatory to take once daily instead of Aleve  Methocarbamol (Robaxin)500 mg ordered as a muscle relaxer to be taken up to three times daily  Office will contact patient with XR results  Orders:  -     XR spine lumbar 2 or 3 views injury; Future; Expected date: 03/15/2023  -     methocarbamol (ROBAXIN) 500 mg tablet; Take 1 tablet (500 mg total) by mouth 3 (three) times a day  -     meloxicam (Mobic) 15 mg tablet; Take 1 tablet (15 mg total) by mouth daily    2  Fall, initial encounter  Assessment & Plan:  Fall occurred due to instability on skis  Ordering XR to rule out acute fracture of lower back  Orders:  -     XR spine lumbar 2 or 3 views injury; Future; Expected date: 03/15/2023  -     methocarbamol (ROBAXIN) 500 mg tablet; Take 1 tablet (500 mg total) by mouth 3 (three) times a day  -     meloxicam (Mobic) 15 mg tablet; Take 1 tablet (15 mg total) by mouth daily    3  Age-related osteoporosis without current pathological fracture  Assessment & Plan:  Due to history of osteoporosis will order XR spine lumbar for injury to rule out acute fracture  Orders:  -     XR spine lumbar 2 or 3 views injury; Future; Expected date: 03/15/2023    4  Encounter for screening mammogram for malignant neoplasm of breast  Assessment & Plan:  Bilateral screening mammogram ordered for patient to complete  Orders:  -     Mammo screening bilateral w 3d & cad; Future; Expected date: 03/15/2023  -     XR spine lumbar 2 or 3 views injury;  Future; Expected date: 03/15/2023  - methocarbamol (ROBAXIN) 500 mg tablet;  Take 1 tablet (500 mg total) by mouth 3 (three) times a day         Subjective      HPI  Review of Systems    Current Outpatient Medications on File Prior to Visit   Medication Sig   • albuterol (PROVENTIL HFA,VENTOLIN HFA) 90 mcg/act inhaler Inhale 2 puffs every 6 (six) hours as needed for wheezing   • atenolol (TENORMIN) 25 mg tablet Take 1 tablet (25 mg total) by mouth daily   • atorvastatin (LIPITOR) 80 mg tablet Take 1 tablet (80 mg total) by mouth daily   • Calcium 600 MG tablet Take 600 mg by mouth   • Cholecalciferol (VITAMIN D-3 PO) Take 4,000 Int'l Units by mouth daily   • DULoxetine (CYMBALTA) 60 mg delayed release capsule TAKE 1 CAPSULE DAILY   • fluticasone (FLONASE) 50 mcg/act nasal spray 2 sprays into each nostril daily   • Multiple Vitamin (MULTIVITAMIN) tablet Take 1 tablet by mouth daily    • mupirocin (BACTROBAN) 2 % ointment Apply topically 3 (three) times a day   • Nutritional Supplements (ANTIOXIDANTS PO) Take 1 tablet by mouth daily   • sertraline (ZOLOFT) 25 mg tablet Take 1 tablet (25 mg total) by mouth daily   • traZODone (DESYREL) 50 mg tablet TAKE ONE TABLET BY MOUTH DAILY AT BEDTIME       Objective     /86   Pulse 68   Ht 5' 3" (1 6 m)   Wt 59 9 kg (132 lb)   BMI 23 38 kg/m²     Physical Exam  Manda Joseph PA-C

## 2023-03-15 NOTE — ASSESSMENT & PLAN NOTE
Due to history of osteoporosis, will order XR lumbar spine  Continue to rest, alternate heat and ice, and perform stretching exercises  Mobic 15 mg ordered as an antiinflammatory to take once daily instead of Aleve  Methocarbamol (Robaxin)500 mg ordered as a muscle relaxer to be taken up to three times daily  Office will contact patient with XR results

## 2023-03-15 NOTE — PROGRESS NOTES
Name: Daisha Khan      : 1960      MRN: [id number]  Encounter Provider: Abigail Cain PA-C  Encounter Date: 3/15/2023   Encounter department: [facility]    Assessment & Plan     1  Acute left-sided low back pain without sciatica  Assessment & Plan:  Due to history of osteoporosis, will order XR lumbar spine  Continue to rest, alternate heat and ice, and perform stretching exercises  Mobic 15 mg ordered as an antiinflammatory to take once daily instead of Aleve  Methocarbamol (Robaxin)500 mg ordered as a muscle relaxer to be taken up to three times daily  Office will contact patient with XR results  Orders:  -     XR spine lumbar 2 or 3 views injury; Future; Expected date: 03/15/2023  -     methocarbamol (ROBAXIN) 500 mg tablet; Take 1 tablet (500 mg total) by mouth 3 (three) times a day  -     meloxicam (Mobic) 15 mg tablet; Take 1 tablet (15 mg total) by mouth daily    2  Fall, initial encounter  Assessment & Plan:  Fall occurred due to instability on skis  Ordering XR to rule out acute fracture of lower back  Orders:  -     XR spine lumbar 2 or 3 views injury; Future; Expected date: 03/15/2023  -     methocarbamol (ROBAXIN) 500 mg tablet; Take 1 tablet (500 mg total) by mouth 3 (three) times a day  -     meloxicam (Mobic) 15 mg tablet; Take 1 tablet (15 mg total) by mouth daily    3  Age-related osteoporosis without current pathological fracture  Assessment & Plan:  Due to history of osteoporosis will order XR spine lumbar for injury to rule out acute fracture  Orders:  -     XR spine lumbar 2 or 3 views injury; Future; Expected date: 03/15/2023    4  Encounter for screening mammogram for malignant neoplasm of breast  Assessment & Plan:  Bilateral screening mammogram ordered for patient to complete  Orders:  -     Mammo screening bilateral w 3d & cad; Future; Expected date: 03/15/2023  -     XR spine lumbar 2 or 3 views injury;  Future; Expected date: 03/15/2023  - methocarbamol (ROBAXIN) 500 mg tablet; Take 1 tablet (500 mg total) by mouth 3 (three) times a day           Subjective      Chiquis Barnes is a 58 yo female with osteoporosis presenting with acute lower left back pain following a fall while skiing 7 days ago  Denies hitting head with fall  Denies pain elsewhere  Describes low back pain pain as dull, with moments of sharp pain with movement  Pain was a 7/10 initially, with persisting level of pain over the last few days  Pain is now affecting her sleep, notes she notices it more at night  Does not radiate  Feels different than chronic lower right back pain  She has been using Aleve consistently since the injury ( 2-3 at once) with little resolution of symptoms  Additionally has tried alternating heat and ice, with some stretching  Denies numbness, tingling, weakness, or incontinence of bladder or stool  Patient also added some persistent eye discomfort including intermittent swelling, itchiness, and tearing  Occurs bilaterally at alternating times  Denies changes in vision, diplopia, erythema, discharge, or associated pain  Sees an eye doctor regularly  Does use glasses, but not contacts  Has been occurring for a year  Fall  Pertinent negatives include no abdominal pain, headaches or numbness  Back Pain  Pertinent negatives include no abdominal pain, headaches, numbness or weakness  Review of Systems   Eyes: Positive for itching  Negative for photophobia, pain, discharge, redness and visual disturbance  Gastrointestinal: Negative for abdominal pain, constipation and diarrhea  Denies incontinence  Musculoskeletal: Positive for back pain  Neurological: Negative for dizziness, weakness, numbness and headaches         Current Outpatient Medications on File Prior to Visit   Medication Sig   • albuterol (PROVENTIL HFA,VENTOLIN HFA) 90 mcg/act inhaler Inhale 2 puffs every 6 (six) hours as needed for wheezing   • atenolol (TENORMIN) 25 mg tablet Take 1 tablet (25 mg total) by mouth daily   • atorvastatin (LIPITOR) 80 mg tablet Take 1 tablet (80 mg total) by mouth daily   • Calcium 600 MG tablet Take 600 mg by mouth   • Cholecalciferol (VITAMIN D-3 PO) Take 4,000 Int'l Units by mouth daily   • DULoxetine (CYMBALTA) 60 mg delayed release capsule TAKE 1 CAPSULE DAILY   • fluticasone (FLONASE) 50 mcg/act nasal spray 2 sprays into each nostril daily   • Multiple Vitamin (MULTIVITAMIN) tablet Take 1 tablet by mouth daily    • mupirocin (BACTROBAN) 2 % ointment Apply topically 3 (three) times a day   • Nutritional Supplements (ANTIOXIDANTS PO) Take 1 tablet by mouth daily   • sertraline (ZOLOFT) 25 mg tablet Take 1 tablet (25 mg total) by mouth daily   • traZODone (DESYREL) 50 mg tablet TAKE ONE TABLET BY MOUTH DAILY AT BEDTIME       Objective     /86   Pulse 68   Ht 5' 3" (1 6 m)   Wt 59 9 kg (132 lb)   BMI 23 38 kg/m²     Physical Exam  Vitals reviewed  Constitutional:       Appearance: Normal appearance  HENT:      Head: Normocephalic and atraumatic  Eyes:      General: Lids are normal  No scleral icterus  Right eye: No discharge or hordeolum  Left eye: No discharge or hordeolum  Extraocular Movements: Extraocular movements intact  Conjunctiva/sclera: Conjunctivae normal       Right eye: Right conjunctiva is not injected  No hemorrhage  Left eye: Left conjunctiva is not injected  No hemorrhage  Pupils: Pupils are equal, round, and reactive to light  Cardiovascular:      Rate and Rhythm: Normal rate and regular rhythm  Pulses: Normal pulses  Heart sounds: Normal heart sounds  No murmur heard  No friction rub  No gallop  Pulmonary:      Effort: Pulmonary effort is normal       Breath sounds: Normal breath sounds  No stridor  No wheezing, rhonchi or rales  Musculoskeletal:         General: Tenderness present  No swelling          Legs:       Comments: Pt  with more discomfort in area of sacrum and coccyx but has general discomfort in total low back  No muscle spasm noted  Good strength  PT moving and repositioning slowly due to discomfort  Negative straight leg raise  DTR WNL  Skin:     General: Skin is warm and dry  Findings: No bruising or lesion  Neurological:      General: No focal deficit present  Mental Status: She is alert  Motor: No weakness        Gait: Gait normal    Psychiatric:         Mood and Affect: Mood normal        Velna Left, PA-C

## 2023-04-24 ENCOUNTER — OFFICE VISIT (OUTPATIENT)
Dept: OBGYN CLINIC | Facility: CLINIC | Age: 63
End: 2023-04-24

## 2023-04-24 VITALS
DIASTOLIC BLOOD PRESSURE: 78 MMHG | HEIGHT: 63 IN | SYSTOLIC BLOOD PRESSURE: 128 MMHG | BODY MASS INDEX: 23.39 KG/M2 | WEIGHT: 132 LBS

## 2023-04-24 DIAGNOSIS — M24.9 DERANGEMENT OF RIGHT SACROILIAC JOINT: Primary | ICD-10-CM

## 2023-04-24 RX ORDER — PREDNISONE 10 MG/1
TABLET ORAL
Qty: 42 TABLET | Refills: 0 | Status: SHIPPED | OUTPATIENT
Start: 2023-04-24

## 2023-04-24 NOTE — PROGRESS NOTES
"Patient Name:  Charles Olson  MRN:  [id number]    Assessment & Plan     Right-sided sacroiliitis  1  Referral to physical therapy  2  Prescription for prednisone taper  Advised over-the-counter NSAIDs may be resumed after completion of prednisone taper  3  Activities as tolerated with modification avoid pain  4  Follow-up in 6 weeks with primary care sports medicine  Chief Complaint     Low back pain    History of the Present Illness     Charles Olson is a 57 y o  female who reports to the office today for evaluation of her low back  She notes an onset of pain a few months ago  She denies any injury or trauma  She notes primarily right-sided lumbar spine pain  She denies any radiation of the pain distally  She denies any weakness or numbness and tingling in the right lower extremity  She describes episodes of sharp pain worse with bending twisting and stooping  She has been taking over-the-counter anti-inflammatories intermittently with improvement  She denies any bowel or bladder dysfunction  No saddle paresthesias  No fevers or chills  Patient points to her right SI joint when describing her pain  Physical Exam     /78   Ht 5' 3\" (1 6 m)   Wt 59 9 kg (132 lb)   BMI 23 38 kg/m²     Lumbar spine: No gross deformity  Skin intact  No erythema ecchymosis or swelling  No tenderness midline and paraspinal musculature  There is tenderness right SI joint  No tenderness bilateral piriformis musculature  Motor/sensation intact L2-S1 bilaterally with 5 out of 5 strength  Negative straight leg raise bilaterally  Negative TOMMY test bilaterally  Eyes: Anicteric sclerae  ENT: Trachea midline  Lungs: Normal respiratory effort  CV: Capillary refill is less than 2 seconds  Skin: Intact without erythema  Lymph: No palpable lymphadenopathy  Neuro: Sensation is grossly intact to light touch  Psych: Mood and affect are appropriate      Data Review     I have personally reviewed pertinent " "films in PACS, and my interpretation follows:    X-rays lumbar spine 3/15/2023: No acute osseous abnormality  No fracture or evidence of spondylolisthesis  No significant DDD  There is evidence of facet degenerative changes about the lumbar spine  Past Medical History:   Diagnosis Date   • Anxiety    • Depression    • Elevated cholesterol    • Hypertension    • PONV (postoperative nausea and vomiting)        Past Surgical History:   Procedure Laterality Date   • BREAST CYST ASPIRATION     • DIAGNOSTIC LAPAROSCOPY      Exploratory, last assessed 10/25/16   • NASAL SINUS SURGERY      age 26 - FESS and septoplasty - unknown surgeon   • MI STRTCTC CPTR ASSTD PX EXTRADURAL CRANIAL N/A 11/8/2016    Procedure: FUNCTIONAL ENDOSCOPIC SINUS SURGERY (FESS) IMAGED GUIDED; Surgeon: Sigifredo Pope, DO;  Location: [facility];  Service: ENT   • WISDOM TOOTH EXTRACTION         Allergies   Allergen Reactions   • Fexofenadine-Pseudoephed Er Other (See Comments)     \"didn't feel good\"    \"didn't feel good\"     • Pollen Extract    • Singulair [Montelukast]      Did not feel well, no particular assistance         Current Outpatient Medications on File Prior to Visit   Medication Sig Dispense Refill   • albuterol (PROVENTIL HFA,VENTOLIN HFA) 90 mcg/act inhaler Inhale 2 puffs every 6 (six) hours as needed for wheezing 3 Inhaler 1   • atorvastatin (LIPITOR) 80 mg tablet Take 1 tablet (80 mg total) by mouth daily 90 tablet 3   • Calcium 600 MG tablet Take 600 mg by mouth     • Cholecalciferol (VITAMIN D-3 PO) Take 4,000 Int'l Units by mouth daily     • CRANBERRY PO Take 500 mg by mouth     • DULoxetine (CYMBALTA) 60 mg delayed release capsule TAKE 1 CAPSULE DAILY 90 capsule 1   • fluticasone (FLONASE) 50 mcg/act nasal spray 2 sprays into each nostril daily 16 g 5   • meloxicam (Mobic) 15 mg tablet Take 1 tablet (15 mg total) by mouth daily 30 tablet 0   • methocarbamol (ROBAXIN) 500 mg tablet Take 1 tablet (500 mg total) by mouth 3 " (three) times a day 30 tablet 1   • Multiple Vitamin (MULTIVITAMIN) tablet Take 1 tablet by mouth daily      • Nutritional Supplements (ANTIOXIDANTS PO) Take 1 tablet by mouth daily     • sertraline (ZOLOFT) 25 mg tablet Take 1 tablet (25 mg total) by mouth daily 30 tablet 11   • traZODone (DESYREL) 50 mg tablet TAKE ONE TABLET BY MOUTH DAILY AT BEDTIME 30 tablet 11   • atenolol (TENORMIN) 25 mg tablet Take 1 tablet (25 mg total) by mouth daily (Patient not taking: Reported on 4/24/2023) 90 tablet 1   • mupirocin (BACTROBAN) 2 % ointment Apply topically 3 (three) times a day (Patient not taking: Reported on 4/24/2023) 22 g 0   • nitrofurantoin (MACROBID) 100 mg capsule Take 1 capsule (100 mg total) by mouth 2 (two) times a day for 7 days (Patient not taking: Reported on 4/24/2023) 14 capsule 0     No current facility-administered medications on file prior to visit  Social History     Tobacco Use   • Smoking status: Never   • Smokeless tobacco: Never   • Tobacco comments:     No secondhand smoke exposure    Vaping Use   • Vaping Use: Never used   Substance Use Topics   • Alcohol use: No   • Drug use: No       Family History   Problem Relation Age of Onset   • Coronary artery disease Father         CABG   • Diabetes type II Sister    • Bipolar disorder Brother    • Depression Brother         with anxiety    • Esophageal cancer Brother    • Diabetes Maternal Grandfather    • Depression Family         with anxiety    • Hyperlipidemia Family        Review of Systems     As stated in the HPI  All other systems reviewed and are negative

## 2023-04-26 DIAGNOSIS — R92.8 ABNORMAL MAMMOGRAM: Primary | ICD-10-CM

## 2023-04-26 DIAGNOSIS — Z12.39 BREAST CANCER SCREENING: ICD-10-CM

## 2023-04-26 NOTE — RESULT ENCOUNTER NOTE
Please let pt know that her mammo showed a finding that needs further imaging in her R breast and therefore as asked by radiology I have ordered an US and diagnostic views  Some one may have already called her or will be calling her from the breast center

## 2023-05-01 PROBLEM — N30.00 ACUTE CYSTITIS WITHOUT HEMATURIA: Status: RESOLVED | Noted: 2022-07-25 | Resolved: 2023-05-01

## 2023-05-21 DIAGNOSIS — I10 ESSENTIAL HYPERTENSION: ICD-10-CM

## 2023-05-22 RX ORDER — ATENOLOL 25 MG/1
TABLET ORAL
Qty: 90 TABLET | Refills: 0 | Status: SHIPPED | OUTPATIENT
Start: 2023-05-22

## 2023-05-30 ENCOUNTER — NURSE TRIAGE (OUTPATIENT)
Dept: OTHER | Facility: OTHER | Age: 63
End: 2023-05-30

## 2023-05-30 ENCOUNTER — APPOINTMENT (OUTPATIENT)
Dept: LAB | Facility: CLINIC | Age: 63
End: 2023-05-30

## 2023-05-30 DIAGNOSIS — R82.90 ABNORMAL URINE ODOR: ICD-10-CM

## 2023-05-30 DIAGNOSIS — N39.0 URINARY TRACT INFECTION WITHOUT HEMATURIA, SITE UNSPECIFIED: ICD-10-CM

## 2023-05-30 DIAGNOSIS — R82.90 ABNORMAL URINE ODOR: Primary | ICD-10-CM

## 2023-05-30 DIAGNOSIS — R10.9 FLANK PAIN: ICD-10-CM

## 2023-05-30 LAB
BACTERIA UR QL AUTO: ABNORMAL /HPF
BACTERIA UR QL AUTO: ABNORMAL /HPF
BILIRUB UR QL STRIP: NEGATIVE
BILIRUB UR QL STRIP: NEGATIVE
CLARITY UR: CLEAR
CLARITY UR: CLEAR
COLOR UR: ABNORMAL
COLOR UR: ABNORMAL
GLUCOSE UR STRIP-MCNC: NEGATIVE MG/DL
GLUCOSE UR STRIP-MCNC: NEGATIVE MG/DL
HGB UR QL STRIP.AUTO: NEGATIVE
HGB UR QL STRIP.AUTO: NEGATIVE
KETONES UR STRIP-MCNC: NEGATIVE MG/DL
KETONES UR STRIP-MCNC: NEGATIVE MG/DL
LEUKOCYTE ESTERASE UR QL STRIP: ABNORMAL
LEUKOCYTE ESTERASE UR QL STRIP: ABNORMAL
MUCOUS THREADS UR QL AUTO: ABNORMAL
MUCOUS THREADS UR QL AUTO: ABNORMAL
NITRITE UR QL STRIP: POSITIVE
NITRITE UR QL STRIP: POSITIVE
NON-SQ EPI CELLS URNS QL MICRO: ABNORMAL /HPF
NON-SQ EPI CELLS URNS QL MICRO: ABNORMAL /HPF
PH UR STRIP.AUTO: 7 [PH]
PH UR STRIP.AUTO: 7 [PH]
PROT UR STRIP-MCNC: ABNORMAL MG/DL
PROT UR STRIP-MCNC: ABNORMAL MG/DL
RBC #/AREA URNS AUTO: ABNORMAL /HPF
RBC #/AREA URNS AUTO: ABNORMAL /HPF
SP GR UR STRIP.AUTO: 1.02 (ref 1–1.03)
SP GR UR STRIP.AUTO: 1.02 (ref 1–1.03)
UROBILINOGEN UR STRIP-ACNC: <2 MG/DL
UROBILINOGEN UR STRIP-ACNC: <2 MG/DL
WBC #/AREA URNS AUTO: ABNORMAL /HPF
WBC #/AREA URNS AUTO: ABNORMAL /HPF

## 2023-05-30 NOTE — TELEPHONE ENCOUNTER
"Regarding: Kidney infection  ----- Message from Marifer Quezada sent at 5/30/2023  8:49 AM EDT -----  \"I believe I have a kidney infection  I have severe back pain on the left side and I've had it before  \"    "

## 2023-05-30 NOTE — TELEPHONE ENCOUNTER
"Pt calling for 6/10 left lower back pain that radiates into her pelvic area  It has been present for about a week  Pt notes some odor to her urine  She thinks she has a UTI  Pt denies fever  UA and urine culture orders placed  Please call if any further f/u is needed  Reason for Disposition  • MODERATE back pain (e g , interferes with normal activities) and present > 3 days    Answer Assessment - Initial Assessment Questions  1  ONSET: \"When did the pain begin? \"       Over the last week  2  LOCATION: \"Where does it hurt? \" (upper, mid or lower back)      Lower left back  3  SEVERITY: \"How bad is the pain? \"  (e g , Scale 1-10; mild, moderate, or severe)    - MILD (1-3): doesn't interfere with normal activities     - MODERATE (4-7): interferes with normal activities or awakens from sleep     - SEVERE (8-10): excruciating pain, unable to do any normal activities       Moderate 6/10  4  PATTERN: \"Is the pain constant? \" (e g , yes, no; constant, intermittent)       yes  5  RADIATION: \"Does the pain shoot into your legs or elsewhere? \"      Radiates to pelvic pare  6  CAUSE:  \"What do you think is causing the back pain? \"       I think I have a kidney fever   7  BACK OVERUSE:  [place] recent lifting of heavy objects, strenuous work or exercise? \"      no  8  MEDICATIONS: \"What have you taken so far for the pain? \" (e g , nothing, acetaminophen, NSAIDS)      Aleve/heat/ice  9  NEUROLOGIC SYMPTOMS: \"Do you have any weakness, numbness, or problems with bowel/bladder control? \"      No  10  OTHER SYMPTOMS: \"Do you have any other symptoms? \" (e g , fever, abdominal pain, burning with urination, blood in urine)        Odor to urine    Protocols used: BACK PAIN-ADULT-OH    "

## 2023-05-31 RX ORDER — LEVOFLOXACIN 750 MG/1
750 TABLET ORAL EVERY 24 HOURS
Qty: 5 TABLET | Refills: 0 | Status: SHIPPED | OUTPATIENT
Start: 2023-05-31 | End: 2023-06-05

## 2023-05-31 NOTE — TELEPHONE ENCOUNTER
Call placed to patient  She did not answer  LMOM in detail informing patient that antibiotic was sent to the pharmacy on file for her to start taking  Office number was provided for the patient to call back with any questions or concerns she should have with these instructions

## 2023-06-01 ENCOUNTER — TELEPHONE (OUTPATIENT)
Dept: FAMILY MEDICINE CLINIC | Facility: CLINIC | Age: 63
End: 2023-06-01

## 2023-06-01 ENCOUNTER — HOSPITAL ENCOUNTER (OUTPATIENT)
Dept: CT IMAGING | Facility: HOSPITAL | Age: 63
End: 2023-06-01
Payer: COMMERCIAL

## 2023-06-01 DIAGNOSIS — R10.9 FLANK PAIN: ICD-10-CM

## 2023-06-01 PROCEDURE — G1004 CDSM NDSC: HCPCS

## 2023-06-01 PROCEDURE — 74176 CT ABD & PELVIS W/O CONTRAST: CPT

## 2023-06-01 NOTE — TELEPHONE ENCOUNTER
Patient notified Urology is working with her on this problem  I advised to hang tight and they will call her  She did start the antibiotic yesterday

## 2023-06-01 NOTE — TELEPHONE ENCOUNTER
Returned The Location  Pt stated she had started medication for her UTI as prescribed by her urologist, but was feeling intense lower back pain on the left side  Pt stated she would call her urologist as well, but was wondering if she should make an appointment with EVERARDO or JULIETA

## 2023-06-01 NOTE — TELEPHONE ENCOUNTER
Call placed to patient  She did not answer  Left a detailed message on answering machine with AP recommendations and that STAT CT scan has been ordered  Number for central scheduling was provided to the patient in the message to call and arrange for testing  Office number was provided for patient to call back and discuss any questions or concerns

## 2023-06-01 NOTE — TELEPHONE ENCOUNTER
Patient called requesting appointment for today  She got voice mail yesterday and picked up antibiotics  Took 1 pill yesterday and will take one this am, but has severe pain on back that comes around the side and even in to the front a little bit  Patient has been taking Aleve, it takes the edge off but she feels like something is wrong      Patient can be reached [phone]

## 2023-06-02 ENCOUNTER — HOSPITAL ENCOUNTER (EMERGENCY)
Facility: HOSPITAL | Age: 63
Discharge: HOME | End: 2023-06-02
Attending: EMERGENCY MEDICINE
Payer: COMMERCIAL

## 2023-06-02 VITALS
TEMPERATURE: 97.8 F | WEIGHT: 139.99 LBS | DIASTOLIC BLOOD PRESSURE: 91 MMHG | OXYGEN SATURATION: 98 % | BODY MASS INDEX: 24.8 KG/M2 | SYSTOLIC BLOOD PRESSURE: 164 MMHG | RESPIRATION RATE: 18 BRPM | HEART RATE: 80 BPM

## 2023-06-02 DIAGNOSIS — M54.50 ACUTE LEFT-SIDED LOW BACK PAIN WITHOUT SCIATICA: ICD-10-CM

## 2023-06-02 DIAGNOSIS — N39.0 E. COLI UTI: Primary | ICD-10-CM

## 2023-06-02 DIAGNOSIS — B96.20 E. COLI UTI: Primary | ICD-10-CM

## 2023-06-02 DIAGNOSIS — R11.0 NAUSEA: ICD-10-CM

## 2023-06-02 LAB — BACTERIA UR CULT: ABNORMAL

## 2023-06-02 PROCEDURE — 99283 EMERGENCY DEPT VISIT LOW MDM: CPT

## 2023-06-02 RX ORDER — AMOXICILLIN AND CLAVULANATE POTASSIUM 875; 125 MG/1; MG/1
1 TABLET, FILM COATED ORAL EVERY 12 HOURS SCHEDULED
Qty: 10 TABLET | Refills: 0 | Status: SHIPPED | OUTPATIENT
Start: 2023-06-02 | End: 2023-06-07

## 2023-06-02 RX ORDER — LIDOCAINE 50 MG/G
1 PATCH TOPICAL ONCE
Status: DISCONTINUED | OUTPATIENT
Start: 2023-06-02 | End: 2023-06-02 | Stop reason: HOSPADM

## 2023-06-02 RX ORDER — IBUPROFEN 400 MG/1
400 TABLET ORAL ONCE
Status: COMPLETED | OUTPATIENT
Start: 2023-06-02 | End: 2023-06-02

## 2023-06-02 RX ORDER — ACETAMINOPHEN 325 MG/1
975 TABLET ORAL ONCE
Status: COMPLETED | OUTPATIENT
Start: 2023-06-02 | End: 2023-06-02

## 2023-06-02 RX ORDER — METHOCARBAMOL 500 MG/1
500 TABLET, FILM COATED ORAL
Qty: 7 TABLET | Refills: 0 | Status: SHIPPED | OUTPATIENT
Start: 2023-06-02 | End: 2023-06-09

## 2023-06-02 RX ORDER — ONDANSETRON 4 MG/1
4 TABLET, ORALLY DISINTEGRATING ORAL EVERY 6 HOURS PRN
Qty: 20 TABLET | Refills: 0 | Status: SHIPPED | OUTPATIENT
Start: 2023-06-02

## 2023-06-02 RX ORDER — ONDANSETRON 4 MG/1
4 TABLET, ORALLY DISINTEGRATING ORAL ONCE
Status: COMPLETED | OUTPATIENT
Start: 2023-06-02 | End: 2023-06-02

## 2023-06-02 RX ORDER — AMOXICILLIN AND CLAVULANATE POTASSIUM 875; 125 MG/1; MG/1
1 TABLET, FILM COATED ORAL ONCE
Status: COMPLETED | OUTPATIENT
Start: 2023-06-02 | End: 2023-06-02

## 2023-06-02 RX ADMIN — ONDANSETRON 4 MG: 4 TABLET, ORALLY DISINTEGRATING ORAL at 18:33

## 2023-06-02 RX ADMIN — IBUPROFEN 400 MG: 400 TABLET, FILM COATED ORAL at 18:32

## 2023-06-02 RX ADMIN — AMOXICILLIN AND CLAVULANATE POTASSIUM 1 TABLET: 875; 125 TABLET, FILM COATED ORAL at 19:23

## 2023-06-02 RX ADMIN — LIDOCAINE 5% 1 PATCH: 700 PATCH TOPICAL at 18:34

## 2023-06-02 RX ADMIN — ACETAMINOPHEN 975 MG: 325 TABLET ORAL at 18:32

## 2023-06-02 NOTE — ED ATTENDING ATTESTATION
6/2/2023  IMarichuy DO, saw and evaluated the patient  I have discussed the patient with the resident/non-physician practitioner and agree with the resident's/non-physician practitioner's findings, Plan of Care, and MDM as documented in the resident's/non-physician practitioner's note, except where noted  All available labs and Radiology studies were reviewed  I was present for key portions of any procedure(s) performed by the resident/non-physician practitioner and I was immediately available to provide assistance  At this point I agree with the current assessment done in the Emergency Department  I have conducted an independent evaluation of this patient a history and physical is as follows:    ED Course         Critical Care Time  Procedures    67-year-old female presents to the ED complaining of low back pain initially starting on the left and then progressing to the right  She had a CT stone study done yesterday which revealed no ureteral stones but did show constipation  Patient states she is moving her bowels  She was also diagnosed with UTI and started on an antibiotic  She does have frequency  No burning  No fevers chills  Today she had an episode of pain which caused her to feel sweaty and nauseous which prompted the ED visit  On exam here she is alert and eating crackers in no acute distress  She has no CVA tenderness on exam   Will treat pain  Most likely will switch antibiotics as susceptibilities for urine culture have come back    Will reassess

## 2023-06-02 NOTE — ED PROVIDER NOTES
"History  Chief Complaint   Patient presents with   • Back Pain     Lower back pain for few days  States had CT scan yesterday to rule out kidney stones  Was told to she is constipated  Last bm this morning  Taking stool softener with some relief and abx for UTI  Continues to have back pain with nausea  Also states  called EMS because she was diaphoretic  33-year-old female with history of UTIs presents with lower back pain x1 week  Patient also reports dysuria and increased frequency over same time period  Back pain described as initially isolated to the left lower back, now worse in the right lower back, sharp, constant, with no alleviating or exacerbating factors  Patient does report feeling a \"pinching\" in her lower back 1 week ago after picking up a heavy box  Denies any fevers, abdominal pain, respiratory symptoms, leg weakness, numbness, urinary or fecal incontinence, or history of spinal injections  Prior to Admission Medications   Prescriptions Last Dose Informant Patient Reported? Taking?    CRANBERRY PO   Yes Yes   Sig: Take 500 mg by mouth   Calcium 600 MG tablet  Self Yes Yes   Sig: Take 600 mg by mouth   Cholecalciferol (VITAMIN D-3 PO)  Self Yes Yes   Sig: Take 4,000 Int'l Units by mouth daily   DULoxetine (CYMBALTA) 60 mg delayed release capsule  Self No Yes   Sig: TAKE 1 CAPSULE DAILY   Multiple Vitamin (MULTIVITAMIN) tablet  Self Yes Yes   Sig: Take 1 tablet by mouth daily    Nutritional Supplements (ANTIOXIDANTS PO)  Self Yes Yes   Sig: Take 1 tablet by mouth daily   albuterol (PROVENTIL HFA,VENTOLIN HFA) 90 mcg/act inhaler  Self No Yes   Sig: Inhale 2 puffs every 6 (six) hours as needed for wheezing   atenolol (TENORMIN) 25 mg tablet   No Yes   Sig: TAKE ONE TABLET BY MOUTH EVERY DAY   atorvastatin (LIPITOR) 80 mg tablet  Self No Yes   Sig: Take 1 tablet (80 mg total) by mouth daily   fluticasone (FLONASE) 50 mcg/act nasal spray  Self No Yes   Si sprays into each nostril " daily   levofloxacin (LEVAQUIN) 750 mg tablet   No Yes   Sig: Take 1 tablet (750 mg total) by mouth every 24 hours for 5 days   meloxicam (Mobic) 15 mg tablet   No Yes   Sig: Take 1 tablet (15 mg total) by mouth daily   methocarbamol (ROBAXIN) 500 mg tablet   No Yes   Sig: Take 1 tablet (500 mg total) by mouth 3 (three) times a day   mupirocin (BACTROBAN) 2 % ointment Not Taking Self No No   Sig: Apply topically 3 (three) times a day   Patient not taking: Reported on 4/24/2023   predniSONE 10 mg tablet   No Yes   Sig: Take 6 tabs days 1&2, 5 tabs days 3&4, 4 tabs days 5&6, 3 tabs days 7&8, 2 tabs days 9&10, 1 tab days 11&12   sertraline (ZOLOFT) 25 mg tablet  Self No Yes   Sig: Take 1 tablet (25 mg total) by mouth daily   traZODone (DESYREL) 50 mg tablet  Self No Yes   Sig: TAKE ONE TABLET BY MOUTH DAILY AT BEDTIME      Facility-Administered Medications: None       Past Medical History:   Diagnosis Date   • Anxiety    • Depression    • Elevated cholesterol    • Hypertension    • PONV (postoperative nausea and vomiting)        Past Surgical History:   Procedure Laterality Date   • BREAST CYST ASPIRATION     • DIAGNOSTIC LAPAROSCOPY      Exploratory, last assessed 10/25/16   • NASAL SINUS SURGERY      age 26 - FESS and septoplasty - unknown surgeon   • RI STRTCTC CPTR ASSTD PX EXTRADURAL CRANIAL N/A 11/8/2016    Procedure: FUNCTIONAL ENDOSCOPIC SINUS SURGERY (FESS) IMAGED GUIDED; Surgeon: Kait Garner DO;  Location: [facility];  Service: ENT   • WISDOM TOOTH EXTRACTION         Family History   Problem Relation Age of Onset   • Coronary artery disease Father         CABG   • Diabetes type II Sister    • Bipolar disorder Brother    • Depression Brother         with anxiety    • Esophageal cancer Brother    • Diabetes Maternal Grandfather    • Depression Family         with anxiety    • Hyperlipidemia Family      I have reviewed and agree with the history as documented      E-Cigarette/Vaping   • E-Cigarette Use Never User      E-Cigarette/Vaping Substances   • Nicotine No    • THC No    • CBD No    • Flavoring No    • Other No    • Unknown No      Social History     Tobacco Use   • Smoking status: Never   • Smokeless tobacco: Never   • Tobacco comments:     No secondhand smoke exposure    Vaping Use   • Vaping Use: Never used   Substance Use Topics   • Alcohol use: No   • Drug use: No        Review of Systems   Constitutional: Negative for chills and fever  HENT: Negative for ear pain and sore throat  Eyes: Negative for pain and visual disturbance  Respiratory: Negative for cough and shortness of breath  Cardiovascular: Negative for chest pain and palpitations  Gastrointestinal: Positive for nausea  Negative for abdominal pain and vomiting  Genitourinary: Positive for dysuria and frequency  Negative for hematuria  Musculoskeletal: Positive for back pain  Negative for arthralgias  Skin: Negative for color change and rash  Neurological: Negative for seizures and syncope  All other systems reviewed and are negative  Physical Exam  ED Triage Vitals   Temperature Pulse Respirations Blood Pressure SpO2   06/02/23 1736 06/02/23 1736 06/02/23 1736 06/02/23 1736 06/02/23 1736   97 8 °F (36 6 °C) 80 18 164/91 98 %      Temp Source Heart Rate Source Patient Position - Orthostatic VS BP Location FiO2 (%)   06/02/23 1736 06/02/23 1736 06/02/23 1736 06/02/23 1736 --   Oral Monitor Lying Right arm       Pain Score       06/02/23 1832       7             Orthostatic Vital Signs  Vitals:    06/02/23 1736   BP: 164/91   Pulse: 80   Patient Position - Orthostatic VS: Lying       Physical Exam  Vitals and nursing note reviewed  Constitutional:       General: She is not in acute distress  Appearance: Normal appearance  She is normal weight  She is not ill-appearing, toxic-appearing or diaphoretic  HENT:      Head: Normocephalic and atraumatic        Right Ear: External ear normal       Left Ear: External ear normal  Nose: Nose normal       Mouth/Throat:      Mouth: Mucous membranes are moist    Eyes:      General:         Right eye: No discharge  Left eye: No discharge  Conjunctiva/sclera: Conjunctivae normal    Cardiovascular:      Rate and Rhythm: Normal rate and regular rhythm  Pulmonary:      Effort: Pulmonary effort is normal  No respiratory distress  Abdominal:      General: Abdomen is flat  Bowel sounds are normal  There is no distension  Palpations: Abdomen is soft  Tenderness: There is no abdominal tenderness  There is no right CVA tenderness, left CVA tenderness, guarding or rebound  Musculoskeletal:         General: No tenderness  Normal range of motion  Cervical back: Normal range of motion and neck supple  No tenderness  Comments: No midline back tenderness   Skin:     General: Skin is warm and dry  Capillary Refill: Capillary refill takes less than 2 seconds  Coloration: Skin is not pale  Neurological:      Mental Status: She is alert and oriented to person, place, and time  Psychiatric:         Mood and Affect: Mood normal          Behavior: Behavior normal          ED Medications  Medications   lidocaine (LIDODERM) 5 % patch 1 patch (1 patch Topical Medication Applied 6/2/23 1834)   acetaminophen (TYLENOL) tablet 975 mg (975 mg Oral Given 6/2/23 1832)   ibuprofen (MOTRIN) tablet 400 mg (400 mg Oral Given 6/2/23 1832)   ondansetron (ZOFRAN-ODT) dispersible tablet 4 mg (4 mg Oral Given 6/2/23 1833)   amoxicillin-clavulanate (AUGMENTIN) 875-125 mg per tablet 1 tablet (1 tablet Oral Given 6/2/23 1923)       Diagnostic Studies  Results Reviewed     None                 No orders to display         Procedures  Procedures      ED Course                                       Medical Decision Making  26-year-old female with presentation consistent with musculoskeletal back pain and UTI  Symptoms likely due to musculoskeletal back pain    CNS infection unlikely given lack of fever, neurologic symptoms, and patient's overall well-appearing exam   Cord compression unlikely due to lack of neurological symptoms  Acute vertebral fracture unlikely in setting of no trauma  Pain unlikely due to pyelonephritis due to distal location of the pain  Most recent urine culture significant for E  coli bacteremia with an intermediate susceptibility to Levaquin which patient is taking  We will switch patient's antibiotic to Augmentin  Referral placed for comprehensive spine  Acute left-sided low back pain without sciatica: acute illness or injury  E  coli UTI: acute illness or injury  Nausea: acute illness or injury  Risk  OTC drugs  Prescription drug management  Disposition  Final diagnoses:   E  coli UTI   Acute left-sided low back pain without sciatica   Nausea     Time reflects when diagnosis was documented in both MDM as applicable and the Disposition within this note     Time User Action Codes Description Comment    6/2/2023  7:03 PM Estela Mcduffie Add [N39 0,  B96 20] E  coli UTI     6/2/2023  7:03 PM Estela Mcduffie Add [M54 50] Acute left-sided low back pain without sciatica     6/2/2023  7:04 PM Estela Mcduffie Add [R11 0] Nausea       ED Disposition     ED Disposition   Discharge    Condition   Stable    Date/Time   Fri Jun 2, 2023  7:24 PM    Comment   Petr Offer discharge to home/self care                 Follow-up Information     Follow up With Specialties Details [place] [address] [address], PA-C Family Medicine, Physician Assistant   [address] [place] [id number] [phone]            Discharge Medication List as of 6/2/2023  7:24 PM      START taking these medications    Details   amoxicillin-clavulanate (AUGMENTIN) 875-125 mg per tablet Take 1 tablet by mouth every 12 (twelve) hours for 5 days, Starting Fri 6/2/2023, Until Wed 6/7/2023, Normal      ondansetron (Zofran ODT) 4 mg disintegrating tablet Take 1 tablet (4 mg total) by mouth every 6 (six) hours as needed for nausea or vomiting, Starting Fri 6/2/2023, Normal         CONTINUE these medications which have NOT CHANGED    Details   albuterol (PROVENTIL HFA,VENTOLIN HFA) 90 mcg/act inhaler Inhale 2 puffs every 6 (six) hours as needed for wheezing, Starting Wed 5/27/2020, Normal      atenolol (TENORMIN) 25 mg tablet TAKE ONE TABLET BY MOUTH EVERY DAY, Normal      atorvastatin (LIPITOR) 80 mg tablet Take 1 tablet (80 mg total) by mouth daily, Starting Sun 12/4/2022, Normal      Calcium 600 MG tablet Take 600 mg by mouth, Historical Med      Cholecalciferol (VITAMIN D-3 PO) Take 4,000 Int'l Units by mouth daily, Historical Med      CRANBERRY PO Take 500 mg by mouth, Historical Med      DULoxetine (CYMBALTA) 60 mg delayed release capsule TAKE 1 CAPSULE DAILY, Normal      fluticasone (FLONASE) 50 mcg/act nasal spray 2 sprays into each nostril daily, Starting Mon 9/10/2018, Normal      levofloxacin (LEVAQUIN) 750 mg tablet Take 1 tablet (750 mg total) by mouth every 24 hours for 5 days, Starting Wed 5/31/2023, Until Mon 6/5/2023, Normal      meloxicam (Mobic) 15 mg tablet Take 1 tablet (15 mg total) by mouth daily, Starting Wed 3/15/2023, Normal      methocarbamol (ROBAXIN) 500 mg tablet Take 1 tablet (500 mg total) by mouth 3 (three) times a day, Starting Wed 3/15/2023, Normal      Multiple Vitamin (MULTIVITAMIN) tablet Take 1 tablet by mouth daily , Historical Med      mupirocin (BACTROBAN) 2 % ointment Apply topically 3 (three) times a day, Starting Tue 8/30/2022, Normal      Nutritional Supplements (ANTIOXIDANTS PO) Take 1 tablet by mouth daily, Historical Med      predniSONE 10 mg tablet Take 6 tabs days 1&2, 5 tabs days 3&4, 4 tabs days 5&6, 3 tabs days 7&8, 2 tabs days 9&10, 1 tab days 11&12, Normal      sertraline (ZOLOFT) 25 mg tablet Take 1 tablet (25 mg total) by mouth daily, Starting Mon 2/6/2023, Normal      traZODone (DESYREL) 50 mg tablet TAKE ONE TABLET BY MOUTH DAILY AT BEDTIME, Normal               PDMP Review     None           ED Provider  Attending physically available and evaluated Tiffanie Manuel BRICENO managed the patient along with the ED Attending      Electronically Signed by         Bria Canales MD  06/02/23 2002

## 2023-06-02 NOTE — ED NOTES
RN entered the room to give pt ordered Augmentin  Pt expressed concerns since she is currently taking abx at home but cannot remember the name  Pt requesting to speak with provider before taking Augmentin  Provider at bedside now        Inessa Hartman  06/02/23 1916

## 2023-06-02 NOTE — DISCHARGE INSTRUCTIONS
You were evaluated in the Emergency Department today for your back pain  Your evaluation did not show signs of medical conditions requiring emergent intervention at this time  We recommend you take 600mg ibuprofen every 6 hours and tylenol 650mg every 6 hours as needed for pain  Please schedule an appointment for follow-up with your primary care physician this week for further evaluation of your symptoms  Return to the Emergency Department if you experience worsening back pain, difficulty walking, fevers, numbness, tingling, incontinence, or any other concerning symptoms

## 2023-06-06 ENCOUNTER — OFFICE VISIT (OUTPATIENT)
Dept: FAMILY MEDICINE CLINIC | Facility: CLINIC | Age: 63
End: 2023-06-06
Payer: COMMERCIAL

## 2023-06-06 VITALS
SYSTOLIC BLOOD PRESSURE: 122 MMHG | OXYGEN SATURATION: 96 % | WEIGHT: 137 LBS | HEART RATE: 74 BPM | DIASTOLIC BLOOD PRESSURE: 74 MMHG | BODY MASS INDEX: 24.27 KG/M2 | HEIGHT: 63 IN
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
## 2023-06-06 DIAGNOSIS — M54.50 CHRONIC BILATERAL LOW BACK PAIN WITHOUT SCIATICA: ICD-10-CM

## 2023-06-06 DIAGNOSIS — M54.50 ACUTE LEFT-SIDED LOW BACK PAIN WITHOUT SCIATICA: Chronic | ICD-10-CM

## 2023-06-06 DIAGNOSIS — K59.09 OTHER CONSTIPATION: Primary | ICD-10-CM

## 2023-06-06 DIAGNOSIS — G89.29 CHRONIC BILATERAL LOW BACK PAIN WITHOUT SCIATICA: ICD-10-CM

## 2023-06-06 PROCEDURE — 99214 OFFICE O/P EST MOD 30 MIN: CPT | Performed by: PHYSICIAN ASSISTANT

## 2023-06-06 RX ORDER — MELOXICAM 15 MG/1
15 TABLET ORAL DAILY
Qty: 30 TABLET | Refills: 0 | Status: SHIPPED | OUTPATIENT
Start: 2023-06-06

## 2023-06-06 NOTE — PATIENT INSTRUCTIONS
1  Other constipation  Assessment & Plan:  Patient with abdominal pains and CT done recently does show that she has positive constipation but not obstruction  Will encourage increasing fluids 6 to 8 glasses of water minimum a day increasing fiber with fruits and vegetables and may use fiber supplement as well  Also recommend docusate sodium which is the generic for Colace 100 mg twice a day as a stool softener and avoidance of laxatives if at all possible  If needed to start things off patient could use a dose of MiraLAX  2  Chronic bilateral low back pain without sciatica  Assessment & Plan:  Xray was done this past spring and WNL  PT did see ortho and was supposed to go to PT but did not  Instructed to take the robaxin 500 mg 3 times a day, consider at least 1 PT eval and treat and if still with pain ER did order PM eval and pt can accept when they call  Refilled Mobic 15 mg once daily  3  Fall, initial encounter  -     meloxicam (Mobic) 15 mg tablet; Take 1 tablet (15 mg total) by mouth daily    4  Acute left-sided low back pain without sciatica  Assessment & Plan:  Xray was done this past spring and WNL  PT did see ortho and was supposed to go to PT but did not  Instructed to take the robaxin 500 mg 3 times a day, consider at least 1 PT eval and treat and if still with pain ER did order PM eval and pt can accept when they call  Refilled Mobic 15 mg once daily  Orders:  -     meloxicam (Mobic) 15 mg tablet; Take 1 tablet (15 mg total) by mouth daily      High Fiber Diet   WHAT YOU NEED TO KNOW:   What is a high-fiber diet? A high-fiber diet includes foods that have a high amount of fiber  Fiber is the part of fruits, vegetables, and grains that is not broken down by your body  Fiber keeps your bowel movements regular  Fiber can also help lower your cholesterol level, control blood sugar in people with diabetes, and relieve constipation   Fiber can also help you control your weight because it helps you feel full faster  Most adults should eat 25 to 35 grams of fiber each day  Talk to your dietitian or healthcare provider about the amount of fiber you need  What foods are good sources of fiber? Foods with at least 4 grams of fiber per serving:      ? to ½ cup of high-fiber cereal (check the nutrition label on the box)    ½ cup of blackberries or raspberries    4 dried prunes    1 cooked artichoke    ½ cup of cooked legumes, such as lentils, or red, kidney, and dill beans    Foods with 1 to 3 grams of fiber per servin slice of whole-wheat, pumpernickel, or rye bread    ½ cup of cooked brown rice    4 whole-wheat crackers    1 cup of oatmeal    ½ cup of cereal with 1 to 3 grams of fiber per serving (check the nutrition label on the box)    1 small piece of fruit, such as an apple, banana, pear, kiwi, or orange    3 dates    ½ cup of canned apricots, fruit cocktail, peaches, or pears    ½ cup of raw or cooked vegetables, such as carrots, cauliflower, cabbage, spinach, squash, or corn  What are some ways that I can increase fiber in my diet? Choose brown or wild rice instead of white rice  Use whole wheat flour in recipes instead of white or all-purpose flour  Add beans and peas to casseroles or soups  Choose fresh fruit and vegetables with peels or skins on instead of juices  What other guidelines should I follow? Add fiber to your diet slowly  You may have abdominal discomfort, bloating, and gas if you add fiber to your diet too quickly  Drink plenty of liquids as you add fiber to your diet  You may have nausea or develop constipation if you do not drink enough water  Ask how much liquid to drink each day and which liquids are best for you  CARE AGREEMENT:   You have the right to help plan your care  Discuss treatment options with your healthcare provider to decide what care you want to receive  You always have the right to refuse treatment   The above information is an  only  It is not intended as medical advice for individual conditions or treatments  Talk to your doctor, nurse or pharmacist before following any medical regimen to see if it is safe and effective for you  © Copyright [employer] 2022 Information is for End User's use only and may not be sold, redistributed or otherwise used for commercial purposes

## 2023-06-06 NOTE — ASSESSMENT & PLAN NOTE
Xray was done this past spring and WNL  PT did see ortho and was supposed to go to PT but did not  Instructed to take the robaxin 500 mg 3 times a day, consider at least 1 PT eval and treat and if still with pain ER did order PM eval and pt can accept when they call  Refilled Mobic 15 mg once daily

## 2023-06-06 NOTE — ASSESSMENT & PLAN NOTE
Patient with abdominal pains and CT done recently does show that she has positive constipation but not obstruction  Will encourage increasing fluids 6 to 8 glasses of water minimum a day increasing fiber with fruits and vegetables and may use fiber supplement as well  Also recommend docusate sodium which is the generic for Colace 100 mg twice a day as a stool softener and avoidance of laxatives if at all possible  If needed to start things off patient could use a dose of MiraLAX

## 2023-06-06 NOTE — PROGRESS NOTES
Name: Sawti Hendricks      : 1960      MRN: [id number]  Encounter Provider: Tamela Belcher PA-C  Encounter Date: 2023   Encounter department: [facility] PRIMARY CARE    Assessment & Plan     1  Other constipation  Assessment & Plan:  Patient with abdominal pains and CT done recently does show that she has positive constipation but not obstruction  Will encourage increasing fluids 6 to 8 glasses of water minimum a day increasing fiber with fruits and vegetables and may use fiber supplement as well  Also recommend docusate sodium which is the generic for Colace 100 mg twice a day as a stool softener and avoidance of laxatives if at all possible  If needed to start things off patient could use a dose of MiraLAX  2  Chronic bilateral low back pain without sciatica  Assessment & Plan:  Xray was done this past spring and WNL  PT did see ortho and was supposed to go to PT but did not  Instructed to take the robaxin 500 mg 3 times a day, consider at least 1 PT eval and treat and if still with pain ER did order PM eval and pt can accept when they call  Refilled Mobic 15 mg once daily  3  , initial encounter  -     meloxicam (Mobic) 15 mg tablet; Take 1 tablet (15 mg total) by mouth daily    4  Acute left-sided low back pain without sciatica  Assessment & Plan:  Xray was done this past spring and WNL  PT did see ortho and was supposed to go to PT but did not  Instructed to take the robaxin 500 mg 3 times a day, consider at least 1 PT eval and treat and if still with pain ER did order PM eval and pt can accept when they call  Refilled Mobic 15 mg once daily  Orders:  -     meloxicam (Mobic) 15 mg tablet;  Take 1 tablet (15 mg total) by mouth daily           Subjective      Patient was having problems with her bowel movements and then having abdominal discomfort and continued back pain so on  she took 3 pills of what is most likely laxative and ended up having recurrent passing out "and severe nausea and her  took her to the emergency room  CT abdomen was done and it was found negative except for a copious amount of stool without evidence of his obstruction  She did have bilateral nonobstructing kidney stones as we are aware of this and she does follow-up with urology  It was also found that she needed a different antibiotic than the one she was currently on for her urinary tract infection which she continues  Patient states that she is having bowel movements but they are \"like rocks' and she is still having a lot of abdominal discomfort increasing gas stomach upset  She also is complaining of bilateral back pain which was worse on the left than the right during the emergency room stay they did give her Robaxin to take 1 at bedtime 500 mg but she states that she is not getting any relief from this  No weakness numbness or tingling down her legs  She was reminded that she fell while skiing and did have back pain in March and we did see her for and she did have an x-ray was given Mobic and Robaxin to take 3 times a day  Patient states that she forgot about this but her back pain from that point did improve but never fully resolved  Patient did have pain management entered but did not call her from the emergency room and she states that she does not think she is having a spine problem just a back problem  Patient did see Ortho he recommended PT but she did not want to go for this at the time because it takes up too much time  Review of Systems   Constitutional: Negative  HENT: Negative  Eyes: Negative  Respiratory: Negative  Cardiovascular: Negative  Gastrointestinal: Positive for abdominal pain, constipation and nausea  Endocrine: Negative  Genitourinary: Negative  Musculoskeletal: Positive for back pain  Skin: Negative  Allergic/Immunologic: Negative  Neurological: Negative  Hematological: Negative  Psychiatric/Behavioral: Negative    " Current Outpatient Medications on File Prior to Visit   Medication Sig   • albuterol (PROVENTIL HFA,VENTOLIN HFA) 90 mcg/act inhaler Inhale 2 puffs every 6 (six) hours as needed for wheezing   • amoxicillin-clavulanate (AUGMENTIN) 875-125 mg per tablet Take 1 tablet by mouth every 12 (twelve) hours for 5 days   • atenolol (TENORMIN) 25 mg tablet TAKE ONE TABLET BY MOUTH EVERY DAY   • atorvastatin (LIPITOR) 80 mg tablet Take 1 tablet (80 mg total) by mouth daily   • Calcium 600 MG tablet Take 600 mg by mouth   • Cholecalciferol (VITAMIN D-3 PO) Take 4,000 Int'l Units by mouth daily   • CRANBERRY PO Take 500 mg by mouth   • DULoxetine (CYMBALTA) 60 mg delayed release capsule TAKE 1 CAPSULE DAILY   • fluticasone (FLONASE) 50 mcg/act nasal spray 2 sprays into each nostril daily   • methocarbamol (ROBAXIN) 500 mg tablet Take 1 tablet (500 mg total) by mouth 3 (three) times a day   • Multiple Vitamin (MULTIVITAMIN) tablet Take 1 tablet by mouth daily    • Nutritional Supplements (ANTIOXIDANTS PO) Take 1 tablet by mouth daily   • ondansetron (Zofran ODT) 4 mg disintegrating tablet Take 1 tablet (4 mg total) by mouth every 6 (six) hours as needed for nausea or vomiting   • sertraline (ZOLOFT) 25 mg tablet Take 1 tablet (25 mg total) by mouth daily   • traZODone (DESYREL) 50 mg tablet TAKE ONE TABLET BY MOUTH DAILY AT BEDTIME   • [] levofloxacin (LEVAQUIN) 750 mg tablet Take 1 tablet (750 mg total) by mouth every 24 hours for 5 days   • methocarbamol (ROBAXIN) 500 mg tablet Take 1 tablet (500 mg total) by mouth daily at bedtime as needed for muscle spasms for up to 7 days   • mupirocin (BACTROBAN) 2 % ointment Apply topically 3 (three) times a day (Patient not taking: Reported on 2023)   • predniSONE 10 mg tablet Take 6 tabs days 1&2, 5 tabs days 3&4, 4 tabs days 5&6, 3 tabs days 7&8, 2 tabs days 9&10, 1 tab days 11&12 (Patient not taking: Reported on 2023)   • [DISCONTINUED] meloxicam (Mobic) 15 mg "tablet Take 1 tablet (15 mg total) by mouth daily       Objective     /74 (BP Location: Right arm, Patient Position: Sitting, Cuff Size: Standard)   Pulse 74   Ht 5' 3\" (1 6 m)   Wt 62 1 kg (137 lb)   SpO2 96%   BMI 24 27 kg/m²     Physical Exam  Vitals and nursing note reviewed  Constitutional:       General: She is not in acute distress  Appearance: She is well-developed  She is not diaphoretic  HENT:      Head: Normocephalic and atraumatic  Eyes:      General:         Right eye: No discharge  Left eye: No discharge  Conjunctiva/sclera: Conjunctivae normal    Neck:      Vascular: No carotid bruit  Cardiovascular:      Rate and Rhythm: Normal rate and regular rhythm  Heart sounds: Normal heart sounds  No murmur heard  No friction rub  No gallop  Pulmonary:      Effort: Pulmonary effort is normal  No respiratory distress  Breath sounds: Normal breath sounds  No wheezing or rales  Abdominal:      General: Abdomen is flat  Palpations: Abdomen is soft  Tenderness: There is generalized abdominal tenderness and tenderness in the left lower quadrant  There is no right CVA tenderness or left CVA tenderness  Musculoskeletal:      Cervical back: Neck supple  Comments: No palpation tenderness to direct spine thoracic or lumbar patient does have tenderness to the bilateral paravertebral musculature lumbar area with mild tenderness in the sciatic notch but negative straight leg raise good full range of motion and strength bilateral lower extremities  Skin:     General: Skin is warm and dry  Neurological:      Mental Status: She is alert and oriented to person, place, and time     Psychiatric:         Judgment: Judgment normal        Leandro Benavides PA-C  "

## 2023-06-12 ENCOUNTER — OFFICE VISIT (OUTPATIENT)
Dept: UROLOGY | Facility: MEDICAL CENTER | Age: 63
End: 2023-06-12
Payer: COMMERCIAL

## 2023-06-12 VITALS
OXYGEN SATURATION: 100 % | SYSTOLIC BLOOD PRESSURE: 122 MMHG | BODY MASS INDEX: 23.57 KG/M2 | HEIGHT: 63 IN | HEART RATE: 66 BPM | DIASTOLIC BLOOD PRESSURE: 82 MMHG | WEIGHT: 133 LBS

## 2023-06-12 DIAGNOSIS — N39.0 RECURRENT UTI: Primary | ICD-10-CM

## 2023-06-12 LAB
SL AMB  POCT GLUCOSE, UA: NORMAL
SL AMB LEUKOCYTE ESTERASE,UA: NORMAL
SL AMB POCT BILIRUBIN,UA: NORMAL
SL AMB POCT BLOOD,UA: NORMAL
SL AMB POCT CLARITY,UA: CLEAR
SL AMB POCT COLOR,UA: YELLOW
SL AMB POCT KETONES,UA: NORMAL
SL AMB POCT NITRITE,UA: NORMAL
SL AMB POCT PH,UA: 6
SL AMB POCT SPECIFIC GRAVITY,UA: 1.02
SL AMB POCT URINE PROTEIN: 6
SL AMB POCT UROBILINOGEN: 0.2

## 2023-06-12 PROCEDURE — 99213 OFFICE O/P EST LOW 20 MIN: CPT | Performed by: PHYSICIAN ASSISTANT

## 2023-06-12 PROCEDURE — 81003 URINALYSIS AUTO W/O SCOPE: CPT | Performed by: PHYSICIAN ASSISTANT

## 2023-06-12 RX ORDER — METHENAMINE HIPPURATE 1000 MG/1
1 TABLET ORAL 2 TIMES DAILY WITH MEALS
Qty: 60 TABLET | Refills: 2 | Status: SHIPPED | OUTPATIENT
Start: 2023-06-12

## 2023-06-12 NOTE — PROGRESS NOTES
"6/12/2023      Chief Complaint   Patient presents with   • recurrent uti         Assessment and Plan    57 y o  female    1  Recurrent E coli ESBL UTI  · Urine today: negative   • Nonobstucting stones on CT  Back pain secondary to constipation vs musculoskeletal  Reviewed possible surgical intervention if infections persist despite prevention methods  • Continue D-mannose and oral cranberry daily  Will add Hipprex 1 g po bid x 3 months   • Continue proper  hygiene  • Continue daily water 40-60 oz  • Standing urine testing order for prn use  • Follow up in 3 months  • ED parameters reviewed  History of Present Illness  Gerber Vaca is a 57 y o  female here for evaluation of recurrent UTI  Patient has a history of ESBL E  coli urinary tract infections  She was evaluated as a new patient on 3/10/2023  She was started on d-mannose and oral cranberry and was to start proper  hygiene to prevent infections  CT at that time did reveal bilateral nonobstructing renal stones and we did discuss possible surgical intervention in the future if her infections persist despite prevention methods  Patient had urine collected on 5/31 which was positive for infection  Patient also reported back pain and CT stat was ordered which did not reveal any obstructive uropathy  She felt very ill and was instructed to present to the ED for further evaluation  No signs of sepsis in ED, suspected back pain from constipation vs muscle pain  Patient states her back pain is improved but she still overall feels off  She has been taking stool softeners and laxatives since the ED  She denies any dysuria or gross hematuria today  She denies any fevers or chills  She is agreeable to plan above  Vitals  Vitals:    06/12/23 0850   BP: 122/82   BP Location: Right arm   Patient Position: Sitting   Cuff Size: Adult   Pulse: 66   SpO2: 100%   Weight: 60 3 kg (133 lb)   Height: 5' 3\" (1 6 m)       Physical Exam  Vitals reviewed   " Constitutional:       General: She is not in acute distress  Appearance: Normal appearance  She is normal weight  She is not ill-appearing, toxic-appearing or diaphoretic  HENT:      Head: Normocephalic and atraumatic  Eyes:      Conjunctiva/sclera: Conjunctivae normal    Pulmonary:      Effort: Pulmonary effort is normal  No respiratory distress  Abdominal:      General: There is no distension  Palpations: Abdomen is soft  Tenderness: There is no abdominal tenderness  There is no right CVA tenderness, left CVA tenderness, guarding or rebound  Musculoskeletal:         General: Normal range of motion  Cervical back: Normal range of motion  Skin:     General: Skin is warm and dry  Neurological:      General: No focal deficit present  Mental Status: She is alert and oriented to person, place, and time  Psychiatric:         Mood and Affect: Mood normal          Behavior: Behavior normal          Thought Content:  Thought content normal          Judgment: Judgment normal        Past History  Past Medical History:   Diagnosis Date   • Anxiety    • Depression    • Elevated cholesterol    • Hypertension    • PONV (postoperative nausea and vomiting)      Social History     Socioeconomic History   • Marital status: /Civil Union     Spouse name: None   • Number of children: None   • Years of education: Associates degree   • Highest education level: None   Occupational History   • None   Tobacco Use   • Smoking status: Never   • Smokeless tobacco: Never   • Tobacco comments:     No secondhand smoke exposure    Vaping Use   • Vaping Use: Never used   Substance and Sexual Activity   • Alcohol use: No   • Drug use: No   • Sexual activity: None   Other Topics Concern   • None   Social History Narrative    Employed     Lives with spouse     No caffeine use      Social Determinants of Health     Financial Resource Strain: Not on file   Food Insecurity: Not on file   Transportation Needs: "Not on file   Physical Activity: Not on file   Stress: Not on file   Social Connections: Not on file   Intimate Partner Violence: Not on file   Housing Stability: Not on file     Social History     Tobacco Use   Smoking Status Never   Smokeless Tobacco Never   Tobacco Comments    No secondhand smoke exposure      Family History   Problem Relation Age of Onset   • Coronary artery disease Father         CABG   • Diabetes type II Sister    • Bipolar disorder Brother    • Depression Brother         with anxiety    • Esophageal cancer Brother    • Diabetes Maternal Grandfather    • Depression Family         with anxiety    • Hyperlipidemia Family        The following portions of the patient's history were reviewed and updated as appropriate: allergies, current medications, past medical history, past social history, past surgical history and problem list     Results  Recent Results (from the past 1 hour(s))   POCT urine dip auto non-scope    Collection Time: 06/12/23  8:56 AM   Result Value Ref Range     COLOR,UA yellow     CLARITY,UA clear     SPECIFIC GRAVITY,UA 1 025      PH,UA 6 0     LEUKOCYTE ESTERASE,UA neg     NITRITE,UA neg     GLUCOSE, UA neg     KETONES,UA trace     BILIRUBIN,UA neg     BLOOD,UA trace-lysed     POCT URINE PROTEIN 6 0     SL AMB POCT UROBILINOGEN 0 2    ]  No results found for: \"PSA\"  Lab Results   Component Value Date    BUN 26 (H) 01/31/2023    CALCIUM 9 1 01/31/2023     01/31/2023    CO2 28 01/31/2023    CREATININE 0 57 01/31/2023    K 4 4 01/31/2023     05/01/2017     Lab Results   Component Value Date    HCT 40 7 10/25/2016    HGB 13 8 10/25/2016    MCV 86 10/25/2016     10/25/2016    WBC 7 02 10/25/2016     Chaparro Manzo PA-C    "

## 2023-07-07 ENCOUNTER — OFFICE VISIT (OUTPATIENT)
Dept: FAMILY MEDICINE CLINIC | Facility: CLINIC | Age: 63
End: 2023-07-07
Payer: COMMERCIAL

## 2023-07-07 VITALS
WEIGHT: 133.6 LBS | DIASTOLIC BLOOD PRESSURE: 80 MMHG | SYSTOLIC BLOOD PRESSURE: 110 MMHG | OXYGEN SATURATION: 98 % | BODY MASS INDEX: 23.67 KG/M2 | HEART RATE: 65 BPM | HEIGHT: 63 IN

## 2023-07-07 DIAGNOSIS — K59.00 CONSTIPATION, UNSPECIFIED CONSTIPATION TYPE: Primary | ICD-10-CM

## 2023-07-07 DIAGNOSIS — Z12.11 SCREENING FOR COLON CANCER: ICD-10-CM

## 2023-07-07 PROBLEM — Z86.16 HISTORY OF COVID-19: Status: RESOLVED | Noted: 2022-06-29 | Resolved: 2023-07-07

## 2023-07-07 PROCEDURE — 99213 OFFICE O/P EST LOW 20 MIN: CPT | Performed by: FAMILY MEDICINE

## 2023-07-07 NOTE — PROGRESS NOTES
Name: Khang Wilburn      : 1960      MRN: [id number]  Encounter Provider: Andres Khan MD  Encounter Date: 2023   Encounter department: [facility] PRIMARY CARE    Assessment & Plan     1. Constipation, unspecified constipation type  Assessment & Plan:  Needs  GI eval, treat with a  Couple doses of  Magnesium citrate and then trial Linzess    Orders:  -     Ambulatory Referral to Gastroenterology; Future  -     TSH, 3rd generation; Future  -     linaCLOtide 72 MCG CAPS; Take 72 mcg by mouth daily at least 30 minutes prior to the first meal of the day    2. Screening for colon cancer  -     Ambulatory Referral to Gastroenterology; Future         Subjective      Had back pain, had uti and thought that  Was reason for  Back pain, had CT scan that  Showed constipation, started  With sx, took Metamucil with pomegranate  juice, dulcolax, stool softener, enema-all without relief, has  Referral for PT  For back-hasn't  Been able to get in    Review of Systems   Constitutional: Negative for activity change, appetite change and fatigue. Respiratory: Negative for shortness of breath. Cardiovascular: Negative for chest pain. Gastrointestinal: Positive for abdominal distention and constipation. Negative for abdominal pain. Neurological: Negative for dizziness and headaches.        Current Outpatient Medications on File Prior to Visit   Medication Sig   • albuterol (PROVENTIL HFA,VENTOLIN HFA) 90 mcg/act inhaler Inhale 2 puffs every 6 (six) hours as needed for wheezing   • atenolol (TENORMIN) 25 mg tablet TAKE ONE TABLET BY MOUTH EVERY DAY   • atorvastatin (LIPITOR) 80 mg tablet Take 1 tablet (80 mg total) by mouth daily   • Calcium 600 MG tablet Take 600 mg by mouth   • Cholecalciferol (VITAMIN D-3 PO) Take 4,000 Int'l Units by mouth daily   • CRANBERRY PO Take 500 mg by mouth   • D-MANNOSE PO Take by mouth   • DULoxetine (CYMBALTA) 60 mg delayed release capsule TAKE 1 CAPSULE DAILY   • fluticasone (FLONASE) 50 mcg/act nasal spray 2 sprays into each nostril daily   • meloxicam (Mobic) 15 mg tablet Take 1 tablet (15 mg total) by mouth daily   • methocarbamol (ROBAXIN) 500 mg tablet Take 1 tablet (500 mg total) by mouth 3 (three) times a day   • Multiple Vitamin (MULTIVITAMIN) tablet Take 1 tablet by mouth daily    • Nutritional Supplements (ANTIOXIDANTS PO) Take 1 tablet by mouth daily   • sertraline (ZOLOFT) 25 mg tablet Take 1 tablet (25 mg total) by mouth daily   • traZODone (DESYREL) 50 mg tablet TAKE ONE TABLET BY MOUTH DAILY AT BEDTIME   • methenamine hippurate (HIPREX) 1 g tablet Take 1 tablet (1 g total) by mouth 2 (two) times a day with meals (Patient not taking: Reported on 7/7/2023)   • methocarbamol (ROBAXIN) 500 mg tablet Take 1 tablet (500 mg total) by mouth daily at bedtime as needed for muscle spasms for up to 7 days   • mupirocin (BACTROBAN) 2 % ointment Apply topically 3 (three) times a day (Patient not taking: Reported on 4/24/2023)   • ondansetron (Zofran ODT) 4 mg disintegrating tablet Take 1 tablet (4 mg total) by mouth every 6 (six) hours as needed for nausea or vomiting (Patient not taking: Reported on 7/7/2023)   • predniSONE 10 mg tablet Take 6 tabs days 1&2, 5 tabs days 3&4, 4 tabs days 5&6, 3 tabs days 7&8, 2 tabs days 9&10, 1 tab days 11&12 (Patient not taking: Reported on 6/6/2023)       Objective     /80 (BP Location: Right arm, Patient Position: Sitting, Cuff Size: Standard)   Pulse 65   Ht 5' 3" (1.6 m)   Wt 60.6 kg (133 lb 9.6 oz)   SpO2 98%   BMI 23.67 kg/m²     Physical Exam  Vitals reviewed. Constitutional:       Appearance: Normal appearance. Cardiovascular:      Rate and Rhythm: Normal rate and regular rhythm. Pulses: Normal pulses. Heart sounds: Normal heart sounds. Pulmonary:      Effort: Pulmonary effort is normal.      Breath sounds: Normal breath sounds. Abdominal:      General: Abdomen is flat.  Bowel sounds are normal.      Palpations: Abdomen is soft. Musculoskeletal:      Right lower leg: No edema. Left lower leg: No edema. Lymphadenopathy:      Cervical: No cervical adenopathy. Neurological:      Mental Status: She is alert.    Psychiatric:         Mood and Affect: Mood normal.       Sarah Kolb MD

## 2023-07-25 DIAGNOSIS — F41.8 DEPRESSION WITH ANXIETY: ICD-10-CM

## 2023-07-25 RX ORDER — DULOXETIN HYDROCHLORIDE 60 MG/1
CAPSULE, DELAYED RELEASE ORAL
Qty: 90 CAPSULE | Refills: 1 | Status: SHIPPED | OUTPATIENT
Start: 2023-07-25

## 2023-08-08 ENCOUNTER — APPOINTMENT (OUTPATIENT)
Dept: LAB | Facility: CLINIC | Age: 63
End: 2023-08-08
Payer: COMMERCIAL

## 2023-08-08 ENCOUNTER — RA CDI HCC (OUTPATIENT)
Dept: OTHER | Facility: HOSPITAL | Age: 63
End: 2023-08-08

## 2023-08-08 DIAGNOSIS — K59.00 CONSTIPATION, UNSPECIFIED CONSTIPATION TYPE: ICD-10-CM

## 2023-08-08 DIAGNOSIS — E55.9 VITAMIN D DEFICIENCY: ICD-10-CM

## 2023-08-08 DIAGNOSIS — R73.9 HYPERGLYCEMIA: ICD-10-CM

## 2023-08-08 DIAGNOSIS — I10 ESSENTIAL HYPERTENSION: ICD-10-CM

## 2023-08-08 DIAGNOSIS — E78.2 MIXED HYPERLIPIDEMIA: ICD-10-CM

## 2023-08-08 LAB
ALBUMIN SERPL BCP-MCNC: 3.6 G/DL (ref 3.5–5)
ALP SERPL-CCNC: 80 U/L (ref 46–116)
ALT SERPL W P-5'-P-CCNC: 22 U/L (ref 12–78)
ANION GAP SERPL CALCULATED.3IONS-SCNC: 2 MMOL/L
AST SERPL W P-5'-P-CCNC: 16 U/L (ref 5–45)
BASOPHILS # BLD AUTO: 0.04 THOUSANDS/ÂΜL (ref 0–0.1)
BASOPHILS NFR BLD AUTO: 1 % (ref 0–1)
BILIRUB SERPL-MCNC: 0.68 MG/DL (ref 0.2–1)
BUN SERPL-MCNC: 24 MG/DL (ref 5–25)
CALCIUM SERPL-MCNC: 8.7 MG/DL (ref 8.3–10.1)
CHLORIDE SERPL-SCNC: 108 MMOL/L (ref 96–108)
CHOLEST SERPL-MCNC: 149 MG/DL
CO2 SERPL-SCNC: 29 MMOL/L (ref 21–32)
CREAT SERPL-MCNC: 0.67 MG/DL (ref 0.6–1.3)
EOSINOPHIL # BLD AUTO: 0.24 THOUSAND/ÂΜL (ref 0–0.61)
EOSINOPHIL NFR BLD AUTO: 4 % (ref 0–6)
ERYTHROCYTE [DISTWIDTH] IN BLOOD BY AUTOMATED COUNT: 14.7 % (ref 11.6–15.1)
GFR SERPL CREATININE-BSD FRML MDRD: 92 ML/MIN/1.73SQ M
GLUCOSE P FAST SERPL-MCNC: 109 MG/DL (ref 65–99)
HCT VFR BLD AUTO: 43.7 % (ref 34.8–46.1)
HDLC SERPL-MCNC: 60 MG/DL
HGB BLD-MCNC: 13.6 G/DL (ref 11.5–15.4)
IMM GRANULOCYTES # BLD AUTO: 0.02 THOUSAND/UL (ref 0–0.2)
IMM GRANULOCYTES NFR BLD AUTO: 0 % (ref 0–2)
LDLC SERPL CALC-MCNC: 75 MG/DL (ref 0–100)
LYMPHOCYTES # BLD AUTO: 1.49 THOUSANDS/ÂΜL (ref 0.6–4.47)
LYMPHOCYTES NFR BLD AUTO: 27 % (ref 14–44)
MCH RBC QN AUTO: 25.9 PG (ref 26.8–34.3)
MCHC RBC AUTO-ENTMCNC: 31.1 G/DL (ref 31.4–37.4)
MCV RBC AUTO: 83 FL (ref 82–98)
MONOCYTES # BLD AUTO: 0.5 THOUSAND/ÂΜL (ref 0.17–1.22)
MONOCYTES NFR BLD AUTO: 9 % (ref 4–12)
NEUTROPHILS # BLD AUTO: 3.29 THOUSANDS/ÂΜL (ref 1.85–7.62)
NEUTS SEG NFR BLD AUTO: 59 % (ref 43–75)
NRBC BLD AUTO-RTO: 0 /100 WBCS
PLATELET # BLD AUTO: 281 THOUSANDS/UL (ref 149–390)
PMV BLD AUTO: 10.2 FL (ref 8.9–12.7)
POTASSIUM SERPL-SCNC: 4.1 MMOL/L (ref 3.5–5.3)
PROT SERPL-MCNC: 6.8 G/DL (ref 6.4–8.4)
RBC # BLD AUTO: 5.26 MILLION/UL (ref 3.81–5.12)
SODIUM SERPL-SCNC: 139 MMOL/L (ref 135–147)
TRIGL SERPL-MCNC: 70 MG/DL
WBC # BLD AUTO: 5.58 THOUSAND/UL (ref 4.31–10.16)

## 2023-08-08 PROCEDURE — 80061 LIPID PANEL: CPT

## 2023-08-08 PROCEDURE — 84443 ASSAY THYROID STIM HORMONE: CPT

## 2023-08-08 PROCEDURE — 82306 VITAMIN D 25 HYDROXY: CPT

## 2023-08-08 PROCEDURE — 80053 COMPREHEN METABOLIC PANEL: CPT

## 2023-08-08 PROCEDURE — 36415 COLL VENOUS BLD VENIPUNCTURE: CPT

## 2023-08-08 PROCEDURE — 85025 COMPLETE CBC W/AUTO DIFF WBC: CPT

## 2023-08-08 NOTE — PROGRESS NOTES
[address] [facility] coding opportunities       Chart reviewed, no opportunity found: CHART REVIEWED, NO OPPORTUNITY FOUND        Patients Insurance        Commercial Insurance: [employer]

## 2023-08-09 LAB
25(OH)D3 SERPL-MCNC: 38.6 NG/ML (ref 30–100)
TSH SERPL DL<=0.05 MIU/L-ACNC: 1.86 UIU/ML (ref 0.45–4.5)

## 2023-08-15 ENCOUNTER — OFFICE VISIT (OUTPATIENT)
Dept: FAMILY MEDICINE CLINIC | Facility: CLINIC | Age: 63
End: 2023-08-15
Payer: COMMERCIAL

## 2023-08-15 VITALS
SYSTOLIC BLOOD PRESSURE: 126 MMHG | RESPIRATION RATE: 16 BRPM | WEIGHT: 134 LBS | OXYGEN SATURATION: 98 % | DIASTOLIC BLOOD PRESSURE: 72 MMHG | HEART RATE: 69 BPM | HEIGHT: 63 IN | BODY MASS INDEX: 23.74 KG/M2

## 2023-08-15 DIAGNOSIS — M81.0 AGE-RELATED OSTEOPOROSIS WITHOUT CURRENT PATHOLOGICAL FRACTURE: ICD-10-CM

## 2023-08-15 DIAGNOSIS — F41.8 DEPRESSION WITH ANXIETY: ICD-10-CM

## 2023-08-15 DIAGNOSIS — Z00.00 HEALTHCARE MAINTENANCE: ICD-10-CM

## 2023-08-15 DIAGNOSIS — E78.2 MIXED HYPERLIPIDEMIA: ICD-10-CM

## 2023-08-15 DIAGNOSIS — E55.9 VITAMIN D DEFICIENCY: Primary | ICD-10-CM

## 2023-08-15 DIAGNOSIS — K59.00 CONSTIPATION, UNSPECIFIED CONSTIPATION TYPE: ICD-10-CM

## 2023-08-15 DIAGNOSIS — I10 ESSENTIAL HYPERTENSION: ICD-10-CM

## 2023-08-15 DIAGNOSIS — F51.01 PRIMARY INSOMNIA: ICD-10-CM
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
## 2023-08-15 PROBLEM — Z12.31 ENCOUNTER FOR SCREENING MAMMOGRAM FOR MALIGNANT NEOPLASM OF BREAST: Status: RESOLVED | Noted: 2023-03-15 | Resolved: 2023-08-15

## 2023-08-15 PROCEDURE — 99214 OFFICE O/P EST MOD 30 MIN: CPT | Performed by: PHYSICIAN ASSISTANT

## 2023-08-15 RX ORDER — ATENOLOL 25 MG/1
25 TABLET ORAL DAILY
Qty: 30 TABLET | Refills: 11 | Status: SHIPPED | OUTPATIENT
Start: 2023-08-15

## 2023-08-15 RX ORDER — ATORVASTATIN CALCIUM 80 MG/1
80 TABLET, FILM COATED ORAL DAILY
Qty: 90 TABLET | Refills: 3 | Status: SHIPPED | OUTPATIENT
Start: 2023-08-15 | End: 2023-08-15 | Stop reason: SDUPTHER

## 2023-08-15 RX ORDER — ATENOLOL 25 MG/1
25 TABLET ORAL DAILY
Qty: 90 TABLET | Refills: 3 | Status: SHIPPED | OUTPATIENT
Start: 2023-08-15 | End: 2023-08-15 | Stop reason: SDUPTHER

## 2023-08-15 RX ORDER — ATORVASTATIN CALCIUM 80 MG/1
80 TABLET, FILM COATED ORAL DAILY
Qty: 90 TABLET | Refills: 3 | Status: SHIPPED | OUTPATIENT
Start: 2023-08-15

## 2023-08-15 RX ORDER — ATORVASTATIN CALCIUM 80 MG/1
80 TABLET, FILM COATED ORAL DAILY
Qty: 90 TABLET | Refills: 3 | Status: CANCELLED | OUTPATIENT
Start: 2023-08-15

## 2023-08-15 NOTE — ASSESSMENT & PLAN NOTE
Pt. never ended up filling the linzess. She took the mag citrate and it worked. Cologaurd neg 7/2020. Still goes sometimes 3 days w/o BM. I still recommend colonoscopy and order was placed one month ago. Pt has not gotten call but will.

## 2023-08-15 NOTE — PROGRESS NOTES
Name: Khang Wilburn      : 1960      MRN: [id number]  Encounter Provider: Natalya Phelps PA-C  Encounter Date: 8/15/2023   Encounter department: [facility]    Assessment & Plan     1. Vitamin D deficiency  Assessment & Plan:  Vitamin D level is normal at 38 continue supplementation check yearly. 2. Essential hypertension  Assessment & Plan:  Very well controlled on current medication. Orders:  -     atenolol (TENORMIN) 25 mg tablet; Take 1 tablet (25 mg total) by mouth daily    3. Mixed hyperlipidemia  Assessment & Plan:  Patient is on Lipitor 80 with an LDL of 75 which is at goal.  Continue recheck 6 months. Orders:  -     atorvastatin (LIPITOR) 80 mg tablet; Take 1 tablet (80 mg total) by mouth daily  -     Lipid Panel with Direct LDL reflex; Future; Expected date: 02/15/2024  -     Comprehensive metabolic panel; Future; Expected date: 02/15/2024    4. Depression with anxiety  Assessment & Plan:  Stable on Cymbalta/Zoloft without SI or HI.      5. Age-related osteoporosis without current pathological fracture  Assessment & Plan:  Technically patient's last DEXA showed she had osteopenia repeat is ordered for 2 years. 6. Primary insomnia  Assessment & Plan:  Stable with trazodone. 7. Constipation, unspecified constipation type  Assessment & Plan:  Pt. never ended up filling the linzess. She took the mag citrate and it worked. Cologaurd neg 2020. Still goes sometimes 3 days w/o BM. I still recommend colonoscopy and order was placed one month ago. Pt has not gotten call but will. 8. Healthcare maintenance  -     Ambulatory referral to Obstetrics / Gynecology; Future           Subjective        Khang Wilburn is here for chronic conditions f/u.  Pt. had labs done prior to today's visit which included Recent Results (from the past 672 hour(s))  -Lipid Panel with Direct LDL reflex:   Collection Time: 23  9:10 AM       Result                      Value Ref Range           Cholesterol                 149               See Comment *       Triglycerides               70                See Comment *       HDL, Direct                 60                >=50 mg/dL          LDL Calculated              75                0 - 100 mg/dL  -Comprehensive metabolic panel:   Collection Time: 08/08/23  9:10 AM       Result                      Value             Ref Range           Sodium                      139               135 - 147 mm*       Potassium                   4.1               3.5 - 5.3 mm*       Chloride                    108               96 - 108 mmo*       CO2                         29                21 - 32 mmol*       ANION GAP                   2                 mmol/L              BUN                         24                5 - 25 mg/dL        Creatinine                  0.67              0.60 - 1.30 *       Glucose, Fasting            109 (H)           65 - 99 mg/dL       Calcium                     8.7               8.3 - 10.1 m*       AST                         16                5 - 45 U/L          ALT                         22                12 - 78 U/L         Alkaline Phosphatase        80                46 - 116 U/L        Total Protein               6.8               6.4 - 8.4 g/*       Albumin                     3.6               3.5 - 5.0 g/*       Total Bilirubin             0.68              0.20 - 1.00 *       eGFR                        92                ml/min/1.73s*  -Vitamin D 25 hydroxy:   Collection Time: 08/08/23  9:10 AM       Result                      Value             Ref Range           Vit D, 25-Hydroxy           38.6              30.0 - 100.0*  -CBC and differential:   Collection Time: 08/08/23  9:10 AM       Result                      Value             Ref Range           WBC                         5.58              4.31 - 10.16*       RBC                         5.26 (H)          3.81 - 5.12 *       Hemoglobin 13.6              11.5 - 15.4 *       Hematocrit                  43.7              34.8 - 46.1 %       MCV                         83                82 - 98 fL          MCH                         25.9 (L)          26.8 - 34.3 *       MCHC                        31.1 (L)          31.4 - 37.4 *       RDW                         14.7              11.6 - 15.1 %       MPV                         10.2              8.9 - 12.7 fL       Platelets                   281               149 - 390 Th*       nRBC                        0                 /100 WBCs           Neutrophils Relative        59                43 - 75 %           Immat GRANS %               0                 0 - 2 %             Lymphocytes Relative        27                14 - 44 %           Monocytes Relative          9                 4 - 12 %            Eosinophils Relative        4                 0 - 6 %             Basophils Relative          1                 0 - 1 %             Neutrophils Absolute        3.29              1.85 - 7.62 *       Immature Grans Absolute     0.02              0.00 - 0.20 *       Lymphocytes Absolute        1.49              0.60 - 4.47 *       Monocytes Absolute          0.50              0.17 - 1.22 *       Eosinophils Absolute        0.24              0.00 - 0.61 *       Basophils Absolute          0.04              0.00 - 0.10 *  -TSH, 3rd generation:   Collection Time: 08/08/23  9:10 AM       Result                      Value             Ref Range           TSH 3RD GENERATON           1.862             0.450 - 4.50*      Review of Systems   Constitutional: Negative. HENT: Negative. Eyes: Negative. Respiratory: Negative. Cardiovascular: Negative. Gastrointestinal: Negative. Endocrine: Negative. Genitourinary: Negative. Musculoskeletal: Negative. Skin: Negative. Allergic/Immunologic: Negative. Neurological: Negative. Hematological: Negative.     Psychiatric/Behavioral: Negative.         Current Outpatient Medications on File Prior to Visit   Medication Sig   • albuterol (PROVENTIL HFA,VENTOLIN HFA) 90 mcg/act inhaler Inhale 2 puffs every 6 (six) hours as needed for wheezing   • Calcium 600 MG tablet Take 600 mg by mouth   • Cholecalciferol (VITAMIN D-3 PO) Take 4,000 Int'l Units by mouth daily   • CRANBERRY PO Take 500 mg by mouth   • D-MANNOSE PO Take by mouth   • DULoxetine (CYMBALTA) 60 mg delayed release capsule TAKE 1 CAPSULE DAILY   • fluticasone (FLONASE) 50 mcg/act nasal spray 2 sprays into each nostril daily   • meloxicam (Mobic) 15 mg tablet Take 1 tablet (15 mg total) by mouth daily   • methocarbamol (ROBAXIN) 500 mg tablet Take 1 tablet (500 mg total) by mouth 3 (three) times a day   • Multiple Vitamin (MULTIVITAMIN) tablet Take 1 tablet by mouth daily    • Nutritional Supplements (ANTIOXIDANTS PO) Take 1 tablet by mouth daily   • sertraline (ZOLOFT) 25 mg tablet Take 1 tablet (25 mg total) by mouth daily   • traZODone (DESYREL) 50 mg tablet TAKE ONE TABLET BY MOUTH DAILY AT BEDTIME   • [DISCONTINUED] atenolol (TENORMIN) 25 mg tablet TAKE ONE TABLET BY MOUTH EVERY DAY   • [DISCONTINUED] atorvastatin (LIPITOR) 80 mg tablet Take 1 tablet (80 mg total) by mouth daily   • [DISCONTINUED] linaCLOtide 72 MCG CAPS Take 72 mcg by mouth daily at least 30 minutes prior to the first meal of the day   • [DISCONTINUED] methenamine hippurate (HIPREX) 1 g tablet Take 1 tablet (1 g total) by mouth 2 (two) times a day with meals (Patient not taking: Reported on 7/7/2023)   • [DISCONTINUED] methocarbamol (ROBAXIN) 500 mg tablet Take 1 tablet (500 mg total) by mouth daily at bedtime as needed for muscle spasms for up to 7 days   • [DISCONTINUED] mupirocin (BACTROBAN) 2 % ointment Apply topically 3 (three) times a day (Patient not taking: Reported on 4/24/2023)   • [DISCONTINUED] ondansetron (Zofran ODT) 4 mg disintegrating tablet Take 1 tablet (4 mg total) by mouth every 6 (six) hours as needed for nausea or vomiting (Patient not taking: Reported on 7/7/2023)   • [DISCONTINUED] predniSONE 10 mg tablet Take 6 tabs days 1&2, 5 tabs days 3&4, 4 tabs days 5&6, 3 tabs days 7&8, 2 tabs days 9&10, 1 tab days 11&12 (Patient not taking: Reported on 6/6/2023)       Objective     /72 (BP Location: Left arm, Patient Position: Sitting, Cuff Size: Standard)   Pulse 69   Resp 16   Ht 5' 3" (1.6 m)   Wt 60.8 kg (134 lb)   SpO2 98%   BMI 23.74 kg/m²     Physical Exam  Vitals and nursing note reviewed. Constitutional:       General: She is not in acute distress. Appearance: She is well-developed. She is not diaphoretic. HENT:      Head: Normocephalic and atraumatic. Eyes:      General:         Right eye: No discharge. Left eye: No discharge. Conjunctiva/sclera: Conjunctivae normal.   Neck:      Vascular: No carotid bruit. Cardiovascular:      Rate and Rhythm: Normal rate and regular rhythm. Heart sounds: Normal heart sounds. No murmur heard. No friction rub. No gallop. Pulmonary:      Effort: Pulmonary effort is normal. No respiratory distress. Breath sounds: Normal breath sounds. No wheezing or rales. Musculoskeletal:      Cervical back: Neck supple. Skin:     General: Skin is warm and dry. Neurological:      Mental Status: She is alert and oriented to person, place, and time.    Psychiatric:         Judgment: Judgment normal.       Reed Valles PA-C

## 2023-08-15 NOTE — PATIENT INSTRUCTIONS
Problem List Items Addressed This Visit          Cardiovascular and Mediastinum    Essential hypertension     Very well controlled on current medication. Relevant Medications    atenolol (TENORMIN) 25 mg tablet       Musculoskeletal and Integument    Age-related osteoporosis without current pathological fracture     Technically patient's last DEXA showed she had osteopenia repeat is ordered for 2 years. Other    Depression with anxiety     Stable on Cymbalta/Zoloft without SI or HI. Mixed hyperlipidemia     Patient is on Lipitor 80 with an LDL of 75 which is at goal.  Continue recheck 6 months. Relevant Medications    atorvastatin (LIPITOR) 80 mg tablet    Other Relevant Orders    Lipid Panel with Direct LDL reflex    Comprehensive metabolic panel    Insomnia     Stable with trazodone. Vitamin D deficiency - Primary     Vitamin D level is normal at 38 continue supplementation check yearly. Constipation     Pt. never ended up filling the linzess. She took the mag citrate and it worked. Cologaurd neg 7/2020. Still goes sometimes 3 days w/o BM. I still recommend colonoscopy and order was placed one month ago. Pt has not gotten call but will.            Other Visit Diagnoses       Healthcare maintenance        Relevant Orders    Ambulatory referral to Obstetrics / Gynecology

## 2023-08-18 ENCOUNTER — EVALUATION (OUTPATIENT)
Dept: PHYSICAL THERAPY | Facility: REHABILITATION | Age: 63
End: 2023-08-18
Payer: COMMERCIAL

## 2023-08-18 DIAGNOSIS — M24.9 DERANGEMENT OF RIGHT SACROILIAC JOINT: Primary | ICD-10-CM

## 2023-08-18 PROCEDURE — 97161 PT EVAL LOW COMPLEX 20 MIN: CPT

## 2023-08-18 PROCEDURE — 97110 THERAPEUTIC EXERCISES: CPT

## 2023-08-18 NOTE — PROGRESS NOTES
PT Evaluation     Today's date: 2023  Patient name: Caryle Hartmann  : 1960  MRN: [id number]  Referring provider: Lianna Muta*  Dx:   Encounter Diagnosis     ICD-10-CM    1. Derangement of right sacroiliac joint  M24.9 Ambulatory Referral to Physical Therapy          Start Time: 945  Stop Time: 1030  Total time in clinic (min): 45 minutes    Assessment  Assessment details: Patient is a 70 y.o. female presenting to initial examination with chief complaint of chronic lower back pain with radiation into the R hip that started about a year ago following a slip navigating down some carpeted stairs. Signs and symptoms are consistent with referred diagnosis. Primary impairments include painful/restricted spinal mobility, segmental hypomobility in the lower lumbar/sacral spine, TTP localized the the L5/S1 level with TTP along B/L SI joints (R>L), gross weakness in B/L hip musculature, and increased tone in the B/L lumbar paraspinals (R>L) in addition to the gluteal musculature B/L (R>L). As a result of impairments patient experiences limitations with functional/daily activities including those listed below. Educated patient regarding plan of care and answered all patient questions to patient satisfaction. Patient would benefit from skilled PT interventions to address above impairments, achieve goals, and to maximize function.  Thank you for the referral.    Impairments: abnormal muscle tone, abnormal or restricted ROM, impaired physical strength, lacks appropriate home exercise program, pain with function, weight-bearing intolerance, poor posture  and poor body mechanics  Functional limitations: household cleaning such as mopping/sweeping/vacumming as required for home/occupation, biking, lifting, prolonged standing/walking  Symptom irritability: moderateUnderstanding of Dx/Px/POC: good   Prognosis: good    Goals  Impairment Goals: 4-6 weeks  - Patient to decrease pain at worst from 10/10 to 2-3/10  - Patient to increase hip strength to 5/5 throughout  - Patient to demonstrate proper core activation and body mechanics during functional activities  - patient to demonstrate improvements in spinal ROM to WNL throughout    Functional Goals: by discharge  - Patient to discharge to independent HEP  - Patient to return to prior level of function  - Patient to tolerate sweeping/mopping without increased pain or difficulty  - Patient to tolerate returning to biking without increased pain or difficulty  - Patient to tolerate vacuuming without increased pain or difficulty  - Patient to tolerate lifting such as to move her potted plants without increased pain or difficulty        Plan  Patient would benefit from: skilled physical therapy  Referral necessary: No  Planned modality interventions: traction, cryotherapy and thermotherapy: hydrocollator packs  Planned therapy interventions: joint mobilization, abdominal trunk stabilization, manual therapy, body mechanics training, neuromuscular re-education, patient education, postural training, strengthening, stretching, therapeutic activities, therapeutic exercise, functional ROM exercises and home exercise program  Frequency: 1x week  Duration in visits: 4  Duration in weeks: 4  Plan of Care beginning date: 8/18/2023  Treatment plan discussed with: patient        Subjective Evaluation    History of Present Illness  Mechanism of injury: Vianca Falcon reports to PT with complaints of lower back pain with radiation into the R hip that has been bothering her for for about a year at this point. Notes some improvement since initial onset (following a slip on the stairs) but is still limiting. She describes the following regarding her current symptoms:  - reports she had a little bit of a slip down the steps.   Caught herself but jerked her body in a weird way and began to have lower back pain (more right sided, but seems to travel), at times seems that its across the whole back but R always more than L  - slip occurred about a year ago and pain in general has gotten progressively better since  - waited for a while for it to get better, someday's would be better than others, overall would push through aggravating activities  - has been taking aleve to deal with it   - thinks that if she did some exercises this would help  - Primarily in the lower back and a bit into the R hip, doesn't go down the leg  - no numbness or tingling/weakness  - denies any bowel or bladder changes though mentions some constipation that she had at one point in which she received a diagnostic work-up and went for imaging about a month and a half/2 months ago to clear potential red flag conditions, was told that the back was not involved and that she is okay  - pushing the vacuum was particularly difficult and aggravates pain  - riding her bike increases pain, causes pain in hips and back (has been holding off)  - occupation as a , retired but still does some work  - 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   Will try to do some stretches throughout the day to help  - flexing forward seems to relieves symptoms in the back  - pain not always present in the hip  - Mopping floor/sweeping with the twisting increases pain  - lifting things increases pain as well and avoids lifting heavier objects like her plant pots in the home            Recurrent probem    Quality of life: good    Patient Goals  Patient goals for therapy: return to sport/leisure activities, independence with ADLs/IADLs, increased strength, decreased pain, improved balance and increased motion    Pain  Current pain ratin  At best pain ratin  At worst pain ratin  Quality: pressure, tight, radiating and sharp  Relieving factors: medications, ice and heat  Aggravating factors: lifting and standing  Progression: no change    Treatments  No previous or current treatments        Objective     Concurrent Complaints  Negative for night pain, disturbed sleep, bladder dysfunction, bowel dysfunction, saddle (S4) numbness, cardiac problem, kidney problem, gallbladder problem, stomach problem, ulcer, appendix problem, spleen problem, pancreas problem, history of cancer, history of trauma and infection    Postural Observations  Seated posture: fair  Standing posture: fair  Correction of posture: has no consistent effect        Palpation   Left   Hypertonic in the lumbar paraspinals. Right   Hypertonic in the lumbar paraspinals and quadratus lumborum. Tenderness of the lumbar paraspinals and quadratus lumborum.      Additional Palpation Details  Note: increased tone and tenderness in B/L gluteal musculature (R>L)    Tenderness     Additional Tenderness Details  TTP primarily localized at L5/S1 segmental level with reports of pain primarily in the R side of back comparative to L, TTP also noted along SI joint B/L as well (R>L)    Neurological Testing     Sensation     Lumbar   Left   Intact: light touch    Right   Intact: light touch    Reflexes   Left   Patellar (L4): normal (2+)  Achilles (S1): trace (1+)  Clonus sign: negative    Right   Patellar (L4): normal (2+)  Achilles (S1): trace (1+)  Clonus sign: negative    Active Range of Motion   Cervical/Thoracic Spine       Thoracic    Extension:  with pain Restriction level: minimal  Left rotation:  Restriction level: minimal  Right rotation:  Restriction level: minimal    Lumbar   Flexion:  WFL and with pain  Extension:  with pain Restriction level: minimal  Left lateral flexion:  Restriction level: minimal  Right lateral flexion:  Restriction level: moderate  Left rotation:  with pain Restriction level: moderate  Right rotation:  Restriction level: minimal    Additional Active Range of Motion Details  Pain and tightness with flexion  Pain localized R side LB with EXT        Joint Play     Hypomobile: T8, T9, T10, T11, L4, L5 and S1     Pain: L5 and S1     Strength/Myotome Testing     Lumbar   Left   Heel walk: normal  Toe walk: normal    Right   Heel walk: normal  Toe walk: normal    Left Hip   Planes of Motion   Flexion: 4  Extension: 4-  Abduction: 4-  Adduction: 4+  External rotation: 4-  Internal rotation: 4-    Right Hip   Planes of Motion   Flexion: 4  Extension: 4-  Abduction: 4-  Adduction: 4+  External rotation: 4-  Internal rotation: 4-    Left Knee   Flexion: 4  Extension: 5    Right Knee   Flexion: 4  Extension: 5    Left Ankle/Foot   Dorsiflexion: 5  Plantar flexion: 5  Great toe extension: 4+    Right Ankle/Foot   Dorsiflexion: 5  Plantar flexion: 5  Great toe extension: 4+    Muscle Activation   Patient able to activate left transverse abdominals and right transverse abdominals. Additional Muscle Activation Details  Poor activation of B/L TA, improved with verbal/tactile cueing though unable to reproduce consistently independently or maintain against manual resistance     Tests     Lumbar   Positive SIJ compression. Left   Negative crossed SLR, passive SLR and slump test.     Right   Negative crossed SLR, passive SLR and slump test.     Left Hip   Positive TOMMY. Negative FADIR. Right Hip   Positive TOMMY. Negative FADIR. Functional Assessment      Squat    Pain, trunk lean left, sitting toward left side, left tibial anterior translation beyond toes, right valgus and right tibial anterior translation beyond toes.               Precautions: N/A      Manuals 8/18                                                                Neuro Re-Ed                                                                                                        Ther Ex             Supine Figure 4 Stretch 3x30"            Bridges 2x10                                                                                          Ther

## 2023-08-25 ENCOUNTER — OFFICE VISIT (OUTPATIENT)
Dept: PHYSICAL THERAPY | Facility: REHABILITATION | Age: 63
End: 2023-08-25
Payer: COMMERCIAL

## 2023-08-25 DIAGNOSIS — M24.9 DERANGEMENT OF RIGHT SACROILIAC JOINT: Primary | ICD-10-CM

## 2023-08-25 PROCEDURE — 97110 THERAPEUTIC EXERCISES: CPT

## 2023-08-25 PROCEDURE — 97112 NEUROMUSCULAR REEDUCATION: CPT

## 2023-08-25 NOTE — PROGRESS NOTES
Daily Note     Today's date: 2023  Patient name: Alivia Leonard  : 1960  MRN: [id number]  Referring provider: Helen Hopson*  Dx:   Encounter Diagnosis     ICD-10-CM    1. Derangement of right sacroiliac joint  M24.9           Start Time: 0740  Stop Time: 0815  Total time in clinic (min): 35 minutes    Subjective: Chris Brannon notes that things are feeling about the same today. Mentions that she did some stretching this morning as she usually does, but this didn't really change much. Pain is primarily in the back today and not so much in the hips      Objective: See treatment diary below      Assessment: Tolerated treatment well. Patient demonstrated fatigue post treatment, exhibited good technique with therapeutic exercises and would benefit from continued PT. Patient capable of initiating LE strengthening today with appropriate symptom response. Exercises reviewed in clinic provided to HEP and patient educated regarding proper form, dosage, and symptom response. Will progress as tolerated. Plan: Continue per plan of care.       Precautions: N/A      Manuals                                                                Neuro Re-Ed             SL Hip ABD  2x10ea           Clamshells vs TB  2x10ea                                                                            Ther Ex             Supine Figure 4 Stretch 3x30" 3x30"           Bridges 2x10 3x10           HS Stretch vs SOS  3x30"                                                                            Ther Activity                                       Gait Training                                       Modalities

## 2023-08-28 ENCOUNTER — OFFICE VISIT (OUTPATIENT)
Dept: PHYSICAL THERAPY | Facility: REHABILITATION | Age: 63
End: 2023-08-28
Payer: COMMERCIAL

## 2023-08-28 DIAGNOSIS — M24.9 DERANGEMENT OF RIGHT SACROILIAC JOINT: Primary | ICD-10-CM

## 2023-08-28 PROCEDURE — 97110 THERAPEUTIC EXERCISES: CPT

## 2023-08-28 PROCEDURE — 97112 NEUROMUSCULAR REEDUCATION: CPT

## 2023-08-28 NOTE — PROGRESS NOTES
Daily Note     Today's date: 2023  Patient name: Carol Peres  : 1960  MRN: [id number]  Referring provider: Yan Moran*  Dx:   Encounter Diagnosis     ICD-10-CM    1. Derangement of right sacroiliac joint  M24.9           Start Time: 0900  Stop Time: 09  Total time in clinic (min): 45 minutes    Subjective: Roman Liu reports that things are feeling about the same. 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 Notes that things have simmered down though a bit since friday/saturday when she had thew work. Objective: See treatment diary below      Assessment: Tolerated treatment well. Patient demonstrated fatigue post treatment, exhibited good technique with therapeutic exercises and would benefit from continued PT. Patient capable off additional hip strengthening interventions more focused on endurance of hip musculature in WB stance. Time spent reviewing biomechanics for squatting/lifting, and patient required both verbal/visual cues in order to perform with appropriate form consistently. Plan: Continue per plan of care. Precautions: N/A      Manuals           Lower Lumbar PA Mob Grade 2   M. T.                                                  Neuro Re-Ed             SL Hip ABD  2x10ea 3x10 (#2)          Clamshells vs TB  2x10ea 3x10 (RTB)           Walks   3x30" (RTB)          Chair Squats + Hinge Mechanics   2x10                                                 Ther Ex             Supine Figure 4 Stretch 3x30" 3x30" 3x30"          Bridges 2x10 3x10 3x10 (+Ball Squeeze)          HS Stretch vs SOS  3x30"                                                                            Ther Activity                                       Gait Training                                       Modalities

## 2023-09-08 ENCOUNTER — OFFICE VISIT (OUTPATIENT)
Dept: PHYSICAL THERAPY | Facility: REHABILITATION | Age: 63
End: 2023-09-08
Payer: COMMERCIAL

## 2023-09-08 DIAGNOSIS — M24.9 DERANGEMENT OF RIGHT SACROILIAC JOINT: Primary | ICD-10-CM

## 2023-09-08 PROCEDURE — 97140 MANUAL THERAPY 1/> REGIONS: CPT

## 2023-09-08 PROCEDURE — 97110 THERAPEUTIC EXERCISES: CPT

## 2023-09-08 PROCEDURE — 97112 NEUROMUSCULAR REEDUCATION: CPT

## 2023-09-08 NOTE — PROGRESS NOTES
Daily Note     Today's date: 2023  Patient name: Rupal Harvey  : 1960  MRN: [id number]  Referring provider: Berna Mcmanus*  Dx:   Encounter Diagnosis     ICD-10-CM    1. Derangement of right sacroiliac joint  M24.9           Start Time: 0900  Stop Time: 0945  Total time in clinic (min): 45 minutes    Subjective: Hu Merino reports that pain is about the same and she is particularly stiff today      Objective: See treatment diary below      Assessment: Tolerated treatment well. Patient demonstrated fatigue post treatment, exhibited good technique with therapeutic exercises and would benefit from continued PT. Patient capable off progressing hip strengtheing intervention stoday with appropirate sympotm response. Patient required some additional cueing for appropriate form today to reduce compensatory motion at the spine with hip focused interventions. Demonstrates improved biomechanics with squat/STS transfers. Continues to respond well to PA mobilization to the lower lumbar spine with report of improved symptoms and improved spinal mobility post application. Plan: Continue per plan of care. Precautions: N/A      Manuals          Lower Lumbar PA Mob Grade 2   M.T. M.T.                                                 Neuro Re-Ed             SL Hip ABD  2x10ea 3x10 (#2) 3x10 (#2)         Clamshells vs TB  2x10ea 3x10 (RTB) 3x10 (GTB)          Walks   3x30" (RTB)          Chair Squats + Hinge Mechanics   2x10 3x10 (GTB)         TA Brace vs PB    5"x20         Dead Bugs    3x30"         Hip ADD Ball Squeeze    5"x20         Ther Ex             Supine Figure 4 Stretch 3x30" 3x30" 3x30"          Bridges 2x10 3x10 3x10 (+Ball Squeeze) 3x10 (GTB)         HS Stretch vs SOS  3x30"                                                                            Ther Activity                                       Gait Training                                       Modalities

## 2023-09-11 ENCOUNTER — OFFICE VISIT (OUTPATIENT)
Dept: PHYSICAL THERAPY | Facility: REHABILITATION | Age: 63
End: 2023-09-11
Payer: COMMERCIAL

## 2023-09-11 DIAGNOSIS — M24.9 DERANGEMENT OF RIGHT SACROILIAC JOINT: Primary | ICD-10-CM

## 2023-09-11 PROCEDURE — 97140 MANUAL THERAPY 1/> REGIONS: CPT

## 2023-09-11 PROCEDURE — 97112 NEUROMUSCULAR REEDUCATION: CPT

## 2023-09-11 PROCEDURE — 97110 THERAPEUTIC EXERCISES: CPT

## 2023-09-11 NOTE — PROGRESS NOTES
Daily Note     Today's date: 2023  Patient name: Ceci Barrientos  : 1960  MRN: [id number]  Referring provider: Jackie Orozco*  Dx:   Encounter Diagnosis     ICD-10-CM    1. Derangement of right sacroiliac joint  M24.9           Start Time: 0900  Stop Time: 945  Total time in clinic (min): 45 minutes    Subjective: Melissa Quintero reports that pain has been a bit worse since last session and that over the weekend the pain started to bother her across the lower back where it normally only bothers her into the side of the right hip. She states that she has been pushing through      Objective: See treatment diary below      Assessment: Tolerated treatment well. Patient demonstrated fatigue post treatment, exhibited good technique with therapeutic exercises and would benefit from continued PT. Patient presentes today with increased symptom irritability now with pain present across the lower back rather than being localized to the R SI joint region. This was reduced post application of manual therapy along with improved spinal rotational ORM and tolerability for rotational stretches. HEP stretches reviewed and modified to accomodate for symptoms. Patient educated not to push through pain symptoms while performing HEP as this will be counterproductive in mediating pain. Plan to re-assess at time of NV    Plan: Progress note during next visit. Precautions: N/A      Manuals         Lower Lumbar PA Mob Grade 2   M.T. M.T. M.T.                                                Neuro Re-Ed             SL Hip ABD  2x10ea 3x10 (#2) 3x10 (#2) 3x10 (#3)        Clamshells vs TB  2x10ea 3x10 (RTB) 3x10 (GTB) 3x10 (GTB)         Walks   3x30" (RTB)          Chair Squats + Hinge Mechanics   2x10 3x10 (GTB)         TA Brace vs PB    5"x20         Dead Bugs    3x30"         Hip ADD Ball Squeeze    5"x20         Ther Ex             Supine Figure 4 Stretch 3x30" 3x30" 3x30"  3x30" Bridges 2x10 3x10 3x10 (+Ball Squeeze) 3x10 (GTB)         HS Stretch vs SOS  3x30"           SKTC     5"x10        LTR     5"x10                                               Ther Activity                                       Gait Training                                       Modalities

## 2023-09-14 ENCOUNTER — OFFICE VISIT (OUTPATIENT)
Dept: UROLOGY | Facility: MEDICAL CENTER | Age: 63
End: 2023-09-14
Payer: COMMERCIAL

## 2023-09-14 VITALS
OXYGEN SATURATION: 99 % | HEIGHT: 63 IN | DIASTOLIC BLOOD PRESSURE: 74 MMHG | SYSTOLIC BLOOD PRESSURE: 118 MMHG | HEART RATE: 77 BPM | BODY MASS INDEX: 24.1 KG/M2 | WEIGHT: 136 LBS

## 2023-09-14 DIAGNOSIS — N30.00 ACUTE CYSTITIS WITHOUT HEMATURIA: ICD-10-CM

## 2023-09-14 DIAGNOSIS — N20.0 NEPHROLITHIASIS: ICD-10-CM

## 2023-09-14 DIAGNOSIS — N39.0 RECURRENT UTI: Primary | ICD-10-CM

## 2023-09-14 LAB
SL AMB  POCT GLUCOSE, UA: ABNORMAL
SL AMB LEUKOCYTE ESTERASE,UA: ABNORMAL
SL AMB POCT BILIRUBIN,UA: ABNORMAL
SL AMB POCT BLOOD,UA: ABNORMAL
SL AMB POCT CLARITY,UA: CLEAR
SL AMB POCT COLOR,UA: YELLOW
SL AMB POCT KETONES,UA: ABNORMAL
SL AMB POCT NITRITE,UA: ABNORMAL
SL AMB POCT PH,UA: 6.5
SL AMB POCT SPECIFIC GRAVITY,UA: 1.02
SL AMB POCT URINE PROTEIN: ABNORMAL
SL AMB POCT UROBILINOGEN: 0.2

## 2023-09-14 PROCEDURE — 81003 URINALYSIS AUTO W/O SCOPE: CPT | Performed by: UROLOGY

## 2023-09-14 PROCEDURE — 99214 OFFICE O/P EST MOD 30 MIN: CPT | Performed by: UROLOGY

## 2023-09-14 NOTE — PROGRESS NOTES
HISTORY:    1. Follow for recurrent UTI    Resistant E. coli infections on 6 cultures since October 2020    Has been on supplements with cranberry, d-mannose. She tried methenamine, but the pills started to dissolve on her tongue, tasted terrible, and she could not take them. 2.  Recent CT in June 2023 showed 3 small stones upper pole left kidney, 1 small stone midpole right kidney. CT in August 2022 showed the same thing         ASSESSMENT / PLAN:    CT films reviewed and shown to patient    1. Has not had infections since being on the above supplements, it has been 3 months    2. Standing order on the computer for urine culture as needed. She knows to go right to a [facility]'s lab and let us know if she turns a sample in.    3.  If further breakthrough infections, neck step would be Macrodantin 50 mg daily chronic suppression    4. Follow-up 1 year    The following portions of the patient's history were reviewed and updated as appropriate: allergies, current medications, past family history, past medical history, past social history, past surgical history and problem list.    Review of Systems   All other systems reviewed and are negative. Objective:     Physical Exam  Constitutional:       Appearance: Normal appearance. Neurological:      Mental Status: She is alert. No results found for: "PSA"]  BUN   Date Value Ref Range Status   08/08/2023 24 5 - 25 mg/dL Final   01/31/2023 26 (H) 7 - 25 mg/dL Final     Creatinine   Date Value Ref Range Status   08/08/2023 0.67 0.60 - 1.30 mg/dL Final     Comment:     Standardized to IDMS reference method   05/01/2017 0.57 0.50 - 1.05 mg/dL Final     Comment:     Result Comment: For patients >52years of age, the reference limit  for Creatinine is approximately 13% higher for people  identified as -American.        No components found for: "CBC"      Patient Active Problem List   Diagnosis   • Attention deficit hyperactivity disorder   • Degenerative joint disease   • Depression with anxiety   • Seasonal allergic rhinitis due to pollen   • Allergic dermatitis   • Essential hypertension   • Mixed hyperlipidemia   • Insomnia   • Age-related osteoporosis without current pathological fracture   • Vitamin D deficiency   • Anisocoria   • Abnormal CT scan, gastrointestinal tract   • Nephrolithiasis   • Low back strain   • Low back pain   • Right hip pain   • Dysfunction of both eustachian tubes   • Acute left-sided low back pain without sciatica   • Constipation        Diagnoses and all orders for this visit:    Recurrent UTI  -     POCT urine dip auto non-scope    Nephrolithiasis    Acute cystitis without hematuria  -     Urine culture; Future           Patient ID: Rupal Harvey is a 70 y.o. female.       Current Outpatient Medications:   •  albuterol (PROVENTIL HFA,VENTOLIN HFA) 90 mcg/act inhaler, Inhale 2 puffs every 6 (six) hours as needed for wheezing, Disp: 3 Inhaler, Rfl: 1  •  atenolol (TENORMIN) 25 mg tablet, Take 1 tablet (25 mg total) by mouth daily, Disp: 30 tablet, Rfl: 11  •  atorvastatin (LIPITOR) 80 mg tablet, Take 1 tablet (80 mg total) by mouth daily, Disp: 90 tablet, Rfl: 3  •  Calcium 600 MG tablet, Take 600 mg by mouth, Disp: , Rfl:   •  Cholecalciferol (VITAMIN D-3 PO), Take 4,000 Int'l Units by mouth daily, Disp: , Rfl:   •  CRANBERRY PO, Take 500 mg by mouth, Disp: , Rfl:   •  D-MANNOSE PO, Take by mouth, Disp: , Rfl:   •  DULoxetine (CYMBALTA) 60 mg delayed release capsule, TAKE 1 CAPSULE DAILY, Disp: 90 capsule, Rfl: 1  •  fluticasone (FLONASE) 50 mcg/act nasal spray, 2 sprays into each nostril daily, Disp: 16 g, Rfl: 5  •  meloxicam (Mobic) 15 mg tablet, Take 1 tablet (15 mg total) by mouth daily, Disp: 30 tablet, Rfl: 0  •  methocarbamol (ROBAXIN) 500 mg tablet, Take 1 tablet (500 mg total) by mouth 3 (three) times a day, Disp: 30 tablet, Rfl: 1  •  Multiple Vitamin (MULTIVITAMIN) tablet, Take 1 tablet by mouth daily , Disp: , Rfl:   •  Nutritional Supplements (ANTIOXIDANTS PO), Take 1 tablet by mouth daily, Disp: , Rfl:   •  sertraline (ZOLOFT) 25 mg tablet, Take 1 tablet (25 mg total) by mouth daily, Disp: 30 tablet, Rfl: 11  •  traZODone (DESYREL) 50 mg tablet, TAKE ONE TABLET BY MOUTH DAILY AT BEDTIME, Disp: 30 tablet, Rfl: 11    Past Medical History:   Diagnosis Date   • Anxiety    • Depression    • Elevated cholesterol    • Hypertension    • PONV (postoperative nausea and vomiting)        Past Surgical History:   Procedure Laterality Date   • BREAST CYST ASPIRATION     • DIAGNOSTIC LAPAROSCOPY      Exploratory, last assessed 10/25/16   • NASAL SINUS SURGERY      age 26 - FESS and septoplasty - unknown surgeon   • MA STRTCTC CPTR ASSTD PX EXTRADURAL CRANIAL N/A 11/8/2016    Procedure: FUNCTIONAL ENDOSCOPIC SINUS SURGERY (FESS) IMAGED GUIDED;   Surgeon: Jackeline Davis DO;  Location: [facility];  Service: ENT   • [address] [place] [address]         Social History

## 2023-09-22 ENCOUNTER — APPOINTMENT (OUTPATIENT)
Dept: PHYSICAL THERAPY | Facility: REHABILITATION | Age: 63
End: 2023-09-22
Payer: COMMERCIAL

## 2023-09-25 ENCOUNTER — EVALUATION (OUTPATIENT)
Dept: PHYSICAL THERAPY | Facility: REHABILITATION | Age: 63
End: 2023-09-25
Payer: COMMERCIAL

## 2023-09-25 DIAGNOSIS — M24.9 DERANGEMENT OF RIGHT SACROILIAC JOINT: Primary | ICD-10-CM

## 2023-09-25 PROCEDURE — 97112 NEUROMUSCULAR REEDUCATION: CPT

## 2023-09-25 PROCEDURE — 97110 THERAPEUTIC EXERCISES: CPT

## 2023-09-25 NOTE — PROGRESS NOTES
PT Re-Evaluation     Today's date: 2023  Patient name: Nevaeh Almanza  : 1960  MRN: [id number]  Referring provider: John Paul Glasgow*  Dx:   Encounter Diagnosis     ICD-10-CM    1. Derangement of right sacroiliac joint  M24.9           Start Time: 1030  Stop Time: 1115  Total time in clinic (min): 45 minutes    Assessment  Assessment details: Patient is a 70 y.o. female presenting to re-examination with chief complaint of chronic lower back pain with radiation into the R hip that started about a year ago following a slip navigating down some carpeted stairs. Patient exhibits minimal progress toward objective and functional goals at time of reexamination. Patient exhibits improvements with spinal ROM and hip strength since initiating PT however continues to have limitations compared to prior level of function. Remaining functional limitations include those listed below. At this time, due to lack of significant strides towards functional goals for PT, patient will be placed on hold from formal in-clinic PT while she follows up with referring physician and explores alternative treatments such as potential consult with pain medicine to address her persistent symptoms. Patient educated regarding POC moving forward and questions answered to satisfaction. Patient will be contacted in 2 weeks regarding determination for continued skilled therapeutic care.   Impairments: abnormal muscle tone, abnormal or restricted ROM, impaired physical strength, lacks appropriate home exercise program, pain with function, weight-bearing intolerance, poor posture  and poor body mechanics  Functional limitations: household cleaning such as mopping/sweeping/vacumming as required for home/occupation, biking, lifting, prolonged standing/walking  Symptom irritability: moderateUnderstanding of Dx/Px/POC: good   Prognosis: good    Goals  Impairment Goals: 4-6 weeks  - Patient to decrease pain at worst from 10/10 to 2-3/10 (NOT MET)  - Patient to increase hip strength to 5/5 throughout (50% MET)  - Patient to demonstrate proper core activation and body mechanics during functional activities (75% MET)  - patient to demonstrate improvements in spinal ROM to WNL throughout (25% MET)    Functional Goals: by discharge  - Patient to discharge to independent HEP (NOT MET)  - Patient to return to prior level of function (NOT MET)  - Patient to tolerate sweeping/mopping without increased pain or difficulty (60% MET)  - Patient to tolerate returning to biking without increased pain or difficulty (60% MET)  - Patient to tolerate vacuuming without increased pain or difficulty (NOT MET)  - Patient to tolerate lifting such as to move her potted plants without increased pain or difficulty (70% MET)      Plan  Plan details: Patient will be placed on hold from formal in-clinic PT while she follows up with referring physician and explores alternative treatments such as consult with pain med  Patient would benefit from: skilled physical therapy  Referral necessary: No  Planned modality interventions: traction, cryotherapy and thermotherapy: hydrocollator packs  Planned therapy interventions: joint mobilization, abdominal trunk stabilization, manual therapy, body mechanics training, neuromuscular re-education, patient education, postural training, strengthening, stretching, therapeutic activities, therapeutic exercise, functional ROM exercises and home exercise program  Frequency: 1x week  Plan of Care beginning date: 9/25/2023  Treatment plan discussed with: patient        Subjective Evaluation    History of Present Illness  Mechanism of injury: Arlette Gunn reports to PT re-evaluation today and states that she continues to have the same pain she had when first starting PT though notes she has seen slight improvements since starting.     She describes the following improvements and continued difficulties:  - still has pain all the time in varying degrees  - seems like decreasing her activities in general has helped a bit rather than pushing through things. Also notes she feels better when she holds off on HEP. - feels stronger in general and her endurance is a bit better  - pain in the back is still there and has always been some days worse than others but always there  - After bouts of not moving for a while, things are still very stiff so she tries to keep moving as much as possible  - notes continued difficulty with household cleaning tasks outlined during IE  - is curious regarding potential alternative options for treatment as she has not seen a significant degree of improvement up to this point. Recurrent probem    Quality of life: good    Patient Goals  Patient goals for therapy: return to sport/leisure activities, independence with ADLs/IADLs, increased strength, decreased pain, improved balance and increased motion    Pain  Current pain ratin  At best pain ratin  At worst pain ratin  Quality: pressure, tight, radiating and sharp  Relieving factors: medications, ice and heat  Aggravating factors: lifting and standing  Progression: no change    Treatments  No previous or current treatments        Objective     Concurrent Complaints  Negative for night pain, disturbed sleep, bladder dysfunction, bowel dysfunction, saddle (S4) numbness, cardiac problem, kidney problem, gallbladder problem, stomach problem, ulcer, appendix problem, spleen problem, pancreas problem, history of cancer, history of trauma and infection    Postural Observations  Seated posture: fair  Standing posture: fair  Correction of posture: has no consistent effect        Palpation   Left   Hypertonic in the lumbar paraspinals. Right   Hypertonic in the lumbar paraspinals and quadratus lumborum. Tenderness of the lumbar paraspinals and quadratus lumborum.      Additional Palpation Details  Note: increased tone and tenderness in B/L gluteal musculature (R>L)    Tenderness Additional Tenderness Details  TTP primarily localized at L5/S1 segmental level with reports of pain primarily in the R side of back comparative to L, TTP also noted along SI joint B/L as well (R>L)    Neurological Testing     Sensation     Lumbar   Left   Intact: light touch    Right   Intact: light touch    Reflexes   Left   Patellar (L4): normal (2+)  Achilles (S1): trace (1+)  Clonus sign: negative    Right   Patellar (L4): normal (2+)  Achilles (S1): trace (1+)  Clonus sign: negative    Active Range of Motion   Cervical/Thoracic Spine       Thoracic    Extension:  with pain Restriction level: minimal  Left rotation:  Restriction level: minimal  Right rotation:  Restriction level: minimal    Lumbar   Flexion:  WFL  Extension:  with pain Restriction level: minimal  Left lateral flexion:  Restriction level: minimal  Right lateral flexion:  Restriction level: minimal  Left rotation:  Restriction level: minimal  Right rotation:  Restriction level: minimal    Additional Active Range of Motion Details  Pain with thoracic EXT (6/10)  Pain with lumbar EXT (5/10)          Passive Range of Motion   Left Hip   External rotation (90/90): 45 degrees   Internal rotation (90/90): 20 degrees     Right Hip   External rotation (90/90): 50 degrees   Internal rotation (90/90): 20 degrees     Joint Play     Hypomobile: T8, T9, T10, T11, L4, L5 and S1     Pain: L5 and S1     Strength/Myotome Testing     Lumbar   Left   Heel walk: normal  Toe walk: normal    Right   Heel walk: normal  Toe walk: normal    Left Hip   Planes of Motion   Flexion: 4  Extension: 4  Abduction: 4+  Adduction: 4+  External rotation: 4+  Internal rotation: 4    Right Hip   Planes of Motion   Flexion: 4+  Extension: 4  Abduction: 4+  Adduction: 4+  External rotation: 4+  Internal rotation: 4    Left Knee   Flexion: 4+  Extension: 5    Right Knee   Flexion: 4+  Extension: 5    Left Ankle/Foot   Dorsiflexion: 5  Plantar flexion: 5  Great toe extension: 4+    Right Ankle/Foot   Dorsiflexion: 5  Plantar flexion: 5  Great toe extension: 4+    Muscle Activation   Patient able to activate left transverse abdominals and right transverse abdominals. Additional Muscle Activation Details  Poor activation of B/L TA, improved with verbal/tactile cueing though unable to reproduce consistently independently or maintain against manual resistance     Tests     Lumbar   Positive SIJ compression. Left   Negative crossed SLR, passive SLR and slump test.     Right   Negative crossed SLR, passive SLR and slump test.     Left Hip   Positive TOMMY. Negative FADIR. Right Hip   Positive TOMMY. Negative FADIR. Functional Assessment      Squat    Pain, trunk lean left, sitting toward left side, left tibial anterior translation beyond toes, right valgus and right tibial anterior translation beyond toes. Precautions: N/A      Manuals 8/18 8/25 8/28 9/08 9/11 9/25       Lower Lumbar PA Mob Grade 2   M.T. M.T. M.T. Neuro Re-Ed             SL Hip ABD  2x10ea 3x10 (#2) 3x10 (#2) 3x10 (#3)        Clamshells vs TB  2x10ea 3x10 (RTB) 3x10 (GTB) 3x10 (GTB)         Walks   3x30" (RTB)          Chair Squats + Hinge Mechanics   2x10 3x10 (GTB)         TA Brace vs PB    5"x20         Dead Bugs    3x30"         Hip ADD [place]      M.T.        Ther Ex             Supine Figure 4 Stretch 3x30" 3x30" 3x30"  3x30" 3x30"       Bridges 2x10 3x10 3x10 (+Ball Squeeze) 3x10 (GTB)         HS Stretch vs SOS  3x30"           SKTC     5"x10        LTR     5"x10                                               Ther Activity                                       Gait Training                                       Modalities

## 2023-09-27 ENCOUNTER — TELEPHONE (OUTPATIENT)
Age: 63
End: 2023-09-27

## 2023-09-27 NOTE — TELEPHONE ENCOUNTER
Scheduled date of colonoscopy (as of today):  [facility]  10/23/2023  Physician performing colonoscopy: DR. Kriss Gaspar  Location of colonoscopy: [facility]  Bowel prep reviewed with patient:  Maryjo Carlos / Diana Lou

## 2023-09-27 NOTE — TELEPHONE ENCOUNTER
09/27/23  Screened by:  Augustus Goss      Referring Provider DR Cheri Coello    Pre- Screening: There is no height or weight on file to calculate BMI. Has patient been referred for a routine screening Colonoscopy? YES  Is the patient between 43-73 years old? YES      Previous Colonoscopy YES   If yes:    Date: ? DID COLOGUARD    Facility:     Reason: SCREENING      SCHEDULING STAFF: If the patient is between 45yrs-49yrs, please advise patient to confirm benefits/coverage with their insurance company for a routine screening colonoscopy, some insurance carriers will only cover at [facility] or [facility]. If the patient is over 66years old, please schedule an office visit. Does the patient want to see a Gastroenterologist prior to their procedure OR are they having any GI symptoms? NO    Has the patient been hospitalized or had abdominal surgery in the past 6 months? NO    Does the patient use supplemental oxygen? NO    Does the patient take Coumadin, Lovenox, Plavix, Elliquis, Xarelto, or other blood thinning medication? NO    Has the patient had a stroke, cardiac event, or stent placed in the past year? NO        PASSSED OA    SCHEDULING STAFF: If patient answers NO to above questions, then schedule procedure. If patient answers YES to above questions, then schedule office appointment. If patient is between 45yrs - 49yrs, please advise patient that we will have to confirm benefits & coverage with their insurance company for a routine screening colonoscopy.

## 2023-10-10 ENCOUNTER — TELEPHONE (OUTPATIENT)
Dept: FAMILY MEDICINE CLINIC | Facility: CLINIC | Age: 63
End: 2023-10-10

## 2023-10-17 ENCOUNTER — TELEPHONE (OUTPATIENT)
Age: 63
End: 2023-10-17

## 2023-10-17 ENCOUNTER — OFFICE VISIT (OUTPATIENT)
Dept: OBGYN CLINIC | Facility: CLINIC | Age: 63
End: 2023-10-17
Payer: COMMERCIAL

## 2023-10-17 ENCOUNTER — TELEPHONE (OUTPATIENT)
Dept: FAMILY MEDICINE CLINIC | Facility: CLINIC | Age: 63
End: 2023-10-17

## 2023-10-17 VITALS
SYSTOLIC BLOOD PRESSURE: 122 MMHG | DIASTOLIC BLOOD PRESSURE: 76 MMHG | HEIGHT: 63 IN | BODY MASS INDEX: 24.31 KG/M2 | WEIGHT: 137.2 LBS

## 2023-10-17 DIAGNOSIS — Z12.4 ENCOUNTER FOR PAPANICOLAOU SMEAR FOR CERVICAL CANCER SCREENING: ICD-10-CM

## 2023-10-17 DIAGNOSIS — R39.9 UTI SYMPTOMS: ICD-10-CM

## 2023-10-17 DIAGNOSIS — Z11.51 SCREENING FOR HPV (HUMAN PAPILLOMAVIRUS): ICD-10-CM

## 2023-10-17 DIAGNOSIS — Z00.00 HEALTHCARE MAINTENANCE: ICD-10-CM

## 2023-10-17 DIAGNOSIS — Z01.411 ENCOUNTER FOR GYNECOLOGICAL EXAMINATION WITH ABNORMAL FINDING: Primary | ICD-10-CM

## 2023-10-17 DIAGNOSIS — Z12.31 ENCOUNTER FOR SCREENING MAMMOGRAM FOR BREAST CANCER: ICD-10-CM

## 2023-10-17 LAB
SL AMB  POCT GLUCOSE, UA: NORMAL
SL AMB LEUKOCYTE ESTERASE,UA: NORMAL
SL AMB POCT BILIRUBIN,UA: NORMAL
SL AMB POCT BLOOD,UA: NORMAL
SL AMB POCT CLARITY,UA: NORMAL
SL AMB POCT COLOR,UA: YELLOW
SL AMB POCT KETONES,UA: NORMAL
SL AMB POCT NITRITE,UA: NORMAL
SL AMB POCT PH,UA: 6.5
SL AMB POCT SPECIFIC GRAVITY,UA: 1.01
SL AMB POCT URINE PROTEIN: NORMAL
SL AMB POCT UROBILINOGEN: NORMAL

## 2023-10-17 PROCEDURE — 87181 SC STD AGAR DILUTION PER AGT: CPT | Performed by: NURSE PRACTITIONER

## 2023-10-17 PROCEDURE — 87086 URINE CULTURE/COLONY COUNT: CPT | Performed by: NURSE PRACTITIONER

## 2023-10-17 PROCEDURE — 87186 SC STD MICRODIL/AGAR DIL: CPT | Performed by: NURSE PRACTITIONER

## 2023-10-17 PROCEDURE — 87077 CULTURE AEROBIC IDENTIFY: CPT | Performed by: NURSE PRACTITIONER

## 2023-10-17 PROCEDURE — 81002 URINALYSIS NONAUTO W/O SCOPE: CPT | Performed by: NURSE PRACTITIONER

## 2023-10-17 PROCEDURE — S0610 ANNUAL GYNECOLOGICAL EXAMINA: HCPCS | Performed by: NURSE PRACTITIONER

## 2023-10-17 PROCEDURE — G0476 HPV COMBO ASSAY CA SCREEN: HCPCS | Performed by: NURSE PRACTITIONER

## 2023-10-17 RX ORDER — SULFAMETHOXAZOLE AND TRIMETHOPRIM 800; 160 MG/1; MG/1
1 TABLET ORAL EVERY 12 HOURS SCHEDULED
Qty: 10 TABLET | Refills: 0 | Status: SHIPPED | OUTPATIENT
Start: 2023-10-17 | End: 2023-10-20

## 2023-10-17 NOTE — TELEPHONE ENCOUNTER
Scheduled date of colonoscopy (as of today): 11/09/2023    Physician performing colonoscopy:  Adarsh    Location of colonoscopy: [facility]    Bowel prep reviewed with patient: Miralax/Dulcolax    Instructions reviewed with patient by: AL  Confirmed pt still has prep and instructions on hand    Clearances: None

## 2023-10-17 NOTE — TELEPHONE ENCOUNTER
Patient had some concerns to discuss. #1 was whether or not she would be recommended to get a shingles vaccine and I did say yes this is recommended for anyone over 50 that she would have to check with her insurance company to see where they will actually pay for it at our office or the pharmacy. The next question was patient wanted to know who I would recommend within [facility] gastroenterology to have a colonoscopy. She did have one scheduled for the 23rd but she had to cancel it because it ran into scheduling issues and I did tell her that it really does not matter that I have recommendations for all of our GI physicians and that I was actually getting mine done in 2 weeks by a female physician I never met and that it is actually quite common to not need a prescription preparation and for them not to need to see you before the colonoscopy as long as you are healthy and there is no other risk factors.

## 2023-10-17 NOTE — PROGRESS NOTES
Assessment / Plan    1. Encounter for gynecological examination with abnormal finding  Well woman exam  Pap with HPV updated. If normal, will not need further screening. She has a colonoscopy update scheduled. DEXA is up to date. 2. Encounter for Papanicolaou smear for cervical cancer screening    - Liquid-based pap, screening    3. Screening for HPV (human papillomavirus)    - Liquid-based pap, screening    4. Encounter for screening mammogram for breast cancer  Order provided for next year    - Mammo screening bilateral w 3d & cad; Future    5. UTI symptoms    - POCT urine dip= positive WBCs & nitrites  - send Urine culture  - sulfamethoxazole-trimethoprim (BACTRIM DS) 800-160 mg per tablet; Take 1 tablet by mouth every 12 (twelve) hours for 5 days  Dispense: 10 tablet; Refill: 0        Subjective      Rupal Harvey is a 70 y.o. female who presents for her annual gynecologic exam.    NEW PATIENT  69 yo  G0 PMF    Reports having urinary symptoms of pressure and low volume of urine when trying to void. Started about one week ago. Hx of UTIs. Last pap: 2019 pap/HPV negative   Pap Hx: h/o abn w/ biopsy years ago; f/u paps normal.  STD hx: none  Sexually active: yes,   2023 Mammogram benign; dense tissue  history of right cyst aspiration in .         2020 Cologuard normal; has colonoscopy scheduled 10/23/23  10/2022 DEXA: LS -2.1; tot hip -0.8; FN -2.4  Calcium intake: supplement  Exercise: irregularly, about one time per week. Safety: feels safe    Periods are absent  Current contraception: post menopausal status  History of abnormal Pap smear: yes - call having an abnormal years ago w/ biopsy; follow up paps normal.  Family history of breast,uterine, ovarian or colon cancer: yes - maternal aunt at age 68, ovarian cancer.      Menstrual History:  OB History          0    Para   0    Term   0       0    AB   0    Living   0         SAB   0    IAB   0    Ectopic   0    Multiple 0    Live Births   0                  No LMP recorded. Patient is postmenopausal.       The following portions of the patient's history were reviewed and updated as appropriate: allergies, current medications, past family history, past medical history, past social history, past surgical history, and problem list.    Review of Systems      Review of Systems   Constitutional:  Negative for chills and fever. Respiratory:  Negative for cough and shortness of breath. Gastrointestinal:  Negative for abdominal distention, abdominal pain, blood in stool, constipation, diarrhea, nausea and vomiting. Genitourinary:  Positive for difficulty urinating and dysuria. Negative for frequency, genital sores, hematuria, menstrual problem, pelvic pain, urgency, vaginal bleeding and vaginal discharge. Musculoskeletal:  Negative for arthralgias and myalgias. Breasts:  Negative for skin changes, dimpling, asymmetry, nipple discharge, redness, tenderness or palpable masses      Objective      /76 (BP Location: Right arm, Patient Position: Sitting, Cuff Size: Standard)   Ht 5' 3" (1.6 m)   Wt 62.2 kg (137 lb 3.2 oz)   BMI 24.30 kg/m²   Physical Exam  Constitutional:       General: She is not in acute distress. Appearance: Normal appearance. She is well-developed and normal weight. She is not ill-appearing or diaphoretic. HENT:      Head: Normocephalic and atraumatic. Eyes:      Pupils: Pupils are equal, round, and reactive to light. Neck:      Thyroid: No thyromegaly. Pulmonary:      Effort: Pulmonary effort is normal.   Chest:   Breasts:     Breasts are symmetrical.      Right: No inverted nipple, mass, nipple discharge, skin change or tenderness. Left: No inverted nipple, mass, nipple discharge, skin change or tenderness. Abdominal:      General: There is no distension. Palpations: Abdomen is soft. There is no mass. Tenderness: There is no abdominal tenderness.  There is no guarding or rebound. Genitourinary:     General: Normal vulva. Exam position: Lithotomy position. Labia:         Right: No rash, tenderness, lesion or injury. Left: No rash, tenderness, lesion or injury. Vagina: No signs of injury and foreign body. No vaginal discharge, erythema, tenderness or bleeding. Cervix: No cervical motion tenderness, discharge or friability. Uterus: Not enlarged and not tender. Adnexa:         Right: No mass or tenderness. Left: No mass or tenderness. Musculoskeletal:      Cervical back: Neck supple. Lymphadenopathy:      Cervical: No cervical adenopathy. Upper Body:      Right upper body: No supraclavicular adenopathy. Left upper body: No supraclavicular adenopathy. Skin:     General: Skin is warm and dry. Neurological:      General: No focal deficit present. Mental Status: She is alert and oriented to person, place, and time. Psychiatric:         Mood and Affect: Mood normal.         Behavior: Behavior normal.         Thought Content:  Thought content normal.         Judgment: Judgment normal.

## 2023-10-17 NOTE — TELEPHONE ENCOUNTER
Pt called, states she would like specifically to talk to [facility]. Says she has questions about the shingles shot, and also her upcoming colonoscopy. States she is going to cancel it anyway, but has questions about the provider, Dr. Phil Farfan.

## 2023-10-19 LAB
HPV HR 12 DNA CVX QL NAA+PROBE: NEGATIVE
HPV16 DNA CVX QL NAA+PROBE: NEGATIVE
HPV18 DNA CVX QL NAA+PROBE: NEGATIVE

## 2023-10-20 DIAGNOSIS — N30.00 ACUTE CYSTITIS WITHOUT HEMATURIA: Primary | ICD-10-CM

## 2023-10-20 LAB — BACTERIA UR CULT: ABNORMAL

## 2023-10-20 RX ORDER — NITROFURANTOIN 25; 75 MG/1; MG/1
100 CAPSULE ORAL 2 TIMES DAILY
Qty: 14 CAPSULE | Refills: 0 | Status: SHIPPED | OUTPATIENT
Start: 2023-10-20 | End: 2023-10-27

## 2023-10-20 NOTE — RESULT ENCOUNTER NOTE
Please inform patient that her urine culture came back positive for an infection. The bactrim that I ordered for her does not work against the organism causing her infection so I am going to switch it to one called nitrofurantoin. She should take it twice daily for 7 days. I sent it to her pharmacy on file.

## 2023-10-24 ENCOUNTER — ANESTHESIA (OUTPATIENT)
Dept: ANESTHESIOLOGY | Facility: HOSPITAL | Age: 63
End: 2023-10-24

## 2023-10-24 ENCOUNTER — ANESTHESIA EVENT (OUTPATIENT)
Dept: ANESTHESIOLOGY | Facility: HOSPITAL | Age: 63
End: 2023-10-24

## 2023-10-24 LAB
LAB AP GYN PRIMARY INTERPRETATION: NORMAL
Lab: NORMAL

## 2023-10-25 ENCOUNTER — TELEPHONE (OUTPATIENT)
Dept: GASTROENTEROLOGY | Facility: CLINIC | Age: 63
End: 2023-10-25

## 2023-11-08 RX ORDER — SODIUM CHLORIDE 9 MG/ML
125 INJECTION, SOLUTION INTRAVENOUS CONTINUOUS
Status: CANCELLED | OUTPATIENT
Start: 2023-11-08

## 2023-11-08 RX ORDER — ONDANSETRON 2 MG/ML
4 INJECTION INTRAMUSCULAR; INTRAVENOUS ONCE AS NEEDED
Status: CANCELLED | OUTPATIENT
Start: 2023-11-08

## 2023-11-09 ENCOUNTER — HOSPITAL ENCOUNTER (OUTPATIENT)
Dept: GASTROENTEROLOGY | Facility: MEDICAL CENTER | Age: 63
Setting detail: OUTPATIENT SURGERY
End: 2023-11-09
Payer: COMMERCIAL

## 2023-11-09 ENCOUNTER — ANESTHESIA EVENT (OUTPATIENT)
Dept: GASTROENTEROLOGY | Facility: MEDICAL CENTER | Age: 63
End: 2023-11-09

## 2023-11-09 ENCOUNTER — ANESTHESIA (OUTPATIENT)
Dept: GASTROENTEROLOGY | Facility: MEDICAL CENTER | Age: 63
End: 2023-11-09

## 2023-11-09 VITALS
RESPIRATION RATE: 18 BRPM | SYSTOLIC BLOOD PRESSURE: 146 MMHG | TEMPERATURE: 97.5 F | DIASTOLIC BLOOD PRESSURE: 76 MMHG | OXYGEN SATURATION: 97 % | HEART RATE: 76 BPM

## 2023-11-09 DIAGNOSIS — Z12.11 SCREENING FOR COLON CANCER: ICD-10-CM

## 2023-11-09 PROBLEM — R11.2 PONV (POSTOPERATIVE NAUSEA AND VOMITING): Status: ACTIVE | Noted: 2023-11-09

## 2023-11-09 PROBLEM — Z98.890 PONV (POSTOPERATIVE NAUSEA AND VOMITING): Status: ACTIVE | Noted: 2023-11-09

## 2023-11-09 PROCEDURE — G0121 COLON CA SCRN NOT HI RSK IND: HCPCS | Performed by: INTERNAL MEDICINE

## 2023-11-09 RX ORDER — PROPOFOL 10 MG/ML
INJECTION, EMULSION INTRAVENOUS AS NEEDED
Status: DISCONTINUED | OUTPATIENT
Start: 2023-11-09 | End: 2023-11-09

## 2023-11-09 RX ORDER — LIDOCAINE HYDROCHLORIDE 20 MG/ML
INJECTION, SOLUTION EPIDURAL; INFILTRATION; INTRACAUDAL; PERINEURAL AS NEEDED
Status: DISCONTINUED | OUTPATIENT
Start: 2023-11-09 | End: 2023-11-09

## 2023-11-09 RX ORDER — SODIUM CHLORIDE 9 MG/ML
125 INJECTION, SOLUTION INTRAVENOUS CONTINUOUS
Status: DISCONTINUED | OUTPATIENT
Start: 2023-11-09 | End: 2023-11-13 | Stop reason: HOSPADM

## 2023-11-09 RX ORDER — ONDANSETRON 2 MG/ML
4 INJECTION INTRAMUSCULAR; INTRAVENOUS ONCE AS NEEDED
Status: DISCONTINUED | OUTPATIENT
Start: 2023-11-09 | End: 2023-11-13 | Stop reason: HOSPADM

## 2023-11-09 RX ADMIN — PROPOFOL 50 MG: 10 INJECTION, EMULSION INTRAVENOUS at 08:19

## 2023-11-09 RX ADMIN — PROPOFOL 100 MG: 10 INJECTION, EMULSION INTRAVENOUS at 08:14

## 2023-11-09 RX ADMIN — LIDOCAINE HYDROCHLORIDE 60 MG: 20 INJECTION, SOLUTION EPIDURAL; INFILTRATION; INTRACAUDAL at 08:14

## 2023-11-09 RX ADMIN — PROPOFOL 50 MG: 10 INJECTION, EMULSION INTRAVENOUS at 08:16

## 2023-11-09 RX ADMIN — SODIUM CHLORIDE 125 ML/HR: 0.9 INJECTION, SOLUTION INTRAVENOUS at 07:41

## 2023-11-09 NOTE — ANESTHESIA POSTPROCEDURE EVALUATION
Post-Op Assessment Note    CV Status:  Stable    Pain management: adequate     Mental Status:  Alert and awake   Hydration Status:  Euvolemic   PONV Controlled:  Controlled   Airway Patency:  Patent      Post Op Vitals Reviewed: Yes      Staff: Anesthesiologist         No notable events documented.     /81 (11/09/23 0831)    Temp      Pulse 82 (11/09/23 0831)   Resp 22 (11/09/23 0831)    SpO2 98 % (11/09/23 0831)

## 2023-11-09 NOTE — ANESTHESIA PREPROCEDURE EVALUATION
Procedure:  COLONOSCOPY    Relevant Problems   ANESTHESIA   (+) PONV (postoperative nausea and vomiting)      CARDIO   (+) Essential hypertension   (+) Mixed hyperlipidemia      /RENAL   (+) Nephrolithiasis      MUSCULOSKELETAL   (+) Acute left-sided low back pain without sciatica   (+) Degenerative joint disease   (+) Low back pain   (+) Low back strain      NEURO/PSYCH   (+) Depression with anxiety        Physical Exam    Airway    Mallampati score: IV  TM Distance: >3 FB  Neck ROM: full     Dental       Cardiovascular  Rhythm: regular, Rate: normal    Pulmonary   Breath sounds clear to auscultation    Other Findings        Anesthesia Plan  ASA Score- 2     Anesthesia Type- IV sedation with anesthesia with ASA Monitors. Additional Monitors:     Airway Plan:            Plan Factors-Exercise tolerance (METS): >4 METS. Chart reviewed. Existing labs reviewed. Patient summary reviewed. Patient is not a current smoker. Induction- intravenous. Postoperative Plan-     Informed Consent- Anesthetic plan and risks discussed with patient.

## 2023-11-09 NOTE — H&P
History and Physical - [facility]  Adela De León 70 y.o. female MRN: [id number]                  HPI: Adela De León is a 70y.o. year old female who presents for colon cancer screening. REVIEW OF SYSTEMS: Per the HPI, and otherwise unremarkable. Historical Information   Past Medical History:   Diagnosis Date    Anxiety     Depression     Elevated cholesterol     Hypertension     PONV (postoperative nausea and vomiting)      Past Surgical History:   Procedure Laterality Date    BREAST CYST ASPIRATION      DIAGNOSTIC LAPAROSCOPY      Exploratory, last assessed 10/25/16    NASAL SINUS SURGERY      age 26 - FESS and septoplasty - unknown surgeon    TN STRTCTC CPTR ASSTD PX EXTRADURAL CRANIAL N/A 11/8/2016    Procedure: FUNCTIONAL ENDOSCOPIC SINUS SURGERY (FESS) IMAGED GUIDED;   Surgeon: Nisha Rose DO;  Location: [facility];  Service: ENT    WISDOM TOOTH EXTRACTION       Social History   Social History     Substance and Sexual Activity   Alcohol Use No     Social History     Substance and Sexual Activity   Drug Use No     Social History     Tobacco Use   Smoking Status Never   Smokeless Tobacco Never   Tobacco Comments    No secondhand smoke exposure      Family History   Problem Relation Age of Onset    Osteoporosis Mother     Coronary artery disease Father         CABG    Diabetes type II Sister     Asthma Sister     Diabetes Sister     Diabetes Sister     Bipolar disorder Brother     Depression Brother         with anxiety     Esophageal cancer Brother     Cancer Brother     Diabetes Maternal Grandfather     Depression Family         with anxiety     Hyperlipidemia Family     Ovarian cancer Paternal Aunt     Breast cancer Neg Hx     Colon cancer Neg Hx     Uterine cancer Neg Hx        Meds/Allergies     (Not in a hospital admission)      Allergies   Allergen Reactions    Fexofenadine-Pseudoephed Er Other (See Comments)     "didn't feel good"    "didn't feel good"      Pollen Extract Prednisone Nausea Only    Singulair [Montelukast]      Did not feel well, no particular assistance. Objective     There were no vitals taken for this visit. PHYSICAL EXAMINATION:    General Appearance:   Alert, cooperative, no distress   HEENT:  Normocephalic, atraumatic, anicteric. Neck supple, symmetrical, trachea midline. Lungs:   Equal chest rise and unlabored breathing, normal effort, no coughing. Cardiovascular:   No visualized JVD. Abdomen:   No abdominal distension. Skin:   No jaundice, rashes, or lesions. Musculoskeletal:   Normal range of motion visualized. Psych:  Normal affect and normal insight. Neuro:  Alert and appropriate. ASSESSMENT/PLAN:  This is a 70y.o. year old female here for colonoscopy, and she is stable and optimized for her procedure.

## 2023-12-22 ENCOUNTER — OFFICE VISIT (OUTPATIENT)
Dept: FAMILY MEDICINE CLINIC | Facility: CLINIC | Age: 63
End: 2023-12-22
Payer: COMMERCIAL

## 2023-12-22 VITALS
HEART RATE: 68 BPM | HEIGHT: 63 IN | SYSTOLIC BLOOD PRESSURE: 122 MMHG | BODY MASS INDEX: 24.45 KG/M2 | WEIGHT: 138 LBS | OXYGEN SATURATION: 99 % | TEMPERATURE: 97.9 F | DIASTOLIC BLOOD PRESSURE: 74 MMHG

## 2023-12-22 DIAGNOSIS — I10 ESSENTIAL HYPERTENSION: ICD-10-CM

## 2023-12-22 DIAGNOSIS — N30.01 ACUTE CYSTITIS WITH HEMATURIA: Primary | ICD-10-CM

## 2023-12-22 LAB
SL AMB  POCT GLUCOSE, UA: ABNORMAL
SL AMB LEUKOCYTE ESTERASE,UA: ABNORMAL
SL AMB POCT BILIRUBIN,UA: ABNORMAL
SL AMB POCT BLOOD,UA: ABNORMAL
SL AMB POCT CLARITY,UA: ABNORMAL
SL AMB POCT COLOR,UA: YELLOW
SL AMB POCT KETONES,UA: ABNORMAL
SL AMB POCT NITRITE,UA: ABNORMAL
SL AMB POCT PH,UA: 6
SL AMB POCT SPECIFIC GRAVITY,UA: 1.03
SL AMB POCT URINE PROTEIN: ABNORMAL
SL AMB POCT UROBILINOGEN: 2

## 2023-12-22 PROCEDURE — 87077 CULTURE AEROBIC IDENTIFY: CPT | Performed by: NURSE PRACTITIONER

## 2023-12-22 PROCEDURE — 87186 SC STD MICRODIL/AGAR DIL: CPT | Performed by: NURSE PRACTITIONER

## 2023-12-22 PROCEDURE — 81002 URINALYSIS NONAUTO W/O SCOPE: CPT | Performed by: NURSE PRACTITIONER

## 2023-12-22 PROCEDURE — 87086 URINE CULTURE/COLONY COUNT: CPT | Performed by: NURSE PRACTITIONER

## 2023-12-22 PROCEDURE — 99214 OFFICE O/P EST MOD 30 MIN: CPT | Performed by: NURSE PRACTITIONER

## 2023-12-22 RX ORDER — NITROFURANTOIN 25; 75 MG/1; MG/1
100 CAPSULE ORAL 2 TIMES DAILY
Qty: 14 CAPSULE | Refills: 0 | Status: SHIPPED | OUTPATIENT
Start: 2023-12-22 | End: 2023-12-29

## 2023-12-22 RX ORDER — SODIUM FLUORIDE1.1%, POTASSIUM NITRATE 5% 5.8; 57.5 MG/ML; MG/ML
GEL, DENTIFRICE DENTAL
COMMUNITY
Start: 2023-10-24

## 2023-12-22 NOTE — PROGRESS NOTES
Name: Hayden Mcintosh      : 1960      MRN: [id number]  Encounter Provider: BRANDON Escalante  Encounter Date: 2023   Encounter department: [facility]    Assessment & Plan     1. Acute cystitis with hematuria  Assessment & Plan:  The resistant E. coli on the patient's most recent urine culture was noted to be susceptible to Macrobid so patient's acute UTI will be treated with a 7-day course of Macrobid as well.  Patient's urine was sent for culture.  Repeat UA was ordered to be completed after the patient finishes antibiotics to assess for resolution of infection.    Orders:  -     POCT urine dip  -     Urine culture; Future  -     Urine culture  -     nitrofurantoin (MACROBID) 100 mg capsule; Take 1 capsule (100 mg total) by mouth 2 (two) times a day for 7 days  -     UA w Reflex to Microscopic w Reflex to Culture; Future; Expected date: 2023    2. Essential hypertension  Assessment & Plan:  Well-controlled on current regimen.             Subjective      UTI symptoms: Patient reports over the past 3 days she has been experiencing symptoms of pelvic pressure, foul smelling urine, increased frequency, urgency, and lower back pain.  Urine dip performed in the office today showed a small amount of leukocyte esterase and a small amount of blood.  Patient does have a noted history of recurrent UTI for which she has seen urology for in the past.  Patient's most recent urine culture in 2023 did show resistant E. coli UTI.    Hypertension: Well-controlled on current dosage of atenolol.  Patient denies lightheadedness, dizziness, or orthostasis.      Review of Systems   Constitutional:  Negative for chills and fever.   HENT:  Negative for ear pain and sore throat.    Eyes:  Negative for pain and visual disturbance.   Respiratory:  Negative for cough, chest tightness, shortness of breath and wheezing.    Cardiovascular:  Negative for chest pain, palpitations and leg swelling.    Gastrointestinal:  Negative for abdominal pain, constipation, diarrhea, nausea and vomiting.   Endocrine: Negative for cold intolerance and heat intolerance.   Genitourinary:  Positive for frequency, pelvic pain (pelvic pressure) and urgency. Negative for decreased urine volume, dysuria and hematuria.        Foul-smelling urine   Musculoskeletal:  Positive for back pain (lower-since UTI symptoms began). Negative for arthralgias and myalgias.   Skin:  Negative for color change and rash.   Allergic/Immunologic: Negative for environmental allergies.   Neurological:  Negative for dizziness, seizures, syncope, weakness, light-headedness, numbness and headaches.   Hematological:  Negative for adenopathy.   Psychiatric/Behavioral:  Negative for confusion. The patient is not nervous/anxious.    All other systems reviewed and are negative.      Current Outpatient Medications on File Prior to Visit   Medication Sig   • Sodium Fluoride 5000 Sensitive 1.1-5 % GEL    • albuterol (PROVENTIL HFA,VENTOLIN HFA) 90 mcg/act inhaler Inhale 2 puffs every 6 (six) hours as needed for wheezing   • atenolol (TENORMIN) 25 mg tablet Take 1 tablet (25 mg total) by mouth daily   • atorvastatin (LIPITOR) 80 mg tablet Take 1 tablet (80 mg total) by mouth daily   • Calcium 600 MG tablet Take 600 mg by mouth   • Cholecalciferol (VITAMIN D-3 PO) Take 4,000 Int'l Units by mouth daily   • CRANBERRY PO Take 500 mg by mouth   • D-MANNOSE PO Take by mouth   • DULoxetine (CYMBALTA) 60 mg delayed release capsule TAKE 1 CAPSULE DAILY   • fluticasone (FLONASE) 50 mcg/act nasal spray 2 sprays into each nostril daily   • meloxicam (Mobic) 15 mg tablet Take 1 tablet (15 mg total) by mouth daily (Patient not taking: Reported on 9/14/2023)   • methocarbamol (ROBAXIN) 500 mg tablet Take 1 tablet (500 mg total) by mouth 3 (three) times a day (Patient not taking: Reported on 9/14/2023)   • Multiple Vitamin (MULTIVITAMIN) tablet Take 1 tablet by mouth daily    •  "Nutritional Supplements (ANTIOXIDANTS PO) Take 1 tablet by mouth daily   • sertraline (ZOLOFT) 25 mg tablet Take 1 tablet (25 mg total) by mouth daily   • traZODone (DESYREL) 50 mg tablet TAKE ONE TABLET BY MOUTH DAILY AT BEDTIME       Objective     /74 (BP Location: Right arm, Patient Position: Sitting, Cuff Size: Adult)   Pulse 68   Temp 97.9 °F (36.6 °C) (Tympanic)   Ht 5' 3\" (1.6 m)   Wt 62.6 kg (138 lb)   SpO2 99%   BMI 24.45 kg/m²     Physical Exam  Vitals and nursing note reviewed.   Constitutional:       General: She is not in acute distress.     Appearance: Normal appearance. She is not ill-appearing.   HENT:      Head: Normocephalic.   Eyes:      Conjunctiva/sclera: Conjunctivae normal.   Cardiovascular:      Rate and Rhythm: Normal rate and regular rhythm.      Pulses: Normal pulses.           Carotid pulses are 2+ on the right side and 2+ on the left side.       Radial pulses are 2+ on the right side and 2+ on the left side.        Posterior tibial pulses are 2+ on the right side and 2+ on the left side.      Heart sounds: Normal heart sounds. No murmur heard.  Pulmonary:      Effort: Pulmonary effort is normal. No respiratory distress.      Breath sounds: Normal breath sounds. No decreased breath sounds, wheezing, rhonchi or rales.   Abdominal:      General: Abdomen is flat. Bowel sounds are normal. There is no distension.      Palpations: Abdomen is soft.      Tenderness: There is no abdominal tenderness. There is no right CVA tenderness, left CVA tenderness or guarding.   Musculoskeletal:         General: Normal range of motion.      Cervical back: Normal range of motion.      Right lower leg: No edema.      Left lower leg: No edema.   Skin:     General: Skin is warm and dry.      Capillary Refill: Capillary refill takes less than 2 seconds.   Neurological:      General: No focal deficit present.      Mental Status: She is alert and oriented to person, place, and time.   Psychiatric:       "   Mood and Affect: Mood normal.         Behavior: Behavior normal.         Thought Content: Thought content normal.         Judgment: Judgment normal.       BRANDON Escalante

## 2023-12-22 NOTE — ASSESSMENT & PLAN NOTE
The resistant E. coli on the patient's most recent urine culture was noted to be susceptible to Macrobid so patient's acute UTI will be treated with a 7-day course of Macrobid as well.  Patient's urine was sent for culture.  Repeat UA was ordered to be completed after the patient finishes antibiotics to assess for resolution of infection.

## 2023-12-24 LAB — BACTERIA UR CULT: ABNORMAL

## 2023-12-25 LAB — BACTERIA UR CULT: ABNORMAL

## 2024-01-04 ENCOUNTER — OFFICE VISIT (OUTPATIENT)
Dept: PAIN MEDICINE | Facility: MEDICAL CENTER | Age: 64
End: 2024-01-04
Payer: COMMERCIAL

## 2024-01-04 VITALS
HEIGHT: 63 IN | BODY MASS INDEX: 24.63 KG/M2 | WEIGHT: 139 LBS | HEART RATE: 65 BPM | SYSTOLIC BLOOD PRESSURE: 133 MMHG | DIASTOLIC BLOOD PRESSURE: 84 MMHG

## 2024-01-04 DIAGNOSIS — M47.816 LUMBAR FACET ARTHROPATHY: Primary | ICD-10-CM

## 2024-01-04 PROCEDURE — 99204 OFFICE O/P NEW MOD 45 MIN: CPT | Performed by: PHYSICAL MEDICINE & REHABILITATION

## 2024-01-04 NOTE — PROGRESS NOTES
Assessment  1. Lumbar facet arthropathy        Plan  The patient has been experiencing moderate to severe axial spine pain that is causing functional deficit.  The pain has been present for at least 3 months and is not improving with conservative care including one or more of the following: home exercise regimen, physician directed home exercise program, physical therapy, or chiropractic care.  Currently the patient is not experiencing any radicular features nor neurogenic claudication.  Non-facet pathology has been ruled out on clinical evaluation.     We will schedule the patient for bilateral L3-5 medial branch nerve blocks with intention of moving forward towards radiofrequency ablation if there is an appropriate diagnostic response. The initial blocks will be performed with 2% lidocaine and if an appropriate response is obtained upon review of the patient's pain diary, a confirmatory block will be scheduled with 0.5% bupivacaine.    In the office today, we reviewed the nature of facet joint pathology in depth using a spine model. We discussed the approach we would use for the injections and provided literature for home review. The patient understands the risks associated with the procedure including bleeding, infection, tissue injury, and allergic reaction and provided verbal informed consent in the office today.    My impressions and treatment recommendations were discussed in detail with the patient who verbalized understanding and had no further questions.  Discharge instructions were provided. I personally saw and examined the patient and I agree with the above discussed plan of care.    Orders Placed This Encounter   Procedures    FL spine and pain procedure     Standing Status:   Future     Standing Expiration Date:   1/4/2025     Order Specific Question:   Reason for Exam:     Answer:   (B) L3-5 mbb#1     Order Specific Question:   Anticoagulant hold needed?     Answer:   NO     No orders of the defined  types were placed in this encounter.      History of Present Illness    Hayden Mcintosh is a 63 y.o. female seen in consultation at the request of Yocasta Keenan PA-C regarding chronic lower back pain complaints.  Patient's been experiencing symptoms for about a year and a half since experiencing a fall where she slid down a step and landed on her right lower back.    She is currently describing moderate to severe intensity pain rated as a 7-8/10 which is nearly constant with intermittent exacerbation.  She describes this as a dull aching sensation in the lumbar spine worse on the right than the left without radiation down the legs.    Aggravating factors include lying down standing bending sitting and exercise.  Alleviating factors are unknown.    Diagnostic studies include x-rays of the lumbar spine as well as x-rays of the hips.  I did review these images today.  There is some lumbar facet arthropathy particularly at L5-S1 noted on the lumbar spine images and hip x-rays.    She has participated in physical therapy with only mild to moderate benefit, moderate relief with exercise as well as heat or ice application.    Social history negative for tobacco marijuana and alcohol use.        I have personally reviewed and/or updated the patient's past medical history, past surgical history, family history, social history, current medications, allergies, and vital signs today.     Review of Systems   Constitutional:  Negative for chills and fever.   HENT:  Negative for ear pain and sore throat.    Eyes:  Positive for visual disturbance. Negative for pain.   Respiratory:  Negative for cough and shortness of breath.    Cardiovascular:  Negative for chest pain and palpitations.   Gastrointestinal:  Negative for abdominal pain and vomiting.   Genitourinary:  Negative for dysuria and hematuria.   Musculoskeletal:  Negative for arthralgias and back pain.   Skin:  Negative for color change and rash.   Neurological:  Negative  for seizures and syncope.   All other systems reviewed and are negative.      Patient Active Problem List   Diagnosis    Attention deficit hyperactivity disorder    Degenerative joint disease    Depression with anxiety    Seasonal allergic rhinitis due to pollen    Allergic dermatitis    Essential hypertension    Mixed hyperlipidemia    Insomnia    Age-related osteoporosis without current pathological fracture    Vitamin D deficiency    Anisocoria    Acute cystitis with hematuria    Abnormal CT scan, gastrointestinal tract    Nephrolithiasis    Low back strain    Low back pain    Right hip pain    Dysfunction of both eustachian tubes    Acute left-sided low back pain without sciatica    Constipation    PONV (postoperative nausea and vomiting)       Past Medical History:   Diagnosis Date    Anxiety     Depression     Elevated cholesterol     Hypertension     PONV (postoperative nausea and vomiting)        Past Surgical History:   Procedure Laterality Date    BREAST CYST ASPIRATION      DIAGNOSTIC LAPAROSCOPY      Exploratory, last assessed 10/25/16    NASAL SINUS SURGERY      age 33 - FESS and septoplasty - unknown surgeon    [place] STRTCTC CPTR ASSTD PX EXTRADURAL CRANIAL N/A 11/8/2016    Procedure: FUNCTIONAL ENDOSCOPIC SINUS SURGERY (FESS) IMAGED GUIDED;  Surgeon: Tony Morales DO;  Location: [facility];  Service: ENT    WISDOM TOOTH EXTRACTION         Family History   Problem Relation Age of Onset    Osteoporosis Mother     Coronary artery disease Father         CABG    Diabetes type II Sister     Asthma Sister     Diabetes Sister     Diabetes Sister     Bipolar disorder Brother     Depression Brother         with anxiety     Esophageal cancer Brother     Cancer Brother     Diabetes Maternal Grandfather     Depression Family         with anxiety     Hyperlipidemia Family     Ovarian cancer Paternal Aunt     Breast cancer Neg Hx     Colon cancer Neg Hx     Uterine cancer Neg Hx        Social History     Occupational  "History    Not on file   Tobacco Use    Smoking status: Never    Smokeless tobacco: Never    Tobacco comments:     No secondhand smoke exposure    Vaping Use    Vaping status: Never Used   Substance and Sexual Activity    Alcohol use: No    Drug use: No    Sexual activity: Yes     Partners: Male     Birth control/protection: Post-menopausal       Current Outpatient Medications on File Prior to Visit   Medication Sig    albuterol (PROVENTIL HFA,VENTOLIN HFA) 90 mcg/act inhaler Inhale 2 puffs every 6 (six) hours as needed for wheezing    atenolol (TENORMIN) 25 mg tablet Take 1 tablet (25 mg total) by mouth daily    atorvastatin (LIPITOR) 80 mg tablet Take 1 tablet (80 mg total) by mouth daily    Calcium 600 MG tablet Take 600 mg by mouth    Cholecalciferol (VITAMIN D-3 PO) Take 4,000 Int'l Units by mouth daily    CRANBERRY PO Take 500 mg by mouth    D-MANNOSE PO Take by mouth    DULoxetine (CYMBALTA) 60 mg delayed release capsule TAKE 1 CAPSULE DAILY    fluticasone (FLONASE) 50 mcg/act nasal spray 2 sprays into each nostril daily    Multiple Vitamin (MULTIVITAMIN) tablet Take 1 tablet by mouth daily     Nutritional Supplements (ANTIOXIDANTS PO) Take 1 tablet by mouth daily    sertraline (ZOLOFT) 25 mg tablet Take 1 tablet (25 mg total) by mouth daily    Sodium Fluoride 5000 Sensitive 1.1-5 % GEL     traZODone (DESYREL) 50 mg tablet TAKE ONE TABLET BY MOUTH DAILY AT BEDTIME    meloxicam (Mobic) 15 mg tablet Take 1 tablet (15 mg total) by mouth daily (Patient not taking: Reported on 9/14/2023)    methocarbamol (ROBAXIN) 500 mg tablet Take 1 tablet (500 mg total) by mouth 3 (three) times a day (Patient not taking: Reported on 9/14/2023)     No current facility-administered medications on file prior to visit.       Allergies   Allergen Reactions    Fexofenadine-Pseudoephed Er Other (See Comments)     \"didn't feel good\"    \"didn't feel good\"      Pollen Extract     Prednisone Nausea Only    Singulair [Montelukast]      Did " "not feel well, no particular assistance.       Physical Exam    /84   Pulse 65   Ht 5' 3\" (1.6 m)   Wt 63 kg (139 lb)   BMI 24.62 kg/m²     Constitutional: normal, well developed, well nourished, alert, in no distress and non-toxic and no overt pain behavior.  Eyes: anicteric  HEENT: grossly intact  Neck: supple, symmetric, trachea midline and no masses   Pulmonary:even and unlabored  Cardiovascular:No edema or pitting edema present  Psychiatric:Mood and affect appropriate  Neurologic:Cranial Nerves II-XII grossly intact  Musculoskeletal:normal, except for tenderness to palpation over the lower lumbar facet joints, pain is reproduced with lumbar extension as well as extension with rotation, patient is able to transfer independently from a seated to standing position and can stand on heels and toes independently, no significant pain with forward flexion and lumbar flexion is essentially full    Imaging    Narrative & Impression   LUMBAR SPINE     INDICATION:   Z12.31: Encounter for screening mammogram for malignant neoplasm of breast  W19.XXXA: Unspecified fall, initial encounter  M81.0: Age-related osteoporosis without current pathological fracture  M54.50: Low back pain, unspecified.     COMPARISON:  None     VIEWS:  XR SPINE LUMBAR 2 OR 3 VIEWS INJURY        FINDINGS:     There are 5 non rib bearing lumbar vertebral bodies.      There is no evidence of acute fracture or destructive osseous lesion.     Alignment is unremarkable.      No significant lumbar degenerative change noted.     The pedicles appear intact.     Soft tissues are unremarkable.     IMPRESSION:     Unremarkable radiograph of the lumbar spine.        Workstation performed: [id number]        "

## 2024-01-15 ENCOUNTER — TELEPHONE (OUTPATIENT)
Age: 64
End: 2024-01-15

## 2024-01-15 NOTE — TELEPHONE ENCOUNTER
Caller: Hayden GALVIN  Doctor/Office: Dr Gallardo     Call regarding :  Reschedule and LYFT services      Call was transferred to:

## 2024-01-16 ENCOUNTER — HOSPITAL ENCOUNTER (OUTPATIENT)
Dept: RADIOLOGY | Facility: MEDICAL CENTER | Age: 64
Discharge: HOME | End: 2024-01-16
Payer: COMMERCIAL

## 2024-01-16 VITALS
OXYGEN SATURATION: 95 % | DIASTOLIC BLOOD PRESSURE: 87 MMHG | HEART RATE: 65 BPM | TEMPERATURE: 97.6 F | RESPIRATION RATE: 20 BRPM | SYSTOLIC BLOOD PRESSURE: 144 MMHG

## 2024-01-16 DIAGNOSIS — M47.816 LUMBAR FACET ARTHROPATHY: ICD-10-CM

## 2024-01-16 PROCEDURE — 64493 INJ PARAVERT F JNT L/S 1 LEV: CPT | Performed by: PHYSICAL MEDICINE & REHABILITATION

## 2024-01-16 PROCEDURE — 64494 INJ PARAVERT F JNT L/S 2 LEV: CPT | Performed by: PHYSICAL MEDICINE & REHABILITATION

## 2024-01-16 RX ADMIN — Medication 3 ML: at 13:42

## 2024-01-16 NOTE — DISCHARGE INSTRUCTIONS

## 2024-01-16 NOTE — H&P
History of Present Illness: The patient is a 63 y.o. female who presents with complaints of bilateral lower back pain    Past Medical History:   Diagnosis Date    Anxiety     Depression     Elevated cholesterol     Hypertension     PONV (postoperative nausea and vomiting)        Past Surgical History:   Procedure Laterality Date    BREAST CYST ASPIRATION      DIAGNOSTIC LAPAROSCOPY      Exploratory, last assessed 10/25/16    NASAL SINUS SURGERY      age 33 - FESS and septoplasty - unknown surgeon    CT STRTCTC CPTR ASSTD PX EXTRADURAL CRANIAL N/A 11/8/2016    Procedure: FUNCTIONAL ENDOSCOPIC SINUS SURGERY (FESS) IMAGED GUIDED;  Surgeon: Tony Morales DO;  Location: [facility];  Service: ENT    WISDOM TOOTH EXTRACTION           Current Outpatient Medications:     albuterol (PROVENTIL HFA,VENTOLIN HFA) 90 mcg/act inhaler, Inhale 2 puffs every 6 (six) hours as needed for wheezing, Disp: 3 Inhaler, Rfl: 1    atenolol (TENORMIN) 25 mg tablet, Take 1 tablet (25 mg total) by mouth daily, Disp: 30 tablet, Rfl: 11    atorvastatin (LIPITOR) 80 mg tablet, Take 1 tablet (80 mg total) by mouth daily, Disp: 90 tablet, Rfl: 3    Calcium 600 MG tablet, Take 600 mg by mouth, Disp: , Rfl:     Cholecalciferol (VITAMIN D-3 PO), Take 4,000 Int'l Units by mouth daily, Disp: , Rfl:     CRANBERRY PO, Take 500 mg by mouth, Disp: , Rfl:     D-MANNOSE PO, Take by mouth, Disp: , Rfl:     DULoxetine (CYMBALTA) 60 mg delayed release capsule, TAKE 1 CAPSULE DAILY, Disp: 90 capsule, Rfl: 1    fluticasone (FLONASE) 50 mcg/act nasal spray, 2 sprays into each nostril daily, Disp: 16 g, Rfl: 5    meloxicam (Mobic) 15 mg tablet, Take 1 tablet (15 mg total) by mouth daily (Patient not taking: Reported on 9/14/2023), Disp: 30 tablet, Rfl: 0    methocarbamol (ROBAXIN) 500 mg tablet, Take 1 tablet (500 mg total) by mouth 3 (three) times a day (Patient not taking: Reported on 9/14/2023), Disp: 30 tablet, Rfl: 1    Multiple Vitamin (MULTIVITAMIN) tablet,  "Take 1 tablet by mouth daily , Disp: , Rfl:     Nutritional Supplements (ANTIOXIDANTS PO), Take 1 tablet by mouth daily, Disp: , Rfl:     sertraline (ZOLOFT) 25 mg tablet, Take 1 tablet (25 mg total) by mouth daily, Disp: 30 tablet, Rfl: 11    Sodium Fluoride 5000 Sensitive 1.1-5 % GEL, , Disp: , Rfl:     traZODone (DESYREL) 50 mg tablet, TAKE ONE TABLET BY MOUTH DAILY AT BEDTIME, Disp: 30 tablet, Rfl: 11    Current Facility-Administered Medications:     lidocaine (PF) (XYLOCAINE-MPF) 2 % injection 3 mL, 3 mL, Perineural, Once, Yoshi Norris, DO    Allergies   Allergen Reactions    Fexofenadine-Pseudoephed Er Other (See Comments)     \"didn't feel good\"    \"didn't feel good\"      Pollen Extract     Prednisone Nausea Only    Singulair [Montelukast]      Did not feel well, no particular assistance.       Physical Exam:   Vitals:    01/16/24 1327   BP: 123/79   Pulse: 67   Resp: 20   Temp: 97.6 °F (36.4 °C)   SpO2: 97%     General: Awake, Alert, Oriented x 3, Mood and affect appropriate  Respiratory: Respirations even and unlabored  Cardiovascular: Peripheral pulses intact; no edema  Musculoskeletal Exam: bilateral lower back pain    ASA Score: 3    Patient/Chart Verification  Patient ID Verified: Verbal  ID Band Applied: No  Consents Confirmed: Procedural, To be obtained in the Pre-Procedure area  H&P( within 30 days) Verified: To be obtained in the Pre-Procedure area  Interval H&P(within 24 hr) Complete (required for Outpatients and Surgery Admit only): To be obtained in the Pre-Procedure area  Allergies Reviewed: Yes  Anticoag/NSAID held?: NA  Currently on antibiotics?: No    Assessment:   1. Lumbar facet arthropathy        Plan: (B) L3-5 mbb#1    "

## 2024-01-19 ENCOUNTER — TELEPHONE (OUTPATIENT)
Dept: RADIOLOGY | Facility: MEDICAL CENTER | Age: 64
End: 2024-01-19

## 2024-01-19 NOTE — TELEPHONE ENCOUNTER
Called patient to schedule second MBB.  Patient stated that she did not feel that she really got pain relief from the first MBB.  Scheduled patietn for follow up visit with Physician Assistant to discuss results.

## 2024-01-22 DIAGNOSIS — F41.8 DEPRESSION WITH ANXIETY: ICD-10-CM

## 2024-01-22 RX ORDER — DULOXETIN HYDROCHLORIDE 60 MG/1
CAPSULE, DELAYED RELEASE ORAL
Qty: 90 CAPSULE | Refills: 1 | Status: SHIPPED | OUTPATIENT
Start: 2024-01-22

## 2024-01-31 ENCOUNTER — OFFICE VISIT (OUTPATIENT)
Dept: PAIN MEDICINE | Facility: MEDICAL CENTER | Age: 64
End: 2024-01-31
Payer: COMMERCIAL

## 2024-01-31 VITALS
SYSTOLIC BLOOD PRESSURE: 130 MMHG | OXYGEN SATURATION: 98 % | DIASTOLIC BLOOD PRESSURE: 83 MMHG | BODY MASS INDEX: 24.8 KG/M2 | HEIGHT: 63 IN | WEIGHT: 140 LBS | HEART RATE: 69 BPM

## 2024-01-31 DIAGNOSIS — G89.29 CHRONIC BILATERAL LOW BACK PAIN WITHOUT SCIATICA: ICD-10-CM

## 2024-01-31 DIAGNOSIS — M47.816 LUMBAR SPONDYLOSIS: Primary | ICD-10-CM

## 2024-01-31 DIAGNOSIS — M54.50 CHRONIC BILATERAL LOW BACK PAIN WITHOUT SCIATICA: ICD-10-CM

## 2024-01-31 PROCEDURE — 99214 OFFICE O/P EST MOD 30 MIN: CPT

## 2024-01-31 NOTE — PROGRESS NOTES
"Assessment:  1. Lumbar spondylosis    2. Chronic bilateral low back pain without sciatica        Plan:  Schedule for bilateral L3-L5 MBB #2.   Follow-up pending MBB results/RFA, or sooner if needed.    My impressions and treatment recommendations were discussed in detail with the patient who verbalized understanding and had no further questions.  Discharge instructions were provided. I personally saw and examined the patient and I agree with the above discussed plan of care.    Orders Placed This Encounter   Procedures    FL spine and pain procedure     Standing Status:   Future     Standing Expiration Date:   1/31/2028     Order Specific Question:   Reason for Exam:     Answer:   B/L L3-L5 MBB #2     Order Specific Question:   Anticoagulant hold needed?     Answer:   No       History of Present Illness:  Hayden Mcintosh is a 63 y.o. female who presents for a follow up office visit to review results of bilateral L3-L5 MBB #1.  According to patient report and pain diary, the patient experienced 100 to 80% relief of her pain for 2 hours following the procedure, followed by 4 hours of 70 to 80% relief of her pain.  Her pain was reduced from a 7/10 to a 3/10 immediately after the procedure. The patient reports that she marked \"not helpful\" between the hours of 4:00 and 10:00 because she was anticipating that she would be 100% pain-free following the medial branch blocks. We spent time discussing realistic expectations after MBB as well as radiofrequency ablation. I did explain to the patient that our goal is to reduce her pain by at least 80% and to make her pain manageable for her.  After discussion, the patient is willing to undergo bilateral L3-L5 MBB #2 with the understanding that this relief will be temporary and may not take her pain away completely.    I have personally reviewed and/or updated the patient's past medical history, past surgical history, family history, social history, current medications, allergies, " and vital signs today.     Review of Systems   Respiratory:  Negative for shortness of breath.    Cardiovascular:  Negative for chest pain.   Gastrointestinal:  Negative for constipation, diarrhea, nausea and vomiting.   Musculoskeletal:  Positive for back pain. Negative for arthralgias, gait problem, joint swelling and myalgias.   Skin:  Negative for rash.   Neurological:  Negative for dizziness, seizures and weakness.   All other systems reviewed and are negative.      Patient Active Problem List   Diagnosis    Attention deficit hyperactivity disorder    Degenerative joint disease    Depression with anxiety    Seasonal allergic rhinitis due to pollen    Allergic dermatitis    Essential hypertension    Mixed hyperlipidemia    Insomnia    Age-related osteoporosis without current pathological fracture    Vitamin D deficiency    Anisocoria    Acute cystitis with hematuria    Abnormal CT scan, gastrointestinal tract    Nephrolithiasis    Low back strain    Low back pain    Right hip pain    Dysfunction of both eustachian tubes    Acute left-sided low back pain without sciatica    Constipation    PONV (postoperative nausea and vomiting)    Lumbar facet arthropathy       Past Medical History:   Diagnosis Date    Anxiety     Depression     Elevated cholesterol     Hypertension     PONV (postoperative nausea and vomiting)        Past Surgical History:   Procedure Laterality Date    BREAST CYST ASPIRATION      DIAGNOSTIC LAPAROSCOPY      Exploratory, last assessed 10/25/16    NASAL SINUS SURGERY      age 33 - FESS and septoplasty - unknown surgeon    [place] STRTCTC CPTR ASSTD PX EXTRADURAL CRANIAL N/A 11/8/2016    Procedure: FUNCTIONAL ENDOSCOPIC SINUS SURGERY (FESS) IMAGED GUIDED;  Surgeon: Tony Morales DO;  Location: [facility];  Service: ENT    WISDOM TOOTH EXTRACTION         Family History   Problem Relation Age of Onset    Osteoporosis Mother     Coronary artery disease Father         CABG    Diabetes type II Sister      Asthma Sister     Diabetes Sister     Diabetes Sister     Bipolar disorder Brother     Depression Brother         with anxiety     Esophageal cancer Brother     Cancer Brother     Diabetes Maternal Grandfather     Depression Family         with anxiety     Hyperlipidemia Family     Ovarian cancer Paternal Aunt     Breast cancer Neg Hx     Colon cancer Neg Hx     Uterine cancer Neg Hx        Social History     Occupational History    Not on file   Tobacco Use    Smoking status: Never    Smokeless tobacco: Never    Tobacco comments:     No secondhand smoke exposure    Vaping Use    Vaping status: Never Used   Substance and Sexual Activity    Alcohol use: No    Drug use: No    Sexual activity: Yes     Partners: Male     Birth control/protection: Post-menopausal       Current Outpatient Medications on File Prior to Visit   Medication Sig    albuterol (PROVENTIL HFA,VENTOLIN HFA) 90 mcg/act inhaler Inhale 2 puffs every 6 (six) hours as needed for wheezing    atenolol (TENORMIN) 25 mg tablet Take 1 tablet (25 mg total) by mouth daily    atorvastatin (LIPITOR) 80 mg tablet Take 1 tablet (80 mg total) by mouth daily    Calcium 600 MG tablet Take 600 mg by mouth    Cholecalciferol (VITAMIN D-3 PO) Take 4,000 Int'l Units by mouth daily    CRANBERRY PO Take 500 mg by mouth    D-MANNOSE PO Take by mouth    DULoxetine (CYMBALTA) 60 mg delayed release capsule TAKE 1 CAPSULE DAILY    fluticasone (FLONASE) 50 mcg/act nasal spray 2 sprays into each nostril daily    Multiple Vitamin (MULTIVITAMIN) tablet Take 1 tablet by mouth daily     Nutritional Supplements (ANTIOXIDANTS PO) Take 1 tablet by mouth daily    sertraline (ZOLOFT) 25 mg tablet Take 1 tablet (25 mg total) by mouth daily    Sodium Fluoride 5000 Sensitive 1.1-5 % GEL     traZODone (DESYREL) 50 mg tablet TAKE ONE TABLET BY MOUTH DAILY AT BEDTIME    meloxicam (Mobic) 15 mg tablet Take 1 tablet (15 mg total) by mouth daily (Patient not taking: Reported on 9/14/2023)     "methocarbamol (ROBAXIN) 500 mg tablet Take 1 tablet (500 mg total) by mouth 3 (three) times a day (Patient not taking: Reported on 9/14/2023)     No current facility-administered medications on file prior to visit.       Allergies   Allergen Reactions    Fexofenadine-Pseudoephed Er Other (See Comments)     \"didn't feel good\"    \"didn't feel good\"      Pollen Extract     Prednisone Nausea Only    Singulair [Montelukast]      Did not feel well, no particular assistance.       Physical Exam:    /83   Pulse 69   Ht 5' 3\" (1.6 m)   Wt 63.5 kg (140 lb)   SpO2 98%   BMI 24.80 kg/m²     Constitutional:normal, well developed, well nourished, alert, in no distress and non-toxic and no overt pain behavior.  Eyes:anicteric  HEENT:grossly intact  Neck:symmetric, trachea midline and no masses   Pulmonary:even and unlabored  Cardiovascular:No edema or pitting edema present  Skin:Normal without rashes or lesions and well hydrated  Psychiatric:Mood and affect appropriate  Neurologic:Cranial Nerves II-XII grossly intact  Musculoskeletal:normal gait.    "

## 2024-02-05 ENCOUNTER — OFFICE VISIT (OUTPATIENT)
Dept: FAMILY MEDICINE CLINIC | Facility: CLINIC | Age: 64
End: 2024-02-05
Payer: COMMERCIAL

## 2024-02-05 VITALS
BODY MASS INDEX: 24.98 KG/M2 | WEIGHT: 141 LBS | HEART RATE: 74 BPM | SYSTOLIC BLOOD PRESSURE: 130 MMHG | TEMPERATURE: 97.9 F | OXYGEN SATURATION: 96 % | HEIGHT: 63 IN | DIASTOLIC BLOOD PRESSURE: 80 MMHG

## 2024-02-05 DIAGNOSIS — I10 ESSENTIAL HYPERTENSION: ICD-10-CM

## 2024-02-05 DIAGNOSIS — J20.8 VIRAL BRONCHITIS: Primary | ICD-10-CM

## 2024-02-05 PROCEDURE — 99214 OFFICE O/P EST MOD 30 MIN: CPT | Performed by: NURSE PRACTITIONER

## 2024-02-05 RX ORDER — BENZONATATE 200 MG/1
200 CAPSULE ORAL 3 TIMES DAILY PRN
Qty: 20 CAPSULE | Refills: 0 | Status: SHIPPED | OUTPATIENT
Start: 2024-02-05

## 2024-02-06 NOTE — PROGRESS NOTES
Name: Hayden Mcintosh      : 1960      MRN: [id number]  Encounter Provider: BRANDON Escalante  Encounter Date: 2024   Encounter department: [facility] PRIMARY CARE    Assessment & Plan     1. Viral bronchitis  Assessment & Plan:  Patient's symptoms seem consistent with a resolving viral bronchitis.  Patient was advised that her symptoms should hopefully continue to resolve on their own over the next 48 hours.  Tessalon Perles were ordered to be used as needed for cough.    Orders:  -     benzonatate (TESSALON) 200 MG capsule; Take 1 capsule (200 mg total) by mouth 3 (three) times a day as needed for cough    2. Essential hypertension  Assessment & Plan:  Well-controlled on current regimen.        Depression Screening and Follow-up Plan: Patient was screened for depression during today's encounter. They screened negative with a PHQ-9 score of 0.        Subjective      URI symptoms: Patient reports over the past week she has been experiencing symptoms of headaches, productive cough, chest congestion, and increased fatigue.  The patient denies fever, chills, or shortness of breath.  The patient most recently completed a home COVID test yesterday which was negative.  The patient does report over the past 24 hours she has started to feel significantly better.    Hypertension: Well-controlled on current dosage of atenolol.  Patient denies lightheadedness, dizziness, or orthostasis.      Review of Systems   Constitutional:  Positive for fatigue. Negative for chills and fever.   HENT:  Negative for congestion, ear pain, postnasal drip, rhinorrhea, sinus pressure, sinus pain and sore throat.    Eyes:  Negative for pain and visual disturbance.   Respiratory:  Positive for cough (productive). Negative for chest tightness, shortness of breath and wheezing.         Chest congestion   Cardiovascular:  Negative for chest pain, palpitations and leg swelling.   Gastrointestinal:  Negative for abdominal pain,  constipation, diarrhea, nausea and vomiting.   Endocrine: Negative for cold intolerance and heat intolerance.   Genitourinary:  Negative for decreased urine volume, dysuria and hematuria.   Musculoskeletal:  Negative for arthralgias, back pain and myalgias.   Skin:  Negative for color change and rash.   Allergic/Immunologic: Negative for environmental allergies.   Neurological:  Positive for headaches. Negative for dizziness, seizures, syncope, weakness, light-headedness and numbness.   Hematological:  Negative for adenopathy.   Psychiatric/Behavioral:  Negative for confusion. The patient is not nervous/anxious.    All other systems reviewed and are negative.      Current Outpatient Medications on File Prior to Visit   Medication Sig   • albuterol (PROVENTIL HFA,VENTOLIN HFA) 90 mcg/act inhaler Inhale 2 puffs every 6 (six) hours as needed for wheezing   • atenolol (TENORMIN) 25 mg tablet Take 1 tablet (25 mg total) by mouth daily   • atorvastatin (LIPITOR) 80 mg tablet Take 1 tablet (80 mg total) by mouth daily   • Calcium 600 MG tablet Take 600 mg by mouth   • Cholecalciferol (VITAMIN D-3 PO) Take 4,000 Int'l Units by mouth daily   • CRANBERRY PO Take 500 mg by mouth   • D-MANNOSE PO Take by mouth   • DULoxetine (CYMBALTA) 60 mg delayed release capsule TAKE 1 CAPSULE DAILY   • Multiple Vitamin (MULTIVITAMIN) tablet Take 1 tablet by mouth daily    • Nutritional Supplements (ANTIOXIDANTS PO) Take 1 tablet by mouth daily   • sertraline (ZOLOFT) 25 mg tablet Take 1 tablet (25 mg total) by mouth daily   • Sodium Fluoride 5000 Sensitive 1.1-5 % GEL    • traZODone (DESYREL) 50 mg tablet TAKE ONE TABLET BY MOUTH DAILY AT BEDTIME   • fluticasone (FLONASE) 50 mcg/act nasal spray 2 sprays into each nostril daily (Patient not taking: Reported on 2/5/2024)   • meloxicam (Mobic) 15 mg tablet Take 1 tablet (15 mg total) by mouth daily (Patient not taking: Reported on 9/14/2023)   • methocarbamol (ROBAXIN) 500 mg tablet Take 1  "tablet (500 mg total) by mouth 3 (three) times a day (Patient not taking: Reported on 9/14/2023)       Objective     /80 (BP Location: Right arm, Patient Position: Sitting, Cuff Size: Adult)   Pulse 74   Temp 97.9 °F (36.6 °C) (Tympanic)   Ht 5' 3\" (1.6 m)   Wt 64 kg (141 lb)   SpO2 96%   BMI 24.98 kg/m²     Physical Exam  Vitals and nursing note reviewed.   Constitutional:       General: She is not in acute distress.     Appearance: Normal appearance. She is not ill-appearing.   HENT:      Head: Normocephalic.      Right Ear: Hearing and tympanic membrane normal.      Left Ear: Hearing and tympanic membrane normal.      Mouth/Throat:      Pharynx: Oropharynx is clear. Uvula midline. No pharyngeal swelling, oropharyngeal exudate, posterior oropharyngeal erythema or uvula swelling.      Tonsils: No tonsillar exudate or tonsillar abscesses.   Eyes:      Conjunctiva/sclera: Conjunctivae normal.   Cardiovascular:      Rate and Rhythm: Normal rate and regular rhythm.      Pulses: Normal pulses.           Carotid pulses are 2+ on the right side and 2+ on the left side.       Radial pulses are 2+ on the right side and 2+ on the left side.        Posterior tibial pulses are 2+ on the right side and 2+ on the left side.      Heart sounds: Normal heart sounds. No murmur heard.  Pulmonary:      Effort: Pulmonary effort is normal. No respiratory distress.      Breath sounds: Normal breath sounds. No decreased breath sounds, wheezing, rhonchi or rales.   Abdominal:      General: Abdomen is flat. Bowel sounds are normal. There is no distension.      Palpations: Abdomen is soft.      Tenderness: There is no abdominal tenderness. There is no guarding.   Musculoskeletal:         General: Normal range of motion.      Cervical back: Normal range of motion.      Right lower leg: No edema.      Left lower leg: No edema.   Skin:     General: Skin is warm and dry.      Capillary Refill: Capillary refill takes less than 2 " seconds.   Neurological:      General: No focal deficit present.      Mental Status: She is alert and oriented to person, place, and time.   Psychiatric:         Mood and Affect: Mood normal.         Behavior: Behavior normal.         Thought Content: Thought content normal.         Judgment: Judgment normal.       BRANDON Escalante

## 2024-02-06 NOTE — ASSESSMENT & PLAN NOTE
Patient's symptoms seem consistent with a resolving viral bronchitis.  Patient was advised that her symptoms should hopefully continue to resolve on their own over the next 48 hours.  Tessalon Perles were ordered to be used as needed for cough.

## 2024-02-13 DIAGNOSIS — F41.8 DEPRESSION WITH ANXIETY: ICD-10-CM

## 2024-02-13 RX ORDER — SERTRALINE HYDROCHLORIDE 25 MG/1
25 TABLET, FILM COATED ORAL DAILY
Qty: 30 TABLET | Refills: 5 | Status: SHIPPED | OUTPATIENT
Start: 2024-02-13

## 2024-02-15 ENCOUNTER — APPOINTMENT (OUTPATIENT)
Dept: LAB | Facility: CLINIC | Age: 64
End: 2024-02-15
Payer: COMMERCIAL

## 2024-02-15 DIAGNOSIS — E78.2 MIXED HYPERLIPIDEMIA: ICD-10-CM

## 2024-02-15 DIAGNOSIS — N30.01 ACUTE CYSTITIS WITH HEMATURIA: ICD-10-CM

## 2024-02-15 DIAGNOSIS — N30.00 ACUTE CYSTITIS WITHOUT HEMATURIA: ICD-10-CM

## 2024-02-15 LAB
ALBUMIN SERPL BCP-MCNC: 4.2 G/DL (ref 3.5–5)
ALP SERPL-CCNC: 70 U/L (ref 34–104)
ALT SERPL W P-5'-P-CCNC: 15 U/L (ref 7–52)
ANION GAP SERPL CALCULATED.3IONS-SCNC: 8 MMOL/L
AST SERPL W P-5'-P-CCNC: 17 U/L (ref 13–39)
BACTERIA UR QL AUTO: ABNORMAL /HPF
BILIRUB SERPL-MCNC: 0.71 MG/DL (ref 0.2–1)
BILIRUB UR QL STRIP: NEGATIVE
BUN SERPL-MCNC: 31 MG/DL (ref 5–25)
CALCIUM SERPL-MCNC: 8.8 MG/DL (ref 8.4–10.2)
CHLORIDE SERPL-SCNC: 105 MMOL/L (ref 96–108)
CHOLEST SERPL-MCNC: 165 MG/DL
CLARITY UR: CLEAR
CO2 SERPL-SCNC: 28 MMOL/L (ref 21–32)
COLOR UR: YELLOW
CREAT SERPL-MCNC: 0.66 MG/DL (ref 0.6–1.3)
GFR SERPL CREATININE-BSD FRML MDRD: 92 ML/MIN/1.73SQ M
GLUCOSE P FAST SERPL-MCNC: 103 MG/DL (ref 65–99)
GLUCOSE UR STRIP-MCNC: NEGATIVE MG/DL
HDLC SERPL-MCNC: 49 MG/DL
HGB UR QL STRIP.AUTO: NEGATIVE
KETONES UR STRIP-MCNC: NEGATIVE MG/DL
LDLC SERPL CALC-MCNC: 103 MG/DL (ref 0–100)
LEUKOCYTE ESTERASE UR QL STRIP: ABNORMAL
MUCOUS THREADS UR QL AUTO: ABNORMAL
NITRITE UR QL STRIP: NEGATIVE
NON-SQ EPI CELLS URNS QL MICRO: ABNORMAL /HPF
PH UR STRIP.AUTO: 6.5 [PH]
POTASSIUM SERPL-SCNC: 4.6 MMOL/L (ref 3.5–5.3)
PROT SERPL-MCNC: 6.5 G/DL (ref 6.4–8.4)
PROT UR STRIP-MCNC: ABNORMAL MG/DL
RBC #/AREA URNS AUTO: ABNORMAL /HPF
SODIUM SERPL-SCNC: 141 MMOL/L (ref 135–147)
SP GR UR STRIP.AUTO: 1.02 (ref 1–1.03)
TRIGL SERPL-MCNC: 65 MG/DL
UROBILINOGEN UR STRIP-ACNC: <2 MG/DL
WBC #/AREA URNS AUTO: ABNORMAL /HPF

## 2024-02-15 PROCEDURE — 80053 COMPREHEN METABOLIC PANEL: CPT

## 2024-02-15 PROCEDURE — 36415 COLL VENOUS BLD VENIPUNCTURE: CPT

## 2024-02-15 PROCEDURE — 87086 URINE CULTURE/COLONY COUNT: CPT

## 2024-02-15 PROCEDURE — 80061 LIPID PANEL: CPT

## 2024-02-15 PROCEDURE — 81001 URINALYSIS AUTO W/SCOPE: CPT

## 2024-02-16 ENCOUNTER — HOSPITAL ENCOUNTER (OUTPATIENT)
Dept: RADIOLOGY | Facility: MEDICAL CENTER | Age: 64
End: 2024-02-16
Payer: COMMERCIAL

## 2024-02-16 VITALS
RESPIRATION RATE: 18 BRPM | OXYGEN SATURATION: 95 % | SYSTOLIC BLOOD PRESSURE: 134 MMHG | HEART RATE: 66 BPM | DIASTOLIC BLOOD PRESSURE: 90 MMHG | TEMPERATURE: 97.3 F

## 2024-02-16 DIAGNOSIS — M47.816 LUMBAR SPONDYLOSIS: ICD-10-CM

## 2024-02-16 LAB — BACTERIA UR CULT: NORMAL

## 2024-02-16 PROCEDURE — 64494 INJ PARAVERT F JNT L/S 2 LEV: CPT | Performed by: PHYSICAL MEDICINE & REHABILITATION

## 2024-02-16 PROCEDURE — 64493 INJ PARAVERT F JNT L/S 1 LEV: CPT | Performed by: PHYSICAL MEDICINE & REHABILITATION

## 2024-02-16 RX ORDER — BUPIVACAINE HYDROCHLORIDE 5 MG/ML
3 INJECTION, SOLUTION EPIDURAL; INTRACAUDAL ONCE
Status: COMPLETED | OUTPATIENT
Start: 2024-02-16 | End: 2024-02-16

## 2024-02-16 RX ADMIN — BUPIVACAINE HYDROCHLORIDE 3 ML: 5 INJECTION, SOLUTION EPIDURAL; INTRACAUDAL at 09:49

## 2024-02-16 NOTE — DISCHARGE INSTRUCTIONS

## 2024-02-16 NOTE — H&P
History of Present Illness: The patient is a 63 y.o. female who presents with complaints of bilateral lower back pain    Past Medical History:   Diagnosis Date    Anxiety     Depression     Elevated cholesterol     Hypertension     PONV (postoperative nausea and vomiting)        Past Surgical History:   Procedure Laterality Date    BREAST CYST ASPIRATION      DIAGNOSTIC LAPAROSCOPY      Exploratory, last assessed 10/25/16    NASAL SINUS SURGERY      age 33 - FESS and septoplasty - unknown surgeon    CA STRTCTC CPTR ASSTD PX EXTRADURAL CRANIAL N/A 11/8/2016    Procedure: FUNCTIONAL ENDOSCOPIC SINUS SURGERY (FESS) IMAGED GUIDED;  Surgeon: Tony Morales DO;  Location: [facility];  Service: ENT    WISDOM TOOTH EXTRACTION           Current Outpatient Medications:     albuterol (PROVENTIL HFA,VENTOLIN HFA) 90 mcg/act inhaler, Inhale 2 puffs every 6 (six) hours as needed for wheezing, Disp: 3 Inhaler, Rfl: 1    atenolol (TENORMIN) 25 mg tablet, Take 1 tablet (25 mg total) by mouth daily, Disp: 30 tablet, Rfl: 11    atorvastatin (LIPITOR) 80 mg tablet, Take 1 tablet (80 mg total) by mouth daily, Disp: 90 tablet, Rfl: 3    benzonatate (TESSALON) 200 MG capsule, Take 1 capsule (200 mg total) by mouth 3 (three) times a day as needed for cough, Disp: 20 capsule, Rfl: 0    Calcium 600 MG tablet, Take 600 mg by mouth, Disp: , Rfl:     Cholecalciferol (VITAMIN D-3 PO), Take 4,000 Int'l Units by mouth daily, Disp: , Rfl:     CRANBERRY PO, Take 500 mg by mouth, Disp: , Rfl:     D-MANNOSE PO, Take by mouth, Disp: , Rfl:     DULoxetine (CYMBALTA) 60 mg delayed release capsule, TAKE 1 CAPSULE DAILY, Disp: 90 capsule, Rfl: 1    fluticasone (FLONASE) 50 mcg/act nasal spray, 2 sprays into each nostril daily (Patient not taking: Reported on 2/5/2024), Disp: 16 g, Rfl: 5    meloxicam (Mobic) 15 mg tablet, Take 1 tablet (15 mg total) by mouth daily (Patient not taking: Reported on 9/14/2023), Disp: 30 tablet, Rfl: 0    methocarbamol  "(ROBAXIN) 500 mg tablet, Take 1 tablet (500 mg total) by mouth 3 (three) times a day (Patient not taking: Reported on 9/14/2023), Disp: 30 tablet, Rfl: 1    Multiple Vitamin (MULTIVITAMIN) tablet, Take 1 tablet by mouth daily , Disp: , Rfl:     Nutritional Supplements (ANTIOXIDANTS PO), Take 1 tablet by mouth daily, Disp: , Rfl:     sertraline (ZOLOFT) 25 mg tablet, TAKE ONE TABLET BY MOUTH EVERY DAY, Disp: 30 tablet, Rfl: 5    Sodium Fluoride 5000 Sensitive 1.1-5 % GEL, , Disp: , Rfl:     traZODone (DESYREL) 50 mg tablet, TAKE ONE TABLET BY MOUTH DAILY AT BEDTIME, Disp: 30 tablet, Rfl: 11    Allergies   Allergen Reactions    Fexofenadine-Pseudoephed Er Other (See Comments)     \"didn't feel good\"    \"didn't feel good\"      Pollen Extract     Prednisone Nausea Only    Singulair [Montelukast]      Did not feel well, no particular assistance.       Physical Exam:   Vitals:    02/16/24 0941   BP: 131/86   Pulse: 70   Resp: 18   Temp: (!) 97.3 °F (36.3 °C)   SpO2: 96%     General: Awake, Alert, Oriented x 3, Mood and affect appropriate  Respiratory: Respirations even and unlabored  Cardiovascular: Peripheral pulses intact; no edema  Musculoskeletal Exam: bilateral lower back pain    ASA Score: 3    Patient/Chart Verification  Patient ID Verified: Verbal  ID Band Applied: No  Consents Confirmed: Procedural  H&P( within 30 days) Verified: To be obtained in the Pre-Procedure area  Interval H&P(within 24 hr) Complete (required for Outpatients and Surgery Admit only): To be obtained in the Pre-Procedure area  Allergies Reviewed: Yes  Anticoag/NSAID held?: NA  Currently on antibiotics?: No  Pregnancy denied?: NA    Assessment:   1. Lumbar spondylosis        Plan: B/L L3-L5 MBB #2    "

## 2024-02-19 ENCOUNTER — OFFICE VISIT (OUTPATIENT)
Dept: FAMILY MEDICINE CLINIC | Facility: CLINIC | Age: 64
End: 2024-02-19
Payer: COMMERCIAL

## 2024-02-19 VITALS
DIASTOLIC BLOOD PRESSURE: 80 MMHG | HEIGHT: 63 IN | BODY MASS INDEX: 24.8 KG/M2 | WEIGHT: 140 LBS | SYSTOLIC BLOOD PRESSURE: 124 MMHG | HEART RATE: 72 BPM

## 2024-02-19 DIAGNOSIS — F41.8 DEPRESSION WITH ANXIETY: ICD-10-CM

## 2024-02-19 DIAGNOSIS — E55.9 VITAMIN D DEFICIENCY: ICD-10-CM

## 2024-02-19 DIAGNOSIS — R73.9 HYPERGLYCEMIA: ICD-10-CM

## 2024-02-19 DIAGNOSIS — E78.2 MIXED HYPERLIPIDEMIA: ICD-10-CM

## 2024-02-19 DIAGNOSIS — R93.3 ABNORMAL CT SCAN, GASTROINTESTINAL TRACT: ICD-10-CM

## 2024-02-19 DIAGNOSIS — M47.816 LUMBAR SPONDYLOSIS: ICD-10-CM

## 2024-02-19 DIAGNOSIS — I10 ESSENTIAL HYPERTENSION: Primary | ICD-10-CM

## 2024-02-19 DIAGNOSIS — J45.20 MILD INTERMITTENT ASTHMA WITHOUT COMPLICATION: ICD-10-CM

## 2024-02-19 DIAGNOSIS — M81.0 AGE-RELATED OSTEOPOROSIS WITHOUT CURRENT PATHOLOGICAL FRACTURE: ICD-10-CM

## 2024-02-19 DIAGNOSIS — F51.04 PSYCHOPHYSIOLOGICAL INSOMNIA: ICD-10-CM

## 2024-02-19 PROBLEM — S39.012A LOW BACK STRAIN: Status: RESOLVED | Noted: 2022-10-07 | Resolved: 2024-02-19

## 2024-02-19 PROBLEM — J20.8 VIRAL BRONCHITIS: Status: RESOLVED | Noted: 2024-02-05 | Resolved: 2024-02-19

## 2024-02-19 PROBLEM — M54.50 ACUTE LEFT-SIDED LOW BACK PAIN WITHOUT SCIATICA: Status: RESOLVED | Noted: 2023-03-15 | Resolved: 2024-02-19

## 2024-02-19 PROBLEM — N30.01 ACUTE CYSTITIS WITH HEMATURIA: Status: RESOLVED | Noted: 2022-07-25 | Resolved: 2024-02-19

## 2024-02-19 PROBLEM — R11.2 PONV (POSTOPERATIVE NAUSEA AND VOMITING): Status: RESOLVED | Noted: 2023-11-09 | Resolved: 2024-02-19

## 2024-02-19 PROBLEM — Z98.890 PONV (POSTOPERATIVE NAUSEA AND VOMITING): Status: RESOLVED | Noted: 2023-11-09 | Resolved: 2024-02-19

## 2024-02-19 PROCEDURE — 99214 OFFICE O/P EST MOD 30 MIN: CPT | Performed by: PHYSICIAN ASSISTANT

## 2024-02-19 RX ORDER — TRAZODONE HYDROCHLORIDE 50 MG/1
50 TABLET ORAL
Qty: 30 TABLET | Refills: 11 | Status: SHIPPED | OUTPATIENT
Start: 2024-02-19

## 2024-02-19 RX ORDER — ALBUTEROL SULFATE 90 UG/1
2 AEROSOL, METERED RESPIRATORY (INHALATION) EVERY 6 HOURS PRN
Qty: 18 G | Refills: 5 | Status: SHIPPED | OUTPATIENT
Start: 2024-02-19

## 2024-02-19 NOTE — ASSESSMENT & PLAN NOTE
Fasting glucose slightly elevated at 103 this has been an issue in the past but had not occurred from many years now.  Watch simple carbs such as bread Posta potatoes rice junk for dessert and sweets recheck 6 months.

## 2024-02-19 NOTE — PATIENT INSTRUCTIONS
1. Essential hypertension  Assessment & Plan:  Stable on current Tenormin.      2. Psychophysiological insomnia  Assessment & Plan:  Stable on trazodone nightly.      3. Mild intermittent asthma without complication    4. Age-related osteoporosis without current pathological fracture  Assessment & Plan:  Last DEXA with only osteopenia repeat in  ordered.      5. Vitamin D deficiency  Assessment & Plan:  Check vitamin D with next labs continue supplementation.      6. Mixed hyperlipidemia  Assessment & Plan:  Patient is on Lipitor 80 keeping her LDL close to goal at 103 continue recheck 6 months.      7. Depression with anxiety  Assessment & Plan:  Stable on Zoloft and Cymbalta without SI or HI.      8. Hyperglycemia  Assessment & Plan:  Fasting glucose slightly elevated at 103 this has been an issue in the past but had not occurred from many years now.  Watch simple carbs such as bread Posta potatoes rice junk for dessert and sweets recheck 6 months.      9. Lumbar spondylosis  Assessment & Plan:  Has been followed with pain management.      10. Abnormal CT scan, gastrointestinal tract  Assessment & Plan:  Patient had CT abdomen pelvis with contrast  to follow-up on mesenteric lymphadenopathy which at that time was stable and it was recommended by radiology to repeat CT in 6 months and if continued to be stable then no further workup with be needed.  Order for this follow-up 6-month study has  so I will place a new one and urged patient to go.

## 2024-02-19 NOTE — ASSESSMENT & PLAN NOTE
Patient had CT abdomen pelvis with contrast  to follow-up on mesenteric lymphadenopathy which at that time was stable and it was recommended by radiology to repeat CT in 6 months and if continued to be stable then no further workup with be needed.  Order for this follow-up 6-month study has  so I will place a new one and urged patient to go.

## 2024-02-19 NOTE — PROGRESS NOTES
Name: Hayden Mcintosh      : 1960      MRN: [id number]  Encounter Provider: Maame Keenan PA-C  Encounter Date: 2024   Encounter department: [facility] PRIMARY CARE    Assessment & Plan     1. Essential hypertension  Assessment & Plan:  Stable on current Tenormin.    Orders:  -     CBC and differential; Future; Expected date: 2024    2. Psychophysiological insomnia  Assessment & Plan:  Stable on trazodone nightly.    Orders:  -     traZODone (DESYREL) 50 mg tablet; Take 1 tablet (50 mg total) by mouth daily at bedtime    3. Mild intermittent asthma without complication  -     albuterol (PROVENTIL HFA,VENTOLIN HFA) 90 mcg/act inhaler; Inhale 2 puffs every 6 (six) hours as needed for wheezing    4. Age-related osteoporosis without current pathological fracture  Assessment & Plan:  Last DEXA with only osteopenia repeat in  ordered.      5. Vitamin D deficiency  Assessment & Plan:  Check vitamin D with next labs continue supplementation.    Orders:  -     Vitamin D 25 hydroxy; Future; Expected date: 2024    6. Mixed hyperlipidemia  Assessment & Plan:  Patient is on Lipitor 80 keeping her LDL close to goal at 103 continue recheck 6 months.    Orders:  -     Lipid Panel with Direct LDL reflex; Future; Expected date: 2024    7. Depression with anxiety  Assessment & Plan:  Stable on Zoloft and Cymbalta without SI or HI.      8. Hyperglycemia  Assessment & Plan:  Fasting glucose slightly elevated at 103 this has been an issue in the past but had not occurred from many years now.  Watch simple carbs such as bread Posta potatoes rice junk for dessert and sweets recheck 6 months.    Orders:  -     Comprehensive metabolic panel; Future; Expected date: 2024  -     Hemoglobin A1C; Future; Expected date: 2024    9. Lumbar spondylosis  Assessment & Plan:  Has been followed with pain management.      10. Abnormal CT scan, gastrointestinal tract  Assessment & Plan:  Patient had  CT abdomen pelvis with contrast  to follow-up on mesenteric lymphadenopathy which at that time was stable and it was recommended by radiology to repeat CT in 6 months and if continued to be stable then no further workup with be needed.  Order for this follow-up 6-month study has  so I will place a new one and urged patient to go.    Orders:  -     CT chest abdomen pelvis w contrast; Future; Expected date: 2024           Kailee Mcintosh is here for chronic conditions f/u. Pt. had labs done prior to today's visit which included Recent Results (from the past 672 hour(s))  -UA w Reflex to Microscopic w Reflex to Culture -Lab Collect:   Collection Time: 02/15/24  8:13 AM  Specimen: Urine, Clean Catch       Result                      Value             Ref Range           Color, UA                   Yellow                                Clarity, UA                 Clear                                 Specific [place], UA        1.019             1.003 - 1.030       pH, UA                      6.5               4.5, 5.0, 5.*       Leukocytes, UA              Small (A)         Negative            Nitrite, UA                 Negative          Negative            Protein, UA                 Trace (A)         Negative mg/*       Glucose, UA                 Negative          Negative mg/*       Ketones, UA                 Negative          Negative mg/*       Urobilinogen, UA            <2.0              <2.0 mg/dl m*       Bilirubin, UA               Negative          Negative            Occult Blood, UA            Negative          Negative       -Lipid Panel with Direct LDL reflex:   Collection Time: 02/15/24  8:13 AM       Result                      Value             Ref Range           Cholesterol                 165               See Comment *       Triglycerides               65                See Comment *       HDL, Direct                 49 (L)            >=50 mg/dL           LDL Calculated              103 (H)           0 - 100 mg/dL  -Comprehensive metabolic panel:   Collection Time: 02/15/24  8:13 AM       Result                      Value             Ref Range           Sodium                      141               135 - 147 mm*       Potassium                   4.6               3.5 - 5.3 mm*       Chloride                    105               96 - 108 mmo*       CO2                         28                21 - 32 mmol*       ANION GAP                   8                 mmol/L              BUN                         31 (H)            5 - 25 mg/dL        Creatinine                  0.66              0.60 - 1.30 *       Glucose, Fasting            103 (H)           65 - 99 mg/dL       Calcium                     8.8               8.4 - 10.2 m*       AST                         17                13 - 39 U/L         ALT                         15                7 - 52 U/L          Alkaline Phosphatase        70                34 - 104 U/L        Total Protein               6.5               6.4 - 8.4 g/*       Albumin                     4.2               3.5 - 5.0 g/*       Total Bilirubin             0.71              0.20 - 1.00 *       eGFR                        92                ml/min/1.73s*  -Urine culture:   Collection Time: 02/15/24  8:13 AM  Specimen: Urine, Clean Catch       Result                      Value             Ref Range           Urine Culture                                                 20,000-29,000 cfu/ml  -Urine Microscopic:   Collection Time: 02/15/24  8:13 AM       Result                      Value             Ref Range           RBC, UA                     1-2               None Seen, 1*       WBC, UA                     4-10 (A)          None Seen, 1*       Epithelial Cells            Occasional        None Seen, O*       Bacteria, UA                None Seen         None Seen, O*       MUCUS THREADS                                 None Seen        Occasional (A)        Review of Systems   Constitutional: Negative.    HENT: Negative.     Eyes: Negative.    Respiratory: Negative.     Cardiovascular: Negative.    Gastrointestinal: Negative.    Endocrine: Negative.    Genitourinary: Negative.    Musculoskeletal: Negative.    Skin: Negative.    Allergic/Immunologic: Negative.    Neurological: Negative.    Hematological: Negative.    Psychiatric/Behavioral: Negative.         Current Outpatient Medications on File Prior to Visit   Medication Sig   • atenolol (TENORMIN) 25 mg tablet Take 1 tablet (25 mg total) by mouth daily   • atorvastatin (LIPITOR) 80 mg tablet Take 1 tablet (80 mg total) by mouth daily   • Calcium 600 MG tablet Take 600 mg by mouth   • Cholecalciferol (VITAMIN D-3 PO) Take 4,000 Int'l Units by mouth daily   • CRANBERRY PO Take 500 mg by mouth   • D-MANNOSE PO Take by mouth   • DULoxetine (CYMBALTA) 60 mg delayed release capsule TAKE 1 CAPSULE DAILY   • fluticasone (FLONASE) 50 mcg/act nasal spray 2 sprays into each nostril daily   • Multiple Vitamin (MULTIVITAMIN) tablet Take 1 tablet by mouth daily    • Nutritional Supplements (ANTIOXIDANTS PO) Take 1 tablet by mouth daily   • sertraline (ZOLOFT) 25 mg tablet TAKE ONE TABLET BY MOUTH EVERY DAY   • Sodium Fluoride 5000 Sensitive 1.1-5 % GEL    • [DISCONTINUED] albuterol (PROVENTIL HFA,VENTOLIN HFA) 90 mcg/act inhaler Inhale 2 puffs every 6 (six) hours as needed for wheezing   • [DISCONTINUED] traZODone (DESYREL) 50 mg tablet TAKE ONE TABLET BY MOUTH DAILY AT BEDTIME   • [DISCONTINUED] benzonatate (TESSALON) 200 MG capsule Take 1 capsule (200 mg total) by mouth 3 (three) times a day as needed for cough   • [DISCONTINUED] meloxicam (Mobic) 15 mg tablet Take 1 tablet (15 mg total) by mouth daily (Patient not taking: Reported on 9/14/2023)   • [DISCONTINUED] methocarbamol (ROBAXIN) 500 mg tablet Take 1 tablet (500 mg total) by mouth 3 (three) times a day (Patient not taking: Reported on 9/14/2023)  "      Objective     /80   Pulse 72   Ht 5' 3\" (1.6 m)   Wt 63.5 kg (140 lb)   BMI 24.80 kg/m²     Physical Exam  Vitals and nursing note reviewed.   Constitutional:       General: She is not in acute distress.     Appearance: She is well-developed. She is not diaphoretic.   HENT:      Head: Normocephalic and atraumatic.      Nose: Nose normal.   Eyes:      General:         Right eye: No discharge.         Left eye: No discharge.      Conjunctiva/sclera: Conjunctivae normal.   Neck:      Vascular: No carotid bruit.   Cardiovascular:      Rate and Rhythm: Normal rate and regular rhythm.      Heart sounds: Normal heart sounds. No murmur heard.     No friction rub. No gallop.   Pulmonary:      Effort: Pulmonary effort is normal. No respiratory distress.      Breath sounds: Normal breath sounds. No wheezing or rales.   Musculoskeletal:      Cervical back: Neck supple.   Skin:     General: Skin is warm and dry.   Neurological:      Mental Status: She is alert and oriented to person, place, and time.   Psychiatric:         Judgment: Judgment normal.       Maame Keenan PA-C    "

## 2024-02-21 ENCOUNTER — TELEPHONE (OUTPATIENT)
Dept: RADIOLOGY | Facility: MEDICAL CENTER | Age: 64
End: 2024-02-21

## 2024-02-28 ENCOUNTER — OFFICE VISIT (OUTPATIENT)
Dept: FAMILY MEDICINE CLINIC | Facility: CLINIC | Age: 64
End: 2024-02-28
Payer: COMMERCIAL

## 2024-02-28 VITALS
DIASTOLIC BLOOD PRESSURE: 68 MMHG | HEART RATE: 75 BPM | HEIGHT: 63 IN | SYSTOLIC BLOOD PRESSURE: 122 MMHG | BODY MASS INDEX: 24.91 KG/M2 | WEIGHT: 140.6 LBS | OXYGEN SATURATION: 96 %

## 2024-02-28 DIAGNOSIS — G56.02 CARPAL TUNNEL SYNDROME OF LEFT WRIST: ICD-10-CM

## 2024-02-28 DIAGNOSIS — M76.62 LEFT ACHILLES TENDINITIS: Primary | ICD-10-CM

## 2024-02-28 PROCEDURE — 99214 OFFICE O/P EST MOD 30 MIN: CPT | Performed by: NURSE PRACTITIONER

## 2024-02-28 RX ORDER — DICLOFENAC SODIUM 75 MG/1
75 TABLET, DELAYED RELEASE ORAL 2 TIMES DAILY PRN
Qty: 60 TABLET | Refills: 0 | Status: SHIPPED | OUTPATIENT
Start: 2024-02-28

## 2024-02-28 NOTE — PATIENT INSTRUCTIONS
Achilles Tendinitis   WHAT YOU NEED TO KNOW:   Achilles tendinitis is swelling of the tendon that connects your calf muscle to your heel bone. It may happen suddenly or become a chronic condition. Your risk for Achilles tendinitis increases as you age.  DISCHARGE INSTRUCTIONS:   Contact your healthcare provider if:   You have a fever.     Your swelling or pain gets worse.     You feel or hear a sudden pop near your ankle.     You cannot bend your ankle or put pressure on your leg.     You have questions about your condition or care.     Medicines:   NSAIDs , such as ibuprofen, help decrease swelling, pain, and fever. This medicine is available with or without a doctor's order. NSAIDs can cause stomach bleeding or kidney problems in certain people. If you take blood thinner medicine, always ask your healthcare provider if NSAIDs are safe for you. Always read the medicine label and follow directions.     Take your medicine as directed.  Contact your healthcare provider if you think your medicine is not helping or if you have side effects. Tell your provider if you are allergic to any medicine. Keep a list of the medicines, vitamins, and herbs you take. Include the amounts, and when and why you take them. Bring the list or the pill bottles to follow-up visits. Carry your medicine list with you in case of an emergency.     Manage your Achilles tendinitis:   Rest  as directed. Rest decreases swelling and prevents your tendinitis from getting worse. Your healthcare provider may tell you to stop your usual training or exercise activities. Ask when you can return to your normal activities or exercise plan.     Apply ice  on your Achilles tendon for 15 to 20 minutes every hour or as directed. Use an ice pack, or put crushed ice in a plastic bag. Cover it with a towel. Ice helps prevent tissue damage and decreases swelling and pain.     Wear a compression bandage or use tape  as directed. This will decrease swelling and pain.  Ask your healthcare provider how to wrap a compression bandage or apply tape. If you use a support device ask if you should wear a compression bandage or use tape.     Elevate  your heel above the level of your heart as often as you can. This will help decrease swelling and pain. Prop your heel on pillows or blankets to keep it elevated comfortably.     Stretch  as directed when you return to your exercise program. Always warm up your muscles and stretch before you exercise. Do cool down exercises and stretches when you are finished. This will keep your muscles loose and decrease stress on your Achilles tendon.     Do bilateral heel drop exercises as directed.  Bilateral heel drops strengthen your Achilles tendon. Do not do the following exercise unless your healthcare provider says it is safe:     Stand at the edge of a stair or raised step. Hold onto the railing for balance.     Place the front part of your foot on the stair or step. Let the back of your foot hang off of the stair or step.     Slowly lift your heels off the ground and then slowly lower your heels past the stair. Do not move your heels quickly.  This could make your injury worse. Repeat this exercise 20 times or as directed.        Slowly increase the time and intensity when you return to your exercise program.  Start with short and low intensity exercises. Ask your healthcare provider how and when to increase the time and intensity of your exercise.     Wear support devices or supportive shoes as directed.  Support devices may include a splint, orthotic, or brace. These devices will decrease pressure on your Achilles tendon and help relieve pain. Supportive shoes will cushion your heel and protect your Achilles tendon. Replace shoes or sneakers that are worn out.  Go to physical therapy and practice exercises as directed:  A physical therapist teaches you exercises to help improve movement and strength, and decrease pain. Practice these exercises  at home as directed.  Follow up with your doctor as directed:  Write down your questions so you remember to ask them during your visits.  © Copyright [employer] 2023 Information is for End User's use only and may not be sold, redistributed or otherwise used for commercial purposes.  The above information is an  only. It is not intended as medical advice for individual conditions or treatments. Talk to your doctor, nurse or pharmacist before following any medical regimen to see if it is safe and effective for you.

## 2024-02-28 NOTE — ASSESSMENT & PLAN NOTE
Patient's symptoms are consistent with carpal tunnel syndrome of the left wrist.  Since her symptoms only tend to occur in the morning she was advised to begin wearing a wrist brace while sleeping at night.  She was advised that if she begins to note the symptoms during the day she can also begin bracing during the day.  Diclofenac 75 mg was also ordered to be used twice daily as needed for her pain.

## 2024-02-28 NOTE — ASSESSMENT & PLAN NOTE
Patient's symptoms are consistent with left Achilles tendinitis.  Patient was provided with exercises to strengthen the Achilles area on her AVS today.  I also advised her to cut down her walking to about 1 mile per day until her symptoms begin to improve.  Diclofenac 75 mg was also ordered to be used as needed twice daily for symptoms.

## 2024-02-28 NOTE — PROGRESS NOTES
Name: Hayden Mcintosh      : 1960      MRN: [id number]  Encounter Provider: BRANDON Escalante  Encounter Date: 2024   Encounter department: [facility]    Assessment & Plan     1. Left Achilles tendinitis  Assessment & Plan:  Patient's symptoms are consistent with left Achilles tendinitis.  Patient was provided with exercises to strengthen the Achilles area on her AVS today.  I also advised her to cut down her walking to about 1 mile per day until her symptoms begin to improve.  Diclofenac 75 mg was also ordered to be used as needed twice daily for symptoms.    Orders:  -     diclofenac (VOLTAREN) 75 mg EC tablet; Take 1 tablet (75 mg total) by mouth 2 (two) times a day as needed (Ankle pain)    2. Carpal tunnel syndrome of left wrist  Assessment & Plan:  Patient's symptoms are consistent with carpal tunnel syndrome of the left wrist.  Since her symptoms only tend to occur in the morning she was advised to begin wearing a wrist brace while sleeping at night.  She was advised that if she begins to note the symptoms during the day she can also begin bracing during the day.  Diclofenac 75 mg was also ordered to be used twice daily as needed for her pain.    Orders:  -     diclofenac (VOLTAREN) 75 mg EC tablet; Take 1 tablet (75 mg total) by mouth 2 (two) times a day as needed (Ankle pain)        Depression Screening and Follow-up Plan: Patient was screened for depression during today's encounter. They screened negative with a PHQ-9 score of 0.        Subjective     Chief Complaint   Patient presents with   • Ankle Pain     Left ankle pain, swollen, stiffness for about 2 weeks. Feels tight in the morning and still throughout the day. Takes Aleve for pain, but not sure if it helps   • Hand Pain     Left hand pain numb and tingling in the morning for about 6 months. Started off with a bump in the palm and now has about 4 bumps      Left ankle pain: Patient reports over the past 2 weeks she  has been experiencing recurrent left ankle pain.  She reports that the pain is localized to the left posterior ankle in the Achilles area.  She denies any recent falls or injuries to the area but reports that she has been attempting to walk more recently.  She reports on average she has been walking about 2 miles per day.  She denies any instability of the ankle.  She does report that sometimes the ankle does seem to be slightly swollen when waking in the morning.    Left hand pain: Patient reports over the past 6 months she has been experiencing recurrent left hand pain and numbness when waking in the morning.  She reports that as the day goes on the pain resolves.  She denies any decreased range of motion of the left wrist or hand.  She reports that the pain and numbness is throughout the left hand when it is present.          Review of Systems   Constitutional:  Negative for chills and fever.   HENT:  Negative for ear pain and sore throat.    Eyes:  Negative for pain and visual disturbance.   Respiratory:  Negative for cough, chest tightness, shortness of breath and wheezing.    Cardiovascular:  Negative for chest pain, palpitations and leg swelling.   Gastrointestinal:  Negative for abdominal pain, constipation, diarrhea, nausea and vomiting.   Endocrine: Negative for cold intolerance and heat intolerance.   Genitourinary:  Negative for decreased urine volume, dysuria and hematuria.   Musculoskeletal:  Positive for arthralgias (left ankle and hand) and joint swelling (left ankle-intermittent). Negative for back pain.   Skin:  Negative for color change and rash.   Allergic/Immunologic: Negative for environmental allergies.   Neurological:  Positive for numbness (left hand). Negative for dizziness, seizures, syncope, weakness, light-headedness and headaches.   Hematological:  Negative for adenopathy.   Psychiatric/Behavioral:  Negative for confusion. The patient is not nervous/anxious.    All other systems  "reviewed and are negative.      Current Outpatient Medications on File Prior to Visit   Medication Sig   • albuterol (PROVENTIL HFA,VENTOLIN HFA) 90 mcg/act inhaler Inhale 2 puffs every 6 (six) hours as needed for wheezing   • atenolol (TENORMIN) 25 mg tablet Take 1 tablet (25 mg total) by mouth daily   • atorvastatin (LIPITOR) 80 mg tablet Take 1 tablet (80 mg total) by mouth daily   • Calcium 600 MG tablet Take 600 mg by mouth   • Cholecalciferol (VITAMIN D-3 PO) Take 4,000 Int'l Units by mouth daily   • CRANBERRY PO Take 500 mg by mouth   • D-MANNOSE PO Take by mouth   • DULoxetine (CYMBALTA) 60 mg delayed release capsule TAKE 1 CAPSULE DAILY   • fluticasone (FLONASE) 50 mcg/act nasal spray 2 sprays into each nostril daily   • Multiple Vitamin (MULTIVITAMIN) tablet Take 1 tablet by mouth daily    • Nutritional Supplements (ANTIOXIDANTS PO) Take 1 tablet by mouth daily   • sertraline (ZOLOFT) 25 mg tablet TAKE ONE TABLET BY MOUTH EVERY DAY   • Sodium Fluoride 5000 Sensitive 1.1-5 % GEL    • traZODone (DESYREL) 50 mg tablet Take 1 tablet (50 mg total) by mouth daily at bedtime       Objective     /68 (BP Location: Right arm, Patient Position: Sitting, Cuff Size: Standard)   Pulse 75   Ht 5' 3\" (1.6 m)   Wt 63.8 kg (140 lb 9.6 oz)   SpO2 96%   BMI 24.91 kg/m²     Physical Exam  Vitals and nursing note reviewed.   Constitutional:       General: She is not in acute distress.     Appearance: Normal appearance. She is not ill-appearing.   HENT:      Head: Normocephalic.   Eyes:      Conjunctiva/sclera: Conjunctivae normal.   Cardiovascular:      Rate and Rhythm: Normal rate and regular rhythm.      Pulses: Normal pulses.           Carotid pulses are 2+ on the right side and 2+ on the left side.       Radial pulses are 2+ on the right side and 2+ on the left side.        Posterior tibial pulses are 2+ on the right side and 2+ on the left side.      Heart sounds: Normal heart sounds. No murmur " heard.  Pulmonary:      Effort: Pulmonary effort is normal. No respiratory distress.      Breath sounds: Normal breath sounds. No decreased breath sounds, wheezing, rhonchi or rales.   Abdominal:      General: Abdomen is flat. Bowel sounds are normal. There is no distension.      Palpations: Abdomen is soft.      Tenderness: There is no abdominal tenderness. There is no guarding.   Musculoskeletal:         General: Normal range of motion.      Left wrist: No swelling, effusion, tenderness, bony tenderness or crepitus. Normal range of motion.      Cervical back: Normal range of motion.      Right lower leg: No edema.      Left lower leg: No edema.      Left ankle: No swelling. Normal range of motion.      Left Achilles Tendon: Tenderness present.      Comments: Tinel's, Phalen's, and Finkelstein's tests were all negative for CTS or de Quervain's symptoms of the left wrist.    Tenderness was noted with palpation of the left Achilles area.  Patient did have a full range of motion of the left ankle but did report pain localized to the Achilles area when performing dorsiflexion and plantarflexion.   Skin:     General: Skin is warm and dry.      Capillary Refill: Capillary refill takes less than 2 seconds.   Neurological:      General: No focal deficit present.      Mental Status: She is alert and oriented to person, place, and time.   Psychiatric:         Mood and Affect: Mood normal.         Behavior: Behavior normal.         Thought Content: Thought content normal.         Judgment: Judgment normal.       BRANDON Escalante

## 2024-02-29 NOTE — TELEPHONE ENCOUNTER
Procedure scheduled 3/19/24.    Reviewed instructions: , NPO 1 hour prior, loose-fitting/comfortable clothing, if ill/fever/infx/abx to call and reschedule.  No need to hold any meds prior.  Patient stated verbal understanding.

## 2024-02-29 NOTE — TELEPHONE ENCOUNTER
Voice Message received:  Ema Wade, this is Hayden Mcintosh. My number is [phone] and I am returning your call about scheduling an appointment for me again Hayden Mcintosh [phone]. Thanks. Bye.      Returned call Left message for patient to call me back DIRECTLY to schedule.  Left my DIRECT phone # [phone] on message.

## 2024-03-01 ENCOUNTER — HOSPITAL ENCOUNTER (OUTPATIENT)
Dept: CT IMAGING | Facility: HOSPITAL | Age: 64
Discharge: HOME | End: 2024-03-01
Payer: COMMERCIAL

## 2024-03-01 DIAGNOSIS — R93.3 ABNORMAL CT SCAN, GASTROINTESTINAL TRACT: ICD-10-CM

## 2024-03-01 PROCEDURE — 74177 CT ABD & PELVIS W/CONTRAST: CPT

## 2024-03-01 PROCEDURE — 71260 CT THORAX DX C+: CPT

## 2024-03-01 PROCEDURE — G1004 CDSM NDSC: HCPCS

## 2024-03-01 RX ADMIN — IOHEXOL 85 ML: 350 INJECTION, SOLUTION INTRAVENOUS at 12:48

## 2024-03-08 NOTE — RESULT ENCOUNTER NOTE
Please let pt know that her CT shows the lymph nodes of concern are unchanged from last CT and no longer need to be followed.  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 For this reason I would like her to touch base with her cardiologist. I can not find when she saw this provider last and who it was. I will gladly put in an order for a new cardiologist if pt also does not remember.

## 2024-03-11 DIAGNOSIS — Q24.8 PERICARDIAL CYST: Primary | ICD-10-CM

## 2024-03-12 ENCOUNTER — TELEPHONE (OUTPATIENT)
Age: 64
End: 2024-03-12

## 2024-03-12 NOTE — TELEPHONE ENCOUNTER
Patient called and stated that she wanted to see if Maame Brown would call her back about the cat scan results.  She can be reached at .

## 2024-03-12 NOTE — TELEPHONE ENCOUNTER
Spoke to patient she did not want to make an appointment. She is going to send a my chart message to Maame.

## 2024-03-12 NOTE — TELEPHONE ENCOUNTER
Called pt back to see what she wanted to discuss with KB, as results were already given. Asked if pt had specific questions she wanted to ask, declined, stated she wanted to discuss it in a more general sense.

## 2024-03-19 ENCOUNTER — HOSPITAL ENCOUNTER (OUTPATIENT)
Dept: RADIOLOGY | Facility: MEDICAL CENTER | Age: 64
Discharge: HOME | End: 2024-03-19
Payer: COMMERCIAL

## 2024-03-19 ENCOUNTER — TELEPHONE (OUTPATIENT)
Dept: PAIN MEDICINE | Facility: MEDICAL CENTER | Age: 64
End: 2024-03-19

## 2024-03-19 VITALS
OXYGEN SATURATION: 99 % | HEART RATE: 63 BPM | SYSTOLIC BLOOD PRESSURE: 148 MMHG | TEMPERATURE: 97.2 F | RESPIRATION RATE: 18 BRPM | DIASTOLIC BLOOD PRESSURE: 88 MMHG

## 2024-03-19 DIAGNOSIS — M47.816 LUMBAR SPONDYLOSIS: ICD-10-CM

## 2024-03-19 PROCEDURE — 64635 DESTROY LUMB/SAC FACET JNT: CPT | Performed by: PHYSICAL MEDICINE & REHABILITATION

## 2024-03-19 PROCEDURE — 64636 DESTROY L/S FACET JNT ADDL: CPT | Performed by: PHYSICAL MEDICINE & REHABILITATION

## 2024-03-19 RX ADMIN — Medication 5 ML: at 11:40

## 2024-03-19 NOTE — DISCHARGE INSTRUCTIONS

## 2024-03-19 NOTE — H&P
History of Present Illness: The patient is a 63 y.o. female who presents with complaints of left low back pain    Past Medical History:   Diagnosis Date    Anxiety     Depression     Elevated cholesterol     Hypertension     PONV (postoperative nausea and vomiting)        Past Surgical History:   Procedure Laterality Date    BREAST CYST ASPIRATION      DIAGNOSTIC LAPAROSCOPY      Exploratory, last assessed 10/25/16    NASAL SINUS SURGERY      age 33 - FESS and septoplasty - unknown surgeon    LA STRTCTC CPTR ASSTD PX EXTRADURAL CRANIAL N/A 11/8/2016    Procedure: FUNCTIONAL ENDOSCOPIC SINUS SURGERY (FESS) IMAGED GUIDED;  Surgeon: Tony Morales DO;  Location: [facility];  Service: ENT    WISDOM TOOTH EXTRACTION           Current Outpatient Medications:     albuterol (PROVENTIL HFA,VENTOLIN HFA) 90 mcg/act inhaler, Inhale 2 puffs every 6 (six) hours as needed for wheezing, Disp: 18 g, Rfl: 5    atenolol (TENORMIN) 25 mg tablet, Take 1 tablet (25 mg total) by mouth daily, Disp: 30 tablet, Rfl: 11    atorvastatin (LIPITOR) 80 mg tablet, Take 1 tablet (80 mg total) by mouth daily, Disp: 90 tablet, Rfl: 3    Calcium 600 MG tablet, Take 600 mg by mouth, Disp: , Rfl:     Cholecalciferol (VITAMIN D-3 PO), Take 4,000 Int'l Units by mouth daily, Disp: , Rfl:     CRANBERRY PO, Take 500 mg by mouth, Disp: , Rfl:     D-MANNOSE PO, Take by mouth, Disp: , Rfl:     diclofenac (VOLTAREN) 75 mg EC tablet, Take 1 tablet (75 mg total) by mouth 2 (two) times a day as needed (Ankle pain), Disp: 60 tablet, Rfl: 0    DULoxetine (CYMBALTA) 60 mg delayed release capsule, TAKE 1 CAPSULE DAILY, Disp: 90 capsule, Rfl: 1    fluticasone (FLONASE) 50 mcg/act nasal spray, 2 sprays into each nostril daily, Disp: 16 g, Rfl: 5    Multiple Vitamin (MULTIVITAMIN) tablet, Take 1 tablet by mouth daily , Disp: , Rfl:     Nutritional Supplements (ANTIOXIDANTS PO), Take 1 tablet by mouth daily, Disp: , Rfl:     sertraline (ZOLOFT) 25 mg tablet, TAKE ONE  "TABLET BY MOUTH EVERY DAY, Disp: 30 tablet, Rfl: 5    Sodium Fluoride 5000 Sensitive 1.1-5 % GEL, , Disp: , Rfl:     traZODone (DESYREL) 50 mg tablet, Take 1 tablet (50 mg total) by mouth daily at bedtime, Disp: 30 tablet, Rfl: 11    Allergies   Allergen Reactions    Fexofenadine-Pseudoephed Er Other (See Comments)     \"didn't feel good\"    \"didn't feel good\"      Pollen Extract     Prednisone Nausea Only    Singulair [Montelukast]      Did not feel well, no particular assistance.       Physical Exam:   Vitals:    03/19/24 1124   BP: 133/84   Pulse: 65   Resp: 20   Temp: (!) 97.2 °F (36.2 °C)   SpO2: 99%     General: Awake, Alert, Oriented x 3, Mood and affect appropriate  Respiratory: Respirations even and unlabored  Cardiovascular: Peripheral pulses intact; no edema  Musculoskeletal Exam: left low back pain    ASA Score: 3    Patient/Chart Verification  Patient ID Verified: Verbal  ID Band Applied: No  Consents Confirmed: Procedural, To be obtained in the Pre-Procedure area  H&P( within 30 days) Verified: To be obtained in the Pre-Procedure area  Interval H&P(within 24 hr) Complete (required for Outpatients and Surgery Admit only): To be obtained in the Pre-Procedure area  Allergies Reviewed: Yes  Anticoag/NSAID held?: NA  Currently on antibiotics?: No    Assessment:   1. Lumbar spondylosis        Plan: Left L3-5 RFA ([id number], [id number])    "

## 2024-03-20 NOTE — TELEPHONE ENCOUNTER
FYI-    Pt did well over night no s/s of infection or sunburn sensation.  Aware it takes 2 weeks to notice pain relief and 4-6 weeks for full pain effect to be achieved.  Pain rating as of today-3/10    Aware to medicate as previous for discomfort and may use ice or heat.  Rescheduled f/u appt per pt request  Call if any questions or concerns prior to next appt.

## 2024-04-02 NOTE — ASSESSMENT & PLAN NOTE
Fasting glucose was 102  Lab did not run a hemoglobin A1c 
Lab did not run a fasting lipid panel 
Stable with 1/4 trazodone 
Very infrequent use of inhaler more when cats, allergies
Vitamin-D level is stable at 32 
Within normal limits continue current medication 
No

## 2024-04-07 PROBLEM — Q24.8 PERICARDIAL CYST: Status: ACTIVE | Noted: 2024-04-07

## 2024-04-10 ENCOUNTER — OFFICE VISIT (OUTPATIENT)
Dept: FAMILY MEDICINE CLINIC | Facility: CLINIC | Age: 64
End: 2024-04-10
Payer: MEDICARE

## 2024-04-10 VITALS
DIASTOLIC BLOOD PRESSURE: 72 MMHG | HEART RATE: 76 BPM | TEMPERATURE: 98.2 F | OXYGEN SATURATION: 95 % | SYSTOLIC BLOOD PRESSURE: 118 MMHG

## 2024-04-10 DIAGNOSIS — Z20.822 SUSPECTED COVID-19 VIRUS INFECTION: ICD-10-CM

## 2024-04-10 DIAGNOSIS — I10 ESSENTIAL HYPERTENSION: ICD-10-CM

## 2024-04-10 DIAGNOSIS — J40 BRONCHITIS: Primary | ICD-10-CM

## 2024-04-10 LAB
SARS-COV-2 AG UPPER RESP QL IA: NEGATIVE
VALID CONTROL: NORMAL

## 2024-04-10 PROCEDURE — G2211 COMPLEX E/M VISIT ADD ON: HCPCS | Performed by: NURSE PRACTITIONER

## 2024-04-10 PROCEDURE — 99214 OFFICE O/P EST MOD 30 MIN: CPT | Performed by: NURSE PRACTITIONER

## 2024-04-10 PROCEDURE — 87811 SARS-COV-2 COVID19 W/OPTIC: CPT | Performed by: NURSE PRACTITIONER

## 2024-04-10 RX ORDER — AZITHROMYCIN 250 MG/1
TABLET, FILM COATED ORAL DAILY
Qty: 6 TABLET | Refills: 0 | Status: SHIPPED | OUTPATIENT
Start: 2024-04-10 | End: 2024-04-15

## 2024-04-10 NOTE — PROGRESS NOTES
Name: Hayden Mcintosh      : 1960      MRN: [id number]  Encounter Provider: BRANDON Chua  Encounter Date: 4/10/2024   Encounter department: [facility]    Assessment & Plan     1. Bronchitis  Assessment & Plan:  Azithromycin was ordered for treatment of acute bronchitis.  Patient was also advised to continue using her albuterol inhaler as needed for pleurisy symptoms or any shortness of breath.    Orders:  -     azithromycin (Zithromax) 250 mg tablet; Take 2 tablets (500 mg total) by mouth daily for 1 day, THEN 1 tablet (250 mg total) daily for 4 days.    2. Suspected COVID-19 virus infection  -     POCT Rapid Covid Ag    3. Essential hypertension  Assessment & Plan:  Well-controlled on current regimen.             Subjective      URI symptoms: Patient reports over the past 24 hours she has been experiencing symptoms of chest congestion, left otalgia, headaches, productive cough, pleurisy, chills, and myalgias.  Patient denies any fever, chills, or shortness of breath.  Patient does have an albuterol inhaler on hand at home and she reports that she has been using this as needed for her pleurisy symptoms and she has noted improvement.  Rapid COVID test completed in the office today was negative.    Hypertension: Well-controlled on current dosage of atenolol.  Patient denies lightheadedness, dizziness, or orthostasis.      Review of Systems   Constitutional:  Positive for chills and fatigue. Negative for fever.   HENT:  Positive for congestion, ear pain (left), postnasal drip and rhinorrhea. Negative for sinus pressure, sinus pain and sore throat.    Eyes:  Negative for pain and visual disturbance.   Respiratory:  Positive for cough (productive). Negative for chest tightness, shortness of breath and wheezing.         Pleurisy and chest congestion   Cardiovascular:  Negative for chest pain and palpitations.   Gastrointestinal:  Negative for abdominal pain, constipation, diarrhea,  nausea and vomiting.   Endocrine: Negative for cold intolerance and heat intolerance.   Genitourinary:  Negative for decreased urine volume, dysuria and hematuria.   Musculoskeletal:  Positive for myalgias. Negative for arthralgias and back pain.   Skin:  Negative for color change and rash.   Allergic/Immunologic: Positive for environmental allergies.   Neurological:  Positive for headaches. Negative for dizziness, seizures, syncope, weakness, light-headedness and numbness.   Hematological:  Negative for adenopathy.   Psychiatric/Behavioral:  Negative for confusion. The patient is not nervous/anxious.    All other systems reviewed and are negative.      Current Outpatient Medications on File Prior to Visit   Medication Sig   • albuterol (PROVENTIL HFA,VENTOLIN HFA) 90 mcg/act inhaler Inhale 2 puffs every 6 (six) hours as needed for wheezing   • atenolol (TENORMIN) 25 mg tablet Take 1 tablet (25 mg total) by mouth daily   • atorvastatin (LIPITOR) 80 mg tablet Take 1 tablet (80 mg total) by mouth daily   • Calcium 600 MG tablet Take 600 mg by mouth   • Cholecalciferol (VITAMIN D-3 PO) Take 4,000 Int'l Units by mouth daily   • CRANBERRY PO Take 500 mg by mouth   • D-MANNOSE PO Take by mouth   • DULoxetine (CYMBALTA) 60 mg delayed release capsule TAKE 1 CAPSULE DAILY   • fluticasone (FLONASE) 50 mcg/act nasal spray 2 sprays into each nostril daily   • Multiple Vitamin (MULTIVITAMIN) tablet Take 1 tablet by mouth daily    • Nutritional Supplements (ANTIOXIDANTS PO) Take 1 tablet by mouth daily   • sertraline (ZOLOFT) 25 mg tablet TAKE ONE TABLET BY MOUTH EVERY DAY   • Sodium Fluoride 5000 Sensitive 1.1-5 % GEL    • traZODone (DESYREL) 50 mg tablet Take 1 tablet (50 mg total) by mouth daily at bedtime   • diclofenac (VOLTAREN) 75 mg EC tablet Take 1 tablet (75 mg total) by mouth 2 (two) times a day as needed (Ankle pain) (Patient not taking: Reported on 4/10/2024)       Objective     /72 (BP Location: Right arm,  Patient Position: Sitting, Cuff Size: Standard)   Pulse 76   Temp 98.2 °F (36.8 °C) (Tympanic)   SpO2 95%     Physical Exam  Vitals and nursing note reviewed.   Constitutional:       General: She is not in acute distress.     Appearance: Normal appearance. She is not ill-appearing.   HENT:      Head: Normocephalic.      Right Ear: Hearing normal. A middle ear effusion (small) is present.      Left Ear: Hearing normal. A middle ear effusion (small) is present.      Mouth/Throat:      Pharynx: Posterior oropharyngeal erythema present. No pharyngeal swelling, oropharyngeal exudate or uvula swelling.      Tonsils: No tonsillar exudate or tonsillar abscesses.   Eyes:      Conjunctiva/sclera: Conjunctivae normal.   Cardiovascular:      Rate and Rhythm: Normal rate and regular rhythm.      Pulses: Normal pulses.           Carotid pulses are 2+ on the right side and 2+ on the left side.       Radial pulses are 2+ on the right side and 2+ on the left side.        Posterior tibial pulses are 2+ on the right side and 2+ on the left side.      Heart sounds: Normal heart sounds. No murmur heard.  Pulmonary:      Effort: Pulmonary effort is normal. No respiratory distress.      Breath sounds: Normal breath sounds. No decreased breath sounds, wheezing, rhonchi or rales.   Abdominal:      General: Abdomen is flat. Bowel sounds are normal. There is no distension.      Palpations: Abdomen is soft.      Tenderness: There is no abdominal tenderness. There is no guarding.   Musculoskeletal:         General: Normal range of motion.      Cervical back: Normal range of motion.      Right lower leg: No edema.      Left lower leg: No edema.   Skin:     General: Skin is warm and dry.      Capillary Refill: Capillary refill takes less than 2 seconds.   Neurological:      General: No focal deficit present.      Mental Status: She is alert and oriented to person, place, and time.   Psychiatric:         Mood and Affect: Mood normal.          Behavior: Behavior normal.         Thought Content: Thought content normal.         Judgment: Judgment normal.       BRANDON Chua

## 2024-04-10 NOTE — ASSESSMENT & PLAN NOTE
Azithromycin was ordered for treatment of acute bronchitis.  Patient was also advised to continue using her albuterol inhaler as needed for pleurisy symptoms or any shortness of breath.

## 2024-04-11 ENCOUNTER — TELEPHONE (OUTPATIENT)
Age: 64
End: 2024-04-11

## 2024-04-11 ENCOUNTER — CONSULT (OUTPATIENT)
Dept: CARDIOLOGY CLINIC | Facility: CLINIC | Age: 64
End: 2024-04-11
Payer: MEDICARE

## 2024-04-11 VITALS
BODY MASS INDEX: 24.8 KG/M2 | DIASTOLIC BLOOD PRESSURE: 84 MMHG | SYSTOLIC BLOOD PRESSURE: 130 MMHG | WEIGHT: 140 LBS | HEART RATE: 70 BPM

## 2024-04-11 DIAGNOSIS — E78.2 MIXED HYPERLIPIDEMIA: ICD-10-CM

## 2024-04-11 DIAGNOSIS — I10 ESSENTIAL HYPERTENSION: ICD-10-CM

## 2024-04-11 DIAGNOSIS — Q24.8 PERICARDIAL CYST ALONG RIGHT CARDIOPHRENIC ANGLE: Primary | ICD-10-CM

## 2024-04-11 DIAGNOSIS — Q24.8 PERICARDIAL CYST: ICD-10-CM

## 2024-04-11 PROCEDURE — 99204 OFFICE O/P NEW MOD 45 MIN: CPT | Performed by: INTERNAL MEDICINE

## 2024-04-11 PROCEDURE — 93000 ELECTROCARDIOGRAM COMPLETE: CPT | Performed by: INTERNAL MEDICINE

## 2024-04-11 NOTE — TELEPHONE ENCOUNTER
Received call from Adilene at [facility] on [facility].     Pt has a referral to see Dr. Barry for Pericardial cyst along right cardiophrenic angle.     CT chest abdomen pelvis w contrast 3/01/24.     Pt would like to schedule after May.

## 2024-04-11 NOTE — PROGRESS NOTES
[facility]  [address] [place] [address]  Phone#  [phone]   Fax#  [phone]  *-*-*-*-*-*-*-*-*-*-*-*-*-*-*-*-*-*-*-*-*-*-*-*-*-*-*-*-*-*-*-*-*-*-*-*-*-*-*-*-*-*-*-*-*-*-*-*-*-*-*-*-*-*                                              Cardiology Consultation       ENCOUNTER DATE: 24 2:34 PM  PATIENT NAME: Hayden Mcintosh   : 1960    MRN: [id number]  AGE:63 y.o.      SEX: female  ENCOUNTER PROVIDER:Garry Sanches MD     PRIMARY CARE PHYSICIAN: Maame Keenan PA-C    ACTIVE DIAGNOSIS THIS VISIT  1. Pericardial cyst along right cardiophrenic angle  POCT ECG    Ambulatory referral to Cardiovascular Surgery      2. Mixed hyperlipidemia        3. Essential hypertension  POCT ECG      4. Pericardial cyst  Ambulatory Referral to Cardiology        ACTIVE PROBLEM LIST  Patient Active Problem List   Diagnosis    Attention deficit hyperactivity disorder    Bronchitis    Degenerative joint disease    Depression with anxiety    Seasonal allergic rhinitis due to pollen    Allergic dermatitis    Essential hypertension    Hyperglycemia    Mixed hyperlipidemia    Insomnia    Age-related osteoporosis without current pathological fracture    Vitamin D deficiency    Anisocoria    Abnormal CT scan, gastrointestinal tract    Nephrolithiasis    Low back pain    Right hip pain    Dysfunction of both eustachian tubes    Constipation    Lumbar spondylosis    Left Achilles tendinitis    Carpal tunnel syndrome of left wrist    Pericardial cyst along right cardiophrenic angle       CARDIOLOGY SPECIALTY COMMENTS  Patient referred to cardiology by Maame Keenan PA-C for pericardial cyst which is slowly enlarging    Patient and his mother, Kamila Hickman is also a patient of mine    10/25/2016 chest x-ray: Probable enlarging right-sided pericardial cyst.    2020 chest x-ray: Lobulated right cardiophrenic angle opacity unchanged since 2016 and enlarged since 2009 attributed to  pericardial cyst.    3/1/2024 CT of the chest abdomen and pelvis: Pericardial cyst measuring 3.2 x 2.2 x 3.1 cm cm. This cyst was present on prior study of 2008 and has demonstrated growth in the interval (previously 2.4 x 1.2 x 1.8 cm).    HPI:    Patient referred to me for a pericardial cyst which is enlarging.  Studies noted above.  Patient has a chest infection presently and has a little bit of substernal chest discomfort with the infection.  She is on an antibiotic from her PCP.  It is a constant discomfort and not anginal in nature.  She has not put active cough.  She feels that she does get frequent chest infections at least once a year.    Medications for surgical procedure on a pericardial cyst represents enlargement of the cyst along with repeated chest infections.  Whether that would be appropriate in someone 63 years old and the exact guidelines for a procedure are not in my realm of experience    DISCUSSION/PLAN:         Referral to cardiovascular surgery to evaluate whether to observe this pericardial cyst, drainage or surgically remove it  Return to me as needed.  This is not an area in which I have a lot of expertise and experience.  Usually most pericardial cysts are observed.  However occasionally there are indications to do something about them.    Lab Studies:    Lab Results   Component Value Date    CHOLESTEROL 165 02/15/2024    CHOLESTEROL 149 08/08/2023    CHOLESTEROL 190 01/31/2023     Lab Results   Component Value Date    TRIG 65 02/15/2024    TRIG 70 08/08/2023    TRIG 87 01/31/2023     Lab Results   Component Value Date    HDL 49 (L) 02/15/2024    HDL 60 08/08/2023    HDL 49 (L) 01/31/2023     Lab Results   Component Value Date    LDLCALC 103 (H) 02/15/2024    LDLCALC 75 08/08/2023    LDLCALC 122 (H) 01/31/2023     Lab Results   Component Value Date    NONHDL 141 (H) 01/31/2023    NONHDL 116 07/08/2022    NONHDL 111 01/07/2022     Lab Results   Component Value Date    LDLDIRECT 112  10/13/2016    LDLDIRECT 137 (H) 07/07/2016       Lab Results   Component Value Date    HGBA1C 5.7 (H) 07/08/2022      Lab Results   Component Value Date    EGFR 92 02/15/2024    EGFR 92 08/08/2023    EGFR 101 01/31/2023    SODIUM 141 02/15/2024    SODIUM 139 08/08/2023    SODIUM 140 01/31/2023    K 4.6 02/15/2024    K 4.1 08/08/2023    K 4.4 01/31/2023     02/15/2024     08/08/2023     01/31/2023    CO2 28 02/15/2024    CO2 29 08/08/2023    CO2 28 01/31/2023    BUN 31 (H) 02/15/2024    BUN 24 08/08/2023    BUN 26 (H) 01/31/2023    CREATININE 0.66 02/15/2024    CREATININE 0.67 08/08/2023    CREATININE 0.57 01/31/2023     Lab Results   Component Value Date    WBC 5.58 08/08/2023    WBC 7.02 10/25/2016    HGB 13.6 08/08/2023    HGB 13.8 10/25/2016    HCT 43.7 08/08/2023    HCT 40.7 10/25/2016    MCV 83 08/08/2023    MCV 86 10/25/2016    MCH 25.9 (L) 08/08/2023    MCH 29.2 10/25/2016    MCHC 31.1 (L) 08/08/2023    MCHC 33.9 10/25/2016     08/08/2023     10/25/2016        Lab Results   Component Value Date    CALCIUM 8.8 02/15/2024    CALCIUM 8.7 08/08/2023    CALCIUM 9.1 01/31/2023    AST 17 02/15/2024    AST 16 08/08/2023    AST 17 01/31/2023    ALT 15 02/15/2024    ALT 22 08/08/2023    ALT 16 01/31/2023    ALKPHOS 70 02/15/2024    ALKPHOS 80 08/08/2023    ALKPHOS 93 01/31/2023    PROT 6.4 05/01/2017    PROT 6.6 10/13/2016    PROT 7.2 04/08/2016    BILITOT 0.9 05/01/2017    BILITOT 0.9 10/13/2016    BILITOT 1.0 07/07/2016       Results for orders placed or performed in visit on 04/11/24   POCT ECG    Narrative    Normal sinus rhythm at a rate of 70 bpm.  Normal ECG.         Current Outpatient Medications:     albuterol (PROVENTIL HFA,VENTOLIN HFA) 90 mcg/act inhaler, Inhale 2 puffs every 6 (six) hours as needed for wheezing, Disp: 18 g, Rfl: 5    atenolol (TENORMIN) 25 mg tablet, Take 1 tablet (25 mg total) by mouth daily, Disp: 30 tablet, Rfl: 11    atorvastatin (LIPITOR) 80 mg tablet,  "Take 1 tablet (80 mg total) by mouth daily, Disp: 90 tablet, Rfl: 3    azithromycin (Zithromax) 250 mg tablet, Take 2 tablets (500 mg total) by mouth daily for 1 day, THEN 1 tablet (250 mg total) daily for 4 days., Disp: 6 tablet, Rfl: 0    Calcium 600 MG tablet, Take 600 mg by mouth, Disp: , Rfl:     Cholecalciferol (VITAMIN D-3 PO), Take 4,000 Int'l Units by mouth daily, Disp: , Rfl:     CRANBERRY PO, Take 500 mg by mouth, Disp: , Rfl:     D-MANNOSE PO, Take by mouth, Disp: , Rfl:     DULoxetine (CYMBALTA) 60 mg delayed release capsule, TAKE 1 CAPSULE DAILY, Disp: 90 capsule, Rfl: 1    fluticasone (FLONASE) 50 mcg/act nasal spray, 2 sprays into each nostril daily, Disp: 16 g, Rfl: 5    Multiple Vitamin (MULTIVITAMIN) tablet, Take 1 tablet by mouth daily , Disp: , Rfl:     Nutritional Supplements (ANTIOXIDANTS PO), Take 1 tablet by mouth daily, Disp: , Rfl:     sertraline (ZOLOFT) 25 mg tablet, TAKE ONE TABLET BY MOUTH EVERY DAY, Disp: 30 tablet, Rfl: 5    Sodium Fluoride 5000 Sensitive 1.1-5 % GEL, , Disp: , Rfl:     traZODone (DESYREL) 50 mg tablet, Take 1 tablet (50 mg total) by mouth daily at bedtime, Disp: 30 tablet, Rfl: 11    diclofenac (VOLTAREN) 75 mg EC tablet, Take 1 tablet (75 mg total) by mouth 2 (two) times a day as needed (Ankle pain) (Patient not taking: Reported on 4/10/2024), Disp: 60 tablet, Rfl: 0  Allergies   Allergen Reactions    Fexofenadine-Pseudoephed Er Other (See Comments)     \"didn't feel good\"    \"didn't feel good\"      Pollen Extract     Prednisone Nausea Only    Singulair [Montelukast]      Did not feel well, no particular assistance.       Past Medical History:   Diagnosis Date    Anxiety     Depression     Elevated cholesterol     Hypertension     PONV (postoperative nausea and vomiting)      Social History     Socioeconomic History    Marital status: /Civil Union     Spouse name: Not on file    Number of children: Not on file    Years of education: Associates degree    " Highest education level: Not on file   Occupational History    Not on file   Tobacco Use    Smoking status: Never     Passive exposure: Never    Smokeless tobacco: Never    Tobacco comments:     No secondhand smoke exposure    Vaping Use    Vaping status: Never Used   Substance and Sexual Activity    Alcohol use: No    Drug use: No    Sexual activity: Yes     Partners: Male     Birth control/protection: Post-menopausal   Other Topics Concern    Not on file   Social History Narrative    Employed     Lives with spouse     No caffeine use      Social Determinants of Health     Financial Resource Strain: Not on file   Food Insecurity: Not on file   Transportation Needs: Not on file   Physical Activity: Not on file   Stress: Not on file   Social Connections: Not on file   Intimate Partner Violence: Not on file   Housing Stability: Not on file      Family History   Problem Relation Age of Onset    Osteoporosis Mother     Coronary artery disease Father         CABG    Diabetes type II Sister     Asthma Sister     Diabetes Sister     Diabetes Sister     Bipolar disorder Brother     Depression Brother         with anxiety     Esophageal cancer Brother     Cancer Brother     Diabetes Maternal Grandfather     Depression Family         with anxiety     Hyperlipidemia Family     Ovarian cancer Paternal Aunt     Breast cancer Neg Hx     Colon cancer Neg Hx     Uterine cancer Neg Hx      Past Surgical History:   Procedure Laterality Date    BREAST CYST ASPIRATION      DIAGNOSTIC LAPAROSCOPY      Exploratory, last assessed 10/25/16    NASAL SINUS SURGERY      age 33 - FESS and septoplasty - unknown surgeon    MA STRTCTC CPTR ASSTD PX EXTRADURAL CRANIAL N/A 11/8/2016    Procedure: FUNCTIONAL ENDOSCOPIC SINUS SURGERY (FESS) IMAGED GUIDED;  Surgeon: Tony Morales DO;  Location: [facility];  Service: ENT    WISDOM TOOTH EXTRACTION         Review of Systems:  Review of Systems   Respiratory:  Negative for cough, choking, chest  tightness, shortness of breath and wheezing.    Cardiovascular:  Negative for chest pain, palpitations and leg swelling.   Musculoskeletal:  Negative for gait problem.   Skin:  Negative for rash.   Neurological:  Negative for dizziness, tremors, syncope, weakness, light-headedness, numbness and headaches.   Psychiatric/Behavioral:  Negative for agitation and behavioral problems. The patient is not hyperactive.        Physical Exam:  /84 (BP Location: Right arm, Patient Position: Sitting, Cuff Size: Adult)   Pulse 70   Wt 63.5 kg (140 lb)   BMI 24.80 kg/m²     PREVIOUS WEIGHTS:   Wt Readings from Last 10 Encounters:   04/11/24 63.5 kg (140 lb)   03/01/24 64 kg (141 lb)   02/28/24 63.8 kg (140 lb 9.6 oz)   02/19/24 63.5 kg (140 lb)   02/05/24 64 kg (141 lb)   01/31/24 63.5 kg (140 lb)   01/04/24 63 kg (139 lb)   12/22/23 62.6 kg (138 lb)   10/17/23 62.2 kg (137 lb 3.2 oz)   09/14/23 61.7 kg (136 lb)      Physical Exam  Constitutional:       General: She is not in acute distress.     Appearance: She is well-developed.   HENT:      Head: Normocephalic and atraumatic.   Neck:      Thyroid: No thyromegaly.      Vascular: No carotid bruit or JVD.      Trachea: No tracheal deviation.   Cardiovascular:      Rate and Rhythm: Normal rate and regular rhythm.      Pulses: Normal pulses.      Heart sounds: Normal heart sounds. No murmur heard.     No friction rub. No gallop.   Pulmonary:      Effort: Pulmonary effort is normal. No respiratory distress.      Breath sounds: Normal breath sounds. No wheezing, rhonchi or rales.   Chest:      Chest wall: No tenderness.   Abdominal:      General: Bowel sounds are normal. There is no distension.      Palpations: Abdomen is soft.      Tenderness: There is no abdominal tenderness.   Musculoskeletal:         General: Normal range of motion.      Cervical back: Normal range of motion and neck supple.      Right lower leg: No edema.      Left lower leg: No edema.   Skin:      "General: Skin is warm and dry.   Neurological:      General: No focal deficit present.      Mental Status: She is alert and oriented to person, place, and time.      Gait: Gait normal.   Psychiatric:         Mood and Affect: Mood normal.         Behavior: Behavior normal.         Thought Content: Thought content normal.         Judgment: Judgment normal.       ======================================================   I have personally reviewed pertinent reports.      Portions of the record may have been created with voice recognition software. Occasional wrong word or \"sound a like\" substitutions may have occurred due to the inherent limitations of voice recognition software. Read the chart carefully and recognize, using context, where substitutions have occurred.    SIGNATURES:   Garry Sanches MD   "

## 2024-04-19 ENCOUNTER — OFFICE VISIT (OUTPATIENT)
Dept: FAMILY MEDICINE CLINIC | Facility: CLINIC | Age: 64
End: 2024-04-19
Payer: MEDICARE

## 2024-04-19 ENCOUNTER — TELEPHONE (OUTPATIENT)
Age: 64
End: 2024-04-19

## 2024-04-19 VITALS
WEIGHT: 136 LBS | TEMPERATURE: 98.2 F | BODY MASS INDEX: 24.1 KG/M2 | SYSTOLIC BLOOD PRESSURE: 112 MMHG | HEIGHT: 63 IN | HEART RATE: 74 BPM | DIASTOLIC BLOOD PRESSURE: 68 MMHG | OXYGEN SATURATION: 97 %

## 2024-04-19 DIAGNOSIS — J40 BRONCHITIS: Primary | ICD-10-CM

## 2024-04-19 DIAGNOSIS — I10 ESSENTIAL HYPERTENSION: ICD-10-CM

## 2024-04-19 PROCEDURE — 99214 OFFICE O/P EST MOD 30 MIN: CPT | Performed by: PHYSICIAN ASSISTANT

## 2024-04-19 PROCEDURE — G2211 COMPLEX E/M VISIT ADD ON: HCPCS | Performed by: PHYSICIAN ASSISTANT

## 2024-04-19 RX ORDER — CEFUROXIME AXETIL 250 MG/1
250 TABLET ORAL EVERY 12 HOURS SCHEDULED
Qty: 20 TABLET | Refills: 0 | Status: SHIPPED | OUTPATIENT
Start: 2024-04-19 | End: 2024-04-29

## 2024-04-19 NOTE — PROGRESS NOTES
Name: Hayden Mcintosh      : 1960      MRN: [id number]  Encounter Provider: Maame Keenan PA-C  Encounter Date: 2024   Encounter department: [facility]    Assessment & Plan     1. Bronchitis  Assessment & Plan:  Pt. needs to use her inhaler 3 times a day. Ceftin as directed. Increase fluids. Continue Delsym. Pt. Is unable to tolerate prednisone due to SE.     Orders:  -     cefuroxime (CEFTIN) 250 mg tablet; Take 1 tablet (250 mg total) by mouth every 12 (twelve) hours for 10 days    2. Essential hypertension  Assessment & Plan:  BP good today. Avoid decongestants.              Subjective      Patient presents with:  Follow-up: Pt present today for a follow up on bronchitis, per pt she was seen last week and she was given a Z-pack, pt did finish the antibiotic, but she is still spitting up stuff and when she takes a deep breath it causes her to cough.          Review of Systems   Constitutional: Negative.    HENT: Negative.     Eyes: Negative.    Respiratory: Negative.     Cardiovascular: Negative.    Gastrointestinal: Negative.    Endocrine: Negative.    Genitourinary: Negative.    Musculoskeletal: Negative.    Skin: Negative.    Allergic/Immunologic: Negative.    Neurological: Negative.    Hematological: Negative.    Psychiatric/Behavioral: Negative.         Current Outpatient Medications on File Prior to Visit   Medication Sig   • albuterol (PROVENTIL HFA,VENTOLIN HFA) 90 mcg/act inhaler Inhale 2 puffs every 6 (six) hours as needed for wheezing   • atenolol (TENORMIN) 25 mg tablet Take 1 tablet (25 mg total) by mouth daily   • atorvastatin (LIPITOR) 80 mg tablet Take 1 tablet (80 mg total) by mouth daily   • Calcium 600 MG tablet Take 600 mg by mouth   • Cholecalciferol (VITAMIN D-3 PO) Take 4,000 Int'l Units by mouth daily   • CRANBERRY PO Take 500 mg by mouth   • D-MANNOSE PO Take by mouth   • DULoxetine (CYMBALTA) 60 mg delayed release capsule TAKE 1 CAPSULE DAILY   •  "fluticasone (FLONASE) 50 mcg/act nasal spray 2 sprays into each nostril daily   • Multiple Vitamin (MULTIVITAMIN) tablet Take 1 tablet by mouth daily    • Nutritional Supplements (ANTIOXIDANTS PO) Take 1 tablet by mouth daily   • sertraline (ZOLOFT) 25 mg tablet TAKE ONE TABLET BY MOUTH EVERY DAY   • Sodium Fluoride 5000 Sensitive 1.1-5 % GEL    • traZODone (DESYREL) 50 mg tablet Take 1 tablet (50 mg total) by mouth daily at bedtime   • diclofenac (VOLTAREN) 75 mg EC tablet Take 1 tablet (75 mg total) by mouth 2 (two) times a day as needed (Ankle pain) (Patient not taking: Reported on 4/10/2024)       Objective     /68 (BP Location: Left arm, Patient Position: Sitting, Cuff Size: Standard)   Pulse 74   Temp 98.2 °F (36.8 °C) (Tympanic)   Ht 5' 3\" (1.6 m)   Wt 61.7 kg (136 lb)   SpO2 97%   BMI 24.09 kg/m²     Physical Exam  Vitals and nursing note reviewed.   Constitutional:       General: She is not in acute distress.     Appearance: She is well-developed. She is not diaphoretic.   HENT:      Head: Normocephalic and atraumatic.      Nose: Nose normal.   Eyes:      General:         Right eye: No discharge.         Left eye: No discharge.      Conjunctiva/sclera: Conjunctivae normal.   Neck:      Vascular: No carotid bruit.   Cardiovascular:      Rate and Rhythm: Normal rate and regular rhythm.      Heart sounds: Normal heart sounds. No murmur heard.     No friction rub. No gallop.   Pulmonary:      Effort: Pulmonary effort is normal. No respiratory distress.      Breath sounds: Examination of the right-middle field reveals rhonchi. Examination of the left-middle field reveals rhonchi. Rhonchi present. No wheezing or rales.   Musculoskeletal:      Cervical back: Neck supple.   Skin:     General: Skin is warm and dry.   Neurological:      Mental Status: She is alert and oriented to person, place, and time.   Psychiatric:         Judgment: Judgment normal.       Maame Keenan PA-C    "

## 2024-04-19 NOTE — PATIENT INSTRUCTIONS
1. Bronchitis  Assessment & Plan:  Pt. needs to use her inhaler 3 times a day. Ceftin as directed. Increase fluids. Continue Delsym. Pt. Is unable to tolerate prednisone due to SE.     Orders:  -     cefuroxime (CEFTIN) 250 mg tablet; Take 1 tablet (250 mg total) by mouth every 12 (twelve) hours for 10 days

## 2024-04-19 NOTE — TELEPHONE ENCOUNTER
Patient calling in . States she finished her antibiotics and still has chest infection. Coughing up dark green / yellow mucus. Wanted to see PCP . Scheduled today at 12:10

## 2024-04-19 NOTE — ASSESSMENT & PLAN NOTE
Pt. needs to use her inhaler 3 times a day. Ceftin as directed. Increase fluids. Continue Delsym. Pt. Is unable to tolerate prednisone due to SE.

## 2024-04-23 ENCOUNTER — OFFICE VISIT (OUTPATIENT)
Dept: FAMILY MEDICINE CLINIC | Facility: CLINIC | Age: 64
End: 2024-04-23
Payer: MEDICARE

## 2024-04-23 ENCOUNTER — HOSPITAL ENCOUNTER (OUTPATIENT)
Dept: CT IMAGING | Facility: HOSPITAL | Age: 64
Discharge: HOME | End: 2024-04-23
Payer: MEDICARE

## 2024-04-23 VITALS
WEIGHT: 139 LBS | OXYGEN SATURATION: 96 % | SYSTOLIC BLOOD PRESSURE: 118 MMHG | BODY MASS INDEX: 24.63 KG/M2 | HEIGHT: 63 IN | DIASTOLIC BLOOD PRESSURE: 72 MMHG | HEART RATE: 68 BPM

## 2024-04-23 DIAGNOSIS — R10.9 LEFT FLANK PAIN: ICD-10-CM

## 2024-04-23 DIAGNOSIS — N20.0 NEPHROLITHIASIS: Primary | ICD-10-CM

## 2024-04-23 DIAGNOSIS — N20.0 NEPHROLITHIASIS: ICD-10-CM

## 2024-04-23 DIAGNOSIS — R10.9 FLANK PAIN: Primary | ICD-10-CM

## 2024-04-23 DIAGNOSIS — N13.30 HYDRONEPHROSIS, UNSPECIFIED HYDRONEPHROSIS TYPE: ICD-10-CM

## 2024-04-23 DIAGNOSIS — R31.0 GROSS HEMATURIA: ICD-10-CM

## 2024-04-23 LAB
SL AMB  POCT GLUCOSE, UA: NEGATIVE
SL AMB LEUKOCYTE ESTERASE,UA: ABNORMAL
SL AMB POCT BILIRUBIN,UA: NEGATIVE
SL AMB POCT BLOOD,UA: ABNORMAL
SL AMB POCT CLARITY,UA: CLEAR
SL AMB POCT COLOR,UA: YELLOW
SL AMB POCT KETONES,UA: NEGATIVE
SL AMB POCT NITRITE,UA: NEGATIVE
SL AMB POCT PH,UA: 6.5
SL AMB POCT SPECIFIC GRAVITY,UA: 1.01
SL AMB POCT URINE PROTEIN: NEGATIVE
SL AMB POCT UROBILINOGEN: 0.2

## 2024-04-23 PROCEDURE — 99214 OFFICE O/P EST MOD 30 MIN: CPT | Performed by: PHYSICIAN ASSISTANT

## 2024-04-23 PROCEDURE — 81002 URINALYSIS NONAUTO W/O SCOPE: CPT | Performed by: PHYSICIAN ASSISTANT

## 2024-04-23 PROCEDURE — G2211 COMPLEX E/M VISIT ADD ON: HCPCS | Performed by: PHYSICIAN ASSISTANT

## 2024-04-23 PROCEDURE — 87086 URINE CULTURE/COLONY COUNT: CPT | Performed by: PHYSICIAN ASSISTANT

## 2024-04-23 PROCEDURE — 74176 CT ABD & PELVIS W/O CONTRAST: CPT

## 2024-04-23 RX ORDER — TRAMADOL HYDROCHLORIDE 50 MG/1
50 TABLET ORAL EVERY 6 HOURS PRN
Qty: 10 TABLET | Refills: 0 | Status: SHIPPED | OUTPATIENT
Start: 2024-04-23

## 2024-04-23 NOTE — ASSESSMENT & PLAN NOTE
CT done March 2024 does show a left 6 mm kidney stone nonobstructing. I believe this is the stone which is trying to pass at this time. Check CT stat. Given strainer. IF any stones are caught will send for stone study. IF stone per CT is larger than 6 mm will refer to Uro for intervention. Tramadol prn. Increase fluids. UA dip with only trace leuks and blood which will send to culture at lab. Call if needs antibiotic.

## 2024-04-23 NOTE — RESULT ENCOUNTER NOTE
Please let pt know that she has at least 8 kidney stones in the left kidney none of which are greater than 5 mm so they should pass on their own.  Left kidney is mild to moderately swollen because of this however.  Patient should increase fluids strain her urine and I am placing an order for a urology consult for the future.  She has tramadol in case she needs it.  Will call her when urine culture results come in as well.  Thank you.

## 2024-04-23 NOTE — PATIENT INSTRUCTIONS
1. Flank pain  -     POCT urine dip  -     Urine culture; Future  -     Urine culture    2. Nephrolithiasis  Assessment & Plan:  CT done March 2024 does show a left 6 mm kidney stone nonobstructing. I believe this is the stone which is trying to pass at this time. Check CT stat. Given strainer. IF any stones are caught will send for stone study. IF stone per CT is larger than 6 mm will refer to Uro for intervention. Tramadol prn. Increase fluids. UA dip with only trace leuks and blood which will send to culture at lab. Call if needs antibiotic.     Orders:  -     CT renal stone study abdomen pelvis wo contrast; Future; Expected date: 04/23/2024  -     traMADol (Ultram) 50 mg tablet; Take 1 tablet (50 mg total) by mouth every 6 (six) hours as needed for moderate pain    3. Gross hematuria  -     Urine culture; Future  -     Urine culture    4. Left flank pain  -     CT renal stone study abdomen pelvis wo contrast; Future; Expected date: 04/23/2024  -     traMADol (Ultram) 50 mg tablet; Take 1 tablet (50 mg total) by mouth every 6 (six) hours as needed for moderate pain

## 2024-04-23 NOTE — PROGRESS NOTES
Name: Hayden Mcintosh      : 1960      MRN: [id number]  Encounter Provider: Maame Keenan PA-C  Encounter Date: 2024   Encounter department: [facility]    Assessment & Plan     1. Flank pain  -     POCT urine dip  -     Urine culture; Future  -     Urine culture    2. Nephrolithiasis  Assessment & Plan:  CT done 2024 does show a left 6 mm kidney stone nonobstructing. I believe this is the stone which is trying to pass at this time. Check CT stat. Given strainer. IF any stones are caught will send for stone study. IF stone per CT is larger than 6 mm will refer to Uro for intervention. Tramadol prn. Increase fluids. UA dip with only trace leuks and blood which will send to culture at lab. Call if needs antibiotic.     Orders:  -     CT renal stone study abdomen pelvis wo contrast; Future; Expected date: 2024  -     traMADol (Ultram) 50 mg tablet; Take 1 tablet (50 mg total) by mouth every 6 (six) hours as needed for moderate pain    3. Gross hematuria  -     Urine culture; Future  -     Urine culture    4. Left flank pain  -     CT renal stone study abdomen pelvis wo contrast; Future; Expected date: 2024  -     traMADol (Ultram) 50 mg tablet; Take 1 tablet (50 mg total) by mouth every 6 (six) hours as needed for moderate pain           Subjective      Patient presents with:  Flank Pain: Pt complaints of bad pain on her left flank for the past 2 days.     had several episodes of sharp L flank pain up to 8 of 1-10 scale. Coming and going. Yesterday nothing and today a dull ache across her low back. NO UTI symptoms.  March with 6 mm L stone non obstructing. NO fevers. Did not know she had stones in her last CT. Never has passed before.       Review of Systems   Constitutional: Negative.    HENT: Negative.     Eyes: Negative.    Respiratory: Negative.     Cardiovascular: Negative.    Gastrointestinal:  Positive for abdominal pain.   Endocrine: Negative.   "  Genitourinary:  Positive for pelvic pain.   Musculoskeletal: Negative.    Skin: Negative.    Allergic/Immunologic: Negative.    Neurological: Negative.    Hematological: Negative.    Psychiatric/Behavioral: Negative.         Current Outpatient Medications on File Prior to Visit   Medication Sig   • albuterol (PROVENTIL HFA,VENTOLIN HFA) 90 mcg/act inhaler Inhale 2 puffs every 6 (six) hours as needed for wheezing   • atenolol (TENORMIN) 25 mg tablet Take 1 tablet (25 mg total) by mouth daily   • atorvastatin (LIPITOR) 80 mg tablet Take 1 tablet (80 mg total) by mouth daily   • Calcium 600 MG tablet Take 600 mg by mouth   • cefuroxime (CEFTIN) 250 mg tablet Take 1 tablet (250 mg total) by mouth every 12 (twelve) hours for 10 days   • Cholecalciferol (VITAMIN D-3 PO) Take 4,000 Int'l Units by mouth daily   • CRANBERRY PO Take 500 mg by mouth   • D-MANNOSE PO Take by mouth   • DULoxetine (CYMBALTA) 60 mg delayed release capsule TAKE 1 CAPSULE DAILY   • fluticasone (FLONASE) 50 mcg/act nasal spray 2 sprays into each nostril daily   • Multiple Vitamin (MULTIVITAMIN) tablet Take 1 tablet by mouth daily    • Nutritional Supplements (ANTIOXIDANTS PO) Take 1 tablet by mouth daily   • sertraline (ZOLOFT) 25 mg tablet TAKE ONE TABLET BY MOUTH EVERY DAY   • Sodium Fluoride 5000 Sensitive 1.1-5 % GEL    • traZODone (DESYREL) 50 mg tablet Take 1 tablet (50 mg total) by mouth daily at bedtime   • diclofenac (VOLTAREN) 75 mg EC tablet Take 1 tablet (75 mg total) by mouth 2 (two) times a day as needed (Ankle pain) (Patient not taking: Reported on 4/10/2024)       Objective     /72 (BP Location: Left arm, Patient Position: Sitting, Cuff Size: Standard)   Pulse 68   Ht 5' 3\" (1.6 m)   Wt 63 kg (139 lb)   SpO2 96%   BMI 24.62 kg/m²     Physical Exam  Vitals and nursing note reviewed.   Constitutional:       General: She is not in acute distress.     Appearance: She is well-developed. She is not diaphoretic.   HENT:      " Head: Normocephalic and atraumatic.      Nose: Nose normal.   Eyes:      General:         Right eye: No discharge.         Left eye: No discharge.      Conjunctiva/sclera: Conjunctivae normal.   Neck:      Vascular: No carotid bruit.   Cardiovascular:      Rate and Rhythm: Normal rate and regular rhythm.      Heart sounds: Normal heart sounds. No murmur heard.     No friction rub. No gallop.   Pulmonary:      Effort: Pulmonary effort is normal. No respiratory distress.      Breath sounds: Normal breath sounds. No wheezing or rales.   Abdominal:      General: Abdomen is protuberant. Bowel sounds are normal.      Palpations: Abdomen is soft.      Tenderness: There is abdominal tenderness in the left upper quadrant. There is left CVA tenderness. There is no right CVA tenderness.   Musculoskeletal:      Cervical back: Neck supple.   Skin:     General: Skin is warm and dry.   Neurological:      Mental Status: She is alert and oriented to person, place, and time.   Psychiatric:         Judgment: Judgment normal.       Maame Keenan PA-C

## 2024-04-24 ENCOUNTER — TELEPHONE (OUTPATIENT)
Age: 64
End: 2024-04-24

## 2024-04-24 LAB — BACTERIA UR CULT: NORMAL

## 2024-04-24 NOTE — TELEPHONE ENCOUNTER
Patient last seen by Dr. Duncan in [place] on 9/14/23 and not due to follow up for a year.    Patient being referred back to urology ASAP due to findings on CT renal done on 4/23/24    IMPRESSION:  1.  2 mm calculus at the left ureteropelvic junction, causing mild to moderate left hydronephrosis.   2.  Bilateral nonobstructing calculi, more numerous on the left.    Please advise if a sooner follow up is needed and appropriate time frame.    Patient can be reached at [phone]

## 2024-04-24 NOTE — TELEPHONE ENCOUNTER
Call placed to patient. She did not answer. LMOM asking for patient to contact the office in regards to appointment. Number was provided for call back.     **if patient calls back, Dr. Duncan does have 15 min opening tomorrow and pt can be offered this appointment if still available**

## 2024-04-25 ENCOUNTER — OFFICE VISIT (OUTPATIENT)
Dept: UROLOGY | Facility: MEDICAL CENTER | Age: 64
End: 2024-04-25
Payer: MEDICARE

## 2024-04-25 VITALS
HEART RATE: 73 BPM | HEIGHT: 63 IN | WEIGHT: 134 LBS | BODY MASS INDEX: 23.74 KG/M2 | SYSTOLIC BLOOD PRESSURE: 110 MMHG | OXYGEN SATURATION: 97 % | DIASTOLIC BLOOD PRESSURE: 80 MMHG

## 2024-04-25 DIAGNOSIS — N20.0 CALCULUS OF KIDNEY: ICD-10-CM

## 2024-04-25 DIAGNOSIS — N39.0 RECURRENT UTI: Primary | ICD-10-CM

## 2024-04-25 LAB
SL AMB  POCT GLUCOSE, UA: ABNORMAL
SL AMB LEUKOCYTE ESTERASE,UA: ABNORMAL
SL AMB POCT BILIRUBIN,UA: ABNORMAL
SL AMB POCT BLOOD,UA: ABNORMAL
SL AMB POCT CLARITY,UA: CLEAR
SL AMB POCT COLOR,UA: YELLOW
SL AMB POCT KETONES,UA: ABNORMAL
SL AMB POCT NITRITE,UA: ABNORMAL
SL AMB POCT PH,UA: 6
SL AMB POCT SPECIFIC GRAVITY,UA: 1.02
SL AMB POCT URINE PROTEIN: ABNORMAL
SL AMB POCT UROBILINOGEN: 0.2

## 2024-04-25 PROCEDURE — 81003 URINALYSIS AUTO W/O SCOPE: CPT | Performed by: UROLOGY

## 2024-04-25 PROCEDURE — 99214 OFFICE O/P EST MOD 30 MIN: CPT | Performed by: UROLOGY

## 2024-04-25 RX ORDER — TAMSULOSIN HYDROCHLORIDE 0.4 MG/1
CAPSULE ORAL
Qty: 5 CAPSULE | Refills: 0 | Status: SHIPPED | OUTPATIENT
Start: 2024-04-25

## 2024-04-25 NOTE — PROGRESS NOTES
"   HISTORY:    Patient we saw September 2023 for recurrent UTIs.  Has not had any infections since that time, she is on cranberry and d-mannose.    CT at that time showed several nonobstructing calyceal calculi.    4 days ago she had severe left flank pain, ER visit showed a 2 mm stone lodged at the left UPJ with mild hydro.  The other stones are still in the peripheral calyces as on the prior CT.    Yesterday she had mild pain radiating into his left groin    No burning urgency frequency no fevers chills    PCP gave her tramadol to have on hand         ASSESSMENT / PLAN:    1.  CT films reviewed and shown to patient    2.  A 2 Millimeter stone has 95% chance of going through spontaneously.  It can take a long time to pass, and she may feel the pain progress down to the groin.  Sometimes there is burning urgency and frequency when the stone is trying to pop into the bladder.    3.  I gave her tamsulosin, 1/day for 5 days which can sometimes dilate the ureter and help the stone to pass.    She will monitor her discomfort and let us know how it goes.  In most cases, a stone like this will go through spontaneously and not have any more bad attacks of pain    However, there are exceptions, if she has persistent back pain we will bring her right back and reevaluate    Oxalate advice given    The following portions of the patient's history were reviewed and updated as appropriate: allergies, current medications, past family history, past medical history, past social history, past surgical history, and problem list.    Review of Systems      Objective:     Physical Exam  Abdominal:      Comments: Abdomen and back, soft and nontender           No results found for: \"PSA\"]  BUN   Date Value Ref Range Status   02/15/2024 31 (H) 5 - 25 mg/dL Final   01/31/2023 26 (H) 7 - 25 mg/dL Final   11/14/2022 23 7 - 25 mg/dL Final     Creatinine   Date Value Ref Range Status   02/15/2024 0.66 0.60 - 1.30 mg/dL Final     Comment:     " "Standardized to IDMS reference method   11/14/2022 0.56 0.50 - 1.05 mg/dL Final     No components found for: \"CBC\"      Patient Active Problem List   Diagnosis    Attention deficit hyperactivity disorder    Bronchitis    Degenerative joint disease    Depression with anxiety    Seasonal allergic rhinitis due to pollen    Allergic dermatitis    Essential hypertension    Hyperglycemia    Mixed hyperlipidemia    Insomnia    Age-related osteoporosis without current pathological fracture    Vitamin D deficiency    Anisocoria    Abnormal CT scan, gastrointestinal tract    Nephrolithiasis    Low back pain    Right hip pain    Dysfunction of both eustachian tubes    Constipation    Lumbar spondylosis    Left Achilles tendinitis    Carpal tunnel syndrome of left wrist    Pericardial cyst along right cardiophrenic angle        Diagnoses and all orders for this visit:    Recurrent UTI    Calculus of kidney  -     POCT urine dip auto non-scope  -     tamsulosin (FLOMAX) 0.4 mg; Once a day right after supper           Patient ID: Hayden Mcintosh is a 63 y.o. female.      Current Outpatient Medications:     albuterol (PROVENTIL HFA,VENTOLIN HFA) 90 mcg/act inhaler, Inhale 2 puffs every 6 (six) hours as needed for wheezing, Disp: 18 g, Rfl: 5    atenolol (TENORMIN) 25 mg tablet, Take 1 tablet (25 mg total) by mouth daily, Disp: 30 tablet, Rfl: 11    atorvastatin (LIPITOR) 80 mg tablet, Take 1 tablet (80 mg total) by mouth daily, Disp: 90 tablet, Rfl: 3    Calcium 600 MG tablet, Take 600 mg by mouth, Disp: , Rfl:     cefuroxime (CEFTIN) 250 mg tablet, Take 1 tablet (250 mg total) by mouth every 12 (twelve) hours for 10 days, Disp: 20 tablet, Rfl: 0    Cholecalciferol (VITAMIN D-3 PO), Take 4,000 Int'l Units by mouth daily, Disp: , Rfl:     CRANBERRY PO, Take 500 mg by mouth, Disp: , Rfl:     D-MANNOSE PO, Take by mouth, Disp: , Rfl:     DULoxetine (CYMBALTA) 60 mg delayed release capsule, TAKE 1 CAPSULE DAILY, Disp: 90 capsule, Rfl: " 1    fluticasone (FLONASE) 50 mcg/act nasal spray, 2 sprays into each nostril daily, Disp: 16 g, Rfl: 5    Multiple Vitamin (MULTIVITAMIN) tablet, Take 1 tablet by mouth daily , Disp: , Rfl:     Nutritional Supplements (ANTIOXIDANTS PO), Take 1 tablet by mouth daily, Disp: , Rfl:     sertraline (ZOLOFT) 25 mg tablet, TAKE ONE TABLET BY MOUTH EVERY DAY, Disp: 30 tablet, Rfl: 5    Sodium Fluoride 5000 Sensitive 1.1-5 % GEL, , Disp: , Rfl:     tamsulosin (FLOMAX) 0.4 mg, Once a day right after supper, Disp: 5 capsule, Rfl: 0    traZODone (DESYREL) 50 mg tablet, Take 1 tablet (50 mg total) by mouth daily at bedtime, Disp: 30 tablet, Rfl: 11    diclofenac (VOLTAREN) 75 mg EC tablet, Take 1 tablet (75 mg total) by mouth 2 (two) times a day as needed (Ankle pain) (Patient not taking: Reported on 4/10/2024), Disp: 60 tablet, Rfl: 0    traMADol (Ultram) 50 mg tablet, Take 1 tablet (50 mg total) by mouth every 6 (six) hours as needed for moderate pain (Patient not taking: Reported on 4/25/2024), Disp: 10 tablet, Rfl: 0    Past Medical History:   Diagnosis Date    Anxiety     Depression     Elevated cholesterol     Hypertension     PONV (postoperative nausea and vomiting)        Past Surgical History:   Procedure Laterality Date    BREAST CYST ASPIRATION      DIAGNOSTIC LAPAROSCOPY      Exploratory, last assessed 10/25/16    NASAL SINUS SURGERY      age 33 - FESS and septoplasty - unknown surgeon    [place] STRTCTC CPTR ASSTD PX EXTRADURAL CRANIAL N/A 11/8/2016    Procedure: FUNCTIONAL ENDOSCOPIC SINUS SURGERY (FESS) IMAGED GUIDED;  Surgeon: Tony Morales DO;  Location: [facility];  Service: ENT    WISDOM TOOTH EXTRACTION         Social History

## 2024-04-25 NOTE — PATIENT INSTRUCTIONS
DRINK 3 QUARTS (96 Oz.) LIQUIDS EACH DAY - ALL LIQUIDS COUNT       ** THESE FOODS ARE HIGH IN [place] - TRY TO LIMIT HOW MUCH OF THESE YOU EAT:  Coke and Pepsi  Nuts  Dark Leafy Greens:     Spinach and Kale, Rhubarb, Chard  Asparagus  Beets  Sweet potatoes   Blueberries, Strawberries   Dark tea (Green tea is okay)  Tofu    ADD LEMON JUICE 3 OZ. DAILY - Fresh squeezed or lemon juice concentrate - Not MinuteMaid, [place], Crystal Lite, etc.        ** Recipe for RODNEY'S OLDE TYME LEMONADE:         One ounce of lemon juice, glass of water, sweetener of your choice    Coffee is okay!  Cranberry juice is good to prevent infections, but does not help for stones.

## 2024-04-26 ENCOUNTER — TELEPHONE (OUTPATIENT)
Dept: HEMATOLOGY ONCOLOGY | Facility: CLINIC | Age: 64
End: 2024-04-26

## 2024-04-26 NOTE — TELEPHONE ENCOUNTER
I called Hayden in response to a referral that was received for patient to establish care with Thoracic Surgery.     Outreach was made to complete patient's intake questionnaire .    I left a voicemail explaining the reason for my call and advised patient to call [facility] at [phone].  Another attempt will be made to contact patient.

## 2024-05-03 ENCOUNTER — TELEPHONE (OUTPATIENT)
Dept: HEMATOLOGY ONCOLOGY | Facility: CLINIC | Age: 64
End: 2024-05-03

## 2024-05-03 NOTE — TELEPHONE ENCOUNTER
Hayden called in response to a referral that was received for patient to establish care with Thoracic Surgery.     Outreach was made to complete patient's intake questionnaire .    Patient's intake questionnaire was reviewed and complete. Patient's intake has been sent to the team for clinical review.

## 2024-05-06 ENCOUNTER — OFFICE VISIT (OUTPATIENT)
Dept: PAIN MEDICINE | Facility: MEDICAL CENTER | Age: 64
End: 2024-05-06
Payer: COMMERCIAL

## 2024-05-06 VITALS
WEIGHT: 139.2 LBS | DIASTOLIC BLOOD PRESSURE: 85 MMHG | BODY MASS INDEX: 24.66 KG/M2 | HEART RATE: 64 BPM | HEIGHT: 63 IN | SYSTOLIC BLOOD PRESSURE: 129 MMHG

## 2024-05-06 DIAGNOSIS — M47.816 LUMBAR SPONDYLOSIS: Primary | ICD-10-CM

## 2024-05-06 DIAGNOSIS — M46.1 SACROILIITIS (HCC): ICD-10-CM

## 2024-05-06 PROCEDURE — 99214 OFFICE O/P EST MOD 30 MIN: CPT

## 2024-05-06 NOTE — PROGRESS NOTES
Assessment:  1. Lumbar spondylosis    2. Sacroiliitis (HCC)        Plan:  Unfortunately, the patient is reporting very little relief following left L3-L5 radiofrequency ablation.  I have recommended bilateral sacroiliac joint injections as an alternative, however the patient is hesitant to undergo any further injection therapy at this time.  Patient stated she would like to speak to her  before agreeing to any procedures.  She will call the office if she wishes to schedule for this.  She reports moderate relief in the past with regular chiropractic therapy sessions. I have recommended that she consider returning for ongoing treatment of the lower back.  Follow-up as needed/after injection.    My impressions and treatment recommendations were discussed in detail with the patient who verbalized understanding and had no further questions.  Discharge instructions were provided. I personally saw and examined the patient and I agree with the above discussed plan of care.    History of Present Illness:  Hayden Mcintosh is a 63 y.o. female with a history of chronic low back pain who presents for a follow up office visit after undergoing left L3-L5 radiofrequency ablation.  Patient reports very little relief following this procedure and is very disappointed with these results.  She feels her pain has been reduced very minimally if at all.  She continues to experience low back pain bilaterally that is localized to the lumbosacral region and does not radiate into the lower extremities.  This pain is constant, and typically worse in the evening.  She is currently rating her pain a 5-6/10 in intensity and describes it as a dull/aching sensation.  She reports increased pain while sitting and lying flat on her back.    She is not currently taking any prescription analgesic medications.     I have personally reviewed and/or updated the patient's past medical history, past surgical history, family history, social history,  current medications, allergies, and vital signs today.     Review of Systems   Constitutional:  Positive for activity change. Negative for unexpected weight change.   HENT:  Negative for hearing loss and sinus pain.    Eyes:  Negative for visual disturbance.   Respiratory:  Negative for shortness of breath.    Cardiovascular:  Negative for palpitations.   Gastrointestinal:  Negative for abdominal pain.   Genitourinary:  Negative for difficulty urinating.   Musculoskeletal:  Positive for back pain, joint swelling and neck stiffness. Negative for gait problem and myalgias.   Neurological:  Negative for weakness and numbness.   Psychiatric/Behavioral:  Negative for decreased concentration.        Patient Active Problem List   Diagnosis    Attention deficit hyperactivity disorder    Bronchitis    Degenerative joint disease    Depression with anxiety    Seasonal allergic rhinitis due to pollen    Allergic dermatitis    Essential hypertension    Hyperglycemia    Mixed hyperlipidemia    Insomnia    Age-related osteoporosis without current pathological fracture    Vitamin D deficiency    Anisocoria    Abnormal CT scan, gastrointestinal tract    Nephrolithiasis    Low back pain    Right hip pain    Dysfunction of both eustachian tubes    Constipation    Lumbar spondylosis    Left Achilles tendinitis    Carpal tunnel syndrome of left wrist    Pericardial cyst along right cardiophrenic angle       Past Medical History:   Diagnosis Date    Anxiety     Depression     Elevated cholesterol     Hypertension     PONV (postoperative nausea and vomiting)        Past Surgical History:   Procedure Laterality Date    BREAST CYST ASPIRATION      DIAGNOSTIC LAPAROSCOPY      Exploratory, last assessed 10/25/16    NASAL SINUS SURGERY      age 33 - FESS and septoplasty - unknown surgeon    [place] STRTCTC CPTR ASSTD PX EXTRADURAL CRANIAL N/A 11/8/2016    Procedure: FUNCTIONAL ENDOSCOPIC SINUS SURGERY (FESS) IMAGED GUIDED;  Surgeon: Tony Morales  DO;  Location: [facility];  Service: ENT    WISDOM TOOTH EXTRACTION         Family History   Problem Relation Age of Onset    Osteoporosis Mother     Coronary artery disease Father         CABG    Diabetes type II Sister     Asthma Sister     Diabetes Sister     Diabetes Sister     Bipolar disorder Brother     Depression Brother         with anxiety     Esophageal cancer Brother     Cancer Brother     Diabetes Maternal Grandfather     Depression Family         with anxiety     Hyperlipidemia Family     Ovarian cancer Paternal Aunt     Breast cancer Neg Hx     Colon cancer Neg Hx     Uterine cancer Neg Hx        Social History     Occupational History    Not on file   Tobacco Use    Smoking status: Never     Passive exposure: Never    Smokeless tobacco: Never    Tobacco comments:     No secondhand smoke exposure    Vaping Use    Vaping status: Never Used   Substance and Sexual Activity    Alcohol use: No    Drug use: No    Sexual activity: Yes     Partners: Male     Birth control/protection: Post-menopausal       Current Outpatient Medications on File Prior to Visit   Medication Sig    albuterol (PROVENTIL HFA,VENTOLIN HFA) 90 mcg/act inhaler Inhale 2 puffs every 6 (six) hours as needed for wheezing    atenolol (TENORMIN) 25 mg tablet Take 1 tablet (25 mg total) by mouth daily    atorvastatin (LIPITOR) 80 mg tablet Take 1 tablet (80 mg total) by mouth daily    Calcium 600 MG tablet Take 600 mg by mouth    Cholecalciferol (VITAMIN D-3 PO) Take 4,000 Int'l Units by mouth daily    CRANBERRY PO Take 500 mg by mouth    D-MANNOSE PO Take by mouth    DULoxetine (CYMBALTA) 60 mg delayed release capsule TAKE 1 CAPSULE DAILY    fluticasone (FLONASE) 50 mcg/act nasal spray 2 sprays into each nostril daily    Multiple Vitamin (MULTIVITAMIN) tablet Take 1 tablet by mouth daily     Nutritional Supplements (ANTIOXIDANTS PO) Take 1 tablet by mouth daily    sertraline (ZOLOFT) 25 mg tablet TAKE ONE TABLET BY MOUTH EVERY DAY    Sodium  "Fluoride 5000 Sensitive 1.1-5 % GEL     traZODone (DESYREL) 50 mg tablet Take 1 tablet (50 mg total) by mouth daily at bedtime    diclofenac (VOLTAREN) 75 mg EC tablet Take 1 tablet (75 mg total) by mouth 2 (two) times a day as needed (Ankle pain) (Patient not taking: Reported on 4/10/2024)    tamsulosin (FLOMAX) 0.4 mg Once a day right after supper (Patient not taking: Reported on 5/6/2024)    traMADol (Ultram) 50 mg tablet Take 1 tablet (50 mg total) by mouth every 6 (six) hours as needed for moderate pain (Patient not taking: Reported on 4/25/2024)     No current facility-administered medications on file prior to visit.       Allergies   Allergen Reactions    Fexofenadine-Pseudoephed Er Other (See Comments)     \"didn't feel good\"    \"didn't feel good\"      Pollen Extract     Prednisone Nausea Only    Singulair [Montelukast]      Did not feel well, no particular assistance.       Physical Exam:    /85   Pulse 64   Ht 5' 3\" (1.6 m)   Wt 63.1 kg (139 lb 3.2 oz)   BMI 24.66 kg/m²     Constitutional:normal, well developed, well nourished, alert, in no distress and non-toxic and no overt pain behavior.  Eyes:anicteric  HEENT:grossly intact  Neck:supple, symmetric, trachea midline and no masses   Pulmonary:even and unlabored  Cardiovascular:No edema or pitting edema present  Skin:Normal without rashes or lesions and well hydrated  Psychiatric:Mood and affect appropriate  Neurologic:Cranial Nerves II-XII grossly intact  Musculoskeletal:normal, except for tenderness to palpation over the bilateral sacroiliac joints    Special Tests:  Left Marciano's Maneuver:  positive  Right Marciano's Maneuver:  positive  Left Gaenslen's Test:  positive  Right Gaenslen's Test:  positive  Left Pelvic Distraction Test:  positive  Right Pelvic Distraction Test:  positive  + David finger sign     "

## 2024-06-11 DIAGNOSIS — E78.2 MIXED HYPERLIPIDEMIA: ICD-10-CM

## 2024-06-11 DIAGNOSIS — F41.8 DEPRESSION WITH ANXIETY: ICD-10-CM

## 2024-06-11 DIAGNOSIS — F51.04 PSYCHOPHYSIOLOGICAL INSOMNIA: ICD-10-CM

## 2024-06-11 RX ORDER — ATORVASTATIN CALCIUM 80 MG/1
80 TABLET, FILM COATED ORAL DAILY
Qty: 30 TABLET | Refills: 5 | Status: SHIPPED | OUTPATIENT
Start: 2024-06-11

## 2024-06-11 RX ORDER — DULOXETIN HYDROCHLORIDE 60 MG/1
60 CAPSULE, DELAYED RELEASE ORAL DAILY
Qty: 30 CAPSULE | Refills: 5 | Status: SHIPPED | OUTPATIENT
Start: 2024-06-11

## 2024-06-11 NOTE — TELEPHONE ENCOUNTER
Reason for call:   [x] Refill   [] Prior Auth  [x] Other: pt is asking for 30 day supplies sent to [facility] while she figures out new mailorder - NO REFILLS PLEASE     Office:   [x] PCP/Provider - Maame Keenan   [] Specialty/Provider -         Pharmacy: [facility]     Does the patient have enough for 3 days?   [] Yes   [x] No - Send as HP to POD

## 2024-06-12 ENCOUNTER — TELEPHONE (OUTPATIENT)
Age: 64
End: 2024-06-12

## 2024-06-12 RX ORDER — TRAZODONE HYDROCHLORIDE 50 MG/1
50 TABLET ORAL
Qty: 90 TABLET | Refills: 1 | Status: SHIPPED | OUTPATIENT
Start: 2024-06-12

## 2024-06-12 NOTE — TELEPHONE ENCOUNTER
Pharmacist called with a drug to drug interaction. Potential serotonin syndrome between sertraline and duloxetine. Please advise if these are to be taken together. Pharmacist-Koffi can be reached at [phone].

## 2024-06-24 ENCOUNTER — OFFICE VISIT (OUTPATIENT)
Dept: FAMILY MEDICINE CLINIC | Facility: CLINIC | Age: 64
End: 2024-06-24
Payer: COMMERCIAL

## 2024-06-24 VITALS
TEMPERATURE: 97.2 F | HEIGHT: 63 IN | BODY MASS INDEX: 23.99 KG/M2 | DIASTOLIC BLOOD PRESSURE: 82 MMHG | HEART RATE: 71 BPM | OXYGEN SATURATION: 98 % | SYSTOLIC BLOOD PRESSURE: 120 MMHG | WEIGHT: 135.4 LBS

## 2024-06-24 DIAGNOSIS — R35.0 FREQUENT URINATION: ICD-10-CM

## 2024-06-24 DIAGNOSIS — N30.01 ACUTE CYSTITIS WITH HEMATURIA: Primary | ICD-10-CM

## 2024-06-24 DIAGNOSIS — I10 ESSENTIAL HYPERTENSION: ICD-10-CM

## 2024-06-24 LAB
SL AMB  POCT GLUCOSE, UA: NEGATIVE
SL AMB LEUKOCYTE ESTERASE,UA: NORMAL
SL AMB POCT BILIRUBIN,UA: NEGATIVE
SL AMB POCT BLOOD,UA: NORMAL
SL AMB POCT CLARITY,UA: NORMAL
SL AMB POCT COLOR,UA: YELLOW
SL AMB POCT KETONES,UA: NEGATIVE
SL AMB POCT NITRITE,UA: POSITIVE
SL AMB POCT PH,UA: 6
SL AMB POCT SPECIFIC GRAVITY,UA: 1.02
SL AMB POCT URINE PROTEIN: NORMAL
SL AMB POCT UROBILINOGEN: 0.2

## 2024-06-24 PROCEDURE — 87086 URINE CULTURE/COLONY COUNT: CPT | Performed by: NURSE PRACTITIONER

## 2024-06-24 PROCEDURE — 87077 CULTURE AEROBIC IDENTIFY: CPT | Performed by: NURSE PRACTITIONER

## 2024-06-24 PROCEDURE — 81002 URINALYSIS NONAUTO W/O SCOPE: CPT | Performed by: NURSE PRACTITIONER

## 2024-06-24 PROCEDURE — 87186 SC STD MICRODIL/AGAR DIL: CPT | Performed by: NURSE PRACTITIONER

## 2024-06-24 PROCEDURE — 99214 OFFICE O/P EST MOD 30 MIN: CPT | Performed by: NURSE PRACTITIONER

## 2024-06-24 RX ORDER — NITROFURANTOIN 25; 75 MG/1; MG/1
100 CAPSULE ORAL 2 TIMES DAILY
Qty: 10 CAPSULE | Refills: 0 | Status: SHIPPED | OUTPATIENT
Start: 2024-06-24 | End: 2024-06-29

## 2024-06-24 NOTE — PROGRESS NOTES
Ambulatory Visit  Name: Hayden Mcintosh      : 1960      MRN: [id number]  Encounter Provider: BRANDON Chua  Encounter Date: 2024   Encounter department: [facility]    Assessment & Plan   1. Acute cystitis with hematuria  Assessment & Plan:  5-day course of Macrobid was ordered for treatment of UTI.  Urine culture was also sent.  Orders:  -     nitrofurantoin (MACROBID) 100 mg capsule; Take 1 capsule (100 mg total) by mouth 2 (two) times a day for 5 days  2. Frequent urination  -     POCT urine dip  -     Urine culture; Future  -     Urine culture  3. Essential hypertension  Assessment & Plan:  Well-controlled on current regimen.        Depression Screening and Follow-up Plan: Patient was screened for depression during today's encounter. They screened negative with a PHQ-9 score of 0.      History of Present Illness   {Disappearing Hyperlinks I Encounters * My Last Note * Since Last Visit * History :17950}  UTI: Patient reports over the past week she has been noting that her urine has a foul odor.  She reports that in the past when she noted this she often was found to have a UTI.  She denies noting any dysuria, increased urinary frequency, urgency, or other urinary changes.  Urine dip performed in the office today showed a large amount of leukocyte esterase, a small amount of hematuria, and was positive for nitrites.    Hypertension: Well-controlled on current dosage of atenolol.  Patient denies lightheadedness, dizziness, or orthostasis.        Review of Systems   Constitutional:  Negative for chills and fever.   HENT:  Negative for ear pain and sore throat.    Eyes:  Negative for pain and visual disturbance.   Respiratory:  Negative for cough, chest tightness, shortness of breath and wheezing.    Cardiovascular:  Negative for chest pain, palpitations and leg swelling.   Gastrointestinal:  Negative for abdominal pain, constipation, diarrhea, nausea and vomiting.  "  Endocrine: Negative for cold intolerance and heat intolerance.   Genitourinary:  Negative for decreased urine volume, difficulty urinating, dysuria, flank pain, frequency, hematuria and urgency.        Foul-smelling urine   Musculoskeletal:  Negative for arthralgias and back pain.   Skin:  Negative for color change and rash.   Allergic/Immunologic: Negative for environmental allergies.   Neurological:  Negative for dizziness, seizures, syncope, weakness, light-headedness, numbness and headaches.   Hematological:  Negative for adenopathy.   Psychiatric/Behavioral:  Negative for confusion. The patient is not nervous/anxious.    All other systems reviewed and are negative.      Objective   {Disappearing Hyperlinks   Review Vitals * Enter New Vitals * Results Review * Labs * Imaging * Cardiology * Procedures * Lung Cancer Screening :71496}  /82 (BP Location: Right arm, Patient Position: Sitting, Cuff Size: Adult)   Pulse 71   Temp (!) 97.2 °F (36.2 °C) (Tympanic)   Ht 5' 3\" (1.6 m)   Wt 61.4 kg (135 lb 6.4 oz)   SpO2 98%   BMI 23.99 kg/m²     Physical Exam  Vitals and nursing note reviewed.   Constitutional:       General: She is not in acute distress.     Appearance: Normal appearance. She is well-developed. She is not ill-appearing.   HENT:      Head: Normocephalic.   Eyes:      Conjunctiva/sclera: Conjunctivae normal.   Cardiovascular:      Rate and Rhythm: Normal rate and regular rhythm.      Pulses: Normal pulses.           Carotid pulses are 2+ on the right side and 2+ on the left side.       Radial pulses are 2+ on the right side and 2+ on the left side.        Posterior tibial pulses are 2+ on the right side and 2+ on the left side.      Heart sounds: Normal heart sounds. No murmur heard.  Pulmonary:      Effort: Pulmonary effort is normal. No respiratory distress.      Breath sounds: Normal breath sounds. No decreased breath sounds, wheezing, rhonchi or rales.   Abdominal:      General: Abdomen " is flat. Bowel sounds are normal. There is no distension.      Palpations: Abdomen is soft.      Tenderness: There is no abdominal tenderness. There is no right CVA tenderness, left CVA tenderness or guarding.   Musculoskeletal:         General: No swelling. Normal range of motion.      Cervical back: Normal range of motion and neck supple.      Right lower leg: No edema.      Left lower leg: No edema.   Skin:     General: Skin is warm and dry.      Capillary Refill: Capillary refill takes less than 2 seconds.   Neurological:      General: No focal deficit present.      Mental Status: She is alert and oriented to person, place, and time.   Psychiatric:         Mood and Affect: Mood normal.         Behavior: Behavior normal.         Thought Content: Thought content normal.         Judgment: Judgment normal.       Administrative Statements {Disappearing Hyperlinks I  Level of Service * [facility]}

## 2024-06-27 LAB — BACTERIA UR CULT: ABNORMAL

## 2024-07-08 ENCOUNTER — OFFICE VISIT (OUTPATIENT)
Dept: FAMILY MEDICINE CLINIC | Facility: CLINIC | Age: 64
End: 2024-07-08
Payer: COMMERCIAL

## 2024-07-08 VITALS
BODY MASS INDEX: 24.27 KG/M2 | OXYGEN SATURATION: 95 % | HEART RATE: 78 BPM | TEMPERATURE: 97.6 F | WEIGHT: 137 LBS | HEIGHT: 63 IN | DIASTOLIC BLOOD PRESSURE: 70 MMHG | SYSTOLIC BLOOD PRESSURE: 102 MMHG

## 2024-07-08 DIAGNOSIS — R39.89 URINARY PROBLEM: Primary | ICD-10-CM

## 2024-07-08 DIAGNOSIS — N30.01 ACUTE CYSTITIS WITH HEMATURIA: ICD-10-CM

## 2024-07-08 PROBLEM — J40 BRONCHITIS: Status: RESOLVED | Noted: 2017-01-10 | Resolved: 2024-07-08

## 2024-07-08 LAB
SL AMB  POCT GLUCOSE, UA: ABNORMAL
SL AMB LEUKOCYTE ESTERASE,UA: ABNORMAL
SL AMB POCT BILIRUBIN,UA: ABNORMAL
SL AMB POCT BLOOD,UA: ABNORMAL
SL AMB POCT CLARITY,UA: ABNORMAL
SL AMB POCT COLOR,UA: YELLOW
SL AMB POCT KETONES,UA: ABNORMAL
SL AMB POCT NITRITE,UA: POSITIVE
SL AMB POCT PH,UA: 6
SL AMB POCT SPECIFIC GRAVITY,UA: 1.02
SL AMB POCT URINE PROTEIN: ABNORMAL
SL AMB POCT UROBILINOGEN: 0.2

## 2024-07-08 PROCEDURE — 87186 SC STD MICRODIL/AGAR DIL: CPT | Performed by: PHYSICIAN ASSISTANT

## 2024-07-08 PROCEDURE — 99213 OFFICE O/P EST LOW 20 MIN: CPT | Performed by: PHYSICIAN ASSISTANT

## 2024-07-08 PROCEDURE — 87086 URINE CULTURE/COLONY COUNT: CPT | Performed by: PHYSICIAN ASSISTANT

## 2024-07-08 PROCEDURE — 87077 CULTURE AEROBIC IDENTIFY: CPT | Performed by: PHYSICIAN ASSISTANT

## 2024-07-08 PROCEDURE — 81002 URINALYSIS NONAUTO W/O SCOPE: CPT | Performed by: PHYSICIAN ASSISTANT

## 2024-07-08 RX ORDER — AMOXICILLIN AND CLAVULANATE POTASSIUM 875; 125 MG/1; MG/1
1 TABLET, FILM COATED ORAL EVERY 12 HOURS SCHEDULED
Qty: 14 TABLET | Refills: 0 | Status: SHIPPED | OUTPATIENT
Start: 2024-07-08 | End: 2024-07-15

## 2024-07-08 NOTE — PROGRESS NOTES
"Ambulatory Visit  Name: Hayden Mcintosh      : 1960      MRN: [id number]  Encounter Provider: Maame Keenan PA-C  Encounter Date: 2024   Encounter department: [facility]    Assessment & Plan   1. Urinary problem  -     POCT urine dip  2. Acute cystitis with hematuria  Comments:  S/P macrobid. Symptoms ongoing and urien dip still with + nitrates. Culture shows shoudld also be suseptibe to Augmentin. Use as directed. Increase fluids.  Orders:  -     amoxicillin-clavulanate (AUGMENTIN) 875-125 mg per tablet; Take 1 tablet by mouth every 12 (twelve) hours for 7 days       History of Present Illness   {Disappearing Hyperlinks I Encounters * My Last Note * Since Last Visit * History :46191}  Patient presents with:  Urinary Symptoms: Continues with strong odor in urine.  She is leaving for vacation Wednesday.    PT was just seen by DL and given Macobid for UTI. Culture showed sensitive. PT has no other symptoms but strong smell to urine. Culture only shows sensitive to one other antibiotic readily available.         Review of Systems   Constitutional: Negative.    HENT: Negative.     Eyes: Negative.    Respiratory: Negative.     Cardiovascular: Negative.    Gastrointestinal: Negative.    Endocrine: Negative.    Genitourinary: Negative.         Strong odor to urine   Musculoskeletal: Negative.    Skin: Negative.    Allergic/Immunologic: Negative.    Neurological: Negative.    Hematological: Negative.    Psychiatric/Behavioral: Negative.         Objective   {Disappearing Hyperlinks   Review Vitals * Enter New Vitals * Results Review * Labs * Imaging * Cardiology * Procedures * Lung Cancer Screening :12306}  /70   Pulse 78   Temp 97.6 °F (36.4 °C)   Ht 5' 3\" (1.6 m)   Wt 62.1 kg (137 lb)   SpO2 95%   BMI 24.27 kg/m²     Physical Exam  Vitals and nursing note reviewed.   Constitutional:       Appearance: Normal appearance. She is well-developed.   HENT:      Head: Normocephalic " and atraumatic.   Eyes:      General: Lids are normal.      Conjunctiva/sclera: Conjunctivae normal.      Pupils: Pupils are equal, round, and reactive to light.   Cardiovascular:      Rate and Rhythm: Normal rate and regular rhythm.      Heart sounds: No murmur heard.  Pulmonary:      Effort: Pulmonary effort is normal.      Breath sounds: Normal breath sounds.   Abdominal:      General: Abdomen is flat. Bowel sounds are normal.      Palpations: Abdomen is soft.      Tenderness: There is no abdominal tenderness. There is no right CVA tenderness or left CVA tenderness.   Skin:     General: Skin is warm and dry.   Neurological:      General: No focal deficit present.      Mental Status: She is alert.      Coordination: Coordination is intact.   Psychiatric:         Mood and Affect: Mood normal.         Behavior: Behavior normal. Behavior is cooperative.         Thought Content: Thought content normal.         Judgment: Judgment normal.       Administrative Statements {Disappearing Hyperlinks I  Level of Service * [facility]}

## 2024-07-08 NOTE — PATIENT INSTRUCTIONS
1. Urinary problem  -     POCT urine dip  2. Acute cystitis with hematuria  Comments:  S/P macrobid. Symptoms ongoing and urien dip still with + nitrates. Culture shows shoudld also be suseptibe to Augmentin. Use as directed. Increase fluids.  Orders:  -     amoxicillin-clavulanate (AUGMENTIN) 875-125 mg per tablet; Take 1 tablet by mouth every 12 (twelve) hours for 7 days

## 2024-07-11 LAB — BACTERIA UR CULT: ABNORMAL

## 2024-07-12 DIAGNOSIS — N30.00 ACUTE CYSTITIS WITHOUT HEMATURIA: Primary | ICD-10-CM

## 2024-07-12 NOTE — RESULT ENCOUNTER NOTE
Please let the patient know that she has an infection with a strong bacteria and should really do a very good job with hygiene after going to the bathroom with washing hands.  The antibiotic we just put her on Augmentin is supposed to be susceptible so she should finish this antibiotic.  I am ordering a repeat culture to be done when she is finished with this course of medication. (IF  infection still persists she would need IV antibiotics with Zosyn)

## 2024-07-24 PROBLEM — N30.01 ACUTE CYSTITIS WITH HEMATURIA: Status: RESOLVED | Noted: 2022-07-25 | Resolved: 2024-07-24

## 2024-07-30 ENCOUNTER — TELEPHONE (OUTPATIENT)
Age: 64
End: 2024-07-30

## 2024-07-30 DIAGNOSIS — F41.8 DEPRESSION WITH ANXIETY: ICD-10-CM

## 2024-07-30 DIAGNOSIS — I10 ESSENTIAL HYPERTENSION: ICD-10-CM

## 2024-07-30 RX ORDER — SERTRALINE HYDROCHLORIDE 25 MG/1
25 TABLET, FILM COATED ORAL DAILY
Qty: 30 TABLET | Refills: 0 | Status: SHIPPED | OUTPATIENT
Start: 2024-07-30

## 2024-07-30 RX ORDER — ATENOLOL 25 MG/1
25 TABLET ORAL DAILY
Qty: 30 TABLET | Refills: 0 | Status: SHIPPED | OUTPATIENT
Start: 2024-07-30

## 2024-07-31 ENCOUNTER — LAB (OUTPATIENT)
Dept: LAB | Facility: CLINIC | Age: 64
End: 2024-07-31
Payer: COMMERCIAL

## 2024-07-31 DIAGNOSIS — E55.9 VITAMIN D DEFICIENCY: ICD-10-CM

## 2024-07-31 DIAGNOSIS — N30.00 ACUTE CYSTITIS WITHOUT HEMATURIA: ICD-10-CM

## 2024-07-31 DIAGNOSIS — E78.2 MIXED HYPERLIPIDEMIA: ICD-10-CM

## 2024-07-31 DIAGNOSIS — R73.9 HYPERGLYCEMIA: ICD-10-CM

## 2024-07-31 DIAGNOSIS — I10 ESSENTIAL HYPERTENSION: ICD-10-CM

## 2024-07-31 PROCEDURE — 87186 SC STD MICRODIL/AGAR DIL: CPT

## 2024-07-31 PROCEDURE — 87086 URINE CULTURE/COLONY COUNT: CPT

## 2024-07-31 PROCEDURE — 87077 CULTURE AEROBIC IDENTIFY: CPT

## 2024-08-02 DIAGNOSIS — N39.0 RECURRENT UTI: Primary | ICD-10-CM

## 2024-08-02 DIAGNOSIS — N30.01 ACUTE CYSTITIS WITH HEMATURIA: ICD-10-CM

## 2024-08-02 RX ORDER — AMOXICILLIN AND CLAVULANATE POTASSIUM 875; 125 MG/1; MG/1
1 TABLET, FILM COATED ORAL EVERY 12 HOURS SCHEDULED
Qty: 14 TABLET | Refills: 0 | Status: SHIPPED | OUTPATIENT
Start: 2024-08-02 | End: 2024-08-09

## 2024-08-02 NOTE — RESULT ENCOUNTER NOTE
Please of the patient know that she continues to have a infection with the same bacteria which is supposedly still susceptible to the same antibiotic so I do need her to go back on his antibiotic which I am recalling and I have ordered an urology consult.

## 2024-08-03 LAB — BACTERIA UR CULT: ABNORMAL

## 2024-08-05 ENCOUNTER — HOSPITAL ENCOUNTER (OUTPATIENT)
Dept: MAMMOGRAPHY | Facility: MEDICAL CENTER | Age: 64
Discharge: HOME | End: 2024-08-05
Payer: COMMERCIAL

## 2024-08-05 DIAGNOSIS — Z12.31 ENCOUNTER FOR SCREENING MAMMOGRAM FOR BREAST CANCER: ICD-10-CM

## 2024-08-05 PROCEDURE — 77063 BREAST TOMOSYNTHESIS BI: CPT

## 2024-08-05 PROCEDURE — 77067 SCR MAMMO BI INCL CAD: CPT

## 2024-08-10 ENCOUNTER — OFFICE VISIT (OUTPATIENT)
Dept: URGENT CARE | Facility: MEDICAL CENTER | Age: 64
End: 2024-08-10
Payer: COMMERCIAL

## 2024-08-10 VITALS
SYSTOLIC BLOOD PRESSURE: 138 MMHG | RESPIRATION RATE: 18 BRPM | OXYGEN SATURATION: 99 % | DIASTOLIC BLOOD PRESSURE: 74 MMHG | TEMPERATURE: 98.2 F | HEART RATE: 62 BPM

## 2024-08-10 DIAGNOSIS — L23.9 ALLERGIC CONTACT DERMATITIS, UNSPECIFIED TRIGGER: Primary | ICD-10-CM

## 2024-08-10 PROCEDURE — S9083 URGENT CARE CENTER GLOBAL: HCPCS | Performed by: PHYSICIAN ASSISTANT

## 2024-08-10 PROCEDURE — 99213 OFFICE O/P EST LOW 20 MIN: CPT | Performed by: PHYSICIAN ASSISTANT

## 2024-08-10 RX ORDER — TRIAMCINOLONE ACETONIDE 1 MG/ML
LOTION TOPICAL 2 TIMES DAILY
Qty: 60 ML | Refills: 0 | Status: SHIPPED | OUTPATIENT
Start: 2024-08-10

## 2024-08-10 NOTE — PROGRESS NOTES
[facility]        NAME: Hayden Mcintosh is a 64 y.o. female  : 1960    MRN: [id number]  DATE: August 10, 2024  TIME: 8:25 AM    Assessment and Plan   Allergic contact dermatitis, unspecified trigger [L23.9]  1. Allergic contact dermatitis, unspecified trigger  triamcinolone (KENALOG) 0.1 % lotion            Patient Instructions     Apply Kenalog lotion twice a day  Use Benadryl at night   Take Claritin, Allegra or Zyrtec for the next few days   Follow up with PCP in 3-5 days.  Proceed to  ER if symptoms worsen.    If tests have been performed at [facility], our office will contact you with results if changes need to be made to the care plan discussed with you at the visit.  You [facility]    Chief Complaint     Chief Complaint   Patient presents with    Rash     Patient /o a itchy rash on her Abd x 1 day          History of Present Illness       HPI  65 y/o female presents for evaluation of rash which started yesterday afternoon/evening.  She states it is very itchy.  She tried Hydrocortisone cream which helped slightly.   (She states she does not tolerate oral steroids as they 'make her crazy' but tolerates topical) She is allergic to dogs and was at her brother's yesterday petting his dog.  She denies new detergents/body wash/lotions.    PMH: Htn,   Review of Systems   Review of Systems   Constitutional:  Negative for chills and fever.   HENT:  Negative for ear pain and sore throat.    Eyes:  Negative for pain and visual disturbance.   Respiratory:  Negative for cough and shortness of breath.    Cardiovascular:  Negative for chest pain and palpitations.   Gastrointestinal:  Negative for abdominal pain and vomiting.   Genitourinary:  Negative for dysuria and hematuria.   Musculoskeletal:  Negative for arthralgias and back pain.   Skin:  Positive for rash. Negative for color change.   Neurological:  Negative for seizures and syncope.   All other systems reviewed  and are negative.        Current Medications       Current Outpatient Medications:     triamcinolone (KENALOG) 0.1 % lotion, Apply topically 2 (two) times a day, Disp: 60 mL, Rfl: 0    albuterol (PROVENTIL HFA,VENTOLIN HFA) 90 mcg/act inhaler, Inhale 2 puffs every 6 (six) hours as needed for wheezing, Disp: 18 g, Rfl: 5    atenolol (TENORMIN) 25 mg tablet, TAKE ONE TABLET BY MOUTH EVERY DAY, Disp: 30 tablet, Rfl: 0    atorvastatin (LIPITOR) 80 mg tablet, Take 1 tablet (80 mg total) by mouth daily, Disp: 30 tablet, Rfl: 5    Calcium 600 MG tablet, Take 600 mg by mouth, Disp: , Rfl:     Cholecalciferol (VITAMIN D-3 PO), Take 4,000 Int'l Units by mouth daily, Disp: , Rfl:     CRANBERRY PO, Take 500 mg by mouth, Disp: , Rfl:     D-MANNOSE PO, Take by mouth, Disp: , Rfl:     DULoxetine (CYMBALTA) 60 mg delayed release capsule, Take 1 capsule (60 mg total) by mouth daily, Disp: 30 capsule, Rfl: 5    fluticasone (FLONASE) 50 mcg/act nasal spray, 2 sprays into each nostril daily, Disp: 16 g, Rfl: 5    Multiple Vitamin (MULTIVITAMIN) tablet, Take 1 tablet by mouth daily , Disp: , Rfl:     Nutritional Supplements (ANTIOXIDANTS PO), Take 1 tablet by mouth daily, Disp: , Rfl:     sertraline (ZOLOFT) 25 mg tablet, TAKE ONE TABLET BY MOUTH EVERY DAY, Disp: 30 tablet, Rfl: 0    Sodium Fluoride 5000 Sensitive 1.1-5 % GEL, , Disp: , Rfl:     tamsulosin (FLOMAX) 0.4 mg, Once a day right after supper, Disp: 5 capsule, Rfl: 0    traMADol (Ultram) 50 mg tablet, Take 1 tablet (50 mg total) by mouth every 6 (six) hours as needed for moderate pain, Disp: 10 tablet, Rfl: 0    traZODone (DESYREL) 50 mg tablet, Take 1 tablet (50 mg total) by mouth daily at bedtime, Disp: 90 tablet, Rfl: 1    Current Allergies     Allergies as of 08/10/2024 - Reviewed 07/08/2024   Allergen Reaction Noted    Fexofenadine-pseudoephed er Other (See Comments) 06/30/2021    Pollen extract  05/09/2016    Prednisone Nausea Only 06/06/2023    Singulair [montelukast]   03/28/2020            The following portions of the patient's history were reviewed and updated as appropriate: allergies, current medications, past family history, past medical history, past social history, past surgical history and problem list.     Past Medical History:   Diagnosis Date    Anxiety     Depression     Elevated cholesterol     Hypertension     PONV (postoperative nausea and vomiting)        Past Surgical History:   Procedure Laterality Date    BREAST CYST ASPIRATION      DIAGNOSTIC LAPAROSCOPY      Exploratory, last assessed 10/25/16    NASAL SINUS SURGERY      age 33 - FESS and septoplasty - unknown surgeon    AR STRTCTC CPTR ASSTD PX EXTRADURAL CRANIAL N/A 11/8/2016    Procedure: FUNCTIONAL ENDOSCOPIC SINUS SURGERY (FESS) IMAGED GUIDED;  Surgeon: Tony Morales DO;  Location: [facility];  Service: ENT    WISDOM TOOTH EXTRACTION         Family History   Problem Relation Age of Onset    Osteoporosis Mother     Coronary artery disease Father         CABG    Diabetes type II Sister     Asthma Sister     Diabetes Sister     Diabetes Sister     No Known Problems Daughter     No Known Problems Maternal Grandmother     Diabetes Maternal Grandfather     No Known Problems Paternal Grandmother     No Known Problems Paternal Grandfather     Bipolar disorder Brother     Depression Brother         with anxiety     Esophageal cancer Brother     Depression Family         with anxiety     Hyperlipidemia Family     Ovarian cancer Maternal Aunt     No Known Problems Maternal Aunt     Breast cancer Neg Hx     Colon cancer Neg Hx     Uterine cancer Neg Hx          Medications have been verified.        Objective   /74   Pulse 62   Temp 98.2 °F (36.8 °C)   Resp 18   SpO2 99%   No LMP recorded. Patient is postmenopausal.       Physical Exam     Physical Exam  Vitals and nursing note reviewed.   Constitutional:       General: She is not in acute distress.     Appearance: Normal appearance.   HENT:      Head:  Normocephalic and atraumatic.   Cardiovascular:      Rate and Rhythm: Normal rate and regular rhythm.      Pulses: Normal pulses.      Heart sounds: Normal heart sounds.   Pulmonary:      Effort: Pulmonary effort is normal.      Breath sounds: Normal breath sounds.   Skin:     General: Skin is warm and dry.      Comments: Scattered over the abdomen are red marks which appear to be secondary to itching.  There are some small hive like spots scattered near the bra line on the right and left.  She has no vesicles, no scaling.     Neurological:      Mental Status: She is alert and oriented to person, place, and time.   Psychiatric:         Mood and Affect: Mood normal.         Behavior: Behavior normal.

## 2024-08-10 NOTE — PATIENT INSTRUCTIONS
Apply Kenalog lotion twice a day  Use Benadryl at night   Take Claritin, Allegra or Zyrtec for the next few days   Follow up with PCP in 3-5 days.  Proceed to  ER if symptoms worsen.    If tests have been performed at [facility], our office will contact you with results if changes need to be made to the care plan discussed with you at the visit.  You can review your full results on [facility] MyChart.

## 2024-08-12 ENCOUNTER — OFFICE VISIT (OUTPATIENT)
Dept: FAMILY MEDICINE CLINIC | Facility: CLINIC | Age: 64
End: 2024-08-12
Payer: COMMERCIAL

## 2024-08-12 VITALS
SYSTOLIC BLOOD PRESSURE: 126 MMHG | WEIGHT: 138 LBS | OXYGEN SATURATION: 99 % | DIASTOLIC BLOOD PRESSURE: 80 MMHG | HEIGHT: 63 IN | RESPIRATION RATE: 16 BRPM | TEMPERATURE: 96.8 F | BODY MASS INDEX: 24.45 KG/M2 | HEART RATE: 64 BPM

## 2024-08-12 DIAGNOSIS — I10 ESSENTIAL HYPERTENSION: ICD-10-CM

## 2024-08-12 DIAGNOSIS — F41.8 DEPRESSION WITH ANXIETY: ICD-10-CM

## 2024-08-12 DIAGNOSIS — L23.9 ALLERGIC CONTACT DERMATITIS, UNSPECIFIED TRIGGER: Primary | ICD-10-CM

## 2024-08-12 PROCEDURE — 99214 OFFICE O/P EST MOD 30 MIN: CPT | Performed by: FAMILY MEDICINE

## 2024-08-12 PROCEDURE — G2211 COMPLEX E/M VISIT ADD ON: HCPCS | Performed by: FAMILY MEDICINE

## 2024-08-12 RX ORDER — FAMOTIDINE 20 MG/1
20 TABLET, FILM COATED ORAL 2 TIMES DAILY
Start: 2024-08-12

## 2024-08-12 RX ORDER — BETAMETHASONE DIPROPIONATE 0.5 MG/G
CREAM TOPICAL 2 TIMES DAILY
Qty: 15 G | Refills: 0 | Status: SHIPPED | OUTPATIENT
Start: 2024-08-12

## 2024-08-12 NOTE — PROGRESS NOTES
"Ambulatory Visit  Name: Hayden Mcintosh      : 1960      MRN: [id number]  Encounter Provider: Billy Blue MD  Encounter Date: 2024   Encounter department: [facility]    Assessment & Plan   1. Allergic contact dermatitis, unspecified trigger  Comments:  Pepcid 20 twice a day.  Increase steroid to betamethasone.  If no change in 1 to 2 days, we will need to consider short steroid course.  Appears like hives.  Orders:  -     betamethasone, augmented, (DIPROLENE-AF) 0.05 % cream; Apply topically 2 (two) times a day  -     famotidine (PEPCID) 20 mg tablet; Take 1 tablet (20 mg total) by mouth 2 (two) times a day For 1 week, then twice a day as needed  2. Essential hypertension  Assessment & Plan:  Blood pressure doing quite well.  No changes needed.    3. Depression with anxiety  Assessment & Plan:  Patient using both sertraline and Cymbalta for quite a while now.  Seems to be doing quite well.  Would recommend continue.  Patient does have quite a few tablets of the Zoloft at home from pharmacy.  Will continue.  The most recent prescription was 30 days for going away when she did not have enough available.       History of Present Illness     HPI    Review of Systems    Objective     /80   Pulse 64   Temp (!) 96.8 °F (36 °C)   Resp 16   Ht 5' 3\" (1.6 m)   Wt 62.6 kg (138 lb)   SpO2 99%   BMI 24.45 kg/m²     Physical Exam  Vitals and nursing note reviewed.   Constitutional:       Appearance: Normal appearance. She is well-developed.   HENT:      Head: Normocephalic and atraumatic.   Cardiovascular:      Rate and Rhythm: Normal rate and regular rhythm.      Pulses:           Carotid pulses are 2+ on the right side and 2+ on the left side.     Heart sounds: Normal heart sounds.   Pulmonary:      Effort: Pulmonary effort is normal.      Breath sounds: Normal breath sounds. No wheezing or rales.   Chest:      Chest wall: No tenderness.   Skin:         Neurological:      " Mental Status: She is alert.       Administrative Statements

## 2024-08-12 NOTE — PATIENT INSTRUCTIONS
1. Allergic contact dermatitis, unspecified trigger  Comments:  Pepcid 20 twice a day.  Increase steroid to betamethasone.  If no change in 1 to 2 days, we will need to consider short steroid course.  Appears like hives.  Orders:  -     betamethasone, augmented, (DIPROLENE-AF) 0.05 % cream; Apply topically 2 (two) times a day  -     famotidine (PEPCID) 20 mg tablet; Take 1 tablet (20 mg total) by mouth 2 (two) times a day For 1 week, then twice a day as needed  2. Essential hypertension  Assessment & Plan:  Blood pressure doing quite well.  No changes needed.    3. Depression with anxiety  Assessment & Plan:  Patient using both sertraline and Cymbalta for quite a while now.  Seems to be doing quite well.  Would recommend continue.  Patient does have quite a few tablets of the Zoloft at home from pharmacy.  Will continue.  The most recent prescription was 30 days for going away when she did not have enough available.      COVID 19 Instructions    Hayden Mcintosh was advised to limit contact with others to essential tasks such as getting food, medications, and medical care.    Proper handwashing reviewed, and Hand sanitzer when washing is not available.    If the patient develops symptoms of COVID 19, the patient should call the office as soon as possible.    It is strongly recommended that Flu Vaccinations be obtained.      Virtual Visits:  Bianca: This works on smart phones (any phone with Internet browsing capability).  You should get a text message when the provider is ready to see you.  Click on the link in the text message, and the call should start.  You will need to type in your name, and allow camera and microphone access.  This is HIPPA compliant, and secure.      If you have not already done so, get immunized to COVID 19.      We are committed to getting you vaccinated as soon as possible and will be closely following CDC and [place] BABAK guidelines as they are released and revised.  Please refer to our  COVID-19 vaccine webpage for the most up to date information on the vaccine and our distribution efforts.    This site will also have the most up to date recommendations for COVID booster vaccine.    [email or web address]://[email or web address]/covid-19/[email or web address]-[email or web address]-[email or web address]-vaccine    Call 3-561-FRFNYZR ([phone]), option 7    You can also visit https://www.vaccines.gov/ to find vaccines in your area.    OUR LOCATION:    [facility]  [address] [place] [address], [address]  [place], [place], [address] [phone]  Fax: [phone]    Lab services, Rheumatology, and OB/GYN are at this location as well.

## 2024-08-12 NOTE — ASSESSMENT & PLAN NOTE
Patient using both sertraline and Cymbalta for quite a while now.  Seems to be doing quite well.  Would recommend continue.  Patient does have quite a few tablets of the Zoloft at home from pharmacy.  Will continue.  The most recent prescription was 30 days for going away when she did not have enough available.

## 2024-08-14 ENCOUNTER — OFFICE VISIT (OUTPATIENT)
Dept: FAMILY MEDICINE CLINIC | Facility: CLINIC | Age: 64
End: 2024-08-14
Payer: COMMERCIAL

## 2024-08-14 ENCOUNTER — APPOINTMENT (OUTPATIENT)
Dept: LAB | Facility: CLINIC | Age: 64
End: 2024-08-14
Payer: COMMERCIAL

## 2024-08-14 VITALS
TEMPERATURE: 96.1 F | HEIGHT: 63 IN | BODY MASS INDEX: 24.8 KG/M2 | DIASTOLIC BLOOD PRESSURE: 84 MMHG | WEIGHT: 140 LBS | SYSTOLIC BLOOD PRESSURE: 120 MMHG | OXYGEN SATURATION: 98 % | HEART RATE: 70 BPM

## 2024-08-14 DIAGNOSIS — T14.8XXA BRUISING: ICD-10-CM

## 2024-08-14 DIAGNOSIS — L23.9 ALLERGIC CONTACT DERMATITIS, UNSPECIFIED TRIGGER: Primary | ICD-10-CM

## 2024-08-14 DIAGNOSIS — L50.0 ALLERGIC URTICARIA: ICD-10-CM

## 2024-08-14 DIAGNOSIS — I10 ESSENTIAL HYPERTENSION: ICD-10-CM

## 2024-08-14 DIAGNOSIS — L23.9 ALLERGIC CONTACT DERMATITIS, UNSPECIFIED TRIGGER: ICD-10-CM

## 2024-08-14 LAB
25(OH)D3 SERPL-MCNC: 43.5 NG/ML (ref 30–100)
ALBUMIN SERPL BCG-MCNC: 4.1 G/DL (ref 3.5–5)
ALP SERPL-CCNC: 76 U/L (ref 34–104)
ALT SERPL W P-5'-P-CCNC: 11 U/L (ref 7–52)
ANION GAP SERPL CALCULATED.3IONS-SCNC: 5 MMOL/L (ref 4–13)
AST SERPL W P-5'-P-CCNC: 14 U/L (ref 13–39)
BASOPHILS # BLD AUTO: 0.04 THOUSANDS/ÂΜL (ref 0–0.1)
BASOPHILS NFR BLD AUTO: 1 % (ref 0–1)
BILIRUB SERPL-MCNC: 0.65 MG/DL (ref 0.2–1)
BUN SERPL-MCNC: 31 MG/DL (ref 5–25)
CALCIUM SERPL-MCNC: 10.1 MG/DL (ref 8.4–10.2)
CHLORIDE SERPL-SCNC: 105 MMOL/L (ref 96–108)
CHOLEST SERPL-MCNC: 184 MG/DL
CO2 SERPL-SCNC: 31 MMOL/L (ref 21–32)
CREAT SERPL-MCNC: 0.59 MG/DL (ref 0.6–1.3)
EOSINOPHIL # BLD AUTO: 0.22 THOUSAND/ÂΜL (ref 0–0.61)
EOSINOPHIL NFR BLD AUTO: 3 % (ref 0–6)
ERYTHROCYTE [DISTWIDTH] IN BLOOD BY AUTOMATED COUNT: 12.2 % (ref 11.6–15.1)
EST. AVERAGE GLUCOSE BLD GHB EST-MCNC: 134 MG/DL
GFR SERPL CREATININE-BSD FRML MDRD: 95 ML/MIN/1.73SQ M
GLUCOSE P FAST SERPL-MCNC: 105 MG/DL (ref 65–99)
HBA1C MFR BLD: 6.3 %
HCT VFR BLD AUTO: 43.2 % (ref 34.8–46.1)
HDLC SERPL-MCNC: 52 MG/DL
HGB BLD-MCNC: 13.6 G/DL (ref 11.5–15.4)
IGA SERPL-MCNC: 235 MG/DL (ref 66–433)
IMM GRANULOCYTES # BLD AUTO: 0.03 THOUSAND/UL (ref 0–0.2)
IMM GRANULOCYTES NFR BLD AUTO: 0 % (ref 0–2)
LDLC SERPL CALC-MCNC: 103 MG/DL (ref 0–100)
LYMPHOCYTES # BLD AUTO: 1.67 THOUSANDS/ÂΜL (ref 0.6–4.47)
LYMPHOCYTES NFR BLD AUTO: 24 % (ref 14–44)
MCH RBC QN AUTO: 28.2 PG (ref 26.8–34.3)
MCHC RBC AUTO-ENTMCNC: 31.5 G/DL (ref 31.4–37.4)
MCV RBC AUTO: 90 FL (ref 82–98)
MONOCYTES # BLD AUTO: 0.5 THOUSAND/ÂΜL (ref 0.17–1.22)
MONOCYTES NFR BLD AUTO: 7 % (ref 4–12)
NEUTROPHILS # BLD AUTO: 4.43 THOUSANDS/ÂΜL (ref 1.85–7.62)
NEUTS SEG NFR BLD AUTO: 65 % (ref 43–75)
NRBC BLD AUTO-RTO: 0 /100 WBCS
PLATELET # BLD AUTO: 298 THOUSANDS/UL (ref 149–390)
PMV BLD AUTO: 9.9 FL (ref 8.9–12.7)
POTASSIUM SERPL-SCNC: 4.5 MMOL/L (ref 3.5–5.3)
PROT SERPL-MCNC: 6.9 G/DL (ref 6.4–8.4)
RBC # BLD AUTO: 4.82 MILLION/UL (ref 3.81–5.12)
SODIUM SERPL-SCNC: 141 MMOL/L (ref 135–147)
TRIGL SERPL-MCNC: 147 MG/DL
WBC # BLD AUTO: 6.89 THOUSAND/UL (ref 4.31–10.16)

## 2024-08-14 PROCEDURE — 85730 THROMBOPLASTIN TIME PARTIAL: CPT

## 2024-08-14 PROCEDURE — 85610 PROTHROMBIN TIME: CPT

## 2024-08-14 PROCEDURE — 82306 VITAMIN D 25 HYDROXY: CPT

## 2024-08-14 PROCEDURE — 36415 COLL VENOUS BLD VENIPUNCTURE: CPT

## 2024-08-14 PROCEDURE — G2211 COMPLEX E/M VISIT ADD ON: HCPCS | Performed by: NURSE PRACTITIONER

## 2024-08-14 PROCEDURE — 86258 DGP ANTIBODY EACH IG CLASS: CPT

## 2024-08-14 PROCEDURE — 80061 LIPID PANEL: CPT

## 2024-08-14 PROCEDURE — 80053 COMPREHEN METABOLIC PANEL: CPT

## 2024-08-14 PROCEDURE — 82784 ASSAY IGA/IGD/IGG/IGM EACH: CPT

## 2024-08-14 PROCEDURE — 86008 ALLG SPEC IGE RECOMB EA: CPT

## 2024-08-14 PROCEDURE — 86364 TISS TRNSGLTMNASE EA IG CLAS: CPT

## 2024-08-14 PROCEDURE — 99214 OFFICE O/P EST MOD 30 MIN: CPT | Performed by: NURSE PRACTITIONER

## 2024-08-14 PROCEDURE — 83036 HEMOGLOBIN GLYCOSYLATED A1C: CPT

## 2024-08-14 PROCEDURE — 85025 COMPLETE CBC W/AUTO DIFF WBC: CPT

## 2024-08-14 PROCEDURE — 82785 ASSAY OF IGE: CPT

## 2024-08-14 PROCEDURE — 86003 ALLG SPEC IGE CRUDE XTRC EA: CPT

## 2024-08-14 RX ORDER — LORATADINE 10 MG/1
10 TABLET ORAL DAILY
COMMUNITY

## 2024-08-14 RX ORDER — METHYLPREDNISOLONE 4 MG
TABLET, DOSE PACK ORAL
Qty: 21 EACH | Refills: 0 | Status: SHIPPED | OUTPATIENT
Start: 2024-08-14

## 2024-08-14 NOTE — ASSESSMENT & PLAN NOTE
Patient was advised to continue using Diprolene cream on the affected areas and she will also be started on a Medrol Dosepak for treatment.  Patient does have Claritin on hand at home and she was advised to begin taking this daily as well.  Allergy testing was ordered to be completed to assess for a trigger of her rash.  CBC with differential, PT/INR, and APTT were also ordered to assess for any clotting deficiencies which could be causing her bruising.

## 2024-08-14 NOTE — PROGRESS NOTES
Ambulatory Visit  Name: Hayden Mcintosh      : 1960      MRN: [id number]  Encounter Provider: BRANDON Chua  Encounter Date: 2024   Encounter department: [facility]    Assessment & Plan   1. Allergic contact dermatitis, unspecified trigger  Assessment & Plan:  Patient was advised to continue using Diprolene cream on the affected areas and she will also be started on a Medrol Dosepak for treatment.  Patient does have Claritin on hand at home and she was advised to begin taking this daily as well.  Allergy testing was ordered to be completed to assess for a trigger of her rash.  CBC with differential, PT/INR, and APTT were also ordered to assess for any clotting deficiencies which could be causing her bruising.  Orders:  -     methylPREDNISolone 4 MG tablet therapy pack; Use as directed on package  -     Food Allergy Profile; Future  -     Celiac Disease Panel; Future  -     [facility] Allergy Panel, Adult; Future  2. Bruising  -     CBC and differential; Future  -     Protime-INR; Future  -     APTT; Future  3. Allergic urticaria  -     Food Allergy Profile; Future  -     [facility] Allergy Panel, Adult; Future  4. Essential hypertension  Assessment & Plan:  Well-controlled on current regimen.       History of Present Illness     Allergic contact dermatitis: Patient was last seen in the office on 2024 due to a rash on her right lower abdomen.  The patient was diagnosed with allergic contact dermatitis at that time and she was prescribed Diprolene cream to be used on the affected area twice daily and Pepcid 20 mg twice daily for 1 week and then twice daily as needed after.  The patient reports that since the last office visit the rash has continued to spread.  She reports that the Diprolene cream does somewhat help with the pruritus but has not been improving the rash much.  She also reports that she has been noting bruising on her abdomen and the areas that she is  "scratching.  Of note, the patient reports that she does have multiple environmental allergies that she does follow with an allergy specialist for.  She is currently receiving monthly allergy injections.    Hypertension: Well-controlled on current dosage of atenolol.  Patient denies lightheadedness, dizziness, or orthostasis.            Review of Systems   Constitutional:  Negative for chills and fever.   HENT:  Negative for ear pain and sore throat.    Eyes:  Negative for pain and visual disturbance.   Respiratory:  Negative for cough, chest tightness, shortness of breath and wheezing.    Cardiovascular:  Negative for chest pain, palpitations and leg swelling.   Gastrointestinal:  Negative for abdominal pain, constipation, diarrhea, nausea and vomiting.   Endocrine: Negative for cold intolerance and heat intolerance.   Genitourinary:  Negative for decreased urine volume, dysuria and hematuria.   Musculoskeletal:  Negative for arthralgias, back pain and myalgias.   Skin:  Positive for rash (abdomen, chest and upper arms). Negative for color change.   Allergic/Immunologic: Negative for environmental allergies.   Neurological:  Negative for dizziness, seizures, syncope, weakness, light-headedness, numbness and headaches.   Hematological:  Negative for adenopathy.   Psychiatric/Behavioral:  Negative for confusion. The patient is not nervous/anxious.    All other systems reviewed and are negative.      Objective     /84   Pulse 70   Temp (!) 96.1 °F (35.6 °C)   Ht 5' 3\" (1.6 m)   Wt 63.5 kg (140 lb)   SpO2 98%   BMI 24.80 kg/m²     Physical Exam  Vitals and nursing note reviewed.   Constitutional:       General: She is not in acute distress.     Appearance: Normal appearance. She is well-developed. She is not ill-appearing.   HENT:      Head: Normocephalic.   Eyes:      Conjunctiva/sclera: Conjunctivae normal.   Cardiovascular:      Rate and Rhythm: Normal rate and regular rhythm.      Pulses: Normal pulses.  "          Carotid pulses are 2+ on the right side and 2+ on the left side.       Radial pulses are 2+ on the right side and 2+ on the left side.        Posterior tibial pulses are 2+ on the right side and 2+ on the left side.      Heart sounds: Normal heart sounds. No murmur heard.  Pulmonary:      Effort: Pulmonary effort is normal. No respiratory distress.      Breath sounds: Normal breath sounds. No decreased breath sounds, wheezing, rhonchi or rales.   Abdominal:      General: Abdomen is flat. Bowel sounds are normal. There is no distension.      Palpations: Abdomen is soft.      Tenderness: There is no abdominal tenderness. There is no guarding.   Musculoskeletal:         General: No swelling. Normal range of motion.      Cervical back: Normal range of motion and neck supple.      Right lower leg: No edema.      Left lower leg: No edema.   Skin:     General: Skin is warm and dry.      Capillary Refill: Capillary refill takes less than 2 seconds.      Findings: Rash present. Rash is urticarial.          Neurological:      General: No focal deficit present.      Mental Status: She is alert and oriented to person, place, and time.   Psychiatric:         Mood and Affect: Mood normal.         Behavior: Behavior normal.         Thought Content: Thought content normal.         Judgment: Judgment normal.       Administrative Statements

## 2024-08-15 LAB
A ALTERNATA IGE QN: <0.1 KUA/I
A FUMIGATUS IGE QN: <0.1 KUA/I
A-LACTALB IGE QN: 0.38 KAU/I
ALMOND IGE QN: 0.31 KUA/I
APTT PPP: 33 SECONDS (ref 23–34)
ARA H6 PEANUT: <0.1 KUA/I
B-LACTOGLOB IGE QN: 0.99 KAU/I
BERMUDA GRASS IGE QN: 0.4 KUA/I
BOXELDER IGE QN: 0.21 KUA/I
C HERBARUM IGE QN: <0.1 KUA/I
CASEIN IGE QN: <0.1 KAU/I
CASHEW NUT IGE QN: <0.1 KUA/I
CAT DANDER IGE QN: 67.7 KUA/I
CMN PIGWEED IGE QN: 0.66 KUA/I
CODFISH IGE QN: <0.1 KUA/I
COMMON RAGWEED IGE QN: 1.25 KUA/I
COTTONWOOD IGE QN: 0.24 KUA/I
D FARINAE IGE QN: 2.58 KUA/I
D PTERONYSS IGE QN: 1.85 KUA/I
DOG DANDER IGE QN: 13.5 KUA/I
EGG WHITE IGE QN: 0.22 KUA/I
GLIADIN PEPTIDE IGA SER-ACNC: 0.2 U/ML
GLIADIN PEPTIDE IGA SER-ACNC: NEGATIVE
GLIADIN PEPTIDE IGG SER-ACNC: <0.4 U/ML
GLIADIN PEPTIDE IGG SER-ACNC: NEGATIVE
GLUTEN IGE QN: 0.12 KUA/I
HAZELNUT IGE QN: 0.78 KUA/L
INR PPP: 1.04 (ref 0.85–1.19)
LONDON PLANE IGE QN: 0.85 KUA/I
MILK IGE QN: 1.38 KUA/I
MOUSE URINE PROT IGE QN: <0.1 KUA/I
MT JUNIPER IGE QN: 0.21 KUA/I
MUGWORT IGE QN: 0.25 KUA/I
OVALB IGE QN: 0.1 KAU/I
OVOMUCOID IGE QN: <0.1 KAU/I
P NOTATUM IGE QN: <0.1 KUA/I
PEANUT (RARA H) 1 IGE QN: <0.1 KUA/I
PEANUT (RARA H) 2 IGE QN: <0.1 KUA/I
PEANUT (RARA H) 3 IGE QN: <0.1 KUA/I
PEANUT (RARA H) 8 IGE QN: 0.66 KUA/I
PEANUT (RARA H) 9 IGE QN: 0.38 KUA/I
PEANUT IGE QN: 0.47 KUA/I
PROTHROMBIN TIME: 13.9 SECONDS (ref 12.3–15)
ROACH IGE QN: 0.28 KUA/I
SALMON IGE QN: <0.1 KUA/I
SCALLOP IGE QN: 0.17 KUA/L
SESAME SEED IGE QN: 0.32 KUA/I
SHEEP SORREL IGE QN: 0.34 KUA/I
SHRIMP IGE QN: <0.1 KUA/L
SILVER BIRCH IGE QN: 1.63 KUA/I
SOYBEAN IGE QN: 0.16 KUA/I
TIMOTHY IGE QN: 0.7 KUA/I
TOTAL IGE SMQN RAST: 695 KU/L (ref 0–113)
TOTAL IGE SMQN RAST: 744 KU/L (ref 0–113)
TTG IGA SER-ACNC: <0.5 U/ML
TTG IGA SER-ACNC: NEGATIVE
TTG IGG SER-ACNC: <0.8 U/ML
TTG IGG SER-ACNC: NEGATIVE
TUNA IGE QN: <0.1 KUA/I
WALNUT IGE QN: 0.36 KUA/I
WALNUT IGE QN: 4.56 KUA/I
WHEAT IGE QN: 0.22 KUA/I
WHITE ASH IGE QN: 0.25 KUA/I
WHITE ELM IGE QN: 0.31 KUA/I
WHITE MULBERRY IGE QN: 0.14 KUA/I
WHITE OAK IGE QN: 1.02 KUA/I

## 2024-08-19 ENCOUNTER — OFFICE VISIT (OUTPATIENT)
Dept: FAMILY MEDICINE CLINIC | Facility: CLINIC | Age: 64
End: 2024-08-19
Payer: COMMERCIAL

## 2024-08-19 VITALS
OXYGEN SATURATION: 94 % | HEART RATE: 60 BPM | SYSTOLIC BLOOD PRESSURE: 122 MMHG | WEIGHT: 138.6 LBS | BODY MASS INDEX: 24.56 KG/M2 | DIASTOLIC BLOOD PRESSURE: 80 MMHG | HEIGHT: 63 IN

## 2024-08-19 DIAGNOSIS — Z23 ENCOUNTER FOR IMMUNIZATION: ICD-10-CM

## 2024-08-19 DIAGNOSIS — E78.2 MIXED HYPERLIPIDEMIA: ICD-10-CM

## 2024-08-19 DIAGNOSIS — Z91.018 FOOD ALLERGY: ICD-10-CM

## 2024-08-19 DIAGNOSIS — Z00.00 WELCOME TO MEDICARE PREVENTIVE VISIT: ICD-10-CM

## 2024-08-19 DIAGNOSIS — F41.8 DEPRESSION WITH ANXIETY: ICD-10-CM

## 2024-08-19 DIAGNOSIS — R73.9 HYPERGLYCEMIA: ICD-10-CM

## 2024-08-19 DIAGNOSIS — M81.0 AGE-RELATED OSTEOPOROSIS WITHOUT CURRENT PATHOLOGICAL FRACTURE: Primary | ICD-10-CM

## 2024-08-19 DIAGNOSIS — E55.9 VITAMIN D DEFICIENCY: ICD-10-CM

## 2024-08-19 DIAGNOSIS — I10 ESSENTIAL HYPERTENSION: ICD-10-CM

## 2024-08-19 DIAGNOSIS — F51.01 PRIMARY INSOMNIA: ICD-10-CM

## 2024-08-19 PROCEDURE — G0009 ADMIN PNEUMOCOCCAL VACCINE: HCPCS | Performed by: PHYSICIAN ASSISTANT

## 2024-08-19 PROCEDURE — 99214 OFFICE O/P EST MOD 30 MIN: CPT | Performed by: PHYSICIAN ASSISTANT

## 2024-08-19 PROCEDURE — 90677 PCV20 VACCINE IM: CPT | Performed by: PHYSICIAN ASSISTANT

## 2024-08-19 PROCEDURE — G0438 PPPS, INITIAL VISIT: HCPCS | Performed by: PHYSICIAN ASSISTANT

## 2024-08-19 RX ORDER — EPINEPHRINE 0.3 MG/.3ML
0.3 INJECTION SUBCUTANEOUS ONCE
Qty: 0.6 ML | Refills: 0 | Status: SHIPPED | OUTPATIENT
Start: 2024-08-19 | End: 2024-08-19

## 2024-08-19 NOTE — PATIENT INSTRUCTIONS
1. Age-related osteoporosis without current pathological fracture  Assessment & Plan:  Last DEXA showed osteopenia.  Next DEXA not due until 2025.  2. Welcome to Medicare preventive visit  Comments:  Prevnar recommended at office today.  Shingles recommended at pharmacy.  3. Mixed hyperlipidemia  Assessment & Plan:  Patient is on Lipitor 80 keeping LDL almost at goal at 103.  Continue recheck 6 months.  4. Hyperglycemia  Assessment & Plan:  Fasting glucose today was slightly elevated in the prediabetic range at 105 with an A1c also prediabetic at 6.3.  Patient needs to be cautious and decrease her simple carbs such as bread Posta potatoes rice junk food and desserts. PT was not fasting when she took labs.  Recheck 6 months.  5. Primary insomnia  Assessment & Plan:  Continue trazodone 50 at bedtime.  6. Depression with anxiety  Assessment & Plan:  Patient stable on Zoloft and Cymbalta.  No SI or HI.  7. Essential hypertension  Assessment & Plan:  Patient is controlled on Tenormin 25.    8. Vitamin D deficiency  Assessment & Plan:  Vitamin D is stable at 43 continue supplementation.  9. Food allergy  Assessment & Plan:  Food allergy profile blood test was done and patient is actually seeing ENT soon but did come up positive for milk peanuts hazelnuts almonds wheat soybean sesame scallops eggs and also positive for many environmental allergies as well.  Patient will need further testing with ENT as scheduled.  Orders:  -     EPINEPHrine (EPIPEN) 0.3 mg/0.3 mL SOAJ; Inject 0.3 mL (0.3 mg total) into a muscle once for 1 dose

## 2024-08-19 NOTE — ASSESSMENT & PLAN NOTE
Fasting glucose today was slightly elevated in the prediabetic range at 105 with an A1c also prediabetic at 6.3.  Patient needs to be cautious and decrease her simple carbs such as bread Posta potatoes rice junk food and desserts. PT was not fasting when she took labs.  Recheck 6 months.

## 2024-08-19 NOTE — PROGRESS NOTES
Ambulatory Visit  Name: Hayden Mcintosh      : 1960      MRN: [id number]  Encounter Provider: Maame Keenan PA-C  Encounter Date: 2024   Encounter department: [facility] PRIMARY CARE    Assessment & Plan   1. Age-related osteoporosis without current pathological fracture  Assessment & Plan:  Last DEXA showed osteopenia.  Next DEXA not due until .  2. Welcome to Medicare preventive visit  Comments:  Prevnar recommended at office today.  Shingles recommended at pharmacy.  3. Mixed hyperlipidemia  Assessment & Plan:  Patient is on Lipitor 80 keeping LDL almost at goal at 103.  Continue recheck 6 months.  4. Hyperglycemia  Assessment & Plan:  Fasting glucose today was slightly elevated in the prediabetic range at 105 with an A1c also prediabetic at 6.3.  Patient needs to be cautious and decrease her simple carbs such as bread Posta potatoes rice junk food and desserts. PT was not fasting when she took labs.  Recheck 6 months.  5. Primary insomnia  Assessment & Plan:  Continue trazodone 50 at bedtime.  6. Depression with anxiety  Assessment & Plan:  Patient stable on Zoloft and Cymbalta.  No SI or HI.  7. Essential hypertension  Assessment & Plan:  Patient is controlled on Tenormin 25.    8. Vitamin D deficiency  Assessment & Plan:  Vitamin D is stable at 43 continue supplementation.  9. Food allergy  Assessment & Plan:  Food allergy profile blood test was done and patient is actually seeing ENT soon but did come up positive for milk peanuts hazelnuts almonds wheat soybean sesame scallops eggs and also positive for many environmental allergies as well.  Patient will need further testing with ENT as scheduled.  Orders:  -     EPINEPHrine (EPIPEN) 0.3 mg/0.3 mL SOAJ; Inject 0.3 mL (0.3 mg total) into a muscle once for 1 dose  10. Encounter for immunization  -     Pneumococcal Conjugate Vaccine 20-valent (Pcv20)       Preventive health issues were discussed with patient, and age appropriate  screening tests were ordered as noted in patient's After Visit Summary. Personalized health advice and appropriate referrals for health education or preventive services given if needed, as noted in patient's After Visit Summary.    History of Present Illness       Hayden Mcintosh is here for chronic conditions f/u. Pt. had labs done prior to today's visit which included Recent Results (from the past 672 hour(s))  -Urine culture:   Collection Time: 07/31/24  8:20 AM  Specimen: Urine, Clean Catch       Result                      Value             Ref Range           Urine Culture                                                 >100,000 cfu/ml Escherichia coli ESBL (A)  *Culture results found, see result in Chart Review  -CBC and differential:   Collection Time: 08/14/24  2:17 PM       Result                      Value             Ref Range           WBC                         6.89              4.31 - 10.16*       RBC                         4.82              3.81 - 5.12 *       Hemoglobin                  13.6              11.5 - 15.4 *       Hematocrit                  43.2              34.8 - 46.1 %       MCV                         90                82 - 98 fL          MCH                         28.2              26.8 - 34.3 *       MCHC                        31.5              31.4 - 37.4 *       RDW                         12.2              11.6 - 15.1 %       MPV                         9.9               8.9 - 12.7 fL       Platelets                   298               149 - 390 Th*       nRBC                        0                 /100 WBCs           Segmented %                 65                43 - 75 %           Immature Grans %            0                 0 - 2 %             Lymphocytes %               24                14 - 44 %           Monocytes %                 7                 4 - 12 %            Eosinophils Relative        3                 0 - 6 %             Basophils Relative          1                  0 - 1 %             Absolute Neutrophils        4.43              1.85 - 7.62 *       Absolute Immature Grans     0.03              0.00 - 0.20 *       Absolute Lymphocytes        1.67              0.60 - 4.47 *       Absolute Monocytes          0.50              0.17 - 1.22 *       Eosinophils Absolute        0.22              0.00 - 0.61 *       Basophils Absolute          0.04              0.00 - 0.10 *  -Comprehensive metabolic panel:   Collection Time: 08/14/24  2:17 PM       Result                      Value             Ref Range           Sodium                      141               135 - 147 mm*       Potassium                   4.5               3.5 - 5.3 mm*       Chloride                    105               96 - 108 mmo*       CO2                         31                21 - 32 mmol*       ANION GAP                   5                 4 - 13 mmol/L       BUN                         31 (H)            5 - 25 mg/dL        Creatinine                  0.59 (L)          0.60 - 1.30 *       Glucose, Fasting            105 (H)           65 - 99 mg/dL       Calcium                     10.1              8.4 - 10.2 m*       AST                         14                13 - 39 U/L         ALT                         11                7 - 52 U/L          Alkaline Phosphatase        76                34 - 104 U/L        Total Protein               6.9               6.4 - 8.4 g/*       Albumin                     4.1               3.5 - 5.0 g/*       Total Bilirubin             0.65              0.20 - 1.00 *       eGFR                        95                ml/min/1.73s*  -Hemoglobin A1C:   Collection Time: 08/14/24  2:17 PM       Result                      Value             Ref Range           Hemoglobin A1C              6.3 (H)           Normal 4.0-5*       EAG                         134               mg/dl          -Lipid Panel with Direct LDL reflex:   Collection Time: 08/14/24  2:17 PM        Result                      Value             Ref Range           Cholesterol                 184               See Comment *       Triglycerides               147               See Comment *       HDL, Direct                 52                >=50 mg/dL          LDL Calculated              103 (H)           0 - 100 mg/dL  -Vitamin D 25 hydroxy:   Collection Time: 08/14/24  2:17 PM       Result                      Value             Ref Range           Vit D, 25-Hydroxy           43.5              30.0 - 100.0*  -Food Allergy Profile:   Collection Time: 08/14/24  2:17 PM       Result                      Value             Ref Range           Fish Cod                    <0.10             0.00 - 0.09 *       Egg White                   0.22 (H)          0.00 - 0.09 *       Gluten                      0.12 (H)          0.00 - 0.09 *       Milk, Cow's                 1.38 (H)          0.00 - 0.09 *       Peanut                      0.47 (H)          0.00 - 0.09 *       [place]                      <0.10             0.00 - 0.09 *       Scallop IgE                 0.17 (H)          0.00 - 0.09 *       Sesame Seed IgE             0.32 (H)          0.00 - 0.09 *       Shrimp                      <0.10             0.00 - 0.09 *       SOYBEAN                     0.16 (H)          0.00 - 0.09 *       Tuna IgE                    <0.10             0.00 - 0.09 *       [place]                      0.36 (H)          0.00 - 0.09 *       Wheat                       0.22 (H)          0.00 - 0.09 *       Almonds                     0.31 (H)          0.00 - 0.09 *       Cashew                      <0.10             0.00 - 0.09 *       Hazelnut                    0.78 (H)          0.00 - 0.09 *       IgE                         744 (H)           0 - 113 kU/l   -[facility] Allergy Panel, Adult:   Collection Time: 08/14/24  2:17 PM       Result                      Value             Ref Range           A.ALTERNATA                 <0.10              0.00 - 0.09 *       A.FUMIGATUS                 <0.10             0.00 - 0.09 *       B[place] Grass               0.40 (H)          0.00 - 0.09 *       [place]                   0.21 (H)          0.00 - 0.09 *       Cat Epithellium-Dander      67.70 (H)         0.00 - 0.09 *       C.HERBARUM                  <0.10             0.00 - 0.09 *       Cockroach                   0.28 (H)          0.00 - 0.09 *       Common Silver Birch         1.63 (H)          0.00 - 0.09 *       [place]                  0.24 (H)          0.00 - 0.09 *       D. farinae                  2.58 (H)          0.00 - 0.09 *       D. pteronyssinus            1.85 (H)          0.00 - 0.09 *       Dog Dander                  13.50 (H)         0.00 - 0.09 *       Elm IgE                     0.31 (H)          0.00 - 0.09 *       Mountain Wanda Tree         0.21 (H)          0.00 - 0.09 *       Mugwort                     0.25 (H)          0.00 - 0.09 *       [place] Tree               0.14 (H)          0.00 - 0.09 *       Oak                         1.02 (H)          0.00 - 0.09 *       P.CHRYSOGENUM               <0.10             0.00 - 0.09 *       Rough Pigweed  IgE          0.66 (H)          0.00 - 0.09 *       Common Ragweed              1.25 (H)          0.00 - 0.09 *       Sheep Sorrel IgE            0.34 (H)          0.00 - 0.09 *       [place] Tree               0.85 (H)          0.00 - 0.09 *       [place] Grass               0.70 (H)          0.00 - 0.09 *       [place] Tree                 4.56 (H)          0.00 - 0.09 *       White Jan Tree              0.25 (H)          0.00 - 0.09 *       IgE                         695 (H)           0 - 113 kU/l        MOUSE URINE                 <0.10             0.00 - 0.09 *  -Protime-INR:   Collection Time: 08/14/24  2:17 PM       Result                      Value             Ref Range           Protime                     13.9              12.3 - 15.0 *       INR                          1.04              0.85 - 1.19    -APTT:   Collection Time: 08/14/24  2:17 PM       Result                      Value             Ref Range           PTT                         33                23 - 34 seco*  -Deaminated Gliadin Antibody, IgA:   Collection Time: 08/14/24  2:17 PM       Result                      Value             Ref Range           Deaminated Gliadin IgA*     Negative          Negative            Deaminated Gliadin IgA      0.2               <15.0 U/mL     -Deaminated Gliadin Antibody, IgG:   Collection Time: 08/14/24  2:17 PM       Result                      Value             Ref Range           Deaminated Gliadin IgG      Negative          Negative            Deaminated Gliadin IgG      <0.4              <15.0 U/mL     -Tissue transglutaminase, IgG:   Collection Time: 08/14/24  2:17 PM       Result                      Value             Ref Range           Tissue Transglutaminas*     Negative          Negative            Tissue Transglutaminas*     <0.8              <15.0 U/mL     -Tissue Transglutaminase, IgA:   Collection Time: 08/14/24  2:17 PM       Result                      Value             Ref Range           Tissue Transglutaminas*     Negative          Negative            Tissue Transglutaminas*     <0.5              <15.0 U/mL     -IgA:   Collection Time: 08/14/24  2:17 PM       Result                      Value             Ref Range           IGA                         235               66 - 433 mg/*  -Allergy, Milk Components Panel:   Collection Time: 08/14/24  2:17 PM       Result                      Value             Ref Range           Alpha Lactalbumin           0.38 (H)          0.00 - 0.09 *       Beta Lactoglobulin          0.99 (H)          0.00 - 0.09 *       Casein                      <0.10             0.00 - 0.09 *  -Allergy, Peanut Components Panel(Reflex Only-Do Not Order):   Collection Time: 08/14/24  2:17 PM       Result                      Value              Ref Range           AMELIA h1 Peanut               <0.10             0.00 - 0.09 *       AMELIA h2 Peanut               <0.10             0.00 - 0.09 *       AMELIA h3 Peanut               <0.10             0.00 - 0.09 *       AMELIA h6 Peanut               <0.10             0.00 - 0.09 *       AMELIA h8 Peanut               0.66 (H)          0.00 - 0.09 *       AMELIA h9 Peanut               0.38 (H)          0.00 - 0.09 *  -Allergy, Egg White Components Panel:   Collection Time: 08/14/24  2:17 PM       Result                      Value             Ref Range           F232 Ovalbumin              0.10 (H)          0.00 - 0.09 *       F233 Ovomucoid              <0.10             0.00 - 0.09 *         Patient Care Team:  Maame Keenan PA-C as PCP - General (Family Medicine)  Maame Keenan PA-C as PCP - PCP-[facility]  Driss Degroot PA-C    Review of Systems   Constitutional: Negative.    HENT: Negative.     Eyes: Negative.    Respiratory: Negative.     Cardiovascular: Negative.    Gastrointestinal: Negative.    Endocrine: Negative.    Genitourinary: Negative.    Musculoskeletal: Negative.    Skin: Negative.    Allergic/Immunologic: Negative.    Neurological: Negative.    Hematological: Negative.    Psychiatric/Behavioral: Negative.       Medical History Reviewed by provider this encounter:       Annual Wellness Visit Questionnaire   Hayden is here for her Welcome to Medicare visit.     Health Risk Assessment:   Patient rates overall health as good. Patient feels that their physical health rating is same. Patient is satisfied with their life. Eyesight was rated as slightly worse. Hearing was rated as slightly worse. Patient feels that their emotional and mental health rating is same. Patients states they are never, rarely angry. Patient states they are sometimes unusually tired/fatigued. Pain experienced in the last 7 days has been some. Patient's pain rating has been 8/10. Patient states that she  has experienced no weight loss or gain in last 6 months.     Depression Screening:   PHQ-9 Score: 1      Fall Risk Screening:   In the past year, patient has experienced: no history of falling in past year      Urinary Incontinence Screening:   Patient has not leaked urine accidently in the last six months.     Home Safety:  Patient does not have trouble with stairs inside or outside of their home. Patient has working smoke alarms and has working carbon monoxide detector. Home safety hazards include: none.     Nutrition:   Current diet is Regular.     Medications:   Patient is currently taking over-the-counter supplements. OTC medications include: see medication list. Patient is able to manage medications.     Activities of Daily Living (ADLs)/Instrumental Activities of Daily Living (IADLs):   Walk and transfer into and out of bed and chair?: Yes  Dress and groom yourself?: Yes    Bathe or shower yourself?: Yes    Feed yourself? Yes  Do your laundry/housekeeping?: Yes  Manage your money, pay your bills and track your expenses?: Yes  Make your own meals?: Yes    Do your own shopping?: Yes    Previous Hospitalizations:   Any hospitalizations or ED visits within the last 12 months?: No      Advance Care Planning:   Living will: No    Advanced directive counseling given: Yes    ACP document given: Yes      Cognitive Screening:   Provider or family/friend/caregiver concerned regarding cognition?: No    PREVENTIVE SCREENINGS      Cardiovascular Screening:    General: Screening Not Indicated, History Lipid Disorder and Screening Current      Diabetes Screening:     General: Screening Current      Colorectal Cancer Screening:     General: Screening Current      Breast Cancer Screening:     General: Screening Current      Cervical Cancer Screening:    General: Screening Not Indicated      Osteoporosis Screening:    General: Screening Not Indicated, History Osteoporosis and Screening Current      Abdominal Aortic Aneurysm  "(AAA) Screening:        General: Screening Not Indicated      Lung Cancer Screening:     General: Screening Not Indicated      Hepatitis C Screening:    General: Screening Current    Screening, Brief Intervention, and Referral to Treatment (SBIRT)    Screening    Typical number of drinks in a week: 0    Single Item Drug Screening:  How often have you used an illegal drug (including marijuana) or a prescription medication for non-medical reasons in the past year? never    Single Item Drug Screen Score: 0  Interpretation: Negative screen for possible drug use disorder    Social Determinants of Health     Food Insecurity: No Food Insecurity (8/19/2024)    Hunger Vital Sign    • Worried About Running Out of Food in the Last Year: Never true    • Ran Out of Food in the Last Year: Never true   Transportation Needs: No Transportation Needs (8/19/2024)    PRAPARE - Transportation    • Lack of Transportation (Medical): No    • Lack of Transportation (Non-Medical): No   Housing Stability: Low Risk  (8/19/2024)    Housing Stability Vital Sign    • Unable to Pay for Housing in the Last Year: No    • Number of Times Moved in the Last Year: 0    • Homeless in the Last Year: No   Utilities: Not At Risk (8/19/2024)    [facility] Utilities    • Threatened with loss of utilities: No     Vision Screening    Right eye Left eye Both eyes   Without correction 20/25 20/25 20/25   With correction          Objective     /80 (BP Location: Right arm, Patient Position: Sitting, Cuff Size: Standard)   Pulse 60   Ht 5' 3\" (1.6 m)   Wt 62.9 kg (138 lb 9.6 oz)   SpO2 94%   BMI 24.55 kg/m²     Physical Exam  Vitals and nursing note reviewed.   Constitutional:       Appearance: Normal appearance. She is well-developed.   HENT:      Head: Normocephalic and atraumatic.   Eyes:      General: Lids are normal.      Conjunctiva/sclera: Conjunctivae normal.      Pupils: Pupils are equal, round, and reactive to light.   Cardiovascular:      Rate and " Rhythm: Normal rate and regular rhythm.      Heart sounds: No murmur heard.  Pulmonary:      Effort: Pulmonary effort is normal.      Breath sounds: Normal breath sounds.   Skin:     General: Skin is warm and dry.   Neurological:      General: No focal deficit present.      Mental Status: She is alert.      Coordination: Coordination is intact.   Psychiatric:         Mood and Affect: Mood normal.         Behavior: Behavior normal. Behavior is cooperative.         Thought Content: Thought content normal.         Judgment: Judgment normal.

## 2024-08-19 NOTE — ASSESSMENT & PLAN NOTE
Food allergy profile blood test was done and patient is actually seeing ENT soon but did come up positive for milk peanuts hazelnuts almonds wheat soybean sesame scallops eggs and also positive for many environmental allergies as well.  Patient will need further testing with ENT as scheduled.

## 2024-08-21 ENCOUNTER — TELEPHONE (OUTPATIENT)
Dept: FAMILY MEDICINE CLINIC | Facility: CLINIC | Age: 64
End: 2024-08-21

## 2024-08-27 NOTE — TELEPHONE ENCOUNTER
PA for EPINEPHrine 0.3MG/0.3ML SUBMITTED     via    [x]CMM-KEY: [id number] - PA Case ID #: [id number]  []Surescripts-Case ID #   []Faxed to plan   []Other website   []Phone call Case ID #     Office notes sent, clinical questions answered. Awaiting determination    Turnaround time for your insurance to make a decision on your Prior Authorization can take 7-21 business days.

## 2024-08-28 DIAGNOSIS — I10 ESSENTIAL HYPERTENSION: ICD-10-CM

## 2024-08-28 DIAGNOSIS — F41.8 DEPRESSION WITH ANXIETY: ICD-10-CM

## 2024-08-28 NOTE — TELEPHONE ENCOUNTER
PA for EPINEPHrine 0.3MG/0.3ML auto-injectors APPROVED     Date(s) approved through 12/31/2024    Case # [id number]    Patient advised by          [x]Vivortehart Message  []Phone call   []LMOM  []L/M to call office as no active Communication consent on file  []Unable to leave detailed message as VM not approved on Communication consent       Pharmacy advised by    [x]Fax  []Phone call    Approval letter scanned into Media Yes

## 2024-08-29 RX ORDER — SERTRALINE HYDROCHLORIDE 25 MG/1
25 TABLET, FILM COATED ORAL DAILY
Qty: 30 TABLET | Refills: 0 | Status: SHIPPED | OUTPATIENT
Start: 2024-08-29

## 2024-08-29 RX ORDER — ATENOLOL 25 MG/1
25 TABLET ORAL DAILY
Qty: 30 TABLET | Refills: 0 | Status: SHIPPED | OUTPATIENT
Start: 2024-08-29

## 2024-09-13 ENCOUNTER — OFFICE VISIT (OUTPATIENT)
Dept: FAMILY MEDICINE CLINIC | Facility: CLINIC | Age: 64
End: 2024-09-13
Payer: COMMERCIAL

## 2024-09-13 VITALS
DIASTOLIC BLOOD PRESSURE: 68 MMHG | OXYGEN SATURATION: 97 % | SYSTOLIC BLOOD PRESSURE: 116 MMHG | WEIGHT: 140 LBS | HEART RATE: 65 BPM | HEIGHT: 63 IN | BODY MASS INDEX: 24.8 KG/M2

## 2024-09-13 DIAGNOSIS — N30.00 ACUTE CYSTITIS WITHOUT HEMATURIA: ICD-10-CM

## 2024-09-13 DIAGNOSIS — R82.90 BAD ODOR OF URINE: Primary | ICD-10-CM

## 2024-09-13 LAB
SL AMB  POCT GLUCOSE, UA: NEGATIVE
SL AMB LEUKOCYTE ESTERASE,UA: ABNORMAL
SL AMB POCT BILIRUBIN,UA: NEGATIVE
SL AMB POCT BLOOD,UA: ABNORMAL
SL AMB POCT CLARITY,UA: ABNORMAL
SL AMB POCT COLOR,UA: YELLOW
SL AMB POCT KETONES,UA: NEGATIVE
SL AMB POCT NITRITE,UA: POSITIVE
SL AMB POCT PH,UA: 7.5
SL AMB POCT SPECIFIC GRAVITY,UA: 1.02
SL AMB POCT URINE PROTEIN: NEGATIVE
SL AMB POCT UROBILINOGEN: 1

## 2024-09-13 PROCEDURE — 81002 URINALYSIS NONAUTO W/O SCOPE: CPT | Performed by: PHYSICIAN ASSISTANT

## 2024-09-13 PROCEDURE — 87186 SC STD MICRODIL/AGAR DIL: CPT | Performed by: PHYSICIAN ASSISTANT

## 2024-09-13 PROCEDURE — 87181 SC STD AGAR DILUTION PER AGT: CPT | Performed by: PHYSICIAN ASSISTANT

## 2024-09-13 PROCEDURE — 99213 OFFICE O/P EST LOW 20 MIN: CPT | Performed by: PHYSICIAN ASSISTANT

## 2024-09-13 PROCEDURE — 87086 URINE CULTURE/COLONY COUNT: CPT | Performed by: PHYSICIAN ASSISTANT

## 2024-09-13 PROCEDURE — 87077 CULTURE AEROBIC IDENTIFY: CPT | Performed by: PHYSICIAN ASSISTANT

## 2024-09-13 RX ORDER — NITROFURANTOIN 25; 75 MG/1; MG/1
100 CAPSULE ORAL 2 TIMES DAILY
Qty: 14 CAPSULE | Refills: 0 | Status: SHIPPED | OUTPATIENT
Start: 2024-09-13 | End: 2024-09-20

## 2024-09-13 NOTE — PATIENT INSTRUCTIONS
Acute cystitis without hematuria  Recurrence.  Last culture showed that Macrobid did help so we will put her on this 100 mg twice daily for 7 days and she is to keep her urology appointment that she has for next week for further evaluation.  She can continue her d-mannose and cranberry supplements and I am encouraging her to try to urinate before and after sexual relations.  Increase fluid intake.  Orders:    nitrofurantoin (MACROBID) 100 mg capsule; Take 1 capsule (100 mg total) by mouth 2 (two) times a day for 7 days

## 2024-09-13 NOTE — PROGRESS NOTES
"Ambulatory Visit  Name: Hayden Mcintosh      : 1960      MRN: [id number]  Encounter Provider: Maame Keenan PA-C  Encounter Date: 2024   [facility]    Assessment & Plan  Acute cystitis without hematuria  Recurrence.  Last culture showed that Macrobid did help so we will put her on this 100 mg twice daily for 7 days and she is to keep her urology appointment that she has for next week for further evaluation.  She can continue her d-mannose and cranberry supplements and I am encouraging her to try to urinate before and after sexual relations.  Increase fluid intake.  Orders:    nitrofurantoin (MACROBID) 100 mg capsule; Take 1 capsule (100 mg total) by mouth 2 (two) times a day for 7 days    Bad odor of urine    Orders:    POCT urine dip    Urine culture; Future    Urine culture       History of Present Illness     Patient presents with:  Follow-up: Patient present today for UTI symptoms, pt denies any symptoms, but pt has odor in urine.    PT does take D-mannosse and cran supplements. Has uro apt finally next week. Does have sexual relations often with her  as she states he is retired.           Review of Systems   Constitutional: Negative.    HENT: Negative.     Eyes: Negative.    Respiratory: Negative.     Cardiovascular: Negative.    Gastrointestinal: Negative.    Endocrine: Negative.    Genitourinary:  Positive for frequency and urgency.   Musculoskeletal: Negative.    Skin: Negative.    Allergic/Immunologic: Negative.    Neurological: Negative.    Hematological: Negative.    Psychiatric/Behavioral: Negative.             Objective     /68 (BP Location: Left arm, Patient Position: Sitting, Cuff Size: Standard)   Pulse 65   Ht 5' 3\" (1.6 m)   Wt 63.5 kg (140 lb)   SpO2 97%   BMI 24.80 kg/m²     Physical Exam  Vitals and nursing note reviewed.   Constitutional:       Appearance: Normal appearance. She is well-developed.   HENT:      Head: " Normocephalic and atraumatic.   Eyes:      General: Lids are normal.      Conjunctiva/sclera: Conjunctivae normal.      Pupils: Pupils are equal, round, and reactive to light.   Cardiovascular:      Rate and Rhythm: Normal rate and regular rhythm.      Heart sounds: No murmur heard.  Pulmonary:      Effort: Pulmonary effort is normal.      Breath sounds: Normal breath sounds.   Abdominal:      General: Abdomen is flat. Bowel sounds are normal.      Palpations: Abdomen is soft.      Tenderness: There is abdominal tenderness in the suprapubic area. There is no right CVA tenderness or left CVA tenderness.   Skin:     General: Skin is warm and dry.   Neurological:      General: No focal deficit present.      Mental Status: She is alert.      Coordination: Coordination is intact.   Psychiatric:         Mood and Affect: Mood normal.         Behavior: Behavior normal. Behavior is cooperative.         Thought Content: Thought content normal.         Judgment: Judgment normal.

## 2024-09-15 LAB — BACTERIA UR CULT: ABNORMAL

## 2024-09-16 LAB — BACTERIA UR CULT: ABNORMAL

## 2024-09-20 ENCOUNTER — OFFICE VISIT (OUTPATIENT)
Dept: UROLOGY | Facility: MEDICAL CENTER | Age: 64
End: 2024-09-20
Payer: COMMERCIAL

## 2024-09-20 VITALS
DIASTOLIC BLOOD PRESSURE: 86 MMHG | OXYGEN SATURATION: 97 % | HEIGHT: 63 IN | WEIGHT: 138 LBS | HEART RATE: 84 BPM | BODY MASS INDEX: 24.45 KG/M2 | SYSTOLIC BLOOD PRESSURE: 134 MMHG

## 2024-09-20 DIAGNOSIS — N20.0 CALCULUS OF KIDNEY: Primary | ICD-10-CM

## 2024-09-20 DIAGNOSIS — N39.0 RECURRENT UTI: ICD-10-CM

## 2024-09-20 PROCEDURE — 99213 OFFICE O/P EST LOW 20 MIN: CPT | Performed by: UROLOGY

## 2024-09-20 RX ORDER — NITROFURANTOIN MACROCRYSTALS 50 MG/1
50 CAPSULE ORAL DAILY
Qty: 90 CAPSULE | Refills: 3 | Status: SHIPPED | OUTPATIENT
Start: 2024-09-20

## 2024-09-20 NOTE — PATIENT INSTRUCTIONS
Nitrofurantoin, 50 mg once per day for a 6-month period.  This will see if we can cause them to recur less often.    As we discussed, there are small stones in the kidney.  It is possible that they are harboring the bacteria, but that is hard to prove.  At some point we might need to talk about taking care of the stones by a procedure in the hospital

## 2024-09-20 NOTE — PROGRESS NOTES
"   HISTORY:    Follow-up recurrent UTI.  See prior notes.    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    History of kidney stones.  Passed a left ureteral stone in May 2024.    As CT scan also showed at least 3 to 4 stones on the left kidney, most are very small, 1 was 3 x 6 mm.  Couple on the right nonobstructing all of them.         ASSESSMENT / PLAN:    She is already on supplements for bladder health.    Discussed, I recommend starting low-dose nitrofurantoin 50 mg/day.  Side effects outlined.    We reviewed her CT scan together, at this point I do not think we can say it for any certainty that the stones are the cause of the recurrent infections.  However, that is a possibility, and we will keep that in mind.    The following portions of the patient's history were reviewed and updated as appropriate: allergies, current medications, past family history, past medical history, past social history, past surgical history, and problem list.    Review of Systems      Objective:     Physical Exam  Abdominal:      Comments: Abdomen soft           No results found for: \"PSA\"]  BUN   Date Value Ref Range Status   08/14/2024 31 (H) 5 - 25 mg/dL Final   01/31/2023 26 (H) 7 - 25 mg/dL Final   11/14/2022 23 7 - 25 mg/dL Final     Creatinine   Date Value Ref Range Status   08/14/2024 0.59 (L) 0.60 - 1.30 mg/dL Final     Comment:     Standardized to IDMS reference method   11/14/2022 0.56 0.50 - 1.05 mg/dL Final     No components found for: \"CBC\"      Patient Active Problem List   Diagnosis    Attention deficit hyperactivity disorder    Degenerative joint disease    Depression with anxiety    Seasonal allergic rhinitis due to pollen    Allergic contact dermatitis    Essential hypertension    Hyperglycemia    Mixed hyperlipidemia    Insomnia    Age-related osteoporosis without current pathological fracture    Vitamin D deficiency    Anisocoria    Abnormal CT scan, gastrointestinal tract    Calculus of kidney    Low back " pain    Right hip pain    Dysfunction of both eustachian tubes    Constipation    Lumbar spondylosis    Left Achilles tendinitis    Carpal tunnel syndrome of left wrist    Pericardial cyst along right cardiophrenic angle    Nut allergy    Recurrent UTI        Diagnoses and all orders for this visit:    Calculus of kidney    Recurrent UTI  -     Ambulatory Referral to Urology  -     nitrofurantoin (MACRODANTIN) 50 mg capsule; Take 1 capsule (50 mg total) by mouth daily           Patient ID: Hayden Mcintosh is a 64 y.o. female.      Current Outpatient Medications:     albuterol (PROVENTIL HFA,VENTOLIN HFA) 90 mcg/act inhaler, Inhale 2 puffs every 6 (six) hours as needed for wheezing, Disp: 18 g, Rfl: 5    atenolol (TENORMIN) 25 mg tablet, TAKE ONE TABLET BY MOUTH EVERY DAY, Disp: 30 tablet, Rfl: 0    atorvastatin (LIPITOR) 80 mg tablet, Take 1 tablet (80 mg total) by mouth daily, Disp: 30 tablet, Rfl: 5    Calcium 600 MG tablet, Take 600 mg by mouth, Disp: , Rfl:     Cholecalciferol (VITAMIN D-3 PO), Take 4,000 Int'l Units by mouth daily, Disp: , Rfl:     CRANBERRY PO, Take 500 mg by mouth, Disp: , Rfl:     D-MANNOSE PO, Take by mouth, Disp: , Rfl:     DULoxetine (CYMBALTA) 60 mg delayed release capsule, Take 1 capsule (60 mg total) by mouth daily, Disp: 30 capsule, Rfl: 5    EPINEPHrine (EPIPEN) 0.3 mg/0.3 mL SOAJ, Inject 0.3 mL (0.3 mg total) into a muscle once for 1 dose, Disp: 0.6 mL, Rfl: 0    fluticasone (FLONASE) 50 mcg/act nasal spray, 2 sprays into each nostril daily, Disp: 16 g, Rfl: 5    loratadine (CLARITIN) 10 mg tablet, Take 10 mg by mouth daily, Disp: , Rfl:     Multiple Vitamin (MULTIVITAMIN) tablet, Take 1 tablet by mouth daily , Disp: , Rfl:     nitrofurantoin (MACROBID) 100 mg capsule, Take 1 capsule (100 mg total) by mouth 2 (two) times a day for 7 days, Disp: 14 capsule, Rfl: 0    nitrofurantoin (MACRODANTIN) 50 mg capsule, Take 1 capsule (50 mg total) by mouth daily, Disp: 90 capsule, Rfl: 3     Nutritional Supplements (ANTIOXIDANTS PO), Take 1 tablet by mouth daily, Disp: , Rfl:     sertraline (ZOLOFT) 25 mg tablet, TAKE ONE TABLET BY MOUTH EVERY DAY, Disp: 30 tablet, Rfl: 0    Sodium Fluoride 5000 Sensitive 1.1-5 % GEL, , Disp: , Rfl:     tamsulosin (FLOMAX) 0.4 mg, Once a day right after supper, Disp: 5 capsule, Rfl: 0    traZODone (DESYREL) 50 mg tablet, Take 1 tablet (50 mg total) by mouth daily at bedtime, Disp: 90 tablet, Rfl: 1    betamethasone, augmented, (DIPROLENE-AF) 0.05 % cream, Apply topically 2 (two) times a day (Patient not taking: Reported on 9/20/2024), Disp: 15 g, Rfl: 0    famotidine (PEPCID) 20 mg tablet, Take 1 tablet (20 mg total) by mouth 2 (two) times a day For 1 week, then twice a day as needed (Patient not taking: Reported on 9/13/2024), Disp: , Rfl:     triamcinolone (KENALOG) 0.1 % lotion, Apply topically 2 (two) times a day (Patient not taking: Reported on 9/20/2024), Disp: 60 mL, Rfl: 0    Past Medical History:   Diagnosis Date    Anxiety     Depression     Elevated cholesterol     Hypertension     PONV (postoperative nausea and vomiting)        Past Surgical History:   Procedure Laterality Date    BREAST CYST ASPIRATION      DIAGNOSTIC LAPAROSCOPY      Exploratory, last assessed 10/25/16    NASAL SINUS SURGERY      age 33 - FESS and septoplasty - unknown surgeon    MA STRTCTC CPTR ASSTD PX EXTRADURAL CRANIAL N/A 11/8/2016    Procedure: FUNCTIONAL ENDOSCOPIC SINUS SURGERY (FESS) IMAGED GUIDED;  Surgeon: Tony Morales DO;  Location: [facility];  Service: ENT    WISDOM TOOTH EXTRACTION         Social History

## 2024-10-20 PROBLEM — N39.0 RECURRENT UTI: Status: RESOLVED | Noted: 2024-09-20 | Resolved: 2024-10-20

## 2024-10-22 ENCOUNTER — ANNUAL EXAM (OUTPATIENT)
Dept: OBGYN CLINIC | Facility: CLINIC | Age: 64
End: 2024-10-22
Payer: COMMERCIAL

## 2024-10-22 VITALS
SYSTOLIC BLOOD PRESSURE: 120 MMHG | HEIGHT: 63 IN | WEIGHT: 139 LBS | DIASTOLIC BLOOD PRESSURE: 84 MMHG | BODY MASS INDEX: 24.63 KG/M2

## 2024-10-22 DIAGNOSIS — Z01.419 ENCOUNTER FOR GYNECOLOGICAL EXAMINATION WITHOUT ABNORMAL FINDING: Primary | ICD-10-CM

## 2024-10-22 DIAGNOSIS — Z12.31 ENCOUNTER FOR SCREENING MAMMOGRAM FOR BREAST CANCER: ICD-10-CM

## 2024-10-22 DIAGNOSIS — F41.8 DEPRESSION WITH ANXIETY: ICD-10-CM

## 2024-10-22 PROCEDURE — G0101 CA SCREEN;PELVIC/BREAST EXAM: HCPCS | Performed by: NURSE PRACTITIONER

## 2024-10-22 NOTE — PATIENT INSTRUCTIONS
Patient Education     Calcium Rich Diet   About this topic   Calcium is one of the most important minerals and is found in almost all parts of your body. It makes your teeth and bones strong and healthy. Your body does not make calcium. It is important for you to eat calcium rich foods to get enough calcium. It also helps your body:  Make blood clots  Keep your heartbeat normal  Helps blood move throughout the body  Release hormones  Release enzymes  Send and get signals between your nerves and brain  Make muscles work well         What will the results be?   It is important to have a good amount of calcium in your food. Calcium helps your body work well. It is very important during every life stage. Calcium may also keep you from losing bone mass. This is osteopenia. It may help keep you from having weak bones. This is osteoporosis.  What drugs may be needed?   The doctor may order calcium and vitamin D supplements. You need vitamin D to help your body take in the calcium. Your calcium supplement may have vitamin D in it.  Talk to your doctor about:  If it is OK to take your calcium with food.  How often to take your calcium supplement throughout the day.  All the drugs you are taking. Be sure to include all prescription and over-the-counter (OTC) drugs, and herbal supplements. Tell the doctor about any drug allergy. Bring a list of drugs you take with you. Some drugs may cause problems with how your body takes in calcium.  Will physical activity be limited?   No, physical activities will not be limited. It is good for you to do light exercises and to stay active.  What changes to diet are needed?   You will need to watch how much calcium is in the foods you eat. Your doctor will talk to you about the right amount of calcium for you. How much calcium you need is based on your age and life stage.  When is this diet used?   This diet is used when your normal diet is low in calcium. It is needed to raise the amount of  calcium in your diet. Our bodies do not take in calcium well as we get older.  Who should not use this diet?   People with too much calcium in their blood should not use this diet. This is called hypercalcemia.  What foods are good to eat?   Milk, yogurt, and cheese products are naturally high in calcium.  These foods have smaller amounts of calcium. If they are eaten often and in large amounts they can be good sources of calcium:  Oysters; dried fruit like figs and raisins; green leafy vegetables like broccoli, collards, kale, mustard greens, bok travis; soy beans; oranges  [place] and sardines. Be sure to eat the soft bones.  Nuts like almonds and Brazil nuts, sunflower seeds, tahini, dried beans  Food products with calcium added to them like orange juice, tofu, cereal, bread; almond milk, rice milk, soy milk  What foods should be limited or avoided?   People who eat many kinds of foods do not have to be worried about limiting or avoiding certain foods.   What problems could happen?   Some people may get kidney stones. This may happen after taking high amounts of calcium over a long time. Calcium from food does not seem to cause kidney stones.  Hard stools.  Too much calcium may interfere with your body’s ability to absorb iron and zinc.  When do I need to call the doctor?   Call your doctor if you have concerns about your diet. Tell your doctor if you are allergic to any of the foods in your diet.  Helpful tips   If you have problems digesting milk, try lactose-free milk. You may also use a product to help you take in lactose.  Talk with your doctor if you are a vegetarian and do not eat dairy products. Your doctor can help you make sure you get the amount of calcium your body needs.  Last Reviewed Date   2021-03-12  Consumer Information Use and Disclaimer   This generalized information is a limited summary of diagnosis, treatment, and/or medication information. It is not meant to be comprehensive and should be used  as a tool to help the user understand and/or assess potential diagnostic and treatment options. It does NOT include all information about conditions, treatments, medications, side effects, or risks that may apply to a specific patient. It is not intended to be medical advice or a substitute for the medical advice, diagnosis, or treatment of a health care provider based on the health care provider's examination and assessment of a patient’s specific and unique circumstances. Patients must speak with a health care provider for complete information about their health, medical questions, and treatment options, including any risks or benefits regarding use of medications. This information does not endorse any treatments or medications as safe, effective, or approved for treating a specific patient. UpToDate, Inc. and its affiliates disclaim any warranty or liability relating to this information or the use thereof. The use of this information is governed by the Terms of Use, available at https://www.[employer]/en/know/clinical-effectiveness-terms   Copyright   Copyright © 2024 UpToDate, Inc. and its affiliates and/or licensors. All rights reserved.  Patient Education     Exercise and movement   The Basics   Written by the doctors and editors at [employer]   What are the benefits of movement? -- Moving your body has many benefits. It can:   Burn calories, which helps people manage their weight   Help control blood sugar levels in people with diabetes   Lower blood pressure, especially in people with high blood pressure   Lower stress, and help with depression and anxiety   Keep bones strong, so they don't get thin and break easily   Lower the chance of dying from heart disease  Adding even small amounts of physical activity to your daily routine can improve your health.  What are the main types of exercise? -- There are 3 main types of exercise:   Aerobic exercise - This raises your heart rate. Examples include  walking, running, dancing, riding a bike, and swimming.   Muscle strengthening - This helps make your muscles stronger. You can do it using weights, exercise bands, or weight machines. You can also use your own body weight, as with push-ups, or by lifting items in your home, like jugs of water.   Stretching - These help your muscles and joints move more easily.  It's important to have all 3 types in your exercise program. That way, your body, muscles, and joints can be as healthy as possible.  Should I talk to my doctor or nurse before I start exercising? -- If you have not exercised before or have not exercised in a long time, talk with your doctor or nurse before you start a very active exercise program.  If you have heart disease or risk factors for heart disease (like high blood pressure or diabetes), your doctor or nurse might recommend that you have an exercise test before starting an exercise program.  When you begin an exercise program, start slowly. For example, do the exercise at a slow pace or for a few minutes only. Over time, you can exercise faster and for longer periods of time.  What should I do when I exercise? -- Each time you exercise, you should:   Warm up - This can help keep you from hurting your muscles when you exercise. To warm up, do a light aerobic exercise (such as walking slowly) or stretch for 5 to 10 minutes.   Work out - Try to get a mix of aerobic exercise, muscle strengthening, and stretching. During an aerobic workout, you can walk fast, swim, run, or use an exercise machine, for example. Other activities, like dancing or playing tennis, are also forms of aerobic exercise. You should also take time to stretch all of your joints, including your neck, shoulders, back, hips, and knees. At least 2 times a week, you can do muscle strengthening exercises as part of your workout.   Cool down - This helps keep you from feeling dizzy after you exercise and helps prevent muscle cramps. To  cool down, you can stretch or do a light aerobic exercise for 5 minutes.  Some people go to a gym or do group exercise classes. But you can exercise even without these things. Some exercises can be done even in a small space. You can also try online videos or smartphone apps to get ideas for different types of exercise.  How often should I exercise? -- Doctors recommend that people exercise at least 30 minutes a day, on 5 or more days of the week.  If you can't exercise for 30 minutes straight, try to exercise for 10 minutes at a time, 3 or 4 times a day. Even exercising for shorter amounts of time is good for you, especially if it means spending less time sitting.  When should I call my doctor or nurse? -- If you have any of the following symptoms when you exercise, stop exercising and call your doctor or nurse right away:   Pain or pressure in your chest, arms, throat, jaw, or back   Nausea or vomiting   Feeling like your heart is fluttering or racing very fast   Feeling dizzy or faint  What if I don't have time to exercise? -- Many people have very busy lives and might not think that they have time to exercise. But it's important to try to find time, even if you are tired or work a lot. Exercise can increase your energy level, which can make you feel better and might even help you get more work done.  Even if it's hard to set aside a lot of time to exercise, you can still improve your health by moving your body more. There are many ways that you can be more active. For example, you can:   Take the stairs instead of the elevator.   Park in a parking space that is farther away from the door.   Take a longer route when you walk from one place to another.  Spending a lot of time sitting still (for example, watching TV or working on the computer) is bad for your health. Try to get up and move around whenever you can. Even small amounts of movement, like taking short walks, doing household chores, or gardening, can  improve your health. Finding activities that you enjoy, or doing them with other people, can help you add more movement into your daily life.  What else should I do when I exercise? -- To exercise safely and avoid problems, it's important to:   Drink fluids during and after exercising (but avoid drinks with a lot of caffeine or sugar).   Avoid exercising outside if it is too hot or cold.   Wear layers of clothes, so that you can take them off if you get too hot.   Wear shoes that fit well and support your feet.   Be aware of your surroundings if you exercise outside.  All topics are updated as new evidence becomes available and our peer review process is complete.  This topic retrieved from [employer] on: May 18, 2024.  Topic 98037 Version 31.0  Release: 32.4.3 - C32.137  © 2024 UpToDate, Inc. and/or its affiliates. All rights reserved.  Consumer Information Use and Disclaimer   Disclaimer: This generalized information is a limited summary of diagnosis, treatment, and/or medication information. It is not meant to be comprehensive and should be used as a tool to help the user understand and/or assess potential diagnostic and treatment options. It does NOT include all information about conditions, treatments, medications, side effects, or risks that may apply to a specific patient. It is not intended to be medical advice or a substitute for the medical advice, diagnosis, or treatment of a health care provider based on the health care provider's examination and assessment of a patient's specific and unique circumstances. Patients must speak with a health care provider for complete information about their health, medical questions, and treatment options, including any risks or benefits regarding use of medications. This information does not endorse any treatments or medications as safe, effective, or approved for treating a specific patient. UpToDate, Inc. and its affiliates disclaim any warranty or liability relating to  this information or the use thereof.The use of this information is governed by the Terms of Use, available at https://www.[employer].com/en/know/clinical-effectiveness-terms. 2024© UpToDate, Inc. and its affiliates and/or licensors. All rights reserved.  Copyright   © 2024 [employer] and/or its affiliates. All rights reserved.

## 2024-10-22 NOTE — PROGRESS NOTES
Assessment / Plan    Assessment & Plan  Encounter for gynecological examination without abnormal finding  Normal well woman exam.  Cervical cancer screening is no longer indicated.  Up to date on colonoscopy  DXAs per endocrine for her osteoporosis.       Encounter for screening mammogram for breast cancer  Order provided for next year.    Orders:    Mammo screening bilateral w 3d and cad; Future          Subjective      Hayden Mcintosh is a 64 y.o. female who presents for her annual gynecologic exam.    65 yo G0 PMF    Hx of UTIs; sees Dr Duncan, Urology; currently taking a daily antibiotic.    10/2023 pap/HPV negative   Pap Hx: h/o abn w/ biopsy years ago; f/u paps normal.  STD hx: none  Sexually active: yes,   8/2024 Mammogram benign         11/2023 colonoscopy NL, rpt 10 yrs  10/2022 DEXA: LS -2.1; tot hip -0.8; FN -2.4; has had Reclast for a couple years.  Has appt next week with endocrine.  Body mass index is 24.62 kg/m².  Calcium intake: supplement; given guidelines  Exercise: no; given guidelines  Safety: feels safe     Periods are absent  Current contraception: post menopausal status  History of abnormal Pap smear: yes - call having an abnormal years ago w/ biopsy; follow up paps normal.  Family history of breast,uterine, ovarian or colon cancer: yes - maternal aunt at age 76, ovarian cancer.       The following portions of the patient's history were reviewed and updated as appropriate: allergies, current medications, past family history, past medical history, past social history, past surgical history, and problem list.    Review of Systems      Review of Systems   Constitutional:  Negative for chills and fever.   Respiratory:  Negative for cough and shortness of breath.    Gastrointestinal:  Negative for abdominal distention, abdominal pain, blood in stool, constipation, diarrhea, nausea and vomiting.   Genitourinary:  Negative for difficulty urinating, dysuria, frequency, genital sores, hematuria,  "menstrual problem, pelvic pain, urgency, vaginal bleeding and vaginal discharge.   Musculoskeletal:  Negative for arthralgias and myalgias.     Breasts:  Negative for skin changes, dimpling, asymmetry, nipple discharge, redness, tenderness or palpable masses    Objective     *patient agrees to exam without a chaperone present.     /84 (BP Location: Left arm, Patient Position: Sitting, Cuff Size: Standard)   Ht 5' 3\" (1.6 m)   Wt 63 kg (139 lb)   BMI 24.62 kg/m²   Physical Exam  Constitutional:       General: She is not in acute distress.     Appearance: Normal appearance. She is well-developed. She is not ill-appearing or diaphoretic.      Comments: Body mass index is 24.62 kg/m².     HENT:      Head: Normocephalic and atraumatic.   Eyes:      Pupils: Pupils are equal, round, and reactive to light.   Neck:      Thyroid: No thyromegaly.   Pulmonary:      Effort: Pulmonary effort is normal.   Chest:   Breasts:     Breasts are symmetrical.      Right: No inverted nipple, mass, nipple discharge, skin change or tenderness.      Left: No inverted nipple, mass, nipple discharge, skin change or tenderness.   Abdominal:      General: There is no distension.      Palpations: Abdomen is soft. There is no mass.      Tenderness: There is no abdominal tenderness. There is no guarding or rebound.   Genitourinary:     General: Normal vulva.      Exam position: Lithotomy position.      Labia:         Right: No rash, tenderness, lesion or injury.         Left: No rash, tenderness, lesion or injury.       Vagina: No signs of injury and foreign body. No vaginal discharge, erythema, tenderness or bleeding.      Cervix: No cervical motion tenderness, discharge or friability.      Uterus: Not enlarged and not tender.       Adnexa:         Right: No mass or tenderness.          Left: No mass or tenderness.     Musculoskeletal:      Cervical back: Neck supple.   Lymphadenopathy:      Cervical: No cervical adenopathy.      Upper " Body:      Right upper body: No supraclavicular adenopathy.      Left upper body: No supraclavicular adenopathy.   Skin:     General: Skin is warm and dry.   Neurological:      General: No focal deficit present.      Mental Status: She is alert and oriented to person, place, and time.   Psychiatric:         Mood and Affect: Mood normal.         Behavior: Behavior normal.         Thought Content: Thought content normal.         Judgment: Judgment normal.

## 2024-10-23 DIAGNOSIS — E78.2 MIXED HYPERLIPIDEMIA: ICD-10-CM

## 2024-10-23 RX ORDER — SERTRALINE HYDROCHLORIDE 25 MG/1
25 TABLET, FILM COATED ORAL DAILY
Qty: 30 TABLET | Refills: 2 | Status: SHIPPED | OUTPATIENT
Start: 2024-10-23

## 2024-10-23 RX ORDER — ATORVASTATIN CALCIUM 80 MG/1
80 TABLET, FILM COATED ORAL DAILY
Qty: 30 TABLET | Refills: 5 | Status: SHIPPED | OUTPATIENT
Start: 2024-10-23

## 2024-11-06 ENCOUNTER — RA CDI HCC (OUTPATIENT)
Dept: OTHER | Facility: HOSPITAL | Age: 64
End: 2024-11-06

## 2024-11-11 ENCOUNTER — OFFICE VISIT (OUTPATIENT)
Dept: FAMILY MEDICINE CLINIC | Facility: CLINIC | Age: 64
End: 2024-11-11
Payer: COMMERCIAL

## 2024-11-11 VITALS
SYSTOLIC BLOOD PRESSURE: 120 MMHG | BODY MASS INDEX: 24.73 KG/M2 | DIASTOLIC BLOOD PRESSURE: 90 MMHG | TEMPERATURE: 96.7 F | RESPIRATION RATE: 16 BRPM | HEIGHT: 63 IN | HEART RATE: 69 BPM | WEIGHT: 139.6 LBS | OXYGEN SATURATION: 97 %

## 2024-11-11 DIAGNOSIS — M67.40 MUCOID CYST OF JOINT: Primary | ICD-10-CM

## 2024-11-11 DIAGNOSIS — M81.0 AGE-RELATED OSTEOPOROSIS WITHOUT CURRENT PATHOLOGICAL FRACTURE: ICD-10-CM

## 2024-11-11 PROCEDURE — 99214 OFFICE O/P EST MOD 30 MIN: CPT | Performed by: PHYSICIAN ASSISTANT

## 2024-11-11 PROCEDURE — G2211 COMPLEX E/M VISIT ADD ON: HCPCS | Performed by: PHYSICIAN ASSISTANT

## 2024-11-11 NOTE — PATIENT INSTRUCTIONS
1. Mucoid cyst of joint  -     Ambulatory Referral to Orthopedic Surgery; Future  2. Age-related osteoporosis without current pathological fracture  Assessment & Plan:  Pt. Was seeing [facility] endo but cancelled her last apt and would like to change to [facility]. DEXA was due this Oct. So will have pt get this first and then will see if she needs to see [facility]. Is s/p reclast but iti is unclear through portal if only got 2 infusions or 3. PT will call and find out.     Orders:    DXA bone density spine hip and pelvis; Future    Orders:  -     DXA bone density spine hip and pelvis; Future; Expected date: 11/11/2024

## 2024-11-11 NOTE — PROGRESS NOTES
Ambulatory Visit  Name: Hayden Mcintosh      : 1960      MRN: [id number]  Encounter Provider: Maame Keenan PA-C  Encounter Date: 2024   Encounter department: [facility]    Assessment & Plan  Mucoid cyst of joint  Left 3rd DIP joint. Dx explained. Refer to hand specialist. May wear a silicone band for protection.   Orders:    Ambulatory Referral to Orthopedic Surgery; Future    Age-related osteoporosis without current pathological fracture  Pt. Was seeing [facility] but cancelled her last apt and would like to change to [facility]. DEXA was due this Oct. So will have pt get this first and then will see if she needs to see [facility]. Is s/p reclast but iti is unclear through portal if only got 2 infusions or 3. PT will call and find out.     Orders:    DXA bone density spine hip and pelvis; Future       History of Present Illness     Patient presents with:  wart: Patient in office for wart on right hand middle finger. Patient states it has changed in size and is painful.              Review of Systems   Constitutional: Negative.  Negative for appetite change and fever.   HENT: Negative.     Eyes: Negative.    Respiratory: Negative.  Negative for chest tightness and shortness of breath.    Cardiovascular: Negative.  Negative for chest pain, palpitations and leg swelling.   Gastrointestinal: Negative.  Negative for abdominal pain.   Endocrine: Negative.    Genitourinary: Negative.  Negative for difficulty urinating.   Musculoskeletal: Negative.  Negative for arthralgias and myalgias.   Skin: Negative.  Negative for wound.        Wart   Allergic/Immunologic: Negative.    Neurological: Negative.  Negative for dizziness, light-headedness and headaches.   Hematological: Negative.    Psychiatric/Behavioral: Negative.  Negative for dysphoric mood and sleep disturbance. The patient is not nervous/anxious.            Objective     /90 (BP Location: Left arm, Patient Position: Sitting, Cuff  "Size: Adult)   Pulse 69   Temp (!) 96.7 °F (35.9 °C) (Temporal)   Resp 16   Ht 5' 2.5\" (1.588 m)   Wt 63.3 kg (139 lb 9.6 oz)   SpO2 97%   BMI 25.13 kg/m²     Physical Exam  Vitals and nursing note reviewed.   Constitutional:       Appearance: Normal appearance. She is well-developed.   HENT:      Head: Normocephalic and atraumatic.   Eyes:      General: Lids are normal.      Conjunctiva/sclera: Conjunctivae normal.      Pupils: Pupils are equal, round, and reactive to light.   Cardiovascular:      Rate and Rhythm: Normal rate and regular rhythm.      Heart sounds: No murmur heard.  Pulmonary:      Effort: Pulmonary effort is normal.      Breath sounds: Normal breath sounds.   Musculoskeletal:      Right hand: Deformity present.      Left hand: Deformity present.      Comments: Multiple DIP fingers with significant arthritis changes. L 3rd DIP with mucoid cyst with healing small scab. Not a wart.    Skin:     General: Skin is warm and dry.   Neurological:      General: No focal deficit present.      Mental Status: She is alert.      Coordination: Coordination is intact.   Psychiatric:         Mood and Affect: Mood normal.         Behavior: Behavior normal. Behavior is cooperative.         Thought Content: Thought content normal.         Judgment: Judgment normal.         "

## 2024-11-11 NOTE — ASSESSMENT & PLAN NOTE
Pt. Was seeing [facility] but cancelled her last apt and would like to change to [facility]. DEXA was due this Oct. So will have pt get this first and then will see if she needs to see [facility]. Is s/p reclast but iti is unclear through portal if only got 2 infusions or 3. PT will call and find out.     Orders:    DXA bone density spine hip and pelvis; Future

## 2024-11-16 DIAGNOSIS — F41.8 DEPRESSION WITH ANXIETY: ICD-10-CM

## 2024-11-18 RX ORDER — DULOXETIN HYDROCHLORIDE 60 MG/1
60 CAPSULE, DELAYED RELEASE ORAL DAILY
Qty: 30 CAPSULE | Refills: 5 | Status: SHIPPED | OUTPATIENT
Start: 2024-11-18

## 2024-11-23 DIAGNOSIS — I10 ESSENTIAL HYPERTENSION: ICD-10-CM

## 2024-11-25 RX ORDER — ATENOLOL 25 MG/1
25 TABLET ORAL DAILY
Qty: 30 TABLET | Refills: 5 | Status: SHIPPED | OUTPATIENT
Start: 2024-11-25

## 2024-11-26 ENCOUNTER — OFFICE VISIT (OUTPATIENT)
Dept: OBGYN CLINIC | Facility: MEDICAL CENTER | Age: 64
End: 2024-11-26
Payer: COMMERCIAL

## 2024-11-26 ENCOUNTER — APPOINTMENT (OUTPATIENT)
Dept: RADIOLOGY | Facility: MEDICAL CENTER | Age: 64
End: 2024-11-26
Payer: COMMERCIAL

## 2024-11-26 VITALS
DIASTOLIC BLOOD PRESSURE: 68 MMHG | SYSTOLIC BLOOD PRESSURE: 117 MMHG | HEART RATE: 66 BPM | HEIGHT: 63 IN | WEIGHT: 138 LBS | BODY MASS INDEX: 24.45 KG/M2

## 2024-11-26 DIAGNOSIS — M25.532 PAIN IN LEFT WRIST: ICD-10-CM

## 2024-11-26 DIAGNOSIS — M72.0 DUPUYTREN'S DISEASE OF PALM OF LEFT HAND: ICD-10-CM

## 2024-11-26 DIAGNOSIS — M67.40 MUCOID CYST OF JOINT: Primary | ICD-10-CM

## 2024-11-26 DIAGNOSIS — M18.11 PRIMARY OSTEOARTHRITIS OF FIRST CARPOMETACARPAL JOINT OF RIGHT HAND: ICD-10-CM

## 2024-11-26 DIAGNOSIS — M67.40 MUCOID CYST OF JOINT: ICD-10-CM

## 2024-11-26 PROCEDURE — 99214 OFFICE O/P EST MOD 30 MIN: CPT | Performed by: ORTHOPAEDIC SURGERY

## 2024-11-26 PROCEDURE — 73100 X-RAY EXAM OF WRIST: CPT

## 2024-11-26 NOTE — PROGRESS NOTES
"The HAND & UPPER EXTREMITY OFFICE VISIT   Referred By:  Maame Keenan Pa-c  [address] [address]  [address]  [place] [address]      Chief Complaint:     Left long finger pain/mass    History of Present Illness:   64 y.o., left hand dominant female presents with left long finger DIP mucoid cyst that has been present for \"a while\" that recently began to enlarge and become more painful, 4/10 today. Patient was referred by her PCP. Patient reports a long standing history of swelling secondary to arthritis at the long finger DIP joint. She reports she recently wore a Bandaid over the cyst for the past couple days as her cyst was draining. This is her primary compliant during today's visit as this has become painful for her.    Patient reports nodules in the left palm in line with the ring and long fingers with pain radiating into the long finger and long finger clicking. No finger contracture. She also endorses left wrist pain when pushing off with the wrist.     Patient reports history of right CMC OA with pain and clicking with shoulder sign. She reports she has push metagrip brace for with a CSI performed years ago.    ADLs: Community ambulator  Smoke: No   ETOH: No   Drugs:  No  Job: retired, enjoys skiing at [place]       Past Medical History:  Past Medical History:   Diagnosis Date    Anxiety     Depression     Elevated cholesterol     Hypertension     PONV (postoperative nausea and vomiting)     Recurrent UTI      Past Surgical History:   Procedure Laterality Date    BREAST CYST ASPIRATION      DIAGNOSTIC LAPAROSCOPY      Exploratory, last assessed 10/25/16    NASAL SINUS SURGERY      age 33 - FESS and septoplasty - unknown surgeon    [place] STRTCTC CPTR ASSTD PX EXTRADURAL CRANIAL N/A 11/8/2016    Procedure: FUNCTIONAL ENDOSCOPIC SINUS SURGERY (FESS) IMAGED GUIDED;  Surgeon: Tony Morales DO;  Location: [facility];  Service: ENT    WISDOM TOOTH EXTRACTION       Family History   Problem Relation Age of " Onset    Osteoporosis Mother     Coronary artery disease Father         CABG    Diabetes type II Sister     Asthma Sister     Diabetes Sister     Diabetes Sister     No Known Problems Daughter     No Known Problems Maternal Grandmother     Diabetes Maternal Grandfather     No Known Problems Paternal Grandmother     No Known Problems Paternal Grandfather     Bipolar disorder Brother     Depression Brother         with anxiety     Esophageal cancer Brother     Depression Family         with anxiety     Hyperlipidemia Family     Ovarian cancer Maternal Aunt     No Known Problems Maternal Aunt     Breast cancer Neg Hx     Colon cancer Neg Hx     Uterine cancer Neg Hx      Social History     Socioeconomic History    Marital status: /Civil Union     Spouse name: Not on file    Number of children: Not on file    Years of education: Associates degree    Highest education level: Not on file   Occupational History    Not on file   Tobacco Use    Smoking status: Never     Passive exposure: Never    Smokeless tobacco: Never    Tobacco comments:     No secondhand smoke exposure    Vaping Use    Vaping status: Never Used   Substance and Sexual Activity    Alcohol use: No    Drug use: No    Sexual activity: Yes     Partners: Male     Birth control/protection: Post-menopausal     Comment: same partner, 43 yrs   Other Topics Concern    Not on file   Social History Narrative    Employed     Lives with spouse     No caffeine use      Social Drivers of Health     Financial Resource Strain: Not on file   Food Insecurity: No Food Insecurity (8/19/2024)    Nursing - Inadequate Food Risk Classification     Worried About Running Out of Food in the Last Year: Never true     Ran Out of Food in the Last Year: Never true     Ran Out of Food in the Last Year: Not on file   Transportation Needs: No Transportation Needs (8/19/2024)    PRAPARE - Transportation     Lack of Transportation (Medical): No     Lack of Transportation (Non-Medical):  "No   Physical Activity: Not on file   Stress: Not on file   Social Connections: Not on file   Intimate Partner Violence: Not on file   Housing Stability: Low Risk  (8/19/2024)    Housing Stability Vital Sign     Unable to Pay for Housing in the Last Year: No     Number of Times Moved in the Last Year: 0     Homeless in the Last Year: No     Scheduled Meds:  Continuous Infusions:No current facility-administered medications for this visit.    PRN Meds:.  Allergies   Allergen Reactions    Benadryl [Diphenhydramine] Other (See Comments)     Cannot sleep    Claritin [Loratadine] Other (See Comments)     Tremors in the arms    Fexofenadine-Pseudoephed Er Other (See Comments)     \"didn't feel good\"    \"didn't feel good\"      Pollen Extract     Prednisone Nausea Only    Singulair [Montelukast]      Did not feel well, no particular assistance.           Physical Examination:    /68   Pulse 66   Ht 5' 2.5\" (1.588 m)   Wt 62.6 kg (138 lb)   BMI 24.84 kg/m²     Gen: A&Ox3, NAD  Cardiac: regular rate  Chest: non labored breathing  Abdomen: Non-distended    Right Upper Extremity:  Skin CDI  No obvious deformity of the shoulder, arm, elbow, forearm, wrist, hand  TTP first dorsal webspace and thumb CMC  + pressure shear test, Positive shoulder sign.   Negative MCP hyperextension.  Sensation intact to light touch in the axillary median, ulnar, and radial nerve distributions  5/5 motor   2+RP      Left Upper Extremity:  Skin CDI  No obvious deformity of the shoulder, arm, elbow, forearm, wrist, hand  Swelling at long finger DIP joint   Mucoid cyst at long finger DIP, recently popped, no concern for infection.   Limited flexion of long finger DIP joint.  Full pain-free wrist ROM.  Sensation intact to light touch in the axillary median, ulnar, and radial nerve distributions  5/5 motor   2+RP      Studies:  Radiographs: I personally reviewed and independently interpreted the available radiographs.  11/26/2024: Radiographs of " the bilateral wrists, multiple views, demonstrate no acute fracture or dislocation. Moderate CMC arthritis on right. Severe DIP degenerative changes bilaterally      Assessment and Plan:  1. Mucoid cyst of joint  Ambulatory Referral to Orthopedic Surgery    XR wrist 2 vw left    XR wrist 2 vw right      2. Dupuytren's disease of palm of left hand        3. Primary osteoarthritis of first carpometacarpal joint of right hand        4. Pain in left wrist            64 y.o. female presents with signs and symptoms consistent with the above diagnosis.  We discussed the natural history of this condition and its pathogenesis.  We discussed operative and nonoperative treatment options.    Recommended surgical excision of her left long finger DIP mucoid cyst due to infection risk as the cyst has popped. Explained this does not resolve her arthritis and she is a risk of reoccurrence. If her DIP arthritis persists can consider DIP fusion. Patient is not interested in surgical intervention at this time. Provided patient with a sleeve and along time for skin to continue to heal. If the cyst returns we can discuss surgical intervention. Patient wishes to proceed with non-surigcal treatment. Instructed patient to use bactratracin, Bandaid, compression sleeve for padding.    Patient has Dupuytren's of the left hand. Explained this is genetic and is caused by an overproduction of fibrous tissue. Recommended monitoring as she does not have a contracture on exam today. Explain if this gets too bad with contracture or she is unable to rest her hand flat on table then we can consider further treatment.    Patient has right thumb CMC osteoarthritis with shoulder sign. Explained her OA is moderate on xray. Explained treatment is bracing, CSI, OTC topical Voltaren gel. Patient wishes to monitor the right thumb CMC arthritis and use the brace she has at home as needed.    she expressed understanding of the plan and agreed. We encouraged them  to contact our office with any questions or concerns. She may follow-up as needed.        Greyson Hess MD  Hand and Upper Extremity Surgery    Scribe Attestation      I,:  Madie Solorzano am acting as a scribe while in the presence of the attending physician.:       I,:  Greyson Hess MD personally performed the services described in this documentation    as scribed in my presence.:             *This note was dictated using Dragon voice recognition software. Please excuse any word substitutions or errors.*

## 2024-12-27 ENCOUNTER — HOSPITAL ENCOUNTER (OUTPATIENT)
Dept: BONE DENSITY | Facility: MEDICAL CENTER | Age: 64
Discharge: HOME | End: 2024-12-27
Payer: COMMERCIAL

## 2024-12-27 DIAGNOSIS — M81.0 AGE-RELATED OSTEOPOROSIS WITHOUT CURRENT PATHOLOGICAL FRACTURE: ICD-10-CM

## 2024-12-27 PROCEDURE — 77080 DXA BONE DENSITY AXIAL: CPT

## 2024-12-29 ENCOUNTER — OFFICE VISIT (OUTPATIENT)
Dept: URGENT CARE | Facility: CLINIC | Age: 64
End: 2024-12-29
Payer: COMMERCIAL

## 2024-12-29 VITALS
OXYGEN SATURATION: 96 % | HEART RATE: 92 BPM | BODY MASS INDEX: 25.38 KG/M2 | TEMPERATURE: 97.5 F | WEIGHT: 141 LBS | DIASTOLIC BLOOD PRESSURE: 78 MMHG | RESPIRATION RATE: 18 BRPM | SYSTOLIC BLOOD PRESSURE: 110 MMHG

## 2024-12-29 DIAGNOSIS — B34.9 ACUTE VIRAL SYNDROME: Primary | ICD-10-CM

## 2024-12-29 PROCEDURE — 99213 OFFICE O/P EST LOW 20 MIN: CPT | Performed by: PHYSICIAN ASSISTANT

## 2024-12-29 PROCEDURE — S9083 URGENT CARE CENTER GLOBAL: HCPCS | Performed by: PHYSICIAN ASSISTANT

## 2024-12-29 RX ORDER — BENZONATATE 200 MG/1
200 CAPSULE ORAL 3 TIMES DAILY PRN
Qty: 20 CAPSULE | Refills: 0 | Status: SHIPPED | OUTPATIENT
Start: 2024-12-29

## 2024-12-29 RX ORDER — OSELTAMIVIR PHOSPHATE 75 MG/1
75 CAPSULE ORAL EVERY 12 HOURS SCHEDULED
Qty: 10 CAPSULE | Refills: 0 | Status: SHIPPED | OUTPATIENT
Start: 2024-12-29 | End: 2025-01-03

## 2024-12-29 NOTE — PROGRESS NOTES
[facility]        NAME: Hayden Mcintosh is a 64 y.o. female  : 1960    MRN: [id number]  DATE: 2024  TIME: 10:17 AM    Assessment and Plan   Acute viral syndrome [B34.9]  1. Acute viral syndrome  oseltamivir (TAMIFLU) 75 mg capsule    benzonatate (TESSALON) 200 MG capsule            Patient Instructions       Follow up with PCP as needed.  Increase fluids.  Patient requested antibiotics.  Explained this was viral and no need for antibiotics.  If tests have been performed at [facility], our office will contact you with results if changes need to be made to the care plan discussed with you at the visit.  [facility]    Chief Complaint     Chief Complaint   Patient presents with    Nasal Congestion     Started Friday. Cough, congestion, chest pain, headache. Tried Delsum, tylenol, Mucinex.          History of Present Illness       Patient with 2-day history of symptoms.    URI   This is a new problem. The problem has been gradually worsening. There has been no fever. Associated symptoms include congestion, coughing, dysuria, ear pain, headaches, a plugged ear sensation, rhinorrhea and sinus pain. Pertinent negatives include no abdominal pain, chest pain, diarrhea, joint pain, joint swelling, nausea, neck pain, rash, sneezing, sore throat, swollen glands, vomiting or wheezing. She has tried acetaminophen for the symptoms. The treatment provided mild relief.       Review of Systems   Review of Systems   HENT:  Positive for congestion, ear pain, rhinorrhea and sinus pain. Negative for sneezing and sore throat.    Respiratory:  Positive for cough. Negative for wheezing.    Cardiovascular:  Negative for chest pain.   Gastrointestinal:  Negative for abdominal pain, diarrhea, nausea and vomiting.   Genitourinary:  Positive for dysuria.   Musculoskeletal:  Negative for joint pain and neck pain.   Skin:  Negative for rash.   Neurological:  Positive for  headaches.   All other systems reviewed and are negative.        Current Medications       Current Outpatient Medications:     albuterol (PROVENTIL HFA,VENTOLIN HFA) 90 mcg/act inhaler, Inhale 2 puffs every 6 (six) hours as needed for wheezing, Disp: 18 g, Rfl: 5    atenolol (TENORMIN) 25 mg tablet, TAKE ONE TABLET BY MOUTH EVERY DAY, Disp: 30 tablet, Rfl: 5    atorvastatin (LIPITOR) 80 mg tablet, Take 1 tablet (80 mg total) by mouth daily, Disp: 30 tablet, Rfl: 5    benzonatate (TESSALON) 200 MG capsule, Take 1 capsule (200 mg total) by mouth 3 (three) times a day as needed for cough, Disp: 20 capsule, Rfl: 0    Calcium 600 MG tablet, Take 600 mg by mouth, Disp: , Rfl:     Cholecalciferol (VITAMIN D-3 PO), Take 4,000 Int'l Units by mouth daily, Disp: , Rfl:     CRANBERRY PO, Take 500 mg by mouth, Disp: , Rfl:     D-MANNOSE PO, Take by mouth, Disp: , Rfl:     DULoxetine (CYMBALTA) 60 mg delayed release capsule, Take 1 capsule (60 mg total) by mouth daily, Disp: 30 capsule, Rfl: 5    fluticasone (FLONASE) 50 mcg/act nasal spray, 2 sprays into each nostril daily, Disp: 16 g, Rfl: 5    loratadine (CLARITIN) 10 mg tablet, Take 10 mg by mouth daily, Disp: , Rfl:     Multiple Vitamin (MULTIVITAMIN) tablet, Take 1 tablet by mouth daily , Disp: , Rfl:     nitrofurantoin (MACRODANTIN) 50 mg capsule, Take 1 capsule (50 mg total) by mouth daily, Disp: 90 capsule, Rfl: 3    Nutritional Supplements (ANTIOXIDANTS PO), Take 1 tablet by mouth daily, Disp: , Rfl:     oseltamivir (TAMIFLU) 75 mg capsule, Take 1 capsule (75 mg total) by mouth every 12 (twelve) hours for 5 days, Disp: 10 capsule, Rfl: 0    sertraline (ZOLOFT) 25 mg tablet, TAKE ONE TABLET BY MOUTH EVERY DAY, Disp: 30 tablet, Rfl: 2    Sodium Fluoride 5000 Sensitive 1.1-5 % GEL, , Disp: , Rfl:     tamsulosin (FLOMAX) 0.4 mg, Once a day right after supper, Disp: 5 capsule, Rfl: 0    traZODone (DESYREL) 50 mg tablet, Take 1 tablet (50 mg total) by mouth daily at bedtime,  Disp: 90 tablet, Rfl: 1    betamethasone, augmented, (DIPROLENE-AF) 0.05 % cream, Apply topically 2 (two) times a day (Patient not taking: Reported on 9/20/2024), Disp: 15 g, Rfl: 0    EPINEPHrine (EPIPEN) 0.3 mg/0.3 mL SOAJ, Inject 0.3 mL (0.3 mg total) into a muscle once for 1 dose, Disp: 0.6 mL, Rfl: 0    famotidine (PEPCID) 20 mg tablet, Take 1 tablet (20 mg total) by mouth 2 (two) times a day For 1 week, then twice a day as needed (Patient not taking: Reported on 9/13/2024), Disp: , Rfl:     triamcinolone (KENALOG) 0.1 % lotion, Apply topically 2 (two) times a day (Patient not taking: Reported on 9/20/2024), Disp: 60 mL, Rfl: 0    Current Allergies     Allergies as of 12/29/2024 - Reviewed 12/29/2024   Allergen Reaction Noted    Benadryl [diphenhydramine] Other (See Comments) 08/12/2024    Claritin [loratadine] Other (See Comments) 08/12/2024    Fexofenadine-pseudoephed er Other (See Comments) 06/30/2021    Pollen extract  05/09/2016    Prednisone Nausea Only 06/06/2023    Singulair [montelukast]  03/28/2020            The following portions of the patient's history were reviewed and updated as appropriate: allergies, current medications, past family history, past medical history, past social history, past surgical history and problem list.     Past Medical History:   Diagnosis Date    Anxiety     Depression     Elevated cholesterol     Hypertension     PONV (postoperative nausea and vomiting)     Recurrent UTI        Past Surgical History:   Procedure Laterality Date    BREAST CYST ASPIRATION      DIAGNOSTIC LAPAROSCOPY      Exploratory, last assessed 10/25/16    NASAL SINUS SURGERY      age 33 - FESS and septoplasty - unknown surgeon    [place] STRTCTC CPTR ASSTD PX EXTRADURAL CRANIAL N/A 11/8/2016    Procedure: FUNCTIONAL ENDOSCOPIC SINUS SURGERY (FESS) IMAGED GUIDED;  Surgeon: Tony Morales DO;  Location: [facility];  Service: ENT    WISDOM TOOTH EXTRACTION         Family History   Problem Relation Age of Onset     Osteoporosis Mother     Coronary artery disease Father         CABG    Diabetes type II Sister     Asthma Sister     Diabetes Sister     Diabetes Sister     No Known Problems Daughter     No Known Problems Maternal Grandmother     Diabetes Maternal Grandfather     No Known Problems Paternal Grandmother     No Known Problems Paternal Grandfather     Bipolar disorder Brother     Depression Brother         with anxiety     Esophageal cancer Brother     Depression Family         with anxiety     Hyperlipidemia Family     Ovarian cancer Maternal Aunt     No Known Problems Maternal Aunt     Breast cancer Neg Hx     Colon cancer Neg Hx     Uterine cancer Neg Hx          Medications have been verified.        Objective   /78   Pulse 92   Temp 97.5 °F (36.4 °C) (Tympanic)   Resp 18   Wt 64 kg (141 lb)   SpO2 96%   BMI 25.38 kg/m²   No LMP recorded. Patient is postmenopausal.       Physical Exam     Physical Exam  Vitals and nursing note reviewed.   Constitutional:       Appearance: Normal appearance. She is normal weight.   HENT:      Right Ear: Tympanic membrane, ear canal and external ear normal.      Left Ear: Tympanic membrane, ear canal and external ear normal.      Nose: Congestion and rhinorrhea present.      Mouth/Throat:      Mouth: Mucous membranes are moist.      Pharynx: Posterior oropharyngeal erythema present. No oropharyngeal exudate.   Eyes:      Conjunctiva/sclera: Conjunctivae normal.   Cardiovascular:      Rate and Rhythm: Normal rate.      Pulses: Normal pulses.      Heart sounds: Normal heart sounds.   Pulmonary:      Effort: Pulmonary effort is normal.      Breath sounds: Normal breath sounds.   Neurological:      Mental Status: She is alert.   Psychiatric:         Mood and Affect: Mood normal.         Behavior: Behavior normal.

## 2025-01-02 ENCOUNTER — RESULTS FOLLOW-UP (OUTPATIENT)
Dept: FAMILY MEDICINE CLINIC | Facility: CLINIC | Age: 65
End: 2025-01-02

## 2025-01-02 ENCOUNTER — TELEPHONE (OUTPATIENT)
Age: 65
End: 2025-01-02

## 2025-01-02 ENCOUNTER — OFFICE VISIT (OUTPATIENT)
Dept: FAMILY MEDICINE CLINIC | Facility: CLINIC | Age: 65
End: 2025-01-02
Payer: COMMERCIAL

## 2025-01-02 VITALS
WEIGHT: 140 LBS | BODY MASS INDEX: 25.76 KG/M2 | SYSTOLIC BLOOD PRESSURE: 120 MMHG | HEART RATE: 73 BPM | OXYGEN SATURATION: 98 % | HEIGHT: 62 IN | DIASTOLIC BLOOD PRESSURE: 84 MMHG | TEMPERATURE: 98 F | RESPIRATION RATE: 16 BRPM

## 2025-01-02 DIAGNOSIS — R11.0 NAUSEA: ICD-10-CM

## 2025-01-02 DIAGNOSIS — M81.0 AGE-RELATED OSTEOPOROSIS WITHOUT CURRENT PATHOLOGICAL FRACTURE: Primary | ICD-10-CM

## 2025-01-02 DIAGNOSIS — I10 ESSENTIAL HYPERTENSION: ICD-10-CM

## 2025-01-02 DIAGNOSIS — J40 BRONCHITIS: Primary | ICD-10-CM

## 2025-01-02 PROCEDURE — 99214 OFFICE O/P EST MOD 30 MIN: CPT | Performed by: FAMILY MEDICINE

## 2025-01-02 PROCEDURE — G2211 COMPLEX E/M VISIT ADD ON: HCPCS | Performed by: FAMILY MEDICINE

## 2025-01-02 RX ORDER — ONDANSETRON 4 MG/1
4 TABLET, ORALLY DISINTEGRATING ORAL EVERY 6 HOURS PRN
Qty: 20 TABLET | Refills: 0 | Status: SHIPPED | OUTPATIENT
Start: 2025-01-02

## 2025-01-02 RX ORDER — AMOXICILLIN 875 MG/1
875 TABLET, COATED ORAL 2 TIMES DAILY
Qty: 20 TABLET | Refills: 0 | Status: SHIPPED | OUTPATIENT
Start: 2025-01-02 | End: 2025-01-12

## 2025-01-02 NOTE — PATIENT INSTRUCTIONS
1. Bronchitis  Comments:  seems a bit better. Less cough and HA. Consider abx.  Orders:  -     amoxicillin (AMOXIL) 875 mg tablet; Take 1 tablet (875 mg total) by mouth 2 (two) times a day for 10 days  2. Nausea  Comments:  Nausea just now, none before.  Consider Zofran. Will send. BRAT diet, fluids.  Orders:  -     ondansetron (ZOFRAN-ODT) 4 mg disintegrating tablet; Take 1 tablet (4 mg total) by mouth every 6 (six) hours as needed for nausea or vomiting  3. Essential hypertension  Assessment & Plan:  Blood pressure doing well today.  It was also normal at urgent care.  Follow-up as needed.      COVID 19 Instructions    Hayden Mcintosh was advised to limit contact with others to essential tasks such as getting food, medications, and medical care.    Proper handwashing reviewed, and Hand sanitzer when washing is not available.    If the patient develops symptoms of COVID 19, the patient should call the office as soon as possible.    It is strongly recommended that Flu Vaccinations be obtained.      Virtual Visits:  Bianca: This works on smart phones (any phone with Internet browsing capability).  You should get a text message when the provider is ready to see you.  Click on the link in the text message, and the call should start.  You will need to type in your name, and allow camera and microphone access.  This is HIPPA compliant, and secure.      If you have not already done so, get immunized to COVID 19.      We are committed to getting you vaccinated as soon as possible and will be closely following [facility] and [facility] guidelines as they are released and revised.  Please refer to our COVID-19 vaccine webpage for the most up to date information on the vaccine and our distribution efforts.    This site will also have the most up to date recommendations for COVID booster vaccine.    [email or web address]    Call 1-214-JSPMTUH ([phone]), option 7    You can also visit  https://www.vaccines.gov/ to find vaccines in your area.    OUR LOCATION:    [address], [address]  [place], [address] [phone]  Fax: [phone]    Lab services, Rheumatology, and OB/GYN are at this location as well.

## 2025-01-02 NOTE — RESULT ENCOUNTER NOTE
Please let pt know that her bone density has improved from the last one done since her Reclast infusions. Her DEXA score puts her in the improved osteopenia diagnosis instead of osteoporosis. She does not need to see endo and I have ordered a repeat DEXA to do in two years.

## 2025-01-02 NOTE — TELEPHONE ENCOUNTER
Patient seen today and prescribed  Rx: Amoxicillin.  She wanted to know if she should continue taken Rx: Rx:Tamiflu give to her while she was  seen at  12/29/24, she has one more pill left.    She wanted to know is it was also ok to take along with her Rx: Macrodantin prescribed by her Urologist to avoid UTI.      Please review and advice   Thank you

## 2025-01-02 NOTE — PROGRESS NOTES
Name: Hayden Mcintosh      : 1960      MRN: [id number]  Encounter Provider: Billy Blue MD  Encounter Date: 2025   Encounter department: [facility] PRIMARY CARE  :  Assessment & Plan  Bronchitis    Orders:  •  amoxicillin (AMOXIL) 875 mg tablet; Take 1 tablet (875 mg total) by mouth 2 (two) times a day for 10 days    Nausea    Orders:  •  ondansetron (ZOFRAN-ODT) 4 mg disintegrating tablet; Take 1 tablet (4 mg total) by mouth every 6 (six) hours as needed for nausea or vomiting    Essential hypertension  Blood pressure doing well today.  It was also normal at urgent care.  Follow-up as needed.             1. Bronchitis  Comments:  seems a bit better. Less cough and HA. Consider abx.  Orders:  -     amoxicillin (AMOXIL) 875 mg tablet; Take 1 tablet (875 mg total) by mouth 2 (two) times a day for 10 days  2. Nausea  Comments:  Nausea just now, none before.  Consider Zofran. Will send. BRAT diet, fluids.  Orders:  -     ondansetron (ZOFRAN-ODT) 4 mg disintegrating tablet; Take 1 tablet (4 mg total) by mouth every 6 (six) hours as needed for nausea or vomiting  3. Essential hypertension  Assessment & Plan:  Blood pressure doing well today.  It was also normal at urgent care.  Follow-up as needed.         Chief Complaint   Patient presents with   • URI     Pt states that she thinks she has a respiratory infection pt went to urgent care , they gave her medication. Pt states that is getting a little better but this morning she woke up terrible. Pt has a earaches and headaches. YB            History of Present Illness     Patient was recently at urgent care, please see that note.    Was initially feeling better, but now feels a bit more nausea, GI upset.  Seems to be coming and going.  This morning she did have some right upper quadrant pain as well.      Review of Systems   Constitutional:  Positive for activity change, appetite change and fatigue.   HENT:  Positive for congestion.   "  Respiratory:  Positive for cough. Negative for shortness of breath and wheezing.    Cardiovascular: Negative.    Gastrointestinal:  Positive for abdominal pain and nausea. Negative for diarrhea and vomiting.       Objective   /84 (BP Location: Left arm, Patient Position: Sitting, Cuff Size: Large)   Pulse 73   Temp 98 °F (36.7 °C) (Temporal)   Resp 16   Ht 5' 2\" (1.575 m)   Wt 63.5 kg (140 lb)   SpO2 98%   BMI 25.61 kg/m²      Physical Exam  Vitals and nursing note reviewed.   Constitutional:       Appearance: Normal appearance. She is well-developed. She is ill-appearing.   HENT:      Head: Normocephalic and atraumatic.   Cardiovascular:      Rate and Rhythm: Normal rate and regular rhythm.      Pulses:           Carotid pulses are 2+ on the right side and 2+ on the left side.     Heart sounds: Normal heart sounds.   Pulmonary:      Effort: Pulmonary effort is normal.      Breath sounds: Normal breath sounds. No wheezing or rales.   Chest:      Chest wall: No tenderness.   Abdominal:      General: Abdomen is flat. Bowel sounds are normal.      Palpations: Abdomen is soft.      Tenderness: There is abdominal tenderness in the right upper quadrant. There is no right CVA tenderness, left CVA tenderness, guarding or rebound. Negative signs include Duncan's sign, McBurney's sign and psoas sign.       Neurological:      Mental Status: She is alert.         "

## 2025-01-06 ENCOUNTER — TELEPHONE (OUTPATIENT)
Age: 65
End: 2025-01-06

## 2025-01-06 ENCOUNTER — OFFICE VISIT (OUTPATIENT)
Dept: FAMILY MEDICINE CLINIC | Facility: CLINIC | Age: 65
End: 2025-01-06
Payer: COMMERCIAL

## 2025-01-06 VITALS
DIASTOLIC BLOOD PRESSURE: 80 MMHG | HEIGHT: 62 IN | SYSTOLIC BLOOD PRESSURE: 122 MMHG | TEMPERATURE: 97 F | WEIGHT: 143.7 LBS | BODY MASS INDEX: 26.44 KG/M2 | OXYGEN SATURATION: 95 % | HEART RATE: 70 BPM

## 2025-01-06 DIAGNOSIS — J40 BRONCHITIS: Primary | ICD-10-CM

## 2025-01-06 PROCEDURE — 99214 OFFICE O/P EST MOD 30 MIN: CPT | Performed by: PHYSICIAN ASSISTANT

## 2025-01-06 PROCEDURE — G2211 COMPLEX E/M VISIT ADD ON: HCPCS | Performed by: PHYSICIAN ASSISTANT

## 2025-01-06 RX ORDER — DEXTROMETHORPHAN HYDROBROMIDE AND PROMETHAZINE HYDROCHLORIDE 15; 6.25 MG/5ML; MG/5ML
5 SYRUP ORAL 4 TIMES DAILY PRN
Qty: 118 ML | Refills: 1 | Status: SHIPPED | OUTPATIENT
Start: 2025-01-06

## 2025-01-06 RX ORDER — METHYLPREDNISOLONE 4 MG/1
TABLET ORAL
Qty: 21 EACH | Refills: 0 | Status: SHIPPED | OUTPATIENT
Start: 2025-01-06

## 2025-01-06 NOTE — PATIENT INSTRUCTIONS
Bronchitis  Continue and finish antibiotic as directed.  Patient would benefit from prescription cough medicine as needed and also Medrol Dosepak.  She is allergic to prednisone getting very nauseous and it makes her mentally unstable however she has used Medrol without any difficulty.  Patient is slowly getting better and may have pulled a abdominal muscle with excessive coughing so time and rest with an increase in fluids.  Orders:    methylPREDNISolone 4 MG tablet therapy pack; Use as directed on package    promethazine-dextromethorphan (PHENERGAN-DM) 6.25-15 mg/5 mL oral syrup; Take 5 mL by mouth 4 (four) times a day as needed for cough

## 2025-01-06 NOTE — PROGRESS NOTES
"Name: Hayden Mcintosh      : 1960      MRN: [id number]  Encounter Provider: Maame Keenan PA-C  Encounter Date: 2025   Encounter department: [facility] PRIMARY CARE  :  Assessment & Plan  Bronchitis  Continue and finish antibiotic as directed.  Patient would benefit from prescription cough medicine as needed and also Medrol Dosepak.  She is allergic to prednisone getting very nauseous and it makes her mentally unstable however she has used Medrol without any difficulty.  Patient is slowly getting better and may have pulled a abdominal muscle with excessive coughing so time and rest with an increase in fluids.  Orders:  •  methylPREDNISolone 4 MG tablet therapy pack; Use as directed on package  •  promethazine-dextromethorphan (PHENERGAN-DM) 6.25-15 mg/5 mL oral syrup; Take 5 mL by mouth 4 (four) times a day as needed for cough           History of Present Illness     Patient presents with:  Follow-up: Saw 25 for Bronchitis. Pain in lower right side abdomen. Taking aleve. Still has cough- more dry now          Review of Systems   Respiratory:  Positive for cough.        Objective   /80 (BP Location: Right arm, Patient Position: Sitting, Cuff Size: Standard)   Pulse 70   Temp (!) 97 °F (36.1 °C) (Tympanic)   Ht 5' 2\" (1.575 m)   Wt 65.2 kg (143 lb 11.2 oz)   SpO2 95%   BMI 26.28 kg/m²      Physical Exam  Vitals and nursing note reviewed.   Constitutional:       Appearance: Normal appearance. She is well-developed.   HENT:      Head: Normocephalic and atraumatic.   Eyes:      General: Lids are normal.      Conjunctiva/sclera: Conjunctivae normal.      Pupils: Pupils are equal, round, and reactive to light.   Cardiovascular:      Rate and Rhythm: Normal rate and regular rhythm.      Heart sounds: No murmur heard.  Pulmonary:      Effort: Pulmonary effort is normal.      Breath sounds: Normal breath sounds.      Comments: Cough is thick deep coarse with some rough breath sounds but " not technically any wheezing or rhonchi.  Abdominal:          Comments: Slight discomfort with coughing to the depicted area but no evidence of herniation palpated.   Skin:     General: Skin is warm and dry.   Neurological:      General: No focal deficit present.      Mental Status: She is alert.      Coordination: Coordination is intact.   Psychiatric:         Mood and Affect: Mood normal.         Behavior: Behavior normal. Behavior is cooperative.         Thought Content: Thought content normal.         Judgment: Judgment normal.

## 2025-01-21 DIAGNOSIS — J45.20 MILD INTERMITTENT ASTHMA WITHOUT COMPLICATION: ICD-10-CM

## 2025-01-21 RX ORDER — ALBUTEROL SULFATE 90 UG/1
2 INHALANT RESPIRATORY (INHALATION) EVERY 6 HOURS PRN
Qty: 18 G | Refills: 5 | Status: SHIPPED | OUTPATIENT
Start: 2025-01-21

## 2025-02-03 ENCOUNTER — OFFICE VISIT (OUTPATIENT)
Dept: FAMILY MEDICINE CLINIC | Facility: CLINIC | Age: 65
End: 2025-02-03
Payer: COMMERCIAL

## 2025-02-03 VITALS
HEART RATE: 71 BPM | HEIGHT: 62 IN | WEIGHT: 143 LBS | SYSTOLIC BLOOD PRESSURE: 130 MMHG | DIASTOLIC BLOOD PRESSURE: 90 MMHG | OXYGEN SATURATION: 99 % | BODY MASS INDEX: 26.31 KG/M2 | TEMPERATURE: 98 F

## 2025-02-03 DIAGNOSIS — F41.8 DEPRESSION WITH ANXIETY: ICD-10-CM

## 2025-02-03 DIAGNOSIS — I10 ESSENTIAL HYPERTENSION: ICD-10-CM

## 2025-02-03 DIAGNOSIS — J40 BRONCHITIS: Primary | ICD-10-CM

## 2025-02-03 PROCEDURE — 99214 OFFICE O/P EST MOD 30 MIN: CPT | Performed by: PHYSICIAN ASSISTANT

## 2025-02-03 PROCEDURE — G2211 COMPLEX E/M VISIT ADD ON: HCPCS | Performed by: PHYSICIAN ASSISTANT

## 2025-02-03 RX ORDER — DEXTROMETHORPHAN HYDROBROMIDE AND PROMETHAZINE HYDROCHLORIDE 15; 6.25 MG/5ML; MG/5ML
5 SYRUP ORAL 4 TIMES DAILY PRN
Qty: 118 ML | Refills: 1 | Status: SHIPPED | OUTPATIENT
Start: 2025-02-03

## 2025-02-03 RX ORDER — AZITHROMYCIN 250 MG/1
TABLET, FILM COATED ORAL
Qty: 6 TABLET | Refills: 0 | Status: SHIPPED | OUTPATIENT
Start: 2025-02-03 | End: 2025-02-08

## 2025-02-03 NOTE — PROGRESS NOTES
Name: Hayden Mcintosh      : 1960      MRN: [id number]  Encounter Provider: Driss Degroot PA-C  Encounter Date: 2/3/2025   Encounter department: [facility] PRIMARY CARE  :  Assessment & Plan  Bronchitis  Would recommend Zithromax Z-BRISEYDA as directed.  If she is not improving in the next week consider inhaled steroid such as Advair twice daily for 2 to 4 weeks for residual inflammation.  Patient verbalizes understanding and agreement with plan.  Consider chest x-ray if persisting as well.  Orders:  •  azithromycin (Zithromax) 250 mg tablet; Take 2 tablets (500 mg total) by mouth daily for 1 day, THEN 1 tablet (250 mg total) daily for 4 days.  •  promethazine-dextromethorphan (PHENERGAN-DM) 6.25-15 mg/5 mL oral syrup; Take 5 mL by mouth 4 (four) times a day as needed for cough    Depression with anxiety  Stable on duloxetine, no medication changes.       Essential hypertension  Presently stable on atenolol, recommend avoiding decongestants.  She may continue with Coricidin HBP.             Depression Screening and Follow-up Plan: Patient was screened for depression during today's encounter. They screened negative with a PHQ-9 score of 0.      History of Present Illness   HPI: This is a 64-year-old female who presents to the office, feeling poorly since late December.  She was initially seen in the urgent care setting and diagnosed with influenza.  She was treated with Tamiflu but did not feel any better.  She then came to the office and was given a 10-day course of amoxicillin.  After she continued to cough with more productive mucus she was eventually given prednisone and promethazine which seemed to help temporarily.  She still feels that she has an infection and is coughing thicker green mucus.  She is feeling fatigued and rundown.  She is not having much sinus congestion or sore throat and she denies fever with the illness.      Review of Systems   Constitutional:  Positive for activity change.  "Negative for chills, fatigue and fever.   HENT:  Positive for postnasal drip, rhinorrhea and sore throat. Negative for congestion, ear pain and sinus pressure.    Eyes:  Negative for visual disturbance.   Respiratory:  Positive for cough. Negative for chest tightness, shortness of breath and wheezing.    Cardiovascular:  Negative for chest pain and palpitations.   Gastrointestinal:  Negative for diarrhea, nausea and vomiting.   Endocrine: Negative for polyuria.   Genitourinary:  Negative for dysuria and frequency.   Musculoskeletal:  Negative for arthralgias and myalgias.   Skin:  Negative for pallor and rash.   Neurological:  Negative for dizziness, weakness, light-headedness, numbness and headaches.   Psychiatric/Behavioral:  Negative for agitation, behavioral problems and sleep disturbance.    All other systems reviewed and are negative.      Objective   /90 (BP Location: Left arm, Patient Position: Sitting, Cuff Size: Standard)   Pulse 71   Temp 98 °F (36.7 °C) (Tympanic)   Ht 5' 2\" (1.575 m)   Wt 64.9 kg (143 lb)   SpO2 99%   BMI 26.16 kg/m²      Physical Exam  Constitutional:       General: She is not in acute distress.     Appearance: Normal appearance.   HENT:      Head: Normocephalic and atraumatic.      Right Ear: Tympanic membrane normal.      Left Ear: Tympanic membrane normal.      Nose: No congestion or rhinorrhea.   Eyes:      Conjunctiva/sclera: Conjunctivae normal.      Pupils: Pupils are equal, round, and reactive to light.   Neck:      Vascular: No carotid bruit.   Cardiovascular:      Rate and Rhythm: Normal rate and regular rhythm.      Heart sounds: No murmur heard.  Pulmonary:      Effort: Pulmonary effort is normal. No respiratory distress.      Breath sounds: Normal breath sounds.   Abdominal:      Palpations: Abdomen is soft.   Musculoskeletal:         General: Normal range of motion.      Cervical back: Normal range of motion and neck supple. No muscular tenderness. "   Lymphadenopathy:      Cervical: No cervical adenopathy.   Skin:     General: Skin is warm.      Capillary Refill: Capillary refill takes less than 2 seconds.   Neurological:      General: No focal deficit present.      Mental Status: She is alert and oriented to person, place, and time.   Psychiatric:         Mood and Affect: Mood normal.

## 2025-02-03 NOTE — ASSESSMENT & PLAN NOTE
Presently stable on atenolol, recommend avoiding decongestants.  She may continue with Coricidin HBP.

## 2025-02-07 ENCOUNTER — RA CDI HCC (OUTPATIENT)
Dept: OTHER | Facility: HOSPITAL | Age: 65
End: 2025-02-07

## 2025-02-17 ENCOUNTER — OFFICE VISIT (OUTPATIENT)
Dept: FAMILY MEDICINE CLINIC | Facility: CLINIC | Age: 65
End: 2025-02-17
Payer: COMMERCIAL

## 2025-02-17 VITALS
BODY MASS INDEX: 26.09 KG/M2 | OXYGEN SATURATION: 95 % | WEIGHT: 141.8 LBS | HEIGHT: 62 IN | SYSTOLIC BLOOD PRESSURE: 134 MMHG | HEART RATE: 72 BPM | DIASTOLIC BLOOD PRESSURE: 80 MMHG

## 2025-02-17 DIAGNOSIS — M81.0 AGE-RELATED OSTEOPOROSIS WITHOUT CURRENT PATHOLOGICAL FRACTURE: ICD-10-CM

## 2025-02-17 DIAGNOSIS — I10 ESSENTIAL HYPERTENSION: Primary | ICD-10-CM

## 2025-02-17 DIAGNOSIS — F41.8 DEPRESSION WITH ANXIETY: ICD-10-CM

## 2025-02-17 DIAGNOSIS — R73.9 HYPERGLYCEMIA: ICD-10-CM

## 2025-02-17 DIAGNOSIS — Q24.8 PERICARDIAL CYST ALONG RIGHT CARDIOPHRENIC ANGLE: ICD-10-CM

## 2025-02-17 DIAGNOSIS — M67.40 MUCOID CYST OF JOINT: ICD-10-CM

## 2025-02-17 DIAGNOSIS — E55.9 VITAMIN D DEFICIENCY: ICD-10-CM

## 2025-02-17 DIAGNOSIS — F51.01 PRIMARY INSOMNIA: ICD-10-CM

## 2025-02-17 DIAGNOSIS — E78.2 MIXED HYPERLIPIDEMIA: ICD-10-CM

## 2025-02-17 PROCEDURE — G2211 COMPLEX E/M VISIT ADD ON: HCPCS | Performed by: PHYSICIAN ASSISTANT

## 2025-02-17 PROCEDURE — 99214 OFFICE O/P EST MOD 30 MIN: CPT | Performed by: PHYSICIAN ASSISTANT

## 2025-02-17 NOTE — ASSESSMENT & PLAN NOTE
Saw cardiology and they recommended she consult with cardiothoracic surgery for discussion as to if aspiration or removal is needed.  Patient did not go but is interested in going now.  Order placed.  Orders:  •  Ambulatory Referral to Cardiovascular Surgery; Future

## 2025-02-17 NOTE — PROGRESS NOTES
Name: Hayden Mcintosh      : 1960      MRN: [id number]  Encounter Provider: Maame Keenan PA-C  Encounter Date: 2025   Encounter department: [facility] PRIMARY CARE  :  Assessment & Plan  Essential hypertension  Blood pressure currently stable on Tenormin 25.  Orders:  •  CBC and differential    Vitamin D deficiency  Vitamin D with next labs.  Orders:  •  Vitamin D 25 hydroxy; Future    Mixed hyperlipidemia  Patient is on Lipitor 80 and due for fasting lipid panel.  Orders placed call with results if stable recheck 6 months.  Orders:  •  Lipid Panel with Direct LDL reflex  •  Lipid panel; Future    Hyperglycemia  Patient due for fasting CMP and an A1c.  Call with results.  If stable recheck 6 months.  Orders:  •  Comprehensive metabolic panel  •  Hemoglobin A1C  •  Comprehensive metabolic panel; Future  •  Hemoglobin A1C; Future    Primary insomnia  Continue trazodone 50 at bedtime.  Orders:  •  TSH, 3rd generation with Free T4 reflex    Depression with anxiety  Stable with use of both Cymbalta 60 and Zoloft 25.  No SI or HI.  Orders:  •  TSH, 3rd generation with Free T4 reflex    Age-related osteoporosis without current pathological fracture  Patient had DEXA in  and it shows improvement from her last DEXA after Reclast into the osteopenic range and therefore DEXA is not due until .       Mucoid cyst of joint  Read Ortho hand consult to patient and they did recommend surgery to remove patient declined at the time but now is looking to go forward with this.  Hand surgeon order placed.  Orders:  •  Ambulatory Referral to Orthopedic Surgery; Future    Pericardial cyst along right cardiophrenic angle  Saw cardiology and they recommended she consult with cardiothoracic surgery for discussion as to if aspiration or removal is needed.  Patient did not go but is interested in going now.  Order placed.  Orders:  •  Ambulatory Referral to Cardiovascular Surgery; Future           History of  "Present Illness   Patient presents with:  Follow-up: 6 month fu. Right ankle bruising - burning pain. Went skiing Tuesday.             Review of Systems   Constitutional: Negative.    HENT: Negative.     Eyes: Negative.    Respiratory: Negative.     Cardiovascular: Negative.    Gastrointestinal: Negative.    Endocrine: Negative.    Genitourinary: Negative.    Musculoskeletal: Negative.    Skin: Negative.    Allergic/Immunologic: Negative.    Neurological: Negative.    Hematological: Negative.    Psychiatric/Behavioral: Negative.         Objective   /80 (BP Location: Right arm, Patient Position: Sitting, Cuff Size: Standard)   Pulse 72   Ht 5' 2\" (1.575 m)   Wt 64.3 kg (141 lb 12.8 oz)   SpO2 95%   BMI 25.94 kg/m²      Physical Exam  Vitals and nursing note reviewed.   Constitutional:       Appearance: Normal appearance. She is well-developed.   HENT:      Head: Normocephalic and atraumatic.   Eyes:      General: Lids are normal.      Conjunctiva/sclera: Conjunctivae normal.      Pupils: Pupils are equal, round, and reactive to light.   Cardiovascular:      Rate and Rhythm: Normal rate and regular rhythm.      Heart sounds: No murmur heard.  Pulmonary:      Effort: Pulmonary effort is normal.      Breath sounds: Normal breath sounds.   Musculoskeletal:        Legs:       Comments: Left PIP joint with a significant sized mucoid cyst roughly 1 cm plus with some scabbing on the top suggesting recent break and cyst wall.  Quarter sized hard round ecchymotic contusion on the medial aspect of the right lower leg with some nonswelling ecchymosis around the medial malleolus.  Full range of motion ambulation within normal limits.   Skin:     General: Skin is warm and dry.   Neurological:      General: No focal deficit present.      Mental Status: She is alert.      Coordination: Coordination is intact.   Psychiatric:         Mood and Affect: Mood normal.         Behavior: Behavior normal. Behavior is cooperative.   "       Thought Content: Thought content normal.         Judgment: Judgment normal.

## 2025-02-17 NOTE — ASSESSMENT & PLAN NOTE
Patient is on Lipitor 80 and due for fasting lipid panel.  Orders placed call with results if stable recheck 6 months.  Orders:  •  Lipid Panel with Direct LDL reflex  •  Lipid panel; Future

## 2025-02-17 NOTE — ASSESSMENT & PLAN NOTE
Stable with use of both Cymbalta 60 and Zoloft 25.  No SI or HI.  Orders:  •  TSH, 3rd generation with Free T4 reflex

## 2025-02-17 NOTE — ASSESSMENT & PLAN NOTE
Patient had DEXA in 2024 and it shows improvement from her last DEXA after Reclast into the osteopenic range and therefore DEXA is not due until 2026.

## 2025-02-17 NOTE — ASSESSMENT & PLAN NOTE
Patient due for fasting CMP and an A1c.  Call with results.  If stable recheck 6 months.  Orders:  •  Comprehensive metabolic panel  •  Hemoglobin A1C  •  Comprehensive metabolic panel; Future  •  Hemoglobin A1C; Future

## 2025-02-17 NOTE — PATIENT INSTRUCTIONS
1. Essential hypertension  Assessment & Plan:  Blood pressure currently stable on Tenormin 25.  Orders:    CBC and differential    Orders:  -     CBC and differential  2. Vitamin D deficiency  Assessment & Plan:  Vitamin D with next labs.  Orders:    Vitamin D 25 hydroxy; Future    Orders:  -     Vitamin D 25 hydroxy; Future; Expected date: 08/17/2025  3. Mixed hyperlipidemia  Assessment & Plan:  Patient is on Lipitor 80 and due for fasting lipid panel.  Orders placed call with results if stable recheck 6 months.  Orders:    Lipid Panel with Direct LDL reflex    Lipid panel; Future    Orders:  -     Lipid Panel with Direct LDL reflex  -     Lipid panel; Future; Expected date: 08/17/2025  4. Hyperglycemia  Assessment & Plan:  Patient due for fasting CMP and an A1c.  Call with results.  If stable recheck 6 months.  Orders:    Comprehensive metabolic panel    Hemoglobin A1C    Comprehensive metabolic panel; Future    Hemoglobin A1C; Future    Orders:  -     Comprehensive metabolic panel  -     Hemoglobin A1C  -     Comprehensive metabolic panel; Future; Expected date: 08/17/2025  -     Hemoglobin A1C; Future; Expected date: 08/17/2025  5. Primary insomnia  Assessment & Plan:  Continue trazodone 50 at bedtime.  Orders:    TSH, 3rd generation with Free T4 reflex    Orders:  -     TSH, 3rd generation with Free T4 reflex  6. Depression with anxiety  Assessment & Plan:  Stable with use of both Cymbalta 60 and Zoloft 25.  No SI or HI.  Orders:    TSH, 3rd generation with Free T4 reflex    Orders:  -     TSH, 3rd generation with Free T4 reflex  7. Age-related osteoporosis without current pathological fracture  Assessment & Plan:  Patient had DEXA in 2024 and it shows improvement from her last DEXA after Reclast into the osteopenic range and therefore DEXA is not due until 2026.       8. Mucoid cyst of joint  -     Ambulatory Referral to Orthopedic Surgery; Future  9. Pericardial cyst along right cardiophrenic  angle  Assessment & Plan:    Orders:    Ambulatory Referral to Cardiovascular Surgery; Future    Orders:  -     Ambulatory Referral to Cardiovascular Surgery; Future       Suturegard Body: The suture ends were repeatedly re-tightened and re-clamped to achieve the desired tissue expansion.

## 2025-02-26 ENCOUNTER — OFFICE VISIT (OUTPATIENT)
Dept: OBGYN CLINIC | Facility: MEDICAL CENTER | Age: 65
End: 2025-02-26
Payer: COMMERCIAL

## 2025-02-26 VITALS — HEIGHT: 62 IN | WEIGHT: 141 LBS | BODY MASS INDEX: 25.95 KG/M2

## 2025-02-26 DIAGNOSIS — M15.1 DEGENERATIVE ARTHRITIS OF DISTAL INTERPHALANGEAL JOINT OF MIDDLE FINGER OF LEFT HAND: ICD-10-CM

## 2025-02-26 DIAGNOSIS — M67.40 MUCOID CYST OF JOINT: Primary | ICD-10-CM

## 2025-02-26 PROCEDURE — 99214 OFFICE O/P EST MOD 30 MIN: CPT | Performed by: ORTHOPAEDIC SURGERY

## 2025-02-26 RX ORDER — GABAPENTIN 300 MG/1
300 CAPSULE ORAL ONCE
OUTPATIENT
Start: 2025-02-26 | End: 2025-02-26

## 2025-02-26 RX ORDER — CHLORHEXIDINE GLUCONATE 40 MG/ML
SOLUTION TOPICAL DAILY PRN
OUTPATIENT
Start: 2025-02-26

## 2025-02-26 RX ORDER — ACETAMINOPHEN 325 MG/1
975 TABLET ORAL ONCE
OUTPATIENT
Start: 2025-02-26 | End: 2025-02-26

## 2025-02-26 NOTE — H&P
"    HAND & UPPER EXTREMITY OFFICE VISIT   Referred By:  Maame Keenan Pa-c  [address] [facility]  [address]  [place] [address]      Chief Complaint:     Left long finger pain/mass    Previous History:   64 y.o., left hand dominant female presents with left long finger DIP mucoid cyst that has been present for \"a while\" that recently began to enlarge and become more painful, 4/10 today. Patient was referred by her PCP. Patient reports a long standing history of swelling secondary to arthritis at the long finger DIP joint. She reports she recently wore a Bandaid over the cyst for the past couple days as her cyst was draining. This is her primary compliant during today's visit as this has become painful for her.     Patient reports nodules in the left palm in line with the ring and long fingers with pain radiating into the long finger and long finger clicking. No finger contracture. She also endorses left wrist pain when pushing off with the wrist.      Patient reports history of right CMC OA with pain and clicking with shoulder sign. She reports she has push metagrip brace for with a CSI performed years ago.    Interval History:  Today she reports she continues to have pain to her mass. She often bumps it on surfaces which causes excruciating pain. She reports it does occasionally have mild drainage.     Past Medical History:  Past Medical History:   Diagnosis Date    Anxiety     Depression     Elevated cholesterol     Hypertension     PONV (postoperative nausea and vomiting)     Recurrent UTI      Past Surgical History:   Procedure Laterality Date    BREAST CYST ASPIRATION      DIAGNOSTIC LAPAROSCOPY      Exploratory, last assessed 10/25/16    NASAL SINUS SURGERY      age 33 - FESS and septoplasty - unknown surgeon    [place] STRTCTC CPTR ASSTD PX EXTRADURAL CRANIAL N/A 11/8/2016    Procedure: FUNCTIONAL ENDOSCOPIC SINUS SURGERY (FESS) IMAGED GUIDED;  Surgeon: Tony Morales DO;  Location: [facility];  Service: ENT "    WISDOM TOOTH EXTRACTION       Family History   Problem Relation Age of Onset    Osteoporosis Mother     Coronary artery disease Father         CABG    Diabetes type II Sister     Asthma Sister     Diabetes Sister     Diabetes Sister     No Known Problems Daughter     No Known Problems Maternal Grandmother     Diabetes Maternal Grandfather     No Known Problems Paternal Grandmother     No Known Problems Paternal Grandfather     Bipolar disorder Brother     Depression Brother         with anxiety     Esophageal cancer Brother     Depression Family         with anxiety     Hyperlipidemia Family     Ovarian cancer Maternal Aunt     No Known Problems Maternal Aunt     Breast cancer Neg Hx     Colon cancer Neg Hx     Uterine cancer Neg Hx      Social History     Socioeconomic History    Marital status: /Civil Union     Spouse name: Not on file    Number of children: Not on file    Years of education: Associates degree    Highest education level: Not on file   Occupational History    Not on file   Tobacco Use    Smoking status: Never     Passive exposure: Never    Smokeless tobacco: Never    Tobacco comments:     No secondhand smoke exposure    Vaping Use    Vaping status: Never Used   Substance and Sexual Activity    Alcohol use: No    Drug use: No    Sexual activity: Yes     Partners: Male     Birth control/protection: Post-menopausal     Comment: same partner, 43 yrs   Other Topics Concern    Not on file   Social History Narrative    Employed     Lives with spouse     No caffeine use      Social Drivers of Health     Financial Resource Strain: Not on file   Food Insecurity: No Food Insecurity (8/19/2024)    Nursing - Inadequate Food Risk Classification     Worried About Running Out of Food in the Last Year: Never true     Ran Out of Food in the Last Year: Never true     Ran Out of Food in the Last Year: Not on file   Transportation Needs: No Transportation Needs (8/19/2024)    PRAPARE - Transportation     Lack  "of Transportation (Medical): No     Lack of Transportation (Non-Medical): No   Physical Activity: Not on file   Stress: Not on file   Social Connections: Not on file   Intimate Partner Violence: Not on file   Housing Stability: Low Risk  (8/19/2024)    Housing Stability Vital Sign     Unable to Pay for Housing in the Last Year: No     Number of Times Moved in the Last Year: 0     Homeless in the Last Year: No     Scheduled Meds:  Continuous Infusions:No current facility-administered medications for this visit.    PRN Meds:.  Allergies   Allergen Reactions    Benadryl [Diphenhydramine] Other (See Comments)     Cannot sleep    Claritin [Loratadine] Other (See Comments)     Tremors in the arms    Fexofenadine-Pseudoephed Er Other (See Comments)     \"didn't feel good\"    \"didn't feel good\"      Pollen Extract     Prednisone Nausea Only    Singulair [Montelukast]      Did not feel well, no particular assistance.       Physical Examination:    Ht 5' 2\" (1.575 m)   Wt 64 kg (141 lb)   BMI 25.79 kg/m²     Gen: A&Ox3, NAD  Cardiac: regular rate  Chest: non labored breathing  Abdomen: Non-distended    Left Upper Extremity:  Skin CDI  No obvious deformity of the shoulder, arm, elbow, forearm, wrist, hand  Mucoid cyst to dorsum of long finger DIP joint without drainage  Thin overlying skin  TTP over mass  Limited flexion of long finger DIP   Sensation intact to light touch in the axillary median, ulnar, and radial nerve distributions  5/5 motor   2+RP    Studies:  No new imaging.    Assessment and Plan:  1. Mucoid cyst of joint  Ambulatory Referral to Orthopedic Surgery    Case request operating room: LEFT LONG FINGER EXCISION OF MASS    Case request operating room: LEFT LONG FINGER EXCISION OF MASS      2. 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 female presents in follow up for the above diagnosis. At this time she is having persistent pain of her mucoid cyst " and she would like to proceed with surgical excision. We discussed that surgery would include excision of the cyst and removal of any prominent osteophytes, as well as possible need for soft-tissue rearrangement depending on the viability over the skin overlying the cyst. We discussed risks of surgery including persistent pain, recurrence of cyst, stiffness, wound healing difficulty and infection. Discussed pre, jose alejandro and post operative course. Informed consent for surgery obtained today. All questions were answered.    Left long finger mucous cyst excision  Local anesthetic    she expressed understanding of the plan and agreed. We encouraged them to contact our office with any questions or concerns.       Greyson Hess MD  Hand and Upper Extremity Surgery      *This note was dictated using Dragon voice recognition software. Please excuse any word substitutions or errors.*

## 2025-02-26 NOTE — PROGRESS NOTES
"    HAND & UPPER EXTREMITY OFFICE VISIT   Referred By:  Maame Keenan Pa-c  [address] [facility]  [address]  [place] [address]      Chief Complaint:     Left long finger pain/mass    Previous History:   64 y.o., left hand dominant female presents with left long finger DIP mucoid cyst that has been present for \"a while\" that recently began to enlarge and become more painful, 4/10 today. Patient was referred by her PCP. Patient reports a long standing history of swelling secondary to arthritis at the long finger DIP joint. She reports she recently wore a Bandaid over the cyst for the past couple days as her cyst was draining. This is her primary compliant during today's visit as this has become painful for her.     Patient reports nodules in the left palm in line with the ring and long fingers with pain radiating into the long finger and long finger clicking. No finger contracture. She also endorses left wrist pain when pushing off with the wrist.      Patient reports history of right CMC OA with pain and clicking with shoulder sign. She reports she has push metagrip brace for with a CSI performed years ago.    Interval History:  Today she reports she continues to have pain to her mass. She often bumps it on surfaces which causes excruciating pain. She reports it does occasionally have mild drainage.     Past Medical History:  Past Medical History:   Diagnosis Date    Anxiety     Depression     Elevated cholesterol     Hypertension     PONV (postoperative nausea and vomiting)     Recurrent UTI      Past Surgical History:   Procedure Laterality Date    BREAST CYST ASPIRATION      DIAGNOSTIC LAPAROSCOPY      Exploratory, last assessed 10/25/16    NASAL SINUS SURGERY      age 33 - FESS and septoplasty - unknown surgeon    [place] STRTCTC CPTR ASSTD PX EXTRADURAL CRANIAL N/A 11/8/2016    Procedure: FUNCTIONAL ENDOSCOPIC SINUS SURGERY (FESS) IMAGED GUIDED;  Surgeon: Tony Morales DO;  Location: [facility];  Service: ENT "    WISDOM TOOTH EXTRACTION       Family History   Problem Relation Age of Onset    Osteoporosis Mother     Coronary artery disease Father         CABG    Diabetes type II Sister     Asthma Sister     Diabetes Sister     Diabetes Sister     No Known Problems Daughter     No Known Problems Maternal Grandmother     Diabetes Maternal Grandfather     No Known Problems Paternal Grandmother     No Known Problems Paternal Grandfather     Bipolar disorder Brother     Depression Brother         with anxiety     Esophageal cancer Brother     Depression Family         with anxiety     Hyperlipidemia Family     Ovarian cancer Maternal Aunt     No Known Problems Maternal Aunt     Breast cancer Neg Hx     Colon cancer Neg Hx     Uterine cancer Neg Hx      Social History     Socioeconomic History    Marital status: /Civil Union     Spouse name: Not on file    Number of children: Not on file    Years of education: Associates degree    Highest education level: Not on file   Occupational History    Not on file   Tobacco Use    Smoking status: Never     Passive exposure: Never    Smokeless tobacco: Never    Tobacco comments:     No secondhand smoke exposure    Vaping Use    Vaping status: Never Used   Substance and Sexual Activity    Alcohol use: No    Drug use: No    Sexual activity: Yes     Partners: Male     Birth control/protection: Post-menopausal     Comment: same partner, 43 yrs   Other Topics Concern    Not on file   Social History Narrative    Employed     Lives with spouse     No caffeine use      Social Drivers of Health     Financial Resource Strain: Not on file   Food Insecurity: No Food Insecurity (8/19/2024)    Nursing - Inadequate Food Risk Classification     Worried About Running Out of Food in the Last Year: Never true     Ran Out of Food in the Last Year: Never true     Ran Out of Food in the Last Year: Not on file   Transportation Needs: No Transportation Needs (8/19/2024)    PRAPARE - Transportation     Lack  "of Transportation (Medical): No     Lack of Transportation (Non-Medical): No   Physical Activity: Not on file   Stress: Not on file   Social Connections: Not on file   Intimate Partner Violence: Not on file   Housing Stability: Low Risk  (8/19/2024)    Housing Stability Vital Sign     Unable to Pay for Housing in the Last Year: No     Number of Times Moved in the Last Year: 0     Homeless in the Last Year: No     Scheduled Meds:  Continuous Infusions:No current facility-administered medications for this visit.    PRN Meds:.  Allergies   Allergen Reactions    Benadryl [Diphenhydramine] Other (See Comments)     Cannot sleep    Claritin [Loratadine] Other (See Comments)     Tremors in the arms    Fexofenadine-Pseudoephed Er Other (See Comments)     \"didn't feel good\"    \"didn't feel good\"      Pollen Extract     Prednisone Nausea Only    Singulair [Montelukast]      Did not feel well, no particular assistance.       Physical Examination:    Ht 5' 2\" (1.575 m)   Wt 64 kg (141 lb)   BMI 25.79 kg/m²     Gen: A&Ox3, NAD  Cardiac: regular rate  Chest: non labored breathing  Abdomen: Non-distended    Left Upper Extremity:  Skin CDI  No obvious deformity of the shoulder, arm, elbow, forearm, wrist, hand  Mucoid cyst to dorsum of long finger DIP joint without drainage  Thin overlying skin  TTP over mass  Limited flexion of long finger DIP   Sensation intact to light touch in the axillary median, ulnar, and radial nerve distributions  5/5 motor   2+RP    Studies:  No new imaging.    Assessment and Plan:  1. Mucoid cyst of joint  Ambulatory Referral to Orthopedic Surgery    Case request operating room: LEFT LONG FINGER EXCISION OF MASS    Case request operating room: LEFT LONG FINGER EXCISION OF MASS      2. 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 female presents in follow up for the above diagnosis. At this time she is having persistent pain of her mucoid cyst " and she would like to proceed with surgical excision. We discussed that surgery would include excision of the cyst and removal of any prominent osteophytes, as well as possible need for soft-tissue rearrangement depending on the viability over the skin overlying the cyst. We discussed risks of surgery including persistent pain, recurrence of cyst, stiffness, wound healing difficulty and infection. Discussed pre, jose alejandro and post operative course. Informed consent for surgery obtained today. All questions were answered.    Left long finger mucous cyst excision  Local anesthetic    she expressed understanding of the plan and agreed. We encouraged them to contact our office with any questions or concerns.       Greyson Hess MD  Hand and Upper Extremity Surgery      *This note was dictated using Dragon voice recognition software. Please excuse any word substitutions or errors.*

## 2025-03-04 ENCOUNTER — TELEPHONE (OUTPATIENT)
Dept: OBGYN CLINIC | Facility: HOSPITAL | Age: 65
End: 2025-03-04

## 2025-03-04 NOTE — TELEPHONE ENCOUNTER
Caller: Hayden    Doctor: Dr. Hess    Reason for call: Patient calling to know specifically what these codes are?  She called her insurance to find out how much out of pocket total sedation is vs local.    Codes:    41758    42516    Would like a call.     Call back#: [phone]

## 2025-03-06 DIAGNOSIS — F41.8 DEPRESSION WITH ANXIETY: ICD-10-CM

## 2025-03-07 RX ORDER — SERTRALINE HYDROCHLORIDE 25 MG/1
25 TABLET, FILM COATED ORAL DAILY
Qty: 30 TABLET | Refills: 5 | Status: SHIPPED | OUTPATIENT
Start: 2025-03-07

## 2025-03-19 ENCOUNTER — TELEPHONE (OUTPATIENT)
Age: 65
End: 2025-03-19

## 2025-03-19 NOTE — TELEPHONE ENCOUNTER
Call placed to pt. She did not answer. LMOM asking pt to contact the office to reschedule her appointment. Number was provided for call back.

## 2025-03-19 NOTE — TELEPHONE ENCOUNTER
Pt calling to reschedule f/u appt with Dr Duncan on 3/24/25 due to scheduling conflict.     No other available f/u appts with Dr Duncan and pt is requesting a call back to discuss when this appt could be rescheduled.     Pt cb- [phone]

## 2025-03-20 ENCOUNTER — TELEPHONE (OUTPATIENT)
Age: 65
End: 2025-03-20

## 2025-03-20 NOTE — TELEPHONE ENCOUNTER
Called & left subtle voicemail so that Jackie's message can be conveyed. Yes, patient can be put under for her surgery, per Dr. Hess's PA-C, Jackie.

## 2025-03-20 NOTE — TELEPHONE ENCOUNTER
Caller: Patient    Doctor: Dr. Hess / Maia    Reason for call: Patient is calling to see if she will be put to sleep during her LEFT LONG FINGER EXCISION OF MASS (Left: Finger) on 3/31. Patient states she spoke with her insurance and was told they would cover it. Please advise.     Call back#: [phone]

## 2025-03-20 NOTE — TELEPHONE ENCOUNTER
Caller: patient    Doctor: Dr. Hess    Reason for call:relayed message to patient, patient would like it to be switched to sedation for her procedure on 03/31/2025    Call back#: [phone]

## 2025-03-20 NOTE — PRE-PROCEDURE INSTRUCTIONS
Pre-Surgery Instructions:   Medication Instructions    albuterol (PROVENTIL HFA,VENTOLIN HFA) 90 mcg/act inhaler Uses PRN- OK to take day of surgery    atenolol (TENORMIN) 25 mg tablet Take night before surgery    atorvastatin (LIPITOR) 80 mg tablet Take night before surgery    Cholecalciferol (VITAMIN D-3 PO) Stop taking 7 days prior to surgery.    CRANBERRY PO Stop taking 7 days prior to surgery.    D-MANNOSE PO Stop taking 7 days prior to surgery.    DULoxetine (CYMBALTA) 60 mg delayed release capsule Take day of surgery.    loratadine (CLARITIN) 10 mg tablet Hold day of surgery.    Multiple Vitamin (MULTIVITAMIN) tablet Stop taking 7 days prior to surgery.    Naproxen Sodium (ALEVE PO) Stop taking this medication at least 3 days prior to surgery/procedure    nitrofurantoin (MACRODANTIN) 50 mg capsule Take day of surgery.    sertraline (ZOLOFT) 25 mg tablet Take day of surgery.    traZODone (DESYREL) 50 mg tablet Take night before surgery   Medication instructions for day of surgery reviewed. Please take all instructed medications with only a sip of water.       You will receive a call one business day prior to surgery with an arrival time and hospital directions. If your surgery is scheduled on a Monday, the hospital will be calling you on the Friday prior to your surgery. If you have not heard from anyone by 8pm, please call the hospital supervisor through the hospital  at [phone]. ([place] [phone] or [place] [phone]).    Do not eat or drink anything after midnight the night before your surgery, including candy, mints, lifesavers, or chewing gum. Do not drink alcohol 24hrs before your surgery. Try not to smoke at least 24hrs before your surgery.       Follow the pre surgery showering instructions as listed in the “My Surgical Experience Booklet” or otherwise provided by your surgeon's office. Do not use a blade to shave the surgical area 1 week before surgery. It is okay to use a clean  electric clippers up to 24 hours before surgery. Do not apply any lotions, creams, including makeup, cologne, deodorant, or perfumes after showering on the day of your surgery. Do not use dry shampoo, hair spray, hair gel, or any type of hair products.     No contact lenses, eye make-up, or artificial eyelashes. Remove nail polish, including gel polish, and any artificial, gel, or acrylic nails if possible. Remove all jewelry including rings and body piercing jewelry.     Wear causal clothing that is easy to take on and off. Consider your type of surgery.    Keep any valuables, jewelry, piercings at home. Please bring any specially ordered equipment (sling, braces) if indicated.    Arrange for a responsible person to drive you to and from the hospital on the day of your surgery. Please confirm the visitor policy for the day of your procedure when you receive your phone call with an arrival time.     Call the surgeon's office with any new illnesses, exposures, or additional questions prior to surgery.    Please reference your “My Surgical Experience Booklet” for additional information to prepare for your upcoming surgery.

## 2025-03-24 ENCOUNTER — ANESTHESIA EVENT (OUTPATIENT)
Age: 65
End: 2025-03-24

## 2025-03-24 ENCOUNTER — ANESTHESIA (OUTPATIENT)
Age: 65
End: 2025-03-24

## 2025-03-25 ENCOUNTER — OFFICE VISIT (OUTPATIENT)
Dept: FAMILY MEDICINE CLINIC | Facility: CLINIC | Age: 65
End: 2025-03-25
Payer: COMMERCIAL

## 2025-03-25 VITALS
TEMPERATURE: 98.4 F | SYSTOLIC BLOOD PRESSURE: 104 MMHG | WEIGHT: 140 LBS | HEART RATE: 76 BPM | OXYGEN SATURATION: 94 % | HEIGHT: 62 IN | DIASTOLIC BLOOD PRESSURE: 70 MMHG | BODY MASS INDEX: 25.76 KG/M2

## 2025-03-25 DIAGNOSIS — I10 ESSENTIAL HYPERTENSION: ICD-10-CM

## 2025-03-25 DIAGNOSIS — J30.1 SEASONAL ALLERGIC RHINITIS DUE TO POLLEN: ICD-10-CM

## 2025-03-25 DIAGNOSIS — H10.13 ALLERGIC CONJUNCTIVITIS OF BOTH EYES: Primary | ICD-10-CM

## 2025-03-25 PROCEDURE — G2211 COMPLEX E/M VISIT ADD ON: HCPCS | Performed by: NURSE PRACTITIONER

## 2025-03-25 PROCEDURE — 99214 OFFICE O/P EST MOD 30 MIN: CPT | Performed by: NURSE PRACTITIONER

## 2025-03-25 RX ORDER — OLOPATADINE HYDROCHLORIDE 1 MG/ML
1 SOLUTION/ DROPS OPHTHALMIC 2 TIMES DAILY
Qty: 5 ML | Refills: 2 | Status: SHIPPED | OUTPATIENT
Start: 2025-03-25

## 2025-03-25 NOTE — ASSESSMENT & PLAN NOTE
Patient was not interested in trialing Singulair at this time for treatment of her allergy symptoms.  I did advise her that if Patanol does not control her symptoms it would be a good idea to trial Singulair to see if this helps.

## 2025-03-25 NOTE — PROGRESS NOTES
Name: Hayden Mcintosh      : 1960      MRN: [id number]  Encounter Provider: BRANDON Chua  Encounter Date: 3/25/2025   Encounter department: [facility]  :  Assessment & Plan  Allergic conjunctivitis of both eyes  Patient symptoms are consistent with allergic conjunctivitis.  Patanol was ordered to be used twice daily for treatment.  Orders:  •  olopatadine (PATANOL) 0.1 % ophthalmic solution; Administer 1 drop to both eyes 2 (two) times a day    Essential hypertension  Well-controlled on current regimen.       Seasonal allergic rhinitis due to pollen  Patient was not interested in trialing Singulair at this time for treatment of her allergy symptoms.  I did advise her that if Patanol does not control her symptoms it would be a good idea to trial Singulair to see if this helps.              History of Present Illness   Bilateral allergic conjunctivitis: Patient reports over the past week she has been experiencing bilateral eye swelling, redness, and pruritus.  Patient does have a history of allergic rhinitis however, she is not currently taking any allergy medications as she states that oral antihistamines cause mental fogginess for her.  She is currently receiving weekly allergy shots.    Hypertension: Well-controlled on current regimen.  Patient denies lightheadedness, dizziness, frequent headaches, or vision changes.      Review of Systems   Constitutional:  Negative for chills and fever.   HENT:  Negative for ear pain and sore throat.    Eyes:  Positive for itching (bilateral). Negative for photophobia, pain, discharge, redness and visual disturbance.        Swelling and redness present around bilateral eyes   Respiratory:  Negative for cough, chest tightness, shortness of breath and wheezing.    Cardiovascular:  Negative for chest pain, palpitations and leg swelling.   Gastrointestinal:  Negative for abdominal pain, constipation, diarrhea, nausea and vomiting.   Endocrine:  "Negative for cold intolerance and heat intolerance.   Genitourinary:  Negative for decreased urine volume, dysuria and hematuria.   Musculoskeletal:  Negative for arthralgias, back pain and myalgias.   Skin:  Negative for color change and rash.   Allergic/Immunologic: Positive for environmental allergies.   Neurological:  Negative for dizziness, seizures, syncope, weakness, light-headedness, numbness and headaches.   Hematological:  Negative for adenopathy.   Psychiatric/Behavioral:  Negative for confusion. The patient is not nervous/anxious.    All other systems reviewed and are negative.      Objective   /70   Pulse 76   Temp 98.4 °F (36.9 °C)   Ht 5' 2\" (1.575 m)   Wt 63.5 kg (140 lb)   SpO2 94%   BMI 25.61 kg/m²      Physical Exam  Vitals and nursing note reviewed.   Constitutional:       General: She is not in acute distress.     Appearance: Normal appearance. She is well-developed. She is not ill-appearing.   HENT:      Head: Normocephalic.   Eyes:      Conjunctiva/sclera: Conjunctivae normal.      Right eye: Right conjunctiva is not injected. No chemosis, exudate or hemorrhage.     Left eye: Left conjunctiva is not injected. No chemosis, exudate or hemorrhage.     Comments: Swelling and redness present around bilateral eyes.  Patient does report associated pruritus of the bilateral eyes and some increased tearing as well.  No conjunctival changes were present.   Cardiovascular:      Rate and Rhythm: Normal rate and regular rhythm.      Pulses: Normal pulses.           Carotid pulses are 2+ on the right side and 2+ on the left side.       Radial pulses are 2+ on the right side and 2+ on the left side.        Posterior tibial pulses are 2+ on the right side and 2+ on the left side.      Heart sounds: Normal heart sounds. No murmur heard.  Pulmonary:      Effort: Pulmonary effort is normal. No respiratory distress.      Breath sounds: Normal breath sounds. No decreased breath sounds, wheezing, " rhonchi or rales.   Abdominal:      General: Abdomen is flat. Bowel sounds are normal. There is no distension.      Palpations: Abdomen is soft.      Tenderness: There is no abdominal tenderness. There is no guarding.   Musculoskeletal:         General: No swelling. Normal range of motion.      Cervical back: Normal range of motion and neck supple.      Right lower leg: No edema.      Left lower leg: No edema.   Skin:     General: Skin is warm and dry.      Capillary Refill: Capillary refill takes less than 2 seconds.   Neurological:      General: No focal deficit present.      Mental Status: She is alert and oriented to person, place, and time.   Psychiatric:         Mood and Affect: Mood normal.         Behavior: Behavior normal.         Thought Content: Thought content normal.         Judgment: Judgment normal.

## 2025-03-25 NOTE — ASSESSMENT & PLAN NOTE
Patient symptoms are consistent with allergic conjunctivitis.  Patanol was ordered to be used twice daily for treatment.  Orders:  •  olopatadine (PATANOL) 0.1 % ophthalmic solution; Administer 1 drop to both eyes 2 (two) times a day

## 2025-03-28 ENCOUNTER — ANESTHESIA EVENT (OUTPATIENT)
Age: 65
End: 2025-03-28
Payer: COMMERCIAL

## 2025-03-28 PROBLEM — M67.40 MUCOID CYST OF JOINT: Status: ACTIVE | Noted: 2025-03-28

## 2025-03-28 NOTE — DISCHARGE INSTR - AVS FIRST PAGE
POST-OPERATIVE INSTRUCTIONS  FINGER MASS EXCISION    You have just undergone a finger mass excision by Dr. Hess in the operating room.  It is our wish that your postoperative recovery be as quick and comfortable as possible.  Please carefully review the following items that are important in your recovery.    Pain Control:  After any operation, a certain degree of pain is to be expected.  A prescription for acetaminophen and/or ibuprofen has been electronically sent to your pharmacy. Do not exceed 3000 mg of Tylenol per day. This medication will relieve most of your pain but may not relieve all of the pain. Some pain is normal post operatively.     When you go home, please keep your operated arm elevated at all times (above the level of your heart.)  If you do this, your swelling will be diminished and your pain will be diminished as well.      Dressing Care:  After surgery, make sure that your dressing is kept dry.  You can take a shower if you cover your arm with a plastic bag, such as a newspaper bag, and use tape or rubber bands. If your dressing gets wet you may take it off, place Band-Aids on the wound and re-cover it with sterile dressings which you can obtain at your local drug store.    Please remove your dressing down to the incision 3 days after your surgery. For example, if your had surgery on a Monday, do this on Thursday.  If your surgery was on Thursday, do this on Sunday.   If your dressing gets wet prior to removing it, please take if off and place something clean, or bandaids, on the wound. After removal of your surgical dressing, you may leave the incision open to air or cover with a Band-Aid. You may begin to shower regularly and allow the clean, soapy water to run over the incision site. Do not scrub the incision.     Weight Bearing:  You should NOT bear weight >5lbs through your operative extremity. Do not push off using your operative extremity.     It is ok to move your fingers as tolerated  to prevent stiffness. You may also use your operative hand to assist with light activities of daily living such as putting on clothes, brushing your teeth and eating as tolerated    Please work on these finger range of motions exercises between now and you follow up visit:         Follow Up:  If you don't already have a scheduled postoperative appointment, please call our office for a follow-up appointment. It is best to call the day after surgery to make an appointment in 10-14 days.  At your first postoperative visit, you will be seen by either Dr. Hess, his PA; or one of their associates. The sutures will be removed and you may be asked to see a hand therapist to optimize your functional result. Each of the hand therapists that you will be referred to have received special training in the care of the hand and upper extremity.    When to Call:  It is normal to see minor staining on the hand surgery dressing after surgery. If there is significant bleeding, you are advised to call the office during regular office hours to have this checked.     If you feel that you have a surgical emergency postoperatively that requires immediate attention, please call [phone]. In addition, any emergency can also be handled by the emergency room.      If you have questions regarding your surgery postop that you feel requires attention, please call the office.   If this occurs after our regular office hours, there is a message with instructions to talk to the physician on call.

## 2025-03-31 ENCOUNTER — ANESTHESIA (OUTPATIENT)
Age: 65
End: 2025-03-31
Payer: COMMERCIAL

## 2025-03-31 ENCOUNTER — HOSPITAL ENCOUNTER (OUTPATIENT)
Age: 65
Setting detail: OUTPATIENT SURGERY
Discharge: HOME | End: 2025-03-31
Attending: ORTHOPAEDIC SURGERY | Admitting: ORTHOPAEDIC SURGERY
Payer: COMMERCIAL

## 2025-03-31 VITALS
TEMPERATURE: 98 F | BODY MASS INDEX: 25.4 KG/M2 | OXYGEN SATURATION: 92 % | HEIGHT: 62 IN | SYSTOLIC BLOOD PRESSURE: 139 MMHG | HEART RATE: 70 BPM | DIASTOLIC BLOOD PRESSURE: 90 MMHG | RESPIRATION RATE: 28 BRPM | WEIGHT: 138 LBS

## 2025-03-31 DIAGNOSIS — Z47.89 AFTERCARE FOLLOWING SURGERY OF THE MUSCULOSKELETAL SYSTEM: ICD-10-CM

## 2025-03-31 DIAGNOSIS — M67.40 MUCOID CYST OF JOINT: Primary | ICD-10-CM

## 2025-03-31 PROCEDURE — 26160 REMOVE TENDON SHEATH LESION: CPT | Performed by: ORTHOPAEDIC SURGERY

## 2025-03-31 PROCEDURE — 26160 REMOVE TENDON SHEATH LESION: CPT

## 2025-03-31 PROCEDURE — NC001 PR NO CHARGE: Performed by: ORTHOPAEDIC SURGERY

## 2025-03-31 RX ORDER — GABAPENTIN 300 MG/1
300 CAPSULE ORAL ONCE
Status: COMPLETED | OUTPATIENT
Start: 2025-03-31 | End: 2025-03-31

## 2025-03-31 RX ORDER — HYDROMORPHONE HCL/PF 1 MG/ML
0.5 SYRINGE (ML) INJECTION
Status: DISCONTINUED | OUTPATIENT
Start: 2025-03-31 | End: 2025-03-31 | Stop reason: HOSPADM

## 2025-03-31 RX ORDER — LABETALOL HYDROCHLORIDE 5 MG/ML
10 INJECTION, SOLUTION INTRAVENOUS
Status: DISCONTINUED | OUTPATIENT
Start: 2025-03-31 | End: 2025-03-31 | Stop reason: HOSPADM

## 2025-03-31 RX ORDER — HYDROMORPHONE HCL IN WATER/PF 6 MG/30 ML
0.2 PATIENT CONTROLLED ANALGESIA SYRINGE INTRAVENOUS
Status: DISCONTINUED | OUTPATIENT
Start: 2025-03-31 | End: 2025-03-31 | Stop reason: HOSPADM

## 2025-03-31 RX ORDER — PROMETHAZINE HYDROCHLORIDE 25 MG/ML
12.5 INJECTION, SOLUTION INTRAMUSCULAR; INTRAVENOUS ONCE AS NEEDED
Status: DISCONTINUED | OUTPATIENT
Start: 2025-03-31 | End: 2025-03-31 | Stop reason: HOSPADM

## 2025-03-31 RX ORDER — IBUPROFEN 800 MG/1
800 TABLET, FILM COATED ORAL EVERY 8 HOURS
Qty: 30 TABLET | Refills: 0 | Status: SHIPPED | OUTPATIENT
Start: 2025-03-31

## 2025-03-31 RX ORDER — METOCLOPRAMIDE HYDROCHLORIDE 5 MG/ML
10 INJECTION INTRAMUSCULAR; INTRAVENOUS ONCE AS NEEDED
Status: DISCONTINUED | OUTPATIENT
Start: 2025-03-31 | End: 2025-03-31 | Stop reason: HOSPADM

## 2025-03-31 RX ORDER — ACETAMINOPHEN 325 MG/1
650 TABLET ORAL EVERY 6 HOURS PRN
Status: DISCONTINUED | OUTPATIENT
Start: 2025-03-31 | End: 2025-03-31 | Stop reason: HOSPADM

## 2025-03-31 RX ORDER — IBUPROFEN 600 MG/1
600 TABLET, FILM COATED ORAL EVERY 6 HOURS PRN
Status: DISCONTINUED | OUTPATIENT
Start: 2025-03-31 | End: 2025-03-31 | Stop reason: HOSPADM

## 2025-03-31 RX ORDER — ALBUTEROL SULFATE 0.83 MG/ML
2.5 SOLUTION RESPIRATORY (INHALATION) ONCE
Status: COMPLETED | OUTPATIENT
Start: 2025-03-31 | End: 2025-03-31

## 2025-03-31 RX ORDER — FENTANYL CITRATE/PF 50 MCG/ML
25 SYRINGE (ML) INJECTION
Status: DISCONTINUED | OUTPATIENT
Start: 2025-03-31 | End: 2025-03-31 | Stop reason: HOSPADM

## 2025-03-31 RX ORDER — ONDANSETRON 2 MG/ML
4 INJECTION INTRAMUSCULAR; INTRAVENOUS ONCE AS NEEDED
Status: DISCONTINUED | OUTPATIENT
Start: 2025-03-31 | End: 2025-03-31 | Stop reason: HOSPADM

## 2025-03-31 RX ORDER — ACETAMINOPHEN 500 MG
1000 TABLET ORAL EVERY 8 HOURS
Qty: 60 TABLET | Refills: 0 | Status: SHIPPED | OUTPATIENT
Start: 2025-03-31

## 2025-03-31 RX ORDER — FENTANYL CITRATE 50 UG/ML
INJECTION, SOLUTION INTRAMUSCULAR; INTRAVENOUS AS NEEDED
Status: DISCONTINUED | OUTPATIENT
Start: 2025-03-31 | End: 2025-03-31

## 2025-03-31 RX ORDER — SODIUM CHLORIDE, SODIUM LACTATE, POTASSIUM CHLORIDE, CALCIUM CHLORIDE 600; 310; 30; 20 MG/100ML; MG/100ML; MG/100ML; MG/100ML
125 INJECTION, SOLUTION INTRAVENOUS CONTINUOUS
Status: DISCONTINUED | OUTPATIENT
Start: 2025-03-31 | End: 2025-03-31 | Stop reason: HOSPADM

## 2025-03-31 RX ORDER — HYDRALAZINE HYDROCHLORIDE 20 MG/ML
5 INJECTION INTRAMUSCULAR; INTRAVENOUS
Status: DISCONTINUED | OUTPATIENT
Start: 2025-03-31 | End: 2025-03-31 | Stop reason: HOSPADM

## 2025-03-31 RX ORDER — MAGNESIUM HYDROXIDE 1200 MG/15ML
LIQUID ORAL AS NEEDED
Status: DISCONTINUED | OUTPATIENT
Start: 2025-03-31 | End: 2025-03-31 | Stop reason: HOSPADM

## 2025-03-31 RX ORDER — CHLORHEXIDINE GLUCONATE 40 MG/ML
SOLUTION TOPICAL DAILY PRN
Status: DISCONTINUED | OUTPATIENT
Start: 2025-03-31 | End: 2025-03-31 | Stop reason: HOSPADM

## 2025-03-31 RX ORDER — MIDAZOLAM HYDROCHLORIDE 2 MG/2ML
INJECTION, SOLUTION INTRAMUSCULAR; INTRAVENOUS AS NEEDED
Status: DISCONTINUED | OUTPATIENT
Start: 2025-03-31 | End: 2025-03-31

## 2025-03-31 RX ORDER — ALBUTEROL SULFATE 0.83 MG/ML
2.5 SOLUTION RESPIRATORY (INHALATION) ONCE AS NEEDED
Status: DISCONTINUED | OUTPATIENT
Start: 2025-03-31 | End: 2025-03-31 | Stop reason: HOSPADM

## 2025-03-31 RX ORDER — SODIUM CHLORIDE, SODIUM LACTATE, POTASSIUM CHLORIDE, CALCIUM CHLORIDE 600; 310; 30; 20 MG/100ML; MG/100ML; MG/100ML; MG/100ML
75 INJECTION, SOLUTION INTRAVENOUS CONTINUOUS
Status: DISCONTINUED | OUTPATIENT
Start: 2025-03-31 | End: 2025-03-31 | Stop reason: HOSPADM

## 2025-03-31 RX ORDER — PROPOFOL 10 MG/ML
INJECTION, EMULSION INTRAVENOUS CONTINUOUS PRN
Status: DISCONTINUED | OUTPATIENT
Start: 2025-03-31 | End: 2025-03-31

## 2025-03-31 RX ORDER — ACETAMINOPHEN 325 MG/1
975 TABLET ORAL ONCE
Status: COMPLETED | OUTPATIENT
Start: 2025-03-31 | End: 2025-03-31

## 2025-03-31 RX ORDER — ONDANSETRON 2 MG/ML
INJECTION INTRAMUSCULAR; INTRAVENOUS AS NEEDED
Status: DISCONTINUED | OUTPATIENT
Start: 2025-03-31 | End: 2025-03-31

## 2025-03-31 RX ADMIN — ONDANSETRON 4 MG: 2 INJECTION INTRAMUSCULAR; INTRAVENOUS at 07:30

## 2025-03-31 RX ADMIN — ACETAMINOPHEN 975 MG: 325 TABLET ORAL at 06:21

## 2025-03-31 RX ADMIN — MIDAZOLAM 2 MG: 1 INJECTION INTRAMUSCULAR; INTRAVENOUS at 07:20

## 2025-03-31 RX ADMIN — ALBUTEROL SULFATE 2.5 MG: 2.5 SOLUTION RESPIRATORY (INHALATION) at 07:03

## 2025-03-31 RX ADMIN — GABAPENTIN 300 MG: 300 CAPSULE ORAL at 06:21

## 2025-03-31 RX ADMIN — SODIUM CHLORIDE, SODIUM LACTATE, POTASSIUM CHLORIDE, AND CALCIUM CHLORIDE 75 ML/HR: .6; .31; .03; .02 INJECTION, SOLUTION INTRAVENOUS at 06:23

## 2025-03-31 RX ADMIN — FENTANYL CITRATE 25 MCG: 50 INJECTION INTRAMUSCULAR; INTRAVENOUS at 07:20

## 2025-03-31 RX ADMIN — PROPOFOL 60 MCG/KG/MIN: 10 INJECTION, EMULSION INTRAVENOUS at 07:25

## 2025-03-31 NOTE — ANESTHESIA PREPROCEDURE EVALUATION
Procedure:  LEFT LONG FINGER EXCISION OF MASS (Left: Finger)    Relevant Problems   ANESTHESIA   (+) PONV (postoperative nausea and vomiting)      CARDIO   (+) Essential hypertension   (+) Mixed hyperlipidemia      /RENAL   (+) Calculus of kidney      MUSCULOSKELETAL   (+) Degenerative joint disease   (+) Low back pain      NEURO/PSYCH   (+) Depression with anxiety      Behavioral Health   (+) Attention deficit hyperactivity disorder      Rheumatology   (+) Mucoid cyst of joint      Orthopedic/Musculoskeletal   (+) Age-related osteoporosis without current pathological fracture      Dermatology   (+) Allergic contact dermatitis        Physical Exam    Airway    Mallampati score: II  TM Distance: >3 FB  Neck ROM: full     Dental   No notable dental hx     Cardiovascular  Rhythm: regular, Rate: normal, Cardiovascular exam normal    Pulmonary  Pulmonary exam normal Breath sounds clear to auscultation    Other Findings  post-pubertal.      Anesthesia Plan  ASA Score- 2     Anesthesia Type- IV sedation with anesthesia with ASA Monitors.         Additional Monitors:     Airway Plan:            Plan Factors-Exercise tolerance (METS): >4 METS.    Chart reviewed.   Existing labs reviewed. Patient summary reviewed.    Patient is not a current smoker.              Induction-     Postoperative Plan-         Informed Consent- Anesthetic plan and risks discussed with patient.  I personally reviewed this patient with the CRNA. Discussed and agreed on the Anesthesia Plan with the CRNA..      NPO Status:  No vitals data found for the desired time range.

## 2025-03-31 NOTE — ANESTHESIA POSTPROCEDURE EVALUATION
Post-Op Assessment Note    Last Filed PACU Vitals:  Vitals Value Taken Time   Temp 98 °F (36.7 °C) 03/31/25 0815   Pulse 73 03/31/25 0815   /91 03/31/25 0815   Resp 31 03/31/25 0815   SpO2 92 % 03/31/25 0815       Modified Deangelo:     Vitals Value Taken Time   Activity 2 03/31/25 0815   Respiration 2 03/31/25 0815   Circulation 2 03/31/25 0815   Consciousness 2 03/31/25 0815   Oxygen Saturation 2 03/31/25 0815     Modified Deangelo Score: 10

## 2025-03-31 NOTE — ANESTHESIA POSTPROCEDURE EVALUATION
Post-Op Assessment Note    CV Status:  Stable  Pain Score: 0    Pain management: adequate       Mental Status:  Awake and sleepy   Hydration Status:  Euvolemic   PONV Controlled:  Controlled   Airway Patency:  Patent     Post Op Vitals Reviewed: Yes    No anethesia notable event occurred.    Staff: Anesthesiologist, CRNA           Last Filed PACU Vitals:  Vitals Value Taken Time   Temp     Pulse 79 03/31/25 0758   /81 03/31/25 0758   Resp 34 03/31/25 0758   SpO2 99 % 03/31/25 0758   Vitals shown include unfiled device data.

## 2025-03-31 NOTE — INTERVAL H&P NOTE
H&P reviewed. After examining the patient I find no changes in the patients condition since the H&P had been written.    Vitals:    03/31/25 0612   BP: 122/67   Pulse: 61   Resp: (!) 25   Temp: 97.5 °F (36.4 °C)   SpO2: 95%

## 2025-03-31 NOTE — H&P
"    HAND & UPPER EXTREMITY OFFICE VISIT   Referred By:  Maame Keenan Pa-c  [address]  [address]  [place] [address]      Chief Complaint:     Left long finger pain/mass    Previous History:   64 y.o., left hand dominant female presents with left long finger DIP mucoid cyst that has been present for \"a while\" that recently began to enlarge and become more painful, 4/10 today. Patient was referred by her PCP. Patient reports a long standing history of swelling secondary to arthritis at the long finger DIP joint. She reports she recently wore a Bandaid over the cyst for the past couple days as her cyst was draining. This is her primary compliant during today's visit as this has become painful for her.     Patient reports nodules in the left palm in line with the ring and long fingers with pain radiating into the long finger and long finger clicking. No finger contracture. She also endorses left wrist pain when pushing off with the wrist.      Patient reports history of right CMC OA with pain and clicking with shoulder sign. She reports she has push metagrip brace for with a CSI performed years ago.    Interval History:  Today she reports she continues to have pain to her mass. She often bumps it on surfaces which causes excruciating pain. She reports it does occasionally have mild drainage.     Past Medical History:  Past Medical History:   Diagnosis Date    Anxiety     Depression     Elevated cholesterol     Hypertension     PONV (postoperative nausea and vomiting)     Recurrent UTI      Past Surgical History:   Procedure Laterality Date    BREAST CYST ASPIRATION      DIAGNOSTIC LAPAROSCOPY      Exploratory, last assessed 10/25/16    NASAL SINUS SURGERY      age 33 - FESS and septoplasty - unknown surgeon    NC STRTCTC CPTR ASSTD PX EXTRADURAL CRANIAL N/A 11/8/2016    Procedure: FUNCTIONAL ENDOSCOPIC SINUS SURGERY (FESS) IMAGED GUIDED;  Surgeon: Tony Morales DO;  Location: [facility];  Service: ENT "    WISDOM TOOTH EXTRACTION       Family History   Problem Relation Age of Onset    Osteoporosis Mother     Coronary artery disease Father         CABG    Diabetes type II Sister     Asthma Sister     Diabetes Sister     Diabetes Sister     No Known Problems Daughter     No Known Problems Maternal Grandmother     Diabetes Maternal Grandfather     No Known Problems Paternal Grandmother     No Known Problems Paternal Grandfather     Bipolar disorder Brother     Depression Brother         with anxiety     Esophageal cancer Brother     Depression Family         with anxiety     Hyperlipidemia Family     Ovarian cancer Maternal Aunt     No Known Problems Maternal Aunt     Breast cancer Neg Hx     Colon cancer Neg Hx     Uterine cancer Neg Hx      Social History     Socioeconomic History    Marital status: /Civil Union     Spouse name: Not on file    Number of children: Not on file    Years of education: Associates degree    Highest education level: Not on file   Occupational History    Not on file   Tobacco Use    Smoking status: Never     Passive exposure: Never    Smokeless tobacco: Never    Tobacco comments:     No secondhand smoke exposure    Vaping Use    Vaping status: Never Used   Substance and Sexual Activity    Alcohol use: No    Drug use: No    Sexual activity: Yes     Partners: Male     Birth control/protection: Post-menopausal     Comment: same partner, 43 yrs   Other Topics Concern    Not on file   Social History Narrative    Employed     Lives with spouse     No caffeine use      Social Drivers of Health     Financial Resource Strain: Not on file   Food Insecurity: No Food Insecurity (8/19/2024)    Nursing - Inadequate Food Risk Classification     Worried About Running Out of Food in the Last Year: Never true     Ran Out of Food in the Last Year: Never true     Ran Out of Food in the Last Year: Not on file   Transportation Needs: No Transportation Needs (8/19/2024)    PRAPARE - Transportation     Lack  "of Transportation (Medical): No     Lack of Transportation (Non-Medical): No   Physical Activity: Not on file   Stress: Not on file   Social Connections: Not on file   Intimate Partner Violence: Not on file   Housing Stability: Low Risk  (8/19/2024)    Housing Stability Vital Sign     Unable to Pay for Housing in the Last Year: No     Number of Times Moved in the Last Year: 0     Homeless in the Last Year: No     Scheduled Meds:  Continuous Infusions:No current facility-administered medications for this visit.    PRN Meds:.  Allergies   Allergen Reactions    Benadryl [Diphenhydramine] Other (See Comments)     Cannot sleep    Claritin [Loratadine] Other (See Comments)     Tremors in the arms    Fexofenadine-Pseudoephed Er Other (See Comments)     \"didn't feel good\"    \"didn't feel good\"      Pollen Extract     Prednisone Nausea Only    Singulair [Montelukast]      Did not feel well, no particular assistance.       Physical Examination:    Ht 5' 2\" (1.575 m)   Wt 64 kg (141 lb)   BMI 25.79 kg/m²     Gen: A&Ox3, NAD  Cardiac: regular rate  Chest: non labored breathing  Abdomen: Non-distended    Left Upper Extremity:  Skin CDI  No obvious deformity of the shoulder, arm, elbow, forearm, wrist, hand  Mucoid cyst to dorsum of long finger DIP joint without drainage  Thin overlying skin  TTP over mass  Limited flexion of long finger DIP   Sensation intact to light touch in the axillary median, ulnar, and radial nerve distributions  5/5 motor   2+RP    Studies:  No new imaging.    Assessment and Plan:  1. Mucoid cyst of joint  Ambulatory Referral to Orthopedic Surgery    Case request operating room: LEFT LONG FINGER EXCISION OF MASS    Case request operating room: LEFT LONG FINGER EXCISION OF MASS      2. 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 female presents in follow up for the above diagnosis. At this time she is having persistent pain of her mucoid cyst " and she would like to proceed with surgical excision. We discussed that surgery would include excision of the cyst and removal of any prominent osteophytes, as well as possible need for soft-tissue rearrangement depending on the viability over the skin overlying the cyst. We discussed risks of surgery including persistent pain, recurrence of cyst, stiffness, wound healing difficulty and infection. Discussed pre, jose alejandro and post operative course. Informed consent for surgery obtained today. All questions were answered.    Left long finger mucous cyst excision  Conscious sedation    she expressed understanding of the plan and agreed. We encouraged them to contact our office with any questions or concerns.       Greyson Hess MD  Hand and Upper Extremity Surgery      *This note was dictated using Dragon voice recognition software. Please excuse any word substitutions or errors.*

## 2025-03-31 NOTE — OP NOTE
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  Symptoms have been refractory to appropriate management and due to the thinning skin and drainage there is a risk of infection.  After shared decision-making process the patient elected to proceed with surgical excision.    Operative Findings:  Mucous cyst with thin overlying skin on the dorsal ulnar aspect of the middle finger DIP joint.  Large osteophyte on the head of the middle phalanx.      Complications:   None    Procedure and Technique:  On the day of surgery, I met the patient in the pre-operative area. The patient was identified by name and . Once again the risks benefits and alternatives of  mucous cyst excision were discussed with the patient. Patient was amenable to proceed with surgery.   she was brought to the OR and placed under conscious sedation.  A tourniquet was applied to the operative forearm. The operative extremity was prepped and draped in a standard fashion. No pre operative antibiotic prophylaxis was indicated. A timeout was  performed prior to incision according to hospital protocol.     A local injection of 50/50 solution of 1% lidocaine with epinephrine and 0.25% marcaine was performed in the form of a digital block.     A  2cm L shaped incision was made with the transverse limb parallel to the DIP extension crease and across the cyst and curved longitudinal portion along the dorsoulnar aspect of the digit.  A 2 x 3 mm section of the thin overlying skin at the drainage site was also excised in elliptical manner.  The dorsal skin flap was raised and the extensor tendon identified. We identified the cystic tissue along the ulnar aspect of the DIP joint, and this tissue was complete excised with the use of scalpel and fine tip rongeur. The extensor tendon was elevated to visualized the DIP joint and fairly large osteophyte on the middle phalanx had was partly excised with a rongeur.  The ulnar aspect of it cannot be fully excised due to the collateral ligament attachment.  We are careful to protect and preserve the terminal tendon insertion on the distal phalanx as well.  Clinically there was no further cystic appearing mass or bump on the dorsum of the finger at that location. The tournicot was removed and we irrigated the finger. hemostasis was obtained.      The wound was closed with 4-0 nylon for the skin.        Sterile dressing was applied.      Post operatively the patient may use the operative extremity as tolerated for light activities. They will follow up as planned within 2 weeks for suture removal.      I was present for the entire procedure., A qualified resident physician was not available., and A physician assistant was required during the procedure for retraction, tissue handling, dissection and suturing.    Patient Disposition:  PACU              SIGNATURE: Greyson Hess MD  DATE: March 31, 2025  TIME: 7:49 AM

## 2025-04-01 NOTE — PROGRESS NOTES
Name: Hayden Mcintosh      : 1960      MRN: [id number]  Encounter Provider: BRANDON Elliott  Encounter Date: 2025   [facility]  :  Assessment & Plan  Recurrent UTI  Managed on suppressive nitrofurantoin 50mg/day for recurrent UTI's x6 months   No recent UTI's since last visit in    Recommend trialing off nitrofurantoin   Typical symptoms with UTI's were burning and suprapubic pain    Advised to contact us sooner if she develops any new UTI symptoms   Follow up in 6 months   Continue hydration and cranberry/d-mannose supplements        Calculus of kidney  Last CT renal stone study on 24 showed numerous bilateral calculi  8 stones in the left kidney measuring up to 5mm  2 stones in the right kidney measuring 2 & 3mm   And had a 2mm stone at the left UPJ   She denies passing any stones since that small 2mm UPJ stone last year   She denies ever having surgical intervention for stones   Recommend follow up renal US for stone surveillance   Orders:    US kidney and bladder; Future      History of Present Illness   Hayden Mcintosh is a 64 y.o. female who presents for follow up for recurrent UTI's and history of nephrolithiasis. She is currently managed on low-dose suppressive nitrofurantoin 50mg/day for her recurrent UTI's for the past 6 months. She has not had any UTI's since starting this. She is asymptomatic today. She does have a history of kidney stones. Her last CT scan was last  which showed numerous bilateral calculi. At that time she also had a small 2mm stone at the left UPJ which has since passed. She denies passing any stones since that time. She denies any flank pain or difficulty voiding, no gross hematuria. She does continue to take cranberry supplements for bladder health.      Review of Systems   Constitutional:  Negative for chills, diaphoresis, fatigue and fever.   Respiratory:  Negative for cough and shortness  of breath.    Cardiovascular:  Negative for chest pain and palpitations.   Gastrointestinal:  Negative for abdominal pain, nausea and vomiting.   Genitourinary:  Negative for decreased urine volume, difficulty urinating, dysuria, flank pain, frequency, hematuria, pelvic pain and urgency.   Musculoskeletal:  Negative for back pain, gait problem and myalgias.   Skin:  Negative for pallor and wound.   Neurological:  Negative for dizziness, weakness, light-headedness and numbness.        Objective   /80 (BP Location: Left arm, Patient Position: Sitting, Cuff Size: Standard)   Pulse 69   Wt 62.6 kg (138 lb)   SpO2 97%   BMI 25.24 kg/m²     Physical Exam  Constitutional:       Appearance: Normal appearance.   HENT:      Head: Normocephalic and atraumatic.   Eyes:      Conjunctiva/sclera: Conjunctivae normal.   Pulmonary:      Effort: Pulmonary effort is normal. No respiratory distress.   Musculoskeletal:         General: Normal range of motion.      Cervical back: Normal range of motion.   Skin:     General: Skin is warm and dry.   Neurological:      General: No focal deficit present.      Mental Status: She is alert and oriented to person, place, and time.   Psychiatric:         Mood and Affect: Mood normal.         Behavior: Behavior normal.        Results   Lab Results   Component Value Date    CALCIUM 10.1 08/14/2024     05/01/2017    K 4.5 08/14/2024    CO2 31 08/14/2024     08/14/2024    BUN 31 (H) 08/14/2024    CREATININE 0.59 (L) 08/14/2024     Lab Results   Component Value Date    WBC 6.89 08/14/2024    HGB 13.6 08/14/2024    HCT 43.2 08/14/2024    MCV 90 08/14/2024     08/14/2024

## 2025-04-07 ENCOUNTER — OFFICE VISIT (OUTPATIENT)
Dept: UROLOGY | Facility: MEDICAL CENTER | Age: 65
End: 2025-04-07

## 2025-04-07 VITALS
OXYGEN SATURATION: 97 % | DIASTOLIC BLOOD PRESSURE: 80 MMHG | WEIGHT: 138 LBS | BODY MASS INDEX: 25.24 KG/M2 | HEART RATE: 69 BPM | SYSTOLIC BLOOD PRESSURE: 130 MMHG

## 2025-04-07 DIAGNOSIS — N39.0 RECURRENT UTI: Primary | ICD-10-CM

## 2025-04-07 DIAGNOSIS — N20.0 CALCULUS OF KIDNEY: ICD-10-CM

## 2025-04-07 NOTE — ASSESSMENT & PLAN NOTE
Last CT renal stone study on 4/23/24 showed numerous bilateral calculi  8 stones in the left kidney measuring up to 5mm  2 stones in the right kidney measuring 2 & 3mm   And had a 2mm stone at the left UPJ   She denies passing any stones since that small 2mm UPJ stone last year   She denies ever having surgical intervention for stones   Recommend follow up renal US for stone surveillance   Orders:    US kidney and bladder; Future

## 2025-04-15 ENCOUNTER — OFFICE VISIT (OUTPATIENT)
Dept: OBGYN CLINIC | Facility: MEDICAL CENTER | Age: 65
End: 2025-04-15

## 2025-04-15 VITALS — WEIGHT: 139.2 LBS | HEIGHT: 62 IN | BODY MASS INDEX: 25.62 KG/M2

## 2025-04-15 DIAGNOSIS — Z98.890 STATUS POST MUSCULOSKELETAL SYSTEM SURGERY: Primary | ICD-10-CM

## 2025-04-15 PROCEDURE — 99024 POSTOP FOLLOW-UP VISIT: CPT | Performed by: ORTHOPAEDIC SURGERY

## 2025-04-15 NOTE — PROGRESS NOTES
HAND & UPPER EXTREMITY OFFICE VISIT   Referred By:  No referring provider defined for this encounter.      Chief Complaint:     Left long finger pain/mass     Surgery:  Surgery Date: 3/31/2025 - LEFT LONG FINGER EXCISION OF Mucous Cyst and Osteophyte - Left     History of Present Illness:   Patient presents now 15 days status post the above surgery. she reports she is doing well and she is pleased with her outcome. She is currently experiencing 4/10 pain that is managed with Ibuprofen as needed. Pain is aggravated with pressure to the DIP and with long finger activity. By the end of the day, she notes increasing stiffness of the DIP. She denies numbness or tingling.     Past Medical History:  Past Medical History:   Diagnosis Date    Anxiety     Depression     Elevated cholesterol     Hypertension     Kidney stone     PONV (postoperative nausea and vomiting)     Recurrent UTI      Past Surgical History:   Procedure Laterality Date    BREAST CYST ASPIRATION      COLONOSCOPY      DIAGNOSTIC LAPAROSCOPY      Exploratory, last assessed 10/25/16    MASS EXCISION Left 3/31/2025    Procedure: LEFT LONG FINGER EXCISION OF Mucous Cyst and Osteophyte;  Surgeon: Greyson Hess MD;  Location:  MAIN OR;  Service: Orthopedics    NASAL SINUS SURGERY      age 33 - FESS and septoplasty - unknown surgeon    SC STRTCTC CPTR ASSTD PX EXTRADURAL CRANIAL N/A 11/08/2016    Procedure: FUNCTIONAL ENDOSCOPIC SINUS SURGERY (FESS) IMAGED GUIDED;  Surgeon: Tony Morales DO;  Location: [facility];  Service: ENT    WISDOM TOOTH EXTRACTION       Family History   Problem Relation Age of Onset    Osteoporosis Mother     Coronary artery disease Father         CABG    Diabetes type II Sister     Asthma Sister     Diabetes Sister     Diabetes Sister     No Known Problems Daughter     No Known Problems Maternal Grandmother     Diabetes Maternal Grandfather     No Known Problems Paternal Grandmother     No Known Problems Paternal Grandfather      Bipolar disorder Brother     Depression Brother         with anxiety     Esophageal cancer Brother     Depression Family         with anxiety     Hyperlipidemia Family     Ovarian cancer Maternal Aunt     No Known Problems Maternal Aunt     Breast cancer Neg Hx     Colon cancer Neg Hx     Uterine cancer Neg Hx      Social History     Socioeconomic History    Marital status: /Civil Union     Spouse name: Not on file    Number of children: Not on file    Years of education: Associates degree    Highest education level: Not on file   Occupational History    Not on file   Tobacco Use    Smoking status: Never     Passive exposure: Never    Smokeless tobacco: Never    Tobacco comments:     No secondhand smoke exposure    Vaping Use    Vaping status: Never Used   Substance and Sexual Activity    Alcohol use: Not Currently    Drug use: Never    Sexual activity: Yes     Partners: Male     Birth control/protection: Post-menopausal     Comment: same partner, 43 yrs   Other Topics Concern    Not on file   Social History Narrative    Employed     Lives with spouse     No caffeine use      Social Drivers of Health     Financial Resource Strain: Not on file   Food Insecurity: No Food Insecurity (8/19/2024)    Nursing - Inadequate Food Risk Classification     Worried About Running Out of Food in the Last Year: Never true     Ran Out of Food in the Last Year: Never true     Ran Out of Food in the Last Year: Not on file   Transportation Needs: No Transportation Needs (8/19/2024)    PRAPARE - Transportation     Lack of Transportation (Medical): No     Lack of Transportation (Non-Medical): No   Physical Activity: Not on file   Stress: Not on file   Social Connections: Not on file   Intimate Partner Violence: Not on file   Housing Stability: Low Risk  (8/19/2024)    Housing Stability Vital Sign     Unable to Pay for Housing in the Last Year: No     Number of Times Moved in the Last Year: 0     Homeless in the Last Year: No  "    Scheduled Meds:  Continuous Infusions:No current facility-administered medications for this visit.    PRN Meds:.  Allergies   Allergen Reactions    Benadryl [Diphenhydramine] Other (See Comments)     Cannot sleep    Claritin [Loratadine] Other (See Comments)     Tremors in the arms    Fexofenadine-Pseudoephed Er Other (See Comments)     \"didn't feel good\"    \"didn't feel good\"      Pollen Extract     Prednisone Nausea Only    Singulair [Montelukast]      Did not feel well, no particular assistance.       Physical Examination:    Ht 5' 2\" (1.575 m)   Wt 63.1 kg (139 lb 3.2 oz)   BMI 25.46 kg/m²     Gen: A&Ox3, NAD    Left Upper Extremity:  Dressing clean and dry, removed  Incision healing well without signs of infection   Sutures intact, removed  TTP over the incisional site of the long finger DIP  Sensation intact to light touch in the axillary median, ulnar, and radial nerve distributions  Warm, well-perfused digits  Cap refill <2s      Studies:  No studies to review     Labs:  Lab Results   Component Value Date    HGBA1C 6.3 (H) 08/14/2024    HGBA1C 5.7 (H) 07/08/2022    HGBA1C 5.5 01/07/2022         Assessment & Plan  Status post musculoskeletal system surgery       64 y.o. female presents 15 days status post LEFT LONG FINGER EXCISION OF Mucous Cyst and Osteophyte - Left. Overall, she is doing well and is pleased with her outcome so far. Due to sensitivity of the long finger, I provided her with a soft finger silicone compression sleeve for protection during activity as needed. It is recommended she return to the office in 1 month, or sooner should symptoms worsen        she expressed understanding of the plan and agreed. We encouraged them to contact our office with any questions or concerns.         Greyson Hess MD  Hand and Upper Extremity Surgery          *This note was dictated using Dragon voice recognition software. Please excuse any word substitutions or errors.*      Scribe Attestation  "     I,:  Tha Porras am acting as a scribe while in the presence of the attending physician.:       I,:  Greyson Hess MD personally performed the services described in this documentation    as scribed in my presence.:

## 2025-04-18 ENCOUNTER — HOSPITAL ENCOUNTER (OUTPATIENT)
Dept: ULTRASOUND IMAGING | Facility: MEDICAL CENTER | Age: 65
Discharge: HOME | End: 2025-04-18
Payer: COMMERCIAL

## 2025-04-18 DIAGNOSIS — N20.0 CALCULUS OF KIDNEY: ICD-10-CM

## 2025-04-18 PROCEDURE — 76775 US EXAM ABDO BACK WALL LIM: CPT

## 2025-04-24 PROBLEM — H10.13 ALLERGIC CONJUNCTIVITIS OF BOTH EYES: Status: RESOLVED | Noted: 2018-12-10 | Resolved: 2025-04-24

## 2025-05-01 ENCOUNTER — RESULTS FOLLOW-UP (OUTPATIENT)
Dept: UROLOGY | Facility: HOSPITAL | Age: 65
End: 2025-05-01

## 2025-05-01 NOTE — RESULT ENCOUNTER NOTE
Ultrasound kidney and bladder reviewed:    No masses or lesions within the kidneys.  2-3 mm nonobstructing stone within the right kidney, similar to prior CT.  3-6 mm nonobstructing stone within the left kidney, similar to prior CT.  Bladder is emptying well, no masses or lesions visualized.  No intervention needed at this time, continue to follow-up annually for stone surveillance

## 2025-05-02 NOTE — TELEPHONE ENCOUNTER
Patient returned call to office.    Relayed message below, patient verbalized understanding.       Johanna Dupont PA-C   Physician Assistant  Specialty: Urology     Result Encounter Note     Signed     Encounter Date: 5/1/2025     Signed         Ultrasound kidney and bladder reviewed:     No masses or lesions within the kidneys.  2-3 mm nonobstructing stone within the right kidney, similar to prior CT.  3-6 mm nonobstructing stone within the left kidney, similar to prior CT.  Bladder is emptying well, no masses or lesions visualized.  No intervention needed at this time, continue to follow-up annually for stone surveillance            Electronically signed by Johanna Dupont PA-C at 5/1/2025  3:39 PM

## 2025-05-02 NOTE — TELEPHONE ENCOUNTER
----- Message from Johanna Dupont PA-C sent at 5/1/2025  3:39 PM EDT -----  Ultrasound kidney and bladder reviewed:    No masses or lesions within the kidneys.  2-3 mm nonobstructing stone within the right kidney, similar to prior CT.  3-6 mm nonobstructing stone within the left kidney, similar to prior CT.  Bladder is emptying well, no masses or lesions visualized.  No intervention needed at this time, continue to follow-up annually for stone surveillance

## 2025-05-06 DIAGNOSIS — E78.2 MIXED HYPERLIPIDEMIA: ICD-10-CM

## 2025-05-06 DIAGNOSIS — F41.8 DEPRESSION WITH ANXIETY: ICD-10-CM

## 2025-05-06 DIAGNOSIS — I10 ESSENTIAL HYPERTENSION: ICD-10-CM

## 2025-05-06 RX ORDER — ATENOLOL 25 MG/1
25 TABLET ORAL DAILY
Qty: 90 TABLET | Refills: 1 | Status: SHIPPED | OUTPATIENT
Start: 2025-05-06 | End: 2025-05-18

## 2025-05-06 RX ORDER — ATORVASTATIN CALCIUM 80 MG/1
80 TABLET, FILM COATED ORAL DAILY
Qty: 90 TABLET | Refills: 1 | Status: SHIPPED | OUTPATIENT
Start: 2025-05-06

## 2025-05-06 RX ORDER — SERTRALINE HYDROCHLORIDE 25 MG/1
25 TABLET, FILM COATED ORAL DAILY
Qty: 90 TABLET | Refills: 1 | Status: SHIPPED | OUTPATIENT
Start: 2025-05-06

## 2025-05-06 RX ORDER — DULOXETIN HYDROCHLORIDE 60 MG/1
60 CAPSULE, DELAYED RELEASE ORAL DAILY
Qty: 90 CAPSULE | Refills: 1 | Status: SHIPPED | OUTPATIENT
Start: 2025-05-06 | End: 2025-05-18

## 2025-05-06 NOTE — TELEPHONE ENCOUNTER
NOT A DUPLICATE:  Alternate pharmacy request  Patient now gets her script through mail order pharmacy    Reason for call:   [x] Refill   [] Prior Auth  [] Other:     Office:   [x] PCP/Provider -   [] Specialty/Provider -     Medication:   sertraline (ZOLOFT) 25 mg/TAKE ONE TABLET BY MOUTH EVERY DAY     atenolol (TENORMIN) 25 mg/TAKE ONE TABLET BY MOUTH EVERY DAY     DULoxetine (CYMBALTA) 60 mg delayed release/Take 1 capsule (60 mg total) by mouth daily     atorvastatin (LIPITOR) 80 mg/Take 1 tablet (80 mg total) by mouth daily     Quantity:     Pharmacy: [facility] - [facility] - [facility]     Local Pharmacy   Does the patient have enough for 3 days?   [] Yes   [] No - Send as HP to POD    Mail Away Pharmacy   Does the patient have enough for 10 days?   [] Yes   [x] No - Send as HP to POD

## 2025-05-15 ENCOUNTER — OFFICE VISIT (OUTPATIENT)
Dept: OBGYN CLINIC | Facility: MEDICAL CENTER | Age: 65
End: 2025-05-15

## 2025-05-15 VITALS — HEIGHT: 62 IN | BODY MASS INDEX: 25.58 KG/M2 | WEIGHT: 139 LBS

## 2025-05-15 DIAGNOSIS — Z98.890 STATUS POST MUSCULOSKELETAL SYSTEM SURGERY: Primary | ICD-10-CM

## 2025-05-15 PROCEDURE — 99024 POSTOP FOLLOW-UP VISIT: CPT | Performed by: ORTHOPAEDIC SURGERY

## 2025-05-15 NOTE — PROGRESS NOTES
HAND & UPPER EXTREMITY OFFICE VISIT   Referred By:  No referring provider defined for this encounter.      Chief Complaint:     Left long finger pain/mass      Surgery:  Surgery Date: 3/31/2025 - LEFT LONG FINGER EXCISION OF Mucous Cyst and Osteophyte - Left     Previous History:   Previously seen on 4/15/2025. At that point she was provided with silicone compression sleeve and return to activity.      Interval History:  The patient presents 6 weeks s/p above surgery.  She is overall improving.  Today she complains of left DIP joint soreness and sensitivity.  She has increased activity level yet has some difficulty with cooking and fine use.  She does use Aleve.  She does ROM exercises in mornings.      Past Medical History:  Past Medical History:   Diagnosis Date    Anxiety     Depression     Elevated cholesterol     Hypertension     Kidney stone     PONV (postoperative nausea and vomiting)     Recurrent UTI      Past Surgical History:   Procedure Laterality Date    BREAST CYST ASPIRATION      COLONOSCOPY      DIAGNOSTIC LAPAROSCOPY      Exploratory, last assessed 10/25/16    MASS EXCISION Left 3/31/2025    Procedure: LEFT LONG FINGER EXCISION OF Mucous Cyst and Osteophyte;  Surgeon: Greyson Hess MD;  Location:  MAIN OR;  Service: Orthopedics    NASAL SINUS SURGERY      age 33 - FESS and septoplasty - unknown surgeon    [place] STRTCTC CPTR ASSTD PX EXTRADURAL CRANIAL N/A 11/08/2016    Procedure: FUNCTIONAL ENDOSCOPIC SINUS SURGERY (FESS) IMAGED GUIDED;  Surgeon: Tony Morales DO;  Location: [facility];  Service: ENT    WISDOM TOOTH EXTRACTION       Family History   Problem Relation Age of Onset    Osteoporosis Mother     Coronary artery disease Father         CABG    Diabetes type II Sister     Asthma Sister     Diabetes Sister     Diabetes Sister     No Known Problems Daughter     No Known Problems Maternal Grandmother     Diabetes Maternal Grandfather     No Known Problems Paternal Grandmother     No  Known Problems Paternal Grandfather     Bipolar disorder Brother     Depression Brother         with anxiety     Esophageal cancer Brother     Depression Family         with anxiety     Hyperlipidemia Family     Ovarian cancer Maternal Aunt     No Known Problems Maternal Aunt     Breast cancer Neg Hx     Colon cancer Neg Hx     Uterine cancer Neg Hx      Social History     Socioeconomic History    Marital status: /Civil Union     Spouse name: Not on file    Number of children: Not on file    Years of education: Associates degree    Highest education level: Not on file   Occupational History    Not on file   Tobacco Use    Smoking status: Never     Passive exposure: Never    Smokeless tobacco: Never    Tobacco comments:     No secondhand smoke exposure    Vaping Use    Vaping status: Never Used   Substance and Sexual Activity    Alcohol use: Not Currently    Drug use: Never    Sexual activity: Yes     Partners: Male     Birth control/protection: Post-menopausal     Comment: same partner, 43 yrs   Other Topics Concern    Not on file   Social History Narrative    Employed     Lives with spouse     No caffeine use      Social Drivers of Health     Financial Resource Strain: Not on file   Food Insecurity: No Food Insecurity (8/19/2024)    Nursing - Inadequate Food Risk Classification     Worried About Running Out of Food in the Last Year: Never true     Ran Out of Food in the Last Year: Never true     Ran Out of Food in the Last Year: Not on file   Transportation Needs: No Transportation Needs (8/19/2024)    PRAPARE - Transportation     Lack of Transportation (Medical): No     Lack of Transportation (Non-Medical): No   Physical Activity: Not on file   Stress: Not on file   Social Connections: Not on file   Intimate Partner Violence: Not on file   Housing Stability: Low Risk  (8/19/2024)    Housing Stability Vital Sign     Unable to Pay for Housing in the Last Year: No     Number of Times Moved in the Last Year: 0  "    Homeless in the Last Year: No     Scheduled Meds:  Continuous Infusions:No current facility-administered medications for this visit.    PRN Meds:.  Allergies[1]    Physical Examination:    Ht 5' 2\" (1.575 m)   Wt 63 kg (139 lb)   BMI 25.42 kg/m²     Gen: A&Ox3, NAD  Cardiac: regular rate  Chest: non labored breathing  Abdomen: Non-distended    Left Upper Extremity:  Incision well healed   Sutures intact, removed  TTP over the incisional site of the long finger DIP, Sensation intact to light touch in the axillary median, ulnar, and radial nerve distributions  Warm, well-perfused digits  Cap refill <2s     RUE:  TTP thumb CMC  Composite fist  NV intact    Studies:  No new imaging      Labs:  I personally reviewed the following labs,  Lab Results   Component Value Date    HGBA1C 6.3 (H) 08/14/2024    HGBA1C 5.7 (H) 07/08/2022    HGBA1C 5.5 01/07/2022         Assessment & Plan  Status post musculoskeletal system surgery  64 y.o. female presents 6 weeks status post LEFT LONG FINGER EXCISION OF Mucous Cyst and Osteophyte - Left.  Overall, she is doing well and is pleased with her outcome so far.  She continues to have sensitivity over incision site with mild stiffness.  She is encouraged to continue to return to activity as tolerated.      Upon discussion she mentions right thumb CMC joint pain.  She can obtain Push Metagrip and comfort cool braces.  She can use Voltaren gel as needed for pain.  She was instructed on finger abduction and thenar exercises for joint stabilization.   Steroid injections can be considered for this issue if symptoms persist.      She should follow up in 1-2 months.           she expressed understanding of the plan and agreed. We encouraged them to contact our office with any questions or concerns.       Greyson Hess MD  Hand and Upper Extremity Surgery      *This note was dictated using Dragon voice recognition software. Please excuse any word substitutions or errors.*       Scribe " "Attestation      I,:  Silver Baeza MA am acting as a scribe while in the presence of the attending physician.:       I,:  Greyson Hess MD personally performed the services described in this documentation    as scribed in my presence.:                  [1]   Allergies  Allergen Reactions    Benadryl [Diphenhydramine] Other (See Comments)     Cannot sleep    Claritin [Loratadine] Other (See Comments)     Tremors in the arms    Fexofenadine-Pseudoephed Er Other (See Comments)     \"didn't feel good\"    \"didn't feel good\"      Pollen Extract     Prednisone Nausea Only    Singulair [Montelukast]      Did not feel well, no particular assistance.     "

## 2025-05-17 DIAGNOSIS — F41.8 DEPRESSION WITH ANXIETY: ICD-10-CM

## 2025-05-17 DIAGNOSIS — I10 ESSENTIAL HYPERTENSION: ICD-10-CM

## 2025-05-18 RX ORDER — DULOXETIN HYDROCHLORIDE 60 MG/1
60 CAPSULE, DELAYED RELEASE ORAL DAILY
Qty: 90 CAPSULE | Refills: 1 | Status: SHIPPED | OUTPATIENT
Start: 2025-05-18

## 2025-05-18 RX ORDER — ATENOLOL 25 MG/1
25 TABLET ORAL DAILY
Qty: 90 TABLET | Refills: 1 | Status: SHIPPED | OUTPATIENT
Start: 2025-05-18

## 2025-06-07 DIAGNOSIS — E78.2 MIXED HYPERLIPIDEMIA: ICD-10-CM

## 2025-06-08 RX ORDER — ATORVASTATIN CALCIUM 80 MG/1
80 TABLET, FILM COATED ORAL DAILY
Qty: 90 TABLET | Refills: 1 | Status: SHIPPED | OUTPATIENT
Start: 2025-06-08

## 2025-06-19 ENCOUNTER — OFFICE VISIT (OUTPATIENT)
Dept: OBGYN CLINIC | Facility: MEDICAL CENTER | Age: 65
End: 2025-06-19

## 2025-06-19 VITALS — WEIGHT: 139 LBS | HEIGHT: 62 IN | BODY MASS INDEX: 25.58 KG/M2

## 2025-06-19 DIAGNOSIS — M72.0 DUPUYTREN'S DISEASE OF PALM OF LEFT HAND: ICD-10-CM

## 2025-06-19 DIAGNOSIS — F51.04 PSYCHOPHYSIOLOGICAL INSOMNIA: ICD-10-CM

## 2025-06-19 DIAGNOSIS — M67.40 MUCOID CYST OF JOINT: Primary | ICD-10-CM

## 2025-06-19 PROCEDURE — 99024 POSTOP FOLLOW-UP VISIT: CPT | Performed by: ORTHOPAEDIC SURGERY

## 2025-06-19 RX ORDER — TRAZODONE HYDROCHLORIDE 50 MG/1
50 TABLET ORAL
Qty: 90 TABLET | Refills: 1 | Status: SHIPPED | OUTPATIENT
Start: 2025-06-19

## 2025-06-19 NOTE — PATIENT INSTRUCTIONS
Discussed conservative treatments for the hands to manage her arthritis which includes:  Heating in the morning and evenings for 20 minutes each  Use of topical agents such as Voltaren gel 1%, which has an anti-inflammatory property.  Localized cortisone injection into the joint every 3 to 4 months as needed for pain discomfort.

## 2025-06-19 NOTE — ASSESSMENT & PLAN NOTE
Patient has full composite fist and mild tenderness on the ulnar aspect of Dip joint.   Continue to use treatments for arthritis including heat and topical agents.   The next type of treatment would be a fusion, but she continues to have good motion and it would be done for pain and loss of motion.   She shows understanding and agrees with this plan of care.   We will follow up as needed

## 2025-06-19 NOTE — TELEPHONE ENCOUNTER
Reason for call:   [x] Refill   [] Prior Auth  [] Other:     Office:   [x] PCP/Provider - Maame Keenan   [] Specialty/Provider -     Medication: trazodone     Dose/Frequency: 50 mg take 1 tablet daily at bedtime     Quantity: 90    [facility]   Does the patient have enough for 3 days?   [] Yes   [x] No - Send as HP to POD- will run out over the weekend     Mail Away Pharmacy   Does the patient have enough for 10 days?   [] Yes   [] No - Send as HP to POD

## 2025-06-19 NOTE — PROGRESS NOTES
HAND & UPPER EXTREMITY OFFICE VISIT   Referred By:  No referring provider defined for this encounter.        Assessment and Plan:  Assessment & Plan  Dupuytren's disease of palm of left hand  Noted that the cords have no contracture and to continue to monitor by testing the hand to get completely flat.   Patient shows understanding and agrees with this plan of care.        Mucoid cyst of joint  Patient has full composite fist and mild tenderness on the ulnar aspect of Dip joint.   Continue to use treatments for arthritis including heat and topical agents.   The next type of treatment would be a fusion, but she continues to have good motion and it would be done for pain and loss of motion.   She shows understanding and agrees with this plan of care.   We will follow up as needed           64 y.o. female presents 80 days status post LEFT LONG FINGER EXCISION OF Mucous Cyst and Osteophyte - Left. She reports that the lump that was once there is no longer prominent enough to catch on things when she is using the hand.  She notes that if she does bump the area then it does give her significant pain but it is short-lived.  She still has range of motion at the DIP joint to make a full composite fist.    It is recommended she return to the office as needed if the problem fails to improve, worsens, or recurs.    she expressed understanding of the plan and agreed. We encouraged them to contact our office with any questions or concerns.         Chief Complaint:     Left long finger    Surgery:  Surgery Date: 3/31/2025 - LEFT LONG FINGER EXCISION OF Mucous Cyst and Osteophyte - Left     History of Present Illness:   Patient presents now 80 days status post the above surgery. she reports left long finger is getting better. Pain if she only hit the finger.    Past Medical History:  Past Medical History[1]  Past Surgical History[2]  Family History[3]  Social History     Socioeconomic History    Marital status: /Civil Union  "    Spouse name: Not on file    Number of children: Not on file    Years of education: Associates degree    Highest education level: Not on file   Occupational History    Not on file   Tobacco Use    Smoking status: Never     Passive exposure: Never    Smokeless tobacco: Never    Tobacco comments:     No secondhand smoke exposure    Vaping Use    Vaping status: Never Used   Substance and Sexual Activity    Alcohol use: Not Currently    Drug use: Never    Sexual activity: Yes     Partners: Male     Birth control/protection: Post-menopausal     Comment: same partner, 43 yrs   Other Topics Concern    Not on file   Social History Narrative    Employed     Lives with spouse     No caffeine use      Social Drivers of Health     Financial Resource Strain: Not on file   Food Insecurity: No Food Insecurity (8/19/2024)    Nursing - Inadequate Food Risk Classification     Worried About Running Out of Food in the Last Year: Never true     Ran Out of Food in the Last Year: Never true     Ran Out of Food in the Last Year: Not on file   Transportation Needs: No Transportation Needs (8/19/2024)    PRAPARE - Transportation     Lack of Transportation (Medical): No     Lack of Transportation (Non-Medical): No   Physical Activity: Not on file   Stress: Not on file   Social Connections: Not on file   Intimate Partner Violence: Not on file   Housing Stability: Low Risk  (8/19/2024)    Housing Stability Vital Sign     Unable to Pay for Housing in the Last Year: No     Number of Times Moved in the Last Year: 0     Homeless in the Last Year: No     Scheduled Meds:  Continuous Infusions:No current facility-administered medications for this visit.    PRN Meds:.  Allergies[4]    Physical Examination:    Ht 5' 2\" (1.575 m)   Wt 63 kg (139 lb)   BMI 25.42 kg/m²     Gen: A&Ox3, NAD    Left Upper Extremity:  Dressing clean and dry, removed  Incision healing well without signs of infection none  Sutures intact, removed  TTP mild at the ulnar " aspect of DIP joint  Sensation intact to light touch in the axillary median, ulnar, and radial nerve distributions  Warm, well-perfused digits  Cap refill <2s      Studies:  Radiographs: I personally reviewed and independently interpreted the available radiographs.  No images required to be obtained or reviewed for today's visit     Labs:  I personally reviewed the following labs,   Lab Results   Component Value Date    HGBA1C 6.3 (H) 08/14/2024    HGBA1C 5.7 (H) 07/08/2022    HGBA1C 5.5 01/07/2022           Greyson Hess MD  Hand and Upper Extremity Surgery          *This note was dictated using Dragon voice recognition software. Please excuse any word substitutions or errors.*      Scribe Attestation      I,:  Judi Tracy am acting as a scribe while in the presence of the attending physician.:       I,:  Greyson Hess MD personally performed the services described in this documentation    as scribed in my presence.:                     [1]   Past Medical History:  Diagnosis Date    Anxiety     Depression     Elevated cholesterol     Hypertension     Kidney stone     PONV (postoperative nausea and vomiting)     Recurrent UTI    [2]   Past Surgical History:  Procedure Laterality Date    BREAST CYST ASPIRATION      COLONOSCOPY      DIAGNOSTIC LAPAROSCOPY      Exploratory, last assessed 10/25/16    MASS EXCISION Left 3/31/2025    Procedure: LEFT LONG FINGER EXCISION OF Mucous Cyst and Osteophyte;  Surgeon: Greyson Hess MD;  Location:  MAIN OR;  Service: Orthopedics    NASAL SINUS SURGERY      age 33 - FESS and septoplasty - unknown surgeon    ME STRTCTC CPTR ASSTD PX EXTRADURAL CRANIAL N/A 11/08/2016    Procedure: FUNCTIONAL ENDOSCOPIC SINUS SURGERY (FESS) IMAGED GUIDED;  Surgeon: Tony Morales DO;  Location: [facility];  Service: ENT    WISDOM TOOTH EXTRACTION     [3]   Family History  Problem Relation Name Age of Onset    Osteoporosis Mother Kamila Sell     Coronary artery disease  "Father          CABG    Diabetes type II Sister Priscilla Wyatt     Asthma Sister Priscilla Wyatt     Diabetes Sister Priscilla Wyatt     Diabetes Sister Lucie Elmore     No Known Problems Daughter      No Known Problems Maternal Grandmother      Diabetes Maternal Grandfather Shruthi     No Known Problems Paternal Grandmother      No Known Problems Paternal Grandfather      Bipolar disorder Brother      Depression Brother          with anxiety     Esophageal cancer Brother      Depression Family          with anxiety     Hyperlipidemia Family      Ovarian cancer Maternal Aunt      No Known Problems Maternal Aunt      Breast cancer Neg Hx      Colon cancer Neg Hx      Uterine cancer Neg Hx     [4]   Allergies  Allergen Reactions    Benadryl [Diphenhydramine] Other (See Comments)     Cannot sleep    Claritin [Loratadine] Other (See Comments)     Tremors in the arms    Fexofenadine-Pseudoephed Er Other (See Comments)     \"didn't feel good\"    \"didn't feel good\"      Pollen Extract     Prednisone Nausea Only    Singulair [Montelukast]      Did not feel well, no particular assistance.     "

## 2025-07-03 ENCOUNTER — HOSPITAL ENCOUNTER (EMERGENCY)
Facility: HOSPITAL | Age: 65
Discharge: HOME | End: 2025-07-03
Attending: EMERGENCY MEDICINE
Payer: COMMERCIAL

## 2025-07-03 ENCOUNTER — APPOINTMENT (EMERGENCY)
Dept: RADIOLOGY | Facility: HOSPITAL | Age: 65
End: 2025-07-03
Payer: COMMERCIAL

## 2025-07-03 VITALS
RESPIRATION RATE: 16 BRPM | DIASTOLIC BLOOD PRESSURE: 87 MMHG | TEMPERATURE: 97.9 F | HEART RATE: 82 BPM | OXYGEN SATURATION: 98 % | SYSTOLIC BLOOD PRESSURE: 163 MMHG
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
## 2025-07-03 LAB
ANION GAP SERPL CALCULATED.3IONS-SCNC: 8 MMOL/L (ref 4–13)
BASOPHILS # BLD AUTO: 0.05 THOUSANDS/ÂΜL (ref 0–0.1)
BASOPHILS NFR BLD AUTO: 0 % (ref 0–1)
BUN SERPL-MCNC: 29 MG/DL (ref 5–25)
CALCIUM SERPL-MCNC: 9.2 MG/DL (ref 8.4–10.2)
CHLORIDE SERPL-SCNC: 104 MMOL/L (ref 96–108)
CO2 SERPL-SCNC: 27 MMOL/L (ref 21–32)
CREAT SERPL-MCNC: 0.63 MG/DL (ref 0.6–1.3)
EOSINOPHIL # BLD AUTO: 0.2 THOUSAND/ÂΜL (ref 0–0.61)
EOSINOPHIL NFR BLD AUTO: 2 % (ref 0–6)
ERYTHROCYTE [DISTWIDTH] IN BLOOD BY AUTOMATED COUNT: 12.3 % (ref 11.6–15.1)
GFR SERPL CREATININE-BSD FRML MDRD: 93 ML/MIN/1.73SQ M
GLUCOSE SERPL-MCNC: 126 MG/DL (ref 65–140)
HCT VFR BLD AUTO: 44.6 % (ref 34.8–46.1)
HGB BLD-MCNC: 14.6 G/DL (ref 11.5–15.4)
IMM GRANULOCYTES # BLD AUTO: 0.09 THOUSAND/UL (ref 0–0.2)
IMM GRANULOCYTES NFR BLD AUTO: 1 % (ref 0–2)
LIPASE SERPL-CCNC: 25 U/L (ref 11–82)
LYMPHOCYTES # BLD AUTO: 1.01 THOUSANDS/ÂΜL (ref 0.6–4.47)
LYMPHOCYTES NFR BLD AUTO: 9 % (ref 14–44)
MCH RBC QN AUTO: 28.7 PG (ref 26.8–34.3)
MCHC RBC AUTO-ENTMCNC: 32.7 G/DL (ref 31.4–37.4)
MCV RBC AUTO: 88 FL (ref 82–98)
MONOCYTES # BLD AUTO: 0.66 THOUSAND/ÂΜL (ref 0.17–1.22)
MONOCYTES NFR BLD AUTO: 6 % (ref 4–12)
NEUTROPHILS # BLD AUTO: 9.75 THOUSANDS/ÂΜL (ref 1.85–7.62)
NEUTS SEG NFR BLD AUTO: 82 % (ref 43–75)
NRBC BLD AUTO-RTO: 0 /100 WBCS
PLATELET # BLD AUTO: 251 THOUSANDS/UL (ref 149–390)
PMV BLD AUTO: 9 FL (ref 8.9–12.7)
POTASSIUM SERPL-SCNC: 3.9 MMOL/L (ref 3.5–5.3)
RBC # BLD AUTO: 5.08 MILLION/UL (ref 3.81–5.12)
SODIUM SERPL-SCNC: 139 MMOL/L (ref 135–147)
WBC # BLD AUTO: 11.76 THOUSAND/UL (ref 4.31–10.16)

## 2025-07-03 PROCEDURE — 73090 X-RAY EXAM OF FOREARM: CPT

## 2025-07-03 PROCEDURE — 99284 EMERGENCY DEPT VISIT MOD MDM: CPT

## 2025-07-03 PROCEDURE — 99285 EMERGENCY DEPT VISIT HI MDM: CPT | Performed by: EMERGENCY MEDICINE

## 2025-07-03 PROCEDURE — 85025 COMPLETE CBC W/AUTO DIFF WBC: CPT | Performed by: EMERGENCY MEDICINE

## 2025-07-03 PROCEDURE — 36415 COLL VENOUS BLD VENIPUNCTURE: CPT | Performed by: EMERGENCY MEDICINE

## 2025-07-03 PROCEDURE — 96374 THER/PROPH/DIAG INJ IV PUSH: CPT

## 2025-07-03 PROCEDURE — 74177 CT ABD & PELVIS W/CONTRAST: CPT

## 2025-07-03 PROCEDURE — 90715 TDAP VACCINE 7 YRS/> IM: CPT

## 2025-07-03 PROCEDURE — 71260 CT THORAX DX C+: CPT

## 2025-07-03 PROCEDURE — 90471 IMMUNIZATION ADMIN: CPT

## 2025-07-03 PROCEDURE — 70450 CT HEAD/BRAIN W/O DYE: CPT

## 2025-07-03 PROCEDURE — 73502 X-RAY EXAM HIP UNI 2-3 VIEWS: CPT

## 2025-07-03 PROCEDURE — 72125 CT NECK SPINE W/O DYE: CPT

## 2025-07-03 PROCEDURE — 73080 X-RAY EXAM OF ELBOW: CPT

## 2025-07-03 PROCEDURE — 80048 BASIC METABOLIC PNL TOTAL CA: CPT | Performed by: EMERGENCY MEDICINE

## 2025-07-03 PROCEDURE — 83690 ASSAY OF LIPASE: CPT | Performed by: EMERGENCY MEDICINE

## 2025-07-03 RX ORDER — LIDOCAINE 50 MG/G
1 PATCH TOPICAL DAILY
Qty: 14 PATCH | Refills: 0 | Status: SHIPPED | OUTPATIENT
Start: 2025-07-03 | End: 2025-07-17

## 2025-07-03 RX ORDER — OXYCODONE HYDROCHLORIDE 5 MG/1
5 TABLET ORAL EVERY 4 HOURS PRN
Qty: 12 TABLET | Refills: 0 | Status: SHIPPED | OUTPATIENT
Start: 2025-07-03 | End: 2025-07-09 | Stop reason: SDUPTHER

## 2025-07-03 RX ORDER — ACETAMINOPHEN 500 MG
500 TABLET ORAL EVERY 6 HOURS
Qty: 56 TABLET | Refills: 0 | Status: SHIPPED | OUTPATIENT
Start: 2025-07-03 | End: 2025-07-17

## 2025-07-03 RX ORDER — ACETAMINOPHEN 500 MG
500 TABLET ORAL EVERY 6 HOURS PRN
Qty: 56 TABLET | Refills: 0 | Status: SHIPPED | OUTPATIENT
Start: 2025-07-03 | End: 2025-07-03

## 2025-07-03 RX ORDER — NAPROXEN 500 MG/1
500 TABLET ORAL 2 TIMES DAILY WITH MEALS
Qty: 30 TABLET | Refills: 0 | Status: SHIPPED | OUTPATIENT
Start: 2025-07-03 | End: 2025-07-17

## 2025-07-03 RX ORDER — KETOROLAC TROMETHAMINE 30 MG/ML
15 INJECTION, SOLUTION INTRAMUSCULAR; INTRAVENOUS ONCE
Status: COMPLETED | OUTPATIENT
Start: 2025-07-03 | End: 2025-07-03

## 2025-07-03 RX ADMIN — IOHEXOL 85 ML: 350 INJECTION, SOLUTION INTRAVENOUS at 15:12

## 2025-07-03 RX ADMIN — KETOROLAC TROMETHAMINE 15 MG: 30 INJECTION, SOLUTION INTRAMUSCULAR; INTRAVENOUS at 14:24

## 2025-07-03 RX ADMIN — TETANUS TOXOID, REDUCED DIPHTHERIA TOXOID AND ACELLULAR PERTUSSIS VACCINE, ADSORBED 0.5 ML: 5; 2.5; 8; 8; 2.5 SUSPENSION INTRAMUSCULAR at 17:46

## 2025-07-03 NOTE — ED NOTES
Patient ambulated to the bathroom with no complaints. Provider made aware.     Angie Herrera RN  07/03/25 [id number]
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
## 2025-07-09 ENCOUNTER — OFFICE VISIT (OUTPATIENT)
Dept: FAMILY MEDICINE CLINIC | Facility: CLINIC | Age: 65
End: 2025-07-09
Payer: COMMERCIAL

## 2025-07-09 VITALS
WEIGHT: 141 LBS | OXYGEN SATURATION: 97 % | SYSTOLIC BLOOD PRESSURE: 120 MMHG | BODY MASS INDEX: 25.95 KG/M2 | DIASTOLIC BLOOD PRESSURE: 80 MMHG | TEMPERATURE: 98.2 F | HEART RATE: 65 BPM | HEIGHT: 62 IN | RESPIRATION RATE: 18 BRPM

## 2025-07-09 DIAGNOSIS — R91.1 PULMONARY NODULE: ICD-10-CM
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
## 2025-07-09 DIAGNOSIS — M81.0 AGE-RELATED OSTEOPOROSIS WITHOUT CURRENT PATHOLOGICAL FRACTURE: ICD-10-CM

## 2025-07-09 DIAGNOSIS — I10 ESSENTIAL HYPERTENSION: Primary | ICD-10-CM

## 2025-07-09 PROCEDURE — G2211 COMPLEX E/M VISIT ADD ON: HCPCS | Performed by: FAMILY MEDICINE

## 2025-07-09 PROCEDURE — 99214 OFFICE O/P EST MOD 30 MIN: CPT | Performed by: FAMILY MEDICINE

## 2025-07-09 RX ORDER — OXYCODONE HYDROCHLORIDE 5 MG/1
5 TABLET ORAL EVERY 4 HOURS PRN
Qty: 20 TABLET | Refills: 0 | Status: SHIPPED | OUTPATIENT
Start: 2025-07-09

## 2025-07-09 RX ORDER — OXYCODONE HYDROCHLORIDE 5 MG/1
5 TABLET ORAL EVERY 4 HOURS PRN
Qty: 12 TABLET | Refills: 0 | Status: CANCELLED | OUTPATIENT
Start: 2025-07-09
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
## 2025-07-09 NOTE — ASSESSMENT & PLAN NOTE
Patient did have a CT in the ER of the chest.  This did show a pulmonary nodule.  It was unchanged from prior.  There would not be any follow-up necessary based on that.

## 2025-07-31 ENCOUNTER — TELEPHONE (OUTPATIENT)
Age: 65
End: 2025-07-31

## 2025-08-22 ENCOUNTER — RA CDI HCC (OUTPATIENT)
Dept: OTHER | Facility: HOSPITAL | Age: 65
End: 2025-08-22

## (undated) DEVICE — INTENDED FOR TISSUE SEPARATION, AND OTHER PROCEDURES THAT REQUIRE A SHARP SURGICAL BLADE TO PUNCTURE OR CUT.: Brand: BARD-PARKER ® CARBON RIB-BACK BLADES

## (undated) DEVICE — SUT ETHILON 4-0 PS-2 18 IN 1667H

## (undated) DEVICE — CUFF TOURNIQUET 18 X 4 IN QUICK CONNECT DISP 1 BLADDER

## (undated) DEVICE — GLOVE INDICATOR PI UNDERGLOVE SZ 8 BLUE

## (undated) DEVICE — GLOVE PI ULTRA TOUCH SZ.8.0

## (undated) DEVICE — CHLORHEXIDINE 4PCT 4 OZ

## (undated) DEVICE — STERILE BETHLEHEM PLASTIC HAND: Brand: CARDINAL HEALTH

## (undated) DEVICE — CAST PADDING 4 IN SYNTHETIC NON-STRL

## (undated) DEVICE — GLOVE INDICATOR PI UNDERGLOVE SZ 6.5 BLUE

## (undated) DEVICE — PREMIUM DRY TRAY LF: Brand: MEDLINE INDUSTRIES, INC.

## (undated) DEVICE — PREP PAD BNS: Brand: CONVERTORS

## (undated) DEVICE — NEEDLE 25G X 1 1/2

## (undated) DEVICE — GLOVE SRG BIOGEL 6.5